# Patient Record
Sex: FEMALE | Race: WHITE | NOT HISPANIC OR LATINO | Employment: OTHER | URBAN - METROPOLITAN AREA
[De-identification: names, ages, dates, MRNs, and addresses within clinical notes are randomized per-mention and may not be internally consistent; named-entity substitution may affect disease eponyms.]

---

## 2017-01-31 ENCOUNTER — TELEPHONE (OUTPATIENT)
Dept: INTERNAL MEDICINE | Facility: CLINIC | Age: 37
End: 2017-01-31

## 2017-01-31 NOTE — TELEPHONE ENCOUNTER
Pt states she passed out for an hour this morning-due to muscle spasm. She does report feeling baseline dizziness and dyspnea on exertion.  No clear orthopnea or PND.  No chest pain, abdominal complaints or urinary complaints.  No prior history of DVT.  Vertigo today.  Refusing ER -appt with Dr Ashford 02/02 9:20AM.  Olive Arthur RN 4:59 PM on 1/31/2017.

## 2017-02-17 DIAGNOSIS — Z87.2: ICD-10-CM

## 2017-02-20 NOTE — TELEPHONE ENCOUNTER
minocycline 50 MG      Last Written Prescription Date:  7/7/16  Last Fill Quantity: 60,   # refills: 5  Last Office Visit : 8/25/16  Future Office visit:  none  Routing refill request to provider for review/approval because:   Refer to last progress notes for plan; give one refill to allow for patient to schedule    No Progress Note/Plan

## 2017-02-21 RX ORDER — MINOCYCLINE HYDROCHLORIDE 50 MG/1
50 CAPSULE ORAL 2 TIMES DAILY
Qty: 60 CAPSULE | Refills: 0 | Status: SHIPPED | OUTPATIENT
Start: 2017-02-21 | End: 2018-01-19

## 2017-02-27 DIAGNOSIS — Z87.2: ICD-10-CM

## 2017-02-28 RX ORDER — MINOCYCLINE HYDROCHLORIDE 50 MG/1
CAPSULE ORAL
Qty: 60 CAPSULE | Refills: 0 | OUTPATIENT
Start: 2017-02-28

## 2017-03-15 DIAGNOSIS — T78.40XA ALLERGIC STATE: Primary | ICD-10-CM

## 2017-03-15 RX ORDER — PSEUDOEPHEDRINE HCL 120 MG/1
120 TABLET, FILM COATED, EXTENDED RELEASE ORAL EVERY 12 HOURS PRN
Qty: 60 TABLET | Refills: 3 | Status: SHIPPED | OUTPATIENT
Start: 2017-03-15 | End: 2017-08-01

## 2017-03-15 NOTE — TELEPHONE ENCOUNTER
sudafed  Last Written Prescription Date:  6/24/15  Last Fill Quantity: 60,   # refills: 5  Last Office Visit : 8/25/16  Future Office visit:  no

## 2017-03-24 DIAGNOSIS — G47.00 INSOMNIA: ICD-10-CM

## 2017-03-27 RX ORDER — NORTRIPTYLINE HYDROCHLORIDE 50 MG/1
CAPSULE ORAL
Qty: 90 CAPSULE | Refills: 0 | OUTPATIENT
Start: 2017-03-27

## 2017-04-19 ENCOUNTER — HOSPITAL ENCOUNTER (EMERGENCY)
Facility: CLINIC | Age: 37
Discharge: HOME | End: 2017-04-19
Attending: EMERGENCY MEDICINE | Admitting: EMERGENCY MEDICINE
Payer: MEDICARE

## 2017-04-19 VITALS
SYSTOLIC BLOOD PRESSURE: 128 MMHG | DIASTOLIC BLOOD PRESSURE: 72 MMHG | HEART RATE: 82 BPM | TEMPERATURE: 97.5 F | OXYGEN SATURATION: 98 % | RESPIRATION RATE: 20 BRPM

## 2017-04-19 DIAGNOSIS — T14.8XXA SKIN EXCORIATION: ICD-10-CM

## 2017-04-19 DIAGNOSIS — T14.8XXA SCRATCH MARK: ICD-10-CM

## 2017-04-19 PROCEDURE — 99282 EMERGENCY DEPT VISIT SF MDM: CPT

## 2017-04-19 RX ORDER — MUPIROCIN 20 MG/G
OINTMENT TOPICAL 3 TIMES DAILY
Qty: 22 G | Refills: 1 | Status: SHIPPED | OUTPATIENT
Start: 2017-04-19 | End: 2017-04-24

## 2017-04-19 RX ORDER — CEPHALEXIN 500 MG/1
500 CAPSULE ORAL 4 TIMES DAILY
Qty: 28 CAPSULE | Refills: 0 | Status: SHIPPED | OUTPATIENT
Start: 2017-04-19 | End: 2017-04-26

## 2017-04-19 NOTE — ED AVS SNAPSHOT
[facility] Emergency Department    [address]    [facility] [address]    Phone:  [phone]    Fax:  [phone]                                       Giovana Joaquin   MRN: [id number]    Department:  [facility] Emergency Department   Date of Visit:  4/19/2017           After Visit Summary Signature Page     I have received my discharge instructions, and my questions have been answered. I have discussed any challenges I see with this plan with the nurse or doctor.    ..........................................................................................................................................  Patient/Patient Representative Signature      ..........................................................................................................................................  Patient Representative Print Name and Relationship to Patient    ..................................................               ................................................  Date                                            Time    ..........................................................................................................................................  Reviewed by Signature/Title    ...................................................              ..............................................  Date                                                            Time

## 2017-04-19 NOTE — ED NOTES
"In to discharge pt now; pt began yelling at nurse about \"awful care\" she received; pt told nurse that MD told patient that facial rash is not shingles and that scratches were caused by her or another person; also states MD turned on light even though pt had migraine for his visit and left it on for his exam; states MD did not treat her migraine; MD consulted; confirmed what he had told patient; patient demands to speak to \"the person in charge\"; charge nurse PJ RN asked to see pt.  "

## 2017-04-19 NOTE — ED AVS SNAPSHOT
[facility] Emergency Department    [address] [place] [address]    Phone:  [phone]    Fax:  [phone]                                       Giovana Joaquin   MRN: [id number]    Department:  [facility] Emergency Department   Date of Visit:  4/19/2017           Patient Information     Date Of Birth          1980        Your diagnoses for this visit were:     Skin excoriation chin    Scratch nino Right shoulder       You were seen by Jorge Wheeler MD.      Follow-up Information     Follow up with Gloria Love MD.    Specialty:  Internal Medicine    Why:  As needed    Contact information:    [facility]  [address]  [facility] [address] [phone]        Discharge References/Attachments     CELLULITIS, FACIAL (ENGLISH)      Future Appointments        Provider Department Dept Phone Center    4/19/2017 10:30 AM Hammad Lowe MD [facility] Ear Nose and Throat [phone] [facility]    5/17/2017 10:30 AM Ray Mcfarland [facility] Audiology [phone] [facility]    5/17/2017 11:45 AM Tobin Carrillo MD [facility] Ear Nose and Throat [phone] [facility]    8/16/2017 10:30 AM Hammad Lowe MD [facility] Ear Nose and Throat [phone] [facility]      24 Hour Appointment Hotline       To make an appointment at any [facility], call [phone]). If you don't have a family doctor or clinic, we will help you find one. Central clinics are conveniently located to serve the needs of you and your family.             Review of your medicines      START taking        Dose / Directions Last dose taken    cephALEXin 500 MG capsule   Commonly known as:  KEFLEX   Dose:  500 mg   Quantity:  28 capsule        Take 1 capsule (500 mg) by mouth 4 times daily for 7 days   Refills:  0        mupirocin 2 % ointment   Commonly known as:  BACTROBAN   Quantity:  22 g        Apply topically 3 times daily for 5 days    Refills:  1          Our records show that you are taking the medicines listed below. If these are incorrect, please call your family doctor or clinic.        Dose / Directions Last dose taken    atenolol 25 MG tablet   Commonly known as:  TENORMIN   Dose:  25 mg   Quantity:  90 tablet        Take 1 tablet (25 mg) by mouth daily   Refills:  3        BACLOFEN PO   Dose:  10 mg        Take 10 mg by mouth 3 times daily   Refills:  0        cetirizine 10 MG tablet   Commonly known as:  ZYRTEC ALLERGY   Dose:  10 mg   Quantity:  90 tablet        Take 1 tablet (10 mg) by mouth daily   Refills:  2        cevimeline 30 MG capsule   Commonly known as:  EVOXAC   Dose:  30 mg   Quantity:  270 capsule        Take 1 capsule (30 mg) by mouth 3 times daily   Refills:  3        esomeprazole 40 MG CR capsule   Commonly known as:  nexIUM   Dose:  40 mg   Quantity:  90 capsule        Take 1 capsule (40 mg) by mouth every morning (before breakfast) One hour before meals   Refills:  3        fentaNYL 12 mcg/hr 72 hr patch   Commonly known as:  DURAGESIC   Dose:  1 patch        Place 1 patch onto the skin every 72 hours   Refills:  0        ferrous sulfate 325 (65 FE) MG tablet   Commonly known as:  IRON   Dose:  325 mg   Quantity:  100 tablet        Take 1 tablet (325 mg) by mouth 2 times daily   Refills:  3        FETZIMA 80 MG 24 hr capsule   Dose:  80 mg   Generic drug:  levomilnacipran        Take 80 mg by mouth daily   Refills:  0        gabapentin 300 MG capsule   Commonly known as:  NEURONTIN        Takes 600 mg qid   Refills:  0        MAGNESIUM OXIDE PO   Dose:  500 mg        Take 500 mg by mouth daily   Refills:  0        meclizine 12.5 MG tablet   Commonly known as:  ANTIVERT   Dose:  12.5-25 mg   Quantity:  90 tablet        Take 1-2 tablets (12.5-25 mg) by mouth 3 times daily as needed for dizziness (may cause drowsiness)   Refills:  1        minocycline 50 MG capsule   Commonly known as:  MINOCIN/DYNACIN   Dose:  50 mg    Quantity:  60 capsule        Take 1 capsule (50 mg) by mouth 2 times daily Refills from originating provider or needs appt to discuss   Refills:  0        MULTIVITAL PO        Take by mouth daily   Refills:  0        nabumetone 500 MG tablet   Commonly known as:  RELAFEN   Dose:  500 mg   Quantity:  180 tablet        Take 1 tablet (500 mg) by mouth 2 times daily   Refills:  3        nortriptyline 50 MG capsule   Commonly known as:  PAMELOR   Dose:  50 mg   Quantity:  90 capsule        Take 1 capsule (50 mg) by mouth At Bedtime   Refills:  1        ondansetron 4 MG ODT tab   Commonly known as:  ZOFRAN-ODT   Dose:  4 mg   Quantity:  20 tablet        Take 1 tablet (4 mg) by mouth every 8 hours as needed for nausea   Refills:  1        oxyCODONE-acetaminophen 5-325 MG per tablet   Commonly known as:  PERCOCET   Dose:  0.05 tablet        Take 0.05 tablets by mouth 2 times daily   Refills:  0        pseudoePHEDrine 120 MG 12 hr tablet   Commonly known as:  SUDAFED   Dose:  120 mg   Quantity:  60 tablet        Take 1 tablet (120 mg) by mouth every 12 hours as needed for congestion   Refills:  3        STRATTERA 60 MG capsule   Dose:  80 mg   Generic drug:  atomoxetine        Take 80 mg by mouth daily   Refills:  0        * SUMAtriptan 6 MG/0.5ML injection   Commonly known as:  IMITREX   Dose:  6 mg   Quantity:  6 mL        Inject 0.5 mLs (6 mg) Subcutaneous once as needed May repeat once after one hour, max 2 doses in 24 hours   Refills:  1        * SUMAtriptan 100 MG tablet   Commonly known as:  IMITREX   Dose:  100 mg   Quantity:  27 tablet        Take 1 tablet (100 mg) by mouth at onset of headache May repeat after one hour, max 200 mg in 24 hours   Refills:  2        * SUMAtriptan 50 MG tablet   Commonly known as:  IMITREX   Dose:   mg   Quantity:  27 tablet        Take 1-2 tablets ( mg) by mouth at onset of headache for migraine (max 100 mg in 24 hours)   Refills:  2        vitamin D 2000 UNITS Caps         Refills:  0        * Notice:  This list has 3 medication(s) that are the same as other medications prescribed for you. Read the directions carefully, and ask your doctor or other care provider to review them with you.            Prescriptions were sent or printed at these locations (2 Prescriptions)                   Other Prescriptions                Printed at Department/Unit printer (2 of 2)         cephALEXin (KEFLEX) 500 MG capsule               mupirocin (BACTROBAN) 2 % ointment                Orders Needing Specimen Collection     None      Pending Results     No orders found from 4/17/2017 to 4/20/2017.            Pending Culture Results     No orders found from 4/17/2017 to 4/20/2017.            Test Results From Your Hospital Stay               Clinical Quality Measure: Blood Pressure Screening     Your blood pressure was checked while you were in the emergency department today. The last reading we obtained was  BP: 135/83 . Please read the guidelines below about what these numbers mean and what you should do about them.  If your systolic blood pressure (the top number) is less than 120 and your diastolic blood pressure (the bottom number) is less than 80, then your blood pressure is normal. There is nothing more that you need to do about it.  If your systolic blood pressure (the top number) is 120-139 or your diastolic blood pressure (the bottom number) is 80-89, your blood pressure may be higher than it should be. You should have your blood pressure rechecked within a year by a primary care provider.  If your systolic blood pressure (the top number) is 140 or greater or your diastolic blood pressure (the bottom number) is 90 or greater, you may have high blood pressure. High blood pressure is treatable, but if left untreated over time it can put you at risk for heart attack, stroke, or kidney failure. You should have your blood pressure rechecked by a primary care provider within the next 4 weeks.  If  your provider in the emergency department today gave you specific instructions to follow-up with your doctor or provider even sooner than that, you should follow that instruction and not wait for up to 4 weeks for your follow-up visit.        Thank you for choosing [facility]       Thank you for choosing [facility] for your care. Our goal is always to provide you with excellent care. Hearing back from our patients is one way we can continue to improve our services. Please take a few minutes to complete the written survey that you may receive in the mail after you visit with us. Thank you!        This Week [facility] Information     [facility] gives you secure access to your electronic health record. If you see a primary care provider, you can also send messages to your care team and make appointments. If you have questions, please call your primary care clinic.  If you do not have a primary care provider, please call [phone] and they will assist you.        Care EveryWhere ID     This is your Care EveryWhere ID. This could be used by other organizations to access your [facility] medical records  [id number]        After Visit Summary       This is your record. Keep this with you and show to your community pharmacist(s) and doctor(s) at your next visit.

## 2017-04-19 NOTE — ED NOTES
Pt to ER with c/o rash to face,  And right upper arm pt concerned for shingles has had them in the past

## 2017-04-19 NOTE — ED PROVIDER NOTES
History     Chief Complaint:  Rash      HPI   Giovana Joaquin is a 37 year old female who presents with a rash. The patient states that for the last month she developed a sores in her chin area, which seemed to get more crusty yesterday evening/this morning. After noticing this, she cleaned and then applied hydrogen peroxide to the area. This was immediately painful, and she has had pain at the site since. She also comments on a rash on her right shoulder as well.        Allergies:  Ambien   Levaquinn  Morphine      Medications:    Pseudoephedrine (sudafed) 120 mg 12 hr tablet  Minocycline (minocin/dynacin) 50 mg capsule  Nortriptyline (pamelor) 50 mg capsule  Ondansetron (zofran-odt) 4 mg odt tab  Meclizine (antivert) 12.5 mg tablet  Cetirizine (zyrtec allergy) 10 mg tablet  Sumatriptan (imitrex) 100 mg tablet  Sumatriptan (imitrex) 50 mg tablet  Atenolol (tenormin) 25 mg tablet  Nabumetone (relafen) 500 mg tablet  Cevimeline (evoxac) 30 mg capsule  Sumatriptan (imitrex) 6 mg/0.5ml vial  Magnesium oxide po  Levomilnacipran (fetzima) 80 mg 24 hr capsule  Esomeprazole (nexium) 40 mg capsule  Oxycodone-acetaminophen (percocet) 5-325 mg per tablet  Fentanyl (duragesic) 12 mcg/hr patch 72 hr  Atomoxetine (strattera) 60 mg capsule  Multiple vitamins-minerals (multivital po)  Cholecalciferol (vitamin d) 2000 units caps  Baclofen po  Gabapentin 300 mg or caps  Ferrous sulfate (iron) 325 (65 fe) mg tablet    Past Medical History:    ADD (attention deficit disorder)   Anxiety   Blood transfusion   Brain tumor (H)   Chronic pain   Cryoglobulinemia (H)   Difficult intubation   EDS (Castro-Danlos syndrome)   Gastro-oesophageal reflux disease   Hypertension   IBS (irritable bowel syndrome)   MDD (major depressive disorder)   Migraine   Obesity (BMI 30-39.9) Velopharyngeal insufficiency, acquired   Oligomenorrhea  Dysfunction of eustachian tube  Velopharyngeal insufficiency, acquired  Chordoma (H)    Past Surgical History:     Biopsy  Brain tumor removal  DAVINCI ASSISTED UVULOPALATOPHARYNGOPLASTY  Neck fusion to C3  Proton particle radiation  Soft palette removed, throat x4    Family History:    Arthritis - Father     Social History:  Marital Status: Single  Presents to the ED alone  Tobacco Use: Former Smoker, 0.2 ppd for 10 years, quit 2013   Alcohol Use: Yes  PCP: Gloria Love        Review of Systems   Skin: Positive for rash.   All other systems reviewed and are negative.        Physical Exam   First Vitals:  BP: 135/83  Pulse: 94  Temp: 97.5  F (36.4  C)  Resp: 18  SpO2: 100 %      Physical Exam    Nursing note and vitals reviewed.  Constitutional: Cooperative.   HENT:   Mouth/Throat: Mucous membranes are normal.   Eyes: Pupils are equal, round, and reactive to light.   Cardiovascular: Normal rate, regular rhythm and normal heart sounds.  No murmur.  Pulmonary/Chest: Effort normal and breath sounds normal. No respiratory distress. Musculoskeletal: Normal range of motion of UE's.   Neurological: Alert.   Skin: Skin is warm and dry. Linear excoriations to the right shoulder consistent with scratch marks, no evidence of cellulitis Chin: multiple open excoriations consistent with picking. No vesicular lesions. No cellulitis   Psychiatric: Normal mood and affect.     Emergency Department Course     Emergency Department Course:  Nursing notes and vitals reviewed.  I performed an exam of the patient as documented above.   Findings and plan explained to the patient. Patient discharged home with instructions regarding supportive care, medications, and reasons to return. The importance of close follow-up was reviewed. The patient was prescribed Keflex and Bactroban.       Impression & Plan      Medical Decision Making:  Patient is a 37 year old female who presents with a rash on her chin and scratch marks on her shoulder. She states that she has not scratched, but it is very clear that these are linear excoriations to her right  shoulder consistent with where her left arm would be able to reach. There could be another cause beside self induced, though I'm suspicious. Regardless, there is no evidence of cellulitis or secondary infection here. With regards to the chin, there is multiple excoriations that are in various stages of healing. These are not vesicular. Perhaps this is impetigo, though she just cleaned with H2O2 so it doesn't have an crusting at this time. She was worried about shingles, but with being on bilateral sides of the face, this seems unlikely. Topical treatment with Bactroban as well as oral antibiotics would be reasonable at this time. No indication for imaging, lab work or further workup.         Diagnosis:    ICD-10-CM    1. Skin excoriation T14.8     chin   2. Scratch nino T14.8     Right shoulder       Disposition:  Discharged to home    Discharge Medications:  Discharge Medication List as of 4/19/2017  2:55 AM      START taking these medications    Details   cephALEXin (KEFLEX) 500 MG capsule Take 1 capsule (500 mg) by mouth 4 times daily for 7 days, Disp-28 capsule, R-0, Local Print      mupirocin (BACTROBAN) 2 % ointment Apply topically 3 times daily for 5 daysDisp-22 g, R-1Local Print             Hamilton RIVERA am serving as a scribe on 4/19/2017 at 2:25 AM to personally document services performed by Dr. Wheeler based on my observations and the provider's statements to me.   4/19/2017   [facility] EMERGENCY DEPARTMENT       Jorge Wheeler MD  04/19/17 [id number]

## 2017-05-09 DIAGNOSIS — H93.19 TINNITUS: Primary | ICD-10-CM

## 2017-05-16 DIAGNOSIS — Z87.2: ICD-10-CM

## 2017-05-20 RX ORDER — MINOCYCLINE HYDROCHLORIDE 50 MG/1
50 CAPSULE ORAL 2 TIMES DAILY
Qty: 60 CAPSULE | Refills: 0 | OUTPATIENT
Start: 2017-05-20

## 2017-05-22 ENCOUNTER — TELEPHONE (OUTPATIENT)
Dept: INTERNAL MEDICINE | Facility: CLINIC | Age: 37
End: 2017-05-22

## 2017-05-22 NOTE — TELEPHONE ENCOUNTER
----- Message from Abebe Shi sent at 5/19/2017 12:28 PM CDT -----  Regarding: Medication question   Contact: [phone]  Patient reports she was seen Tuesday morning in the ED for generalized body swelling. They did some test but could not figure out what was wrong with her. She states she was prescribed hydrochlorothiazide at the ED, however, does not like this medication and would like to speak with you about this.       Left message for pt to call back.  Olive Arthur RN 11:33 AM on 5/22/2017.

## 2017-05-22 NOTE — TELEPHONE ENCOUNTER
----- Message from Chelsea Reid sent at 5/22/2017  1:48 PM CDT -----  Regarding: Saul Farerll, Patient wants to be see sooner.   Tammy Gardner wants to have a follow-up to her recent ER visit. She has an Infection + blisters on the face. Patient is allergic to the antibiotic the ER DrPiyush gave her. He also gave her cream for her face, that says not to put onto open sores. Face is not healing completely. Patient is swollen everywhere. She would like to get in with Dr. Farrell if possible. She is not scheduled to see Dr. Regalado tomorrow, but if there's any chance to see Dr. Farrell that would be better. Please f/u.     Left message for pt to call back.  Olive Arthur RN 3:12 PM on 5/22/2017.

## 2017-06-08 ENCOUNTER — ONCOLOGY VISIT (OUTPATIENT)
Dept: ONCOLOGY | Facility: CLINIC | Age: 37
End: 2017-06-08
Attending: INTERNAL MEDICINE
Payer: COMMERCIAL

## 2017-06-08 ENCOUNTER — ONCOLOGY VISIT (OUTPATIENT)
Dept: ONCOLOGY | Facility: CLINIC | Age: 37
End: 2017-06-08
Attending: INTERNAL MEDICINE
Payer: MEDICARE

## 2017-06-08 ENCOUNTER — HOSPITAL ENCOUNTER (OUTPATIENT)
Facility: CLINIC | Age: 37
Setting detail: SPECIMEN
Discharge: HOME | End: 2017-06-08
Attending: INTERNAL MEDICINE | Admitting: INTERNAL MEDICINE
Payer: MEDICARE

## 2017-06-08 VITALS
BODY MASS INDEX: 43.99 KG/M2 | SYSTOLIC BLOOD PRESSURE: 109 MMHG | HEART RATE: 82 BPM | TEMPERATURE: 97.7 F | OXYGEN SATURATION: 97 % | RESPIRATION RATE: 18 BRPM | WEIGHT: 232.8 LBS | DIASTOLIC BLOOD PRESSURE: 73 MMHG

## 2017-06-08 DIAGNOSIS — D50.9 IRON DEFICIENCY ANEMIA, UNSPECIFIED IRON DEFICIENCY ANEMIA TYPE: ICD-10-CM

## 2017-06-08 LAB
BASOPHILS # BLD AUTO: 0 10E9/L (ref 0–0.2)
BASOPHILS NFR BLD AUTO: 0.2 %
DIFFERENTIAL METHOD BLD: ABNORMAL
EOSINOPHIL # BLD AUTO: 0.2 10E9/L (ref 0–0.7)
EOSINOPHIL NFR BLD AUTO: 2.4 %
ERYTHROCYTE [DISTWIDTH] IN BLOOD BY AUTOMATED COUNT: 14.5 % (ref 10–15)
FERRITIN SERPL-MCNC: 24 NG/ML (ref 12–150)
HCT VFR BLD AUTO: 35 % (ref 35–47)
HGB BLD-MCNC: 11 G/DL (ref 11.7–15.7)
IMM GRANULOCYTES # BLD: 0 10E9/L (ref 0–0.4)
IMM GRANULOCYTES NFR BLD: 0.4 %
IRON SATN MFR SERPL: 5 % (ref 15–46)
IRON SERPL-MCNC: 19 UG/DL (ref 35–180)
LYMPHOCYTES # BLD AUTO: 1.4 10E9/L (ref 0.8–5.3)
LYMPHOCYTES NFR BLD AUTO: 16.1 %
MCH RBC QN AUTO: 28.4 PG (ref 26.5–33)
MCHC RBC AUTO-ENTMCNC: 31.4 G/DL (ref 31.5–36.5)
MCV RBC AUTO: 90 FL (ref 78–100)
MONOCYTES # BLD AUTO: 0.8 10E9/L (ref 0–1.3)
MONOCYTES NFR BLD AUTO: 9.4 %
NEUTROPHILS # BLD AUTO: 6 10E9/L (ref 1.6–8.3)
NEUTROPHILS NFR BLD AUTO: 71.5 %
NRBC # BLD AUTO: 0 10*3/UL
NRBC BLD AUTO-RTO: 0 /100
PLATELET # BLD AUTO: 278 10E9/L (ref 150–450)
RBC # BLD AUTO: 3.88 10E12/L (ref 3.8–5.2)
RETICS # AUTO: 70.6 10E9/L (ref 25–95)
RETICS/RBC NFR AUTO: 1.8 % (ref 0.5–2)
TIBC SERPL-MCNC: 378 UG/DL (ref 240–430)
TRANSFERRIN SERPL-MCNC: 298 MG/DL (ref 210–360)
WBC # BLD AUTO: 8.4 10E9/L (ref 4–11)

## 2017-06-08 PROCEDURE — 85025 COMPLETE CBC W/AUTO DIFF WBC: CPT | Performed by: INTERNAL MEDICINE

## 2017-06-08 PROCEDURE — 99211 OFF/OP EST MAY X REQ PHY/QHP: CPT

## 2017-06-08 PROCEDURE — 36415 COLL VENOUS BLD VENIPUNCTURE: CPT

## 2017-06-08 PROCEDURE — 99214 OFFICE O/P EST MOD 30 MIN: CPT | Performed by: INTERNAL MEDICINE

## 2017-06-08 PROCEDURE — 82728 ASSAY OF FERRITIN: CPT | Performed by: INTERNAL MEDICINE

## 2017-06-08 PROCEDURE — 85045 AUTOMATED RETICULOCYTE COUNT: CPT | Performed by: INTERNAL MEDICINE

## 2017-06-08 PROCEDURE — 84466 ASSAY OF TRANSFERRIN: CPT | Performed by: INTERNAL MEDICINE

## 2017-06-08 PROCEDURE — 83540 ASSAY OF IRON: CPT | Performed by: INTERNAL MEDICINE

## 2017-06-08 PROCEDURE — 83550 IRON BINDING TEST: CPT | Performed by: INTERNAL MEDICINE

## 2017-06-08 RX ORDER — FERROUS SULFATE 325(65) MG
325 TABLET ORAL 2 TIMES DAILY
Qty: 100 TABLET | Refills: 3 | Status: SHIPPED | OUTPATIENT
Start: 2017-06-08 | End: 2018-06-13

## 2017-06-08 ASSESSMENT — PAIN SCALES - GENERAL: PAINLEVEL: NO PAIN (0)

## 2017-06-08 NOTE — PROGRESS NOTES
[facility] PHYSICIANS  HEMATOLOGY ONCOLOGY    HEMATOLOGY FOLLOWUP NOTE      DIAGNOSES:   1.  Iron deficiency anemia      TREATMENT:  Iron sulfate 325 mg daily.      SUBJECTIVE:  Giovana Joaquin comes in for followup today. She has been at her baseline except for some complain of hair loss.      REVIEW OF SYSTEMS:  A complete review of systems was performed and found to be negative other than pertinent positives mentioned in history of present illness.     Past medical, social histories reviewed.    Meds- Reviewed.     PHYSICAL EXAMINATION:   VITAL SIGNS:/73 (BP Location: Left arm, Patient Position: Chair, Cuff Size: Adult Large)  Pulse 82  Temp 97.7  F (36.5  C) (Oral)  Resp 18  Wt 105.6 kg (232 lb 12.8 oz)  SpO2 97%  BMI 43.99 kg/m2  GENERAL: Sitting comfortably.   HEENT: Pupils are equal. Oropharynx is clear.   NECK: No cervical or supraclavicular lymphadenopathy.   LUNGS: Clear bilaterally.   HEART: S1, S2, regular.   ABDOMEN: Soft, nontender, nondistended, no hepatosplenomegaly.   EXTREMITIES: Warm, well perfused.   NEUROLOGIC: Alert, awake.   SKIN: No rash.   LYMPHATICS: No edema.     DATA:  Recent Labs   Lab Test  08/25/16   1231  07/27/16   1055   07/20/15   1150   05/10/13   0624  05/08/13   0355   NA  141  138   < >  138   < >   --   143   POTASSIUM  4.4  3.7   < >  3.8   < >   --   3.7   CHLORIDE  105  102   < >  104   < >   --   110*   CO2  30  29   < >  28   < >   --   24   ANIONGAP  6  7   < >  6   < >   --   9   BUN  12  17   < >  11   < >   --   9   CR  0.73  0.75   < >  0.68   < >   --   0.58   GLC  86  103*   < >  81   < >   --   108*   MIROSLAVA  9.6  9.2   < >  9.2   < >   --   8.9   MAG   --    --    --   2.0   --   2.0  1.7   PHOS   --    --    --    --    --   5.3*  4.4    < > = values in this interval not displayed.     Recent Labs   Lab Test  09/26/16   1359  08/25/16   1231  07/27/16   1055   07/20/15   1150   WBC  9.7  9.9  8.7   < >  10.8   HGB  12.1  10.6*  10.1*   < >   11.5*   PLT  265  297  293   < >  327   MCV  88  88  84   < >  85   NEUTROPHIL  67.8   --   65.3   --   75.1    < > = values in this interval not displayed.     Recent Labs   Lab Test  08/25/16   1231  07/27/16   1055  07/14/16   1046   08/11/14   1533   BILITOTAL  0.4  0.4  0.5   < >  0.3   ALKPHOS  134  136  142   < >  140   ALT  20  17  23   < >  27   AST  16  14  18   < >  16   ALBUMIN  3.3*  3.4  3.7   < >  3.5*   LDH   --   189   --    --   240*    < > = values in this interval not displayed.         Results for orders placed or performed during the hospital encounter of 08/25/16   US Lower Extremity Venous Duplex Bilateral    Narrative    US LOWER EXTREMITY VENOUS DUPLEX BILATERAL  8/25/2016 2:54 PM     HISTORY: Other specified soft tissue disorders    COMPARISON: None.    FINDINGS: Examination of the deep veins with graded compression and  color flow Doppler with spectral wave form analysis shows no evidence  of thrombus.      Impression    IMPRESSION: No evidence of deep venous thrombosis.      KELVIN CENTENO MD         ECOG performance status of 0.      ASSESSMENT:    1- This is a 34-year-old lady who was initially seen for neutrophilia. 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  She had a 42 radiation treatments for that since 2006.  She had evidence of leukocytosis and neutrophilia and absolute monocytosis. Negative for KING-2 mutation and BCR-ABL. She also had a polyclonal IgM immunoglobulin detected, unclear etiology which resolved later. She had abnormal serum free light chain ratio. Bone scan 7/16/15- possible focal lucency in calvarium- not a clear cut lytic lesion, level of suspicion was not high given no other lab abnormality indicting myeloma. Resolved.  2-  Iron deficiency anemia- labs are in process today. We will call her with the results.     PLAN:   1- Continue iron sulfate 325 mg BID  2- RTC MD 6 months  3- Labs before next visit-  CBC diff retic iron studies    SUMIT RUBY MD    6/8/2017

## 2017-06-08 NOTE — PROGRESS NOTES
"Oncology Rooming Note    June 8, 2017 12:06 PM   Giovana Joaquin is a 37 year old female who presents for:    Chief Complaint   Patient presents with     Oncology Clinic Visit     Chordoma      Initial Vitals: /73 (BP Location: Left arm, Patient Position: Chair, Cuff Size: Adult Large)  Pulse 82  Temp 97.7  F (36.5  C) (Oral)  Resp 18  Wt 105.6 kg (232 lb 12.8 oz)  SpO2 97%  BMI 43.99 kg/m2 Estimated body mass index is 43.99 kg/(m^2) as calculated from the following:    Height as of 8/13/16: 1.549 m (5' 1\").    Weight as of this encounter: 105.6 kg (232 lb 12.8 oz). Body surface area is 2.13 meters squared.  No Pain (0) Comment: Data Unavailable   No LMP recorded. Patient is not currently having periods (Reason: Irregular Periods).  Allergies reviewed: Yes  Medications reviewed: Yes    Medications: Medication refills not needed today.  Pharmacy name entered into EPIC:    [facility] [place] [address] [facility] [address] [place] [address] [address] [address] AT [place] [address] [address] & [facility]  [facility] [address] [place] [address] [id number] [address] [address]    Clinical concerns: Follow-Up Dr. Lan was notified.    6 minutes for nursing intake (face to face time)     Dinora Higuera MA    DISCHARGE PLAN:  1.) Patient to scheduled for labs and follow up in 6 months with Dr. Eduardo.   2.) Patient to be called with lab results from today.  Next appointments: See patient instruction section  Departure Mode: Ambulatory  Accompanied by: self  6 minutes for nursing discharge (face to face time)   Tea Farnsworth RN            "

## 2017-06-08 NOTE — MR AVS SNAPSHOT
After Visit Summary   6/8/2017    Giovana Joaquin    MRN: [id number]           Patient Information     Date Of Birth          1980        Visit Information        Provider Department      6/8/2017 11:30 AM Natacha Lan MD [facility]        Today's Diagnoses     Iron deficiency anemia, unspecified iron deficiency anemia type          Care Instructions    1- Continue iron sulfate 325 mg BID  2- RTC MD 6 months  3- Labs before next visit- CBC diff retic iron studies          Follow-ups after your visit        Your next 10 appointments already scheduled     Aug 16, 2017 10:30 AM CDT   (Arrive by 10:15 AM)   Return Visit with Hammad Lowe MD   [facility] ([facility])    [address] [place] [address]   [phone]            Sep 20, 2017 10:30 AM CDT   (Arrive by 10:15 AM)   Return Visit with Hammad Lowe MD   [facility] ([facility])    [address] [place] [address]   [phone]            Dec 05, 2017  1:00 PM CST   Return Visit with  Oncology Nurse   [facility] ([facility])    [facility]  [address]  [facility] [address] [phone]            Dec 07, 2017 11:00 AM CST   Return Visit with Natacha Lan MD   [facility] ([facility])    [facility]  [address]  [facility] [address] [phone]              Who to contact     If you have questions or need follow up information about today's clinic visit or your schedule please contact [facility] directly at [phone].  Normal or non-critical lab and imaging results will be communicated to you by MyChart, letter or phone within 4 business days after the clinic has received the results. If you do not hear from us within 7 days,  please contact the clinic through [employer] or phone. If you have a critical or abnormal lab result, we will notify you by phone as soon as possible.  Submit refill requests through [employer] or call your pharmacy and they will forward the refill request to us. Please allow 3 business days for your refill to be completed.          Additional Information About Your Visit        [employer] Information     [employer] gives you secure access to your electronic health record. If you see a primary care provider, you can also send messages to your care team and make appointments. If you have questions, please call your primary care clinic.  If you do not have a primary care provider, please call [phone] and they will assist you.        Care EveryWhere ID     This is your Care EveryWhere ID. This could be used by other organizations to access your [place] medical records  [id number]        Your Vitals Were     Pulse Temperature Respirations Pulse Oximetry BMI (Body Mass Index)       82 97.7  F (36.5  C) (Oral) 18 97% 43.99 kg/m2        Blood Pressure from Last 3 Encounters:   06/08/17 109/73   04/19/17 128/72   09/28/16 118/66    Weight from Last 3 Encounters:   06/08/17 105.6 kg (232 lb 12.8 oz)   09/28/16 108 kg (238 lb 3.2 oz)   08/25/16 110.2 kg (243 lb)              Today, you had the following     No orders found for display         Where to get your medicines      These medications were sent to [facility] [address] [address] AT [facility] [address] & [address] [address]    Hours:  24-hours Phone:  [phone]     ferrous sulfate 325 (65 FE) MG tablet          Primary Care Provider Office Phone # Fax #    Gloria Love [phone]       [address]  [facility] [address]        Thank you!     Thank you for choosing [facility]  for your care. Our goal is always to provide you with excellent care.  Hearing back from our patients is one way we can continue to improve our services. Please take a few minutes to complete the written survey that you may receive in the mail after your visit with us. Thank you!             Your Updated Medication List - Protect others around you: Learn how to safely use, store and throw away your medicines at www.disposemymeds.org.          This list is accurate as of: 6/8/17 12:27 PM.  Always use your most recent med list.                   Brand Name Dispense Instructions for use    atenolol 25 MG tablet    TENORMIN    90 tablet    Take 1 tablet (25 mg) by mouth daily       BACLOFEN PO      Take 10 mg by mouth 3 times daily       cetirizine 10 MG tablet    ZYRTEC ALLERGY    90 tablet    Take 1 tablet (10 mg) by mouth daily       cevimeline 30 MG capsule    EVOXAC    270 capsule    Take 1 capsule (30 mg) by mouth 3 times daily       esomeprazole 40 MG CR capsule    nexIUM    90 capsule    Take 1 capsule (40 mg) by mouth every morning (before breakfast) One hour before meals       fentaNYL 12 mcg/hr 72 hr patch    DURAGESIC     Place 1 patch onto the skin every 72 hours       ferrous sulfate 325 (65 FE) MG tablet    IRON    100 tablet    Take 1 tablet (325 mg) by mouth 2 times daily       FETZIMA 80 MG 24 hr capsule   Generic drug:  levomilnacipran      Take 80 mg by mouth daily       gabapentin 300 MG capsule    NEURONTIN     Takes 600 mg qid       MAGNESIUM OXIDE PO      Take 500 mg by mouth daily       meclizine 12.5 MG tablet    ANTIVERT    90 tablet    Take 1-2 tablets (12.5-25 mg) by mouth 3 times daily as needed for dizziness (may cause drowsiness)       minocycline 50 MG capsule    MINOCIN/DYNACIN    60 capsule    Take 1 capsule (50 mg) by mouth 2 times daily Refills from originating provider or needs appt to discuss       MULTIVITAL PO      Take by mouth daily       nabumetone 500 MG tablet    RELAFEN    180 tablet    Take 1 tablet (500 mg) by mouth 2 times daily        nortriptyline 50 MG capsule    PAMELOR    90 capsule    Take 1 capsule (50 mg) by mouth At Bedtime       ondansetron 4 MG ODT tab    ZOFRAN-ODT    20 tablet    Take 1 tablet (4 mg) by mouth every 8 hours as needed for nausea       oxyCODONE-acetaminophen 5-325 MG per tablet    PERCOCET     Take 0.05 tablets by mouth 2 times daily       pseudoePHEDrine 120 MG 12 hr tablet    SUDAFED    60 tablet    Take 1 tablet (120 mg) by mouth every 12 hours as needed for congestion       REXULTI 0.5 MG tablet   Generic drug:  brexpiprazole      Take 1 mg by mouth daily       STRATTERA 60 MG capsule   Generic drug:  atomoxetine      Take 80 mg by mouth daily       * SUMAtriptan 6 MG/0.5ML injection    IMITREX    6 mL    Inject 0.5 mLs (6 mg) Subcutaneous once as needed May repeat once after one hour, max 2 doses in 24 hours       * SUMAtriptan 100 MG tablet    IMITREX    27 tablet    Take 1 tablet (100 mg) by mouth at onset of headache May repeat after one hour, max 200 mg in 24 hours       * SUMAtriptan 50 MG tablet    IMITREX    27 tablet    Take 1-2 tablets ( mg) by mouth at onset of headache for migraine (max 100 mg in 24 hours)       vitamin D 2000 UNITS Caps          * Notice:  This list has 3 medication(s) that are the same as other medications prescribed for you. Read the directions carefully, and ask your doctor or other care provider to review them with you.

## 2017-06-08 NOTE — PROGRESS NOTES
Medical Assistant Note:  Giovana Joaquin presents today for lab visit.    Patient seen by provider today: Yes: Dr. Lan.   present during visit today: Not Applicable.    Concerns: No Concerns.    Procedure:  Lab draw site: LAC, Needle type: BF, Gauge: 21 g gauze and coban applied.    Post Assessment:  Labs drawn without difficulty: Yes.    Discharge Plan:  Departure Mode: Ambulatory.    Face to Face Time: 5.    Rosalinda Macario MA

## 2017-06-08 NOTE — PATIENT INSTRUCTIONS
1- Continue iron sulfate 325 mg BID  2- RTC MD 6 months   *Scheduled/Tory  3- Labs before next visit- CBC diff retic iron studies *Scheduled/Janice    AVS printed & given to patient/Tory

## 2017-06-08 NOTE — MR AVS SNAPSHOT
After Visit Summary   6/8/2017    Giovana Joaquin    MRN: [id number]           Patient Information     Date Of Birth          1980        Visit Information        Provider Department      6/8/2017 10:45 AM Nurse,  Oncology [facility]        Today's Diagnoses     Iron deficiency anemia, unspecified iron deficiency anemia type           Follow-ups after your visit        Your next 10 appointments already scheduled     Jun 08, 2017 11:30 AM CDT   Return Visit with Natacha Lan MD   [facility])    [facility]  [address]  [facility] [address]   [phone]            Aug 16, 2017 10:30 AM CDT   (Arrive by 10:15 AM)   Return Visit with Hammad Lowe MD   [facility])    [address] [place] [id number] [phone]            Sep 20, 2017 10:30 AM CDT   (Arrive by 10:15 AM)   Return Visit with Hammad Lowe MD   [facility])    [address] [place] [id number] [phone]              Who to contact     If you have questions or need follow up information about today's clinic visit or your schedule please contact [facility] directly at [phone].  Normal or non-critical lab and imaging results will be communicated to you by MyChart, letter or phone within 4 business days after the clinic has received the results. If you do not hear from us within 7 days, please contact the clinic through MyChart or phone. If you have a critical or abnormal lab result, we will notify you by phone as soon as possible.  Submit refill requests through BRIKA or call your pharmacy and they will forward the refill request to us. Please allow 3 business days for your refill to be completed.          Additional Information About Your Visit         [employer]k Information     [employer] gives you secure access to your electronic health record. If you see a primary care provider, you can also send messages to your care team and make appointments. If you have questions, please call your primary care clinic.  If you do not have a primary care provider, please call [phone] and they will assist you.        Care EveryWhere ID     This is your Care EveryWhere ID. This could be used by other organizations to access your [place] medical records  [id number]         Blood Pressure from Last 3 Encounters:   04/19/17 128/72   09/28/16 118/66   08/25/16 121/72    Weight from Last 3 Encounters:   09/28/16 108 kg (238 lb 3.2 oz)   08/25/16 110.2 kg (243 lb)   08/13/16 111.1 kg (245 lb)              We Performed the Following     CBC with platelets differential     Ferritin     Iron and iron binding capacity     Reticulocyte count     Transferrin        Primary Care Provider Office Phone # Fax #    Gloria Love [phone]       [address]  [facility] [address]        Thank you!     Thank you for choosing [facility]  for your care. Our goal is always to provide you with excellent care. Hearing back from our patients is one way we can continue to improve our services. Please take a few minutes to complete the written survey that you may receive in the mail after your visit with us. Thank you!             Your Updated Medication List - Protect others around you: Learn how to safely use, store and throw away your medicines at www.disposemymeds.org.          This list is accurate as of: 6/8/17 11:28 AM.  Always use your most recent med list.                   Brand Name Dispense Instructions for use    atenolol 25 MG tablet    TENORMIN    90 tablet    Take 1 tablet (25 mg) by mouth daily       BACLOFEN PO      Take 10 mg by mouth 3 times daily       cetirizine 10 MG tablet    ZYRTEC ALLERGY    90 tablet    Take  1 tablet (10 mg) by mouth daily       cevimeline 30 MG capsule    EVOXAC    270 capsule    Take 1 capsule (30 mg) by mouth 3 times daily       esomeprazole 40 MG CR capsule    nexIUM    90 capsule    Take 1 capsule (40 mg) by mouth every morning (before breakfast) One hour before meals       fentaNYL 12 mcg/hr 72 hr patch    DURAGESIC     Place 1 patch onto the skin every 72 hours       ferrous sulfate 325 (65 FE) MG tablet    IRON    100 tablet    Take 1 tablet (325 mg) by mouth 2 times daily       FETZIMA 80 MG 24 hr capsule   Generic drug:  levomilnacipran      Take 80 mg by mouth daily       gabapentin 300 MG capsule    NEURONTIN     Takes 600 mg qid       MAGNESIUM OXIDE PO      Take 500 mg by mouth daily       meclizine 12.5 MG tablet    ANTIVERT    90 tablet    Take 1-2 tablets (12.5-25 mg) by mouth 3 times daily as needed for dizziness (may cause drowsiness)       minocycline 50 MG capsule    MINOCIN/DYNACIN    60 capsule    Take 1 capsule (50 mg) by mouth 2 times daily Refills from originating provider or needs appt to discuss       MULTIVITAL PO      Take by mouth daily       nabumetone 500 MG tablet    RELAFEN    180 tablet    Take 1 tablet (500 mg) by mouth 2 times daily       nortriptyline 50 MG capsule    PAMELOR    90 capsule    Take 1 capsule (50 mg) by mouth At Bedtime       ondansetron 4 MG ODT tab    ZOFRAN-ODT    20 tablet    Take 1 tablet (4 mg) by mouth every 8 hours as needed for nausea       oxyCODONE-acetaminophen 5-325 MG per tablet    PERCOCET     Take 0.05 tablets by mouth 2 times daily       pseudoePHEDrine 120 MG 12 hr tablet    SUDAFED    60 tablet    Take 1 tablet (120 mg) by mouth every 12 hours as needed for congestion       STRATTERA 60 MG capsule   Generic drug:  atomoxetine      Take 80 mg by mouth daily       * SUMAtriptan 6 MG/0.5ML injection    IMITREX    6 mL    Inject 0.5 mLs (6 mg) Subcutaneous once as needed May repeat once after one hour, max 2 doses in 24 hours       *  SUMAtriptan 100 MG tablet    IMITREX    27 tablet    Take 1 tablet (100 mg) by mouth at onset of headache May repeat after one hour, max 200 mg in 24 hours       * SUMAtriptan 50 MG tablet    IMITREX    27 tablet    Take 1-2 tablets ( mg) by mouth at onset of headache for migraine (max 100 mg in 24 hours)       vitamin D 2000 UNITS Caps          * Notice:  This list has 3 medication(s) that are the same as other medications prescribed for you. Read the directions carefully, and ask your doctor or other care provider to review them with you.

## 2017-06-16 DIAGNOSIS — G47.00 INSOMNIA: ICD-10-CM

## 2017-06-20 DIAGNOSIS — M19.90 ARTHRITIS: ICD-10-CM

## 2017-06-20 RX ORDER — NORTRIPTYLINE HYDROCHLORIDE 50 MG/1
50 CAPSULE ORAL AT BEDTIME
Qty: 30 CAPSULE | Refills: 0 | Status: SHIPPED | OUTPATIENT
Start: 2017-06-20 | End: 2017-07-21

## 2017-06-20 RX ORDER — NABUMETONE 500 MG/1
500 TABLET, FILM COATED ORAL 2 TIMES DAILY
Qty: 180 TABLET | Refills: 0 | Status: SHIPPED | OUTPATIENT
Start: 2017-06-20 | End: 2017-11-20

## 2017-06-20 NOTE — TELEPHONE ENCOUNTER
nortriptyline  Last Written Prescription Date:  12/20/16  Last Fill Quantity: 90,   # refills: 1  Last Office Visit : 8/25/16  Future Office visit:  NONE

## 2017-06-20 NOTE — TELEPHONE ENCOUNTER
nabumetone (RELAFEN) 500 MG tablet      Last Written Prescription Date:  8/19/2016  Last Fill Quantity: 180,   # refills: 3  Last Office Visit : 8/25/2016  Future Office visit:  0    BP Readings from Last 1 Encounters:   06/08/17 109/73     Creatinine   Date Value Ref Range Status   08/25/2016 0.73 0.52 - 1.04 mg/dL Final   ]      Appointment due in 2 months and no pending appointment at this time.  Will sent Rx for 90 days supply and no added refills with comments added to pharmacy.

## 2017-07-14 DIAGNOSIS — R42 VERTIGO: ICD-10-CM

## 2017-07-16 RX ORDER — MECLIZINE HCL 12.5 MG 12.5 MG/1
TABLET ORAL
Qty: 30 TABLET | Refills: 0 | Status: SHIPPED | OUTPATIENT
Start: 2017-07-16 | End: 2018-01-13

## 2017-07-17 NOTE — TELEPHONE ENCOUNTER
meclizine (ANTIVERT) 12.5 MG       Last Written Prescription Date:  12/14/16  Last Fill Quantity: 90,   # refills: 1  Last Office Visit : 8/25/16  Future Office visit:  NONE

## 2017-07-21 DIAGNOSIS — G47.00 INSOMNIA: ICD-10-CM

## 2017-07-23 RX ORDER — NORTRIPTYLINE HYDROCHLORIDE 50 MG/1
50 CAPSULE ORAL AT BEDTIME
Qty: 30 CAPSULE | Refills: 0 | Status: SHIPPED | OUTPATIENT
Start: 2017-07-23 | End: 2017-08-23

## 2017-07-23 NOTE — TELEPHONE ENCOUNTER
nortriptyline        Last Written Prescription Date:  6/20/17  Last Fill Quantity: 30,   # refills: 0  Last Office Visit : 8/25/16  Future Office visit:  NONE

## 2017-07-30 DIAGNOSIS — C41.2 CHORDOMA (H): ICD-10-CM

## 2017-07-31 RX ORDER — CETIRIZINE HYDROCHLORIDE 10 MG/1
10 TABLET ORAL DAILY
Qty: 30 TABLET | Refills: 0 | Status: SHIPPED | OUTPATIENT
Start: 2017-07-31 | End: 2017-09-05

## 2017-07-31 NOTE — TELEPHONE ENCOUNTER
cetirizine    Last Written Prescription Date:  11/22/16  Last Fill Quantity: 90,   # refills: 2  Last Office Visit : 8/25/16  Future Office visit:  NONE

## 2017-08-01 DIAGNOSIS — T78.40XA ALLERGIC STATE: ICD-10-CM

## 2017-08-01 RX ORDER — PSEUDOEPHEDRINE HCL 120 MG/1
120 TABLET, FILM COATED, EXTENDED RELEASE ORAL EVERY 12 HOURS PRN
Qty: 60 TABLET | Refills: 3 | Status: SHIPPED | OUTPATIENT
Start: 2017-08-01 | End: 2018-01-19

## 2017-08-08 DIAGNOSIS — G43.909 MIGRAINE: Primary | ICD-10-CM

## 2017-08-08 RX ORDER — ATENOLOL 25 MG/1
25 TABLET ORAL DAILY
Qty: 30 TABLET | Refills: 0 | Status: SHIPPED | OUTPATIENT
Start: 2017-08-08 | End: 2017-10-06

## 2017-08-08 NOTE — LETTER
/August 8, 2017      Giovana Joaquin  [address]  [place] [address]            Dear Giovana Joaquin:      This letter is a reminder that you are due/overdue to see your Primary Care Provider for an Annual Visit and needed labs. You must be seen by your Primary Care Provider on a yearly basis and have appropriate labs drawn for continued care and prescription refills. Please call [phone] to schedule an appointment for an Annual Visit with Dr Gloria Love MD.     You have been given a 30 day supply/refill of your Atenolol while you get your clinic visit/labs completed.      [facility],    Primary Care Center

## 2017-08-08 NOTE — TELEPHONE ENCOUNTER
Atenolol 25 mg tabs      Last Written Prescription Date: 8-9-16  Last Fill Quantity: 90, # refills: 3    Last Office Visit :  8-25-16   Next Office Visit: none    BP Readings from Last 3 Encounters:   06/08/17 109/73   04/19/17 128/72   09/28/16 118/66       Kathleen M Doege RN

## 2017-08-10 ENCOUNTER — TELEPHONE (OUTPATIENT)
Dept: INTERNAL MEDICINE | Facility: CLINIC | Age: 37
End: 2017-08-10

## 2017-08-10 DIAGNOSIS — G43.719 INTRACTABLE CHRONIC MIGRAINE WITHOUT AURA AND WITHOUT STATUS MIGRAINOSUS: Primary | ICD-10-CM

## 2017-08-10 RX ORDER — METOPROLOL SUCCINATE 25 MG/1
25 TABLET, EXTENDED RELEASE ORAL DAILY
Qty: 30 TABLET | Refills: 1 | Status: SHIPPED | OUTPATIENT
Start: 2017-08-10 | End: 2018-01-19

## 2017-08-10 NOTE — TELEPHONE ENCOUNTER
----- Message from Abebe Shi sent at 8/10/2017 12:37 PM CDT -----  Regarding: Rx   Contact: [phone]  [facility] called.    Re: atenolol    They are not able to get this for the patient at this time due to the nationwide shortage, requesting for a call back with an alternative.

## 2017-08-16 ENCOUNTER — OFFICE VISIT (OUTPATIENT)
Dept: OTOLARYNGOLOGY | Facility: CLINIC | Age: 37
End: 2017-08-16

## 2017-08-16 DIAGNOSIS — C41.2 CHORDOMA (H): Primary | ICD-10-CM

## 2017-08-16 DIAGNOSIS — R13.12 OROPHARYNGEAL DYSPHAGIA: ICD-10-CM

## 2017-08-16 ASSESSMENT — PAIN SCALES - GENERAL: PAINLEVEL: EXTREME PAIN (8)

## 2017-08-16 NOTE — LETTER
8/16/2017       RE: Giovana Joaquin  [address]  [place] [address]     Dear Colleague,    Thank you for referring your patient, Giovana Joaquin, to the [facility] at [facility]. Please see a copy of my visit note below.    HISTORY OF PRESENT ILLNESS:  Giovana Joaquin is a now 37-year-old female who comes in with multiple complaints.  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  She denies any specific nasal complaints.  She does have chronic rhinitis and uses Flonase.      Her main complaint today is mild dysphagia, but no odynophagia.  It is mainly to solids and some pill she takes.  She has not lost weight and, of course, she has reflux and is treated with Nexium for that.      REVIEW OF SYSTEMS:  Significant only for the above.      SOCIAL HISTORY:  She is a nonsmoker and does not drink alcohol.      PHYSICAL EXAMINATION:  The examination shows tympanic membranes intact and mobile.  Nares are congested with inferior turbinate hypertrophy bilaterally.  There is a whitish coating resultant of the Flonase use.      Oral cavity shows multiple decayed dentition.  She is in the midst of having that corrected at this time.  She does have some sensitive dentition to cold and hot.  There are no lesions or masses.      PROCEDURE:  Nasopharyngoscopy was undertaken with topical lidocaine spray which shows the single port the midline open.  There is scarring on both sides of it, but it is patent.  There is no obvious recurrent tumor seen.  Further, the hypopharynx is unremarkable with normal laryngeal exam.  There is no obstruction to the esophageal inlet.  Neck is supple.  There is no adenopathy or thyromegaly.      ASSESSMENT:   Dysphagia to solids and a  longstanding history of chordoma, status post resection.  She did have radiation as well.      PLAN:  I have recommended we repeat her MRI.  This may be important because she does have a history of cervical fusion and may have some stricture because of scarring there.  At the same time, we will obtain a swallow study to evaluate her current status.  We will call her with the results.      Again, thank you for allowing me to participate in the care of your patient.      Sincerely,    Hammad Lowe MD    Cc:   Saud De Luna MD    [facility] Neuro[facility]    [address] [place] [address]             DDH/ms

## 2017-08-16 NOTE — PROGRESS NOTES
HISTORY OF PRESENT ILLNESS:  Giovana Joaquin is a now 37-year-old female who comes in with multiple complaints.  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  She denies any specific nasal complaints.  She does have chronic rhinitis and uses Flonase.      Her main complaint today is mild dysphagia, but no odynophagia.  It is mainly to solids and some pill she takes.  She has not lost weight and, of course, she has reflux and is treated with Nexium for that.      REVIEW OF SYSTEMS:  Significant only for the above.      SOCIAL HISTORY:  She is a nonsmoker and does not drink alcohol.      PHYSICAL EXAMINATION:  The examination shows tympanic membranes intact and mobile.  Nares are congested with inferior turbinate hypertrophy bilaterally.  There is a whitish coating resultant of the Flonase use.      Oral cavity shows multiple decayed dentition.  She is in the midst of having that corrected at this time.  She does have some sensitive dentition to cold and hot.  There are no lesions or masses.      PROCEDURE:  Nasopharyngoscopy was undertaken with topical lidocaine spray which shows the single port the midline open.  There is scarring on both sides of it, but it is patent.  There is no obvious recurrent tumor seen.  Further, the hypopharynx is unremarkable with normal laryngeal exam.  There is no obstruction to the esophageal inlet.  Neck is supple.  There is no adenopathy or thyromegaly.      ASSESSMENT:   Dysphagia to solids and a longstanding history of chordoma, status post resection.  She did have radiation as well.      PLAN:  I have recommended we repeat her MRI.  This may be important because she does have a history of cervical fusion and may have some stricture because of scarring there.  At the same time, we will  obtain a swallow study to evaluate her current status.  We will call her with the results.      Cc:   Saud De Luna MD    [facility]    [address] [place] [address]             DDH/ms

## 2017-08-16 NOTE — PATIENT INSTRUCTIONS
Once the results are reviewed by Dr Lowe for the swallow study and the MRI we will notify you of the plan moving forward.   Nicholas Marin ,RN  [phone]

## 2017-08-16 NOTE — MR AVS SNAPSHOT
"              After Visit Summary   8/16/2017    Giovana Joaquin    MRN: [id number]           Patient Information     Date Of Birth          1980        Visit Information        Provider Department      8/16/2017 10:30 AM Hammad Lowe MD [facility] Ear Nose and Throat        Today's Diagnoses     Chordoma (H)    -  1    Oropharyngeal dysphagia          Care Instructions    Once the results are reviewed by Dr Lowe for the swallow study and the MRI we will notify you of the plan moving forward.   Nicholas MarinRN  [phone]              Follow-ups after your visit        Additional Services     SPEECH THERAPY REFERRAL       *This therapy referral will be filtered to a centralized scheduling office at [facility] and the patient will receive a call to schedule an appointment at a [facility] location most convenient for them. *     [facility] provides Speech Therapy evaluation and treatment and many specialty services across the [facility] system.  If requesting a specialty program, please choose from the list below.  If you have not heard from the scheduling office within 2 business days, please call [phone] for all locations, with the exception of [place], please call [phone].       Treatment: Evaluation & Treatment  Speech Treatment Diagnosis: Dysphagia  Special Instructions: quinn  Special Programs: Barium swallow study    Please be aware that coverage of these services is subject to the terms and limitations of your health insurance plan.  Call member services at your health plan with any benefit or coverage questions.      **Note to Provider:  If you are referring outside of [facility] for the therapy appointment, please list the name of the location in the \"special instructions\" above, print the referral and give to the patient to schedule the appointment.                  Follow-up notes from your care team     Return for call with MRI and " swallow study results.      Your next 10 appointments already scheduled     Sep 05, 2017  7:00 AM CDT   (Arrive by 6:45 AM)   MR CERVICAL SPINE W/O & W CONTRAST with [id number]   [facility] ([facility])    [address]  [address] [facility] [id number] [phone]           Take your medicines as usual, unless your doctor tells you not to. Bring a list of your current medicines to your exam (including vitamins, minerals and over-the-counter drugs).  You will be given intravenous contrast for this exam. To prepare:   The day before your exam, drink extra fluids at least six 8-ounce glasses (unless your doctor tells you to restrict your fluids).   Have a blood test (creatinine test) within 30 days of your exam. Go to your clinic or Diagnostic Imaging Department for this test.  The MRI machine uses a strong magnet. Please wear clothes without metal (snaps, zippers). A sweatsuit works well, or we may give you a hospital gown.  Please remove any body piercings and hair extensions before you arrive. You will also remove watches, jewelry, hairpins, wallets, dentures, partial dental plates and hearing aids. You may wear contact lenses, and you may be able to wear your rings. We have a safe place to keep your personal items, but it is safer to leave them at home.   **IMPORTANT** THE INSTRUCTIONS BELOW ARE ONLY FOR THOSE PATIENTS WHO HAVE BEEN TOLD THEY WILL RECEIVE SEDATION OR GENERAL ANESTHESIA DURING THEIR MRI PROCEDURE:  IF YOU WILL RECEIVE SEDATION (take medicine to help you relax during your exam):   You must get the medicine from your doctor before you arrive. Bring the medicine to the exam. Do not take it at home.   Arrive one hour early. Bring someone who can take you home after the test. Your medicine will make you sleepy. After the exam, you may not drive, take a bus or take a taxi by yourself.   No eating 8 hours before your exam. You may have clear liquids up  until 4 hours before your exam. (Clear liquids include water, clear tea, black coffee and fruit juice without pulp.)  IF YOU WILL RECEIVE ANESTHESIA (be asleep for your exam):   Arrive 1 1/2 hours early. Bring someone who can take you home after the test. You may not drive, take a bus or take a taxi by yourself.   No eating 8 hours before your exam. You may have clear liquids up until 4 hours before your exam. (Clear liquids include water, clear tea, black coffee and fruit juice without pulp.)  Please call the Imaging Department at your exam site with any questions.            Sep 05, 2017  1:00 PM CDT   XR VIDEO SPEECH EVALUATION WITH ESOPHAGRAM with UCXR2, UC GIGU RAD   [facility] ([facility])    [address] [place] [id number] [phone]           Please bring a list of your current medicines to your exam. (Include vitamins, minerals and over-the-counter medicines.) Leave your valuables at home.  Tell the doctor if there is a chance you could be pregnant.  Do not eat for 4 hours before the exam. Keep drinking clear liquids until 2 hours before the exam.  You may take pain medicine (with a sip of water) up to 4 hours before the exam.  Do not swallow any other medicines unless your doctor tells you to. Talk to your doctor to be sure it s safe to stop your medicines.  Please call the Imaging Department at your exam site with any questions.            Sep 05, 2017  1:00 PM CDT   Video Swallow with ALEXANDRU Webb   [facility] ([facility])    [address] [place] [id number] [phone]           Please check in for this visit in Imaging on the 1st floor.            Sep 20, 2017 10:30 AM CDT   (Arrive by 10:15 AM)   Return Visit with Hammad Lowe MD   [facility] Ear Nose and Throat ([facility])    [address] [place] [address]  [id number]   [phone]            Dec 05, 2017  1:00 PM CST   Return Visit with  Oncology Nurse   [facility] ([facility])    [facility] [place]  [address]  [place] [id number] [phone]            Dec 07, 2017 11:00 AM CST   Return Visit with Natacha Lan MD   [facility] ([facility])    [facility] [place] [address]  [place] [address]   [phone]              Future tests that were ordered for you today     Open Future Orders        Priority Expected Expires Ordered    MR Cervical Spine w/o & w Contrast Routine 8/16/2017 8/16/2018 8/16/2017    XR Video Swallow w/o Esophagram Routine 8/16/2017 8/16/2018 8/16/2017            Who to contact     Please call your clinic at [phone] to:    Ask questions about your health    Make or cancel appointments    Discuss your medicines    Learn about your test results    Speak to your doctor   If you have compliments or concerns about an experience at your clinic, or if you wish to file a complaint, please contact [facility] Patient Relations at [phone] or email us at [email or web address]         Additional Information About Your Visit        MyChart Information     [employer] gives you secure access to your electronic health record. If you see a primary care provider, you can also send messages to your care team and make appointments. If you have questions, please call your primary care clinic.  If you do not have a primary care provider, please call [phone] and they will assist you.      [employer] is an electronic gateway that provides easy, online access to your medical records. With [employer] you can request a clinic appointment, read your test results, renew a prescription or communicate with your care team.     To access your existing account, please contact your [facility]  Clinic or call [phone] for assistance.        Care EveryWhere ID     This is your Care EveryWhere ID. This could be used by other organizations to access your [place] medical records  [id number]         Blood Pressure from Last 3 Encounters:   06/08/17 109/73   04/19/17 128/72   09/28/16 118/66    Weight from Last 3 Encounters:   06/08/17 105.6 kg (232 lb 12.8 oz)   09/28/16 108 kg (238 lb 3.2 oz)   08/25/16 110.2 kg (243 lb)              We Performed the Following     LARYNGOSCOPY FLEX FIBEROPTIC, DIAGNOSTIC     SPEECH THERAPY REFERRAL        Primary Care Provider Office Phone # Fax #    Gloria Love [phone]       [address] [place] [address]        Equal Access to Services     MAKENZIE LEMUS : Hadii becky shrestha hadasho Soomaali, waaxda luqadaha, qaybta kaalmada adeegyada, ronnell alonso . So [facility] [phone].    ATENCIÓN: Si habla español, tiene a moreno disposición servicios gratuitos de asistencia lingüística. Sanya al [phone].    We comply with applicable federal civil rights laws and [place] laws. We do not discriminate on the basis of race, color, national origin, age, disability sex, sexual orientation or gender identity.            Thank you!     Thank you for choosing [facility] EAR NOSE [facility] TH[facility]AT  for your care. Our goal is always to provide you with excellent care. Hearing back from our patients is one way we can continue to improve our services. Please take a few minutes to complete the written survey that you may receive in the mail after your visit with us. Thank you!             Your Updated Medication List - Protect others around you: Learn how to safely use, store and throw away your medicines at www.disposemymeds.org.          This list is accurate as of: 8/16/17 11:39 AM.  Always use your most recent med list.                   Brand Name Dispense Instructions for use Diagnosis    atenolol 25 MG tablet    TENORMIN    30  tablet    Take 1 tablet (25 mg) by mouth daily    Migraine       BACLOFEN PO      Take 10 mg by mouth 3 times daily        cetirizine 10 MG tablet    zyrTEC    30 tablet    Take 1 tablet (10 mg) by mouth daily *PLEASE SCHEDULE ANNUAL MD PT.    Chordoma (H)       cevimeline 30 MG capsule    EVOXAC    270 capsule    Take 1 capsule (30 mg) by mouth 3 times daily    Dry mouth, Chordoma (H)       esomeprazole 40 MG CR capsule    nexIUM    90 capsule    Take 1 capsule (40 mg) by mouth every morning (before breakfast) One hour before meals    Gastroesophageal reflux disease without esophagitis       fentaNYL 12 mcg/hr 72 hr patch    DURAGESIC     Place 1 patch onto the skin every 72 hours        ferrous sulfate 325 (65 FE) MG tablet    IRON    100 tablet    Take 1 tablet (325 mg) by mouth 2 times daily    Iron deficiency anemia, unspecified iron deficiency anemia type       FETZIMA 80 MG 24 hr capsule   Generic drug:  levomilnacipran      Take 80 mg by mouth daily    Tension headache, EDS (Castro-Danlos syndrome), Bleeds easily (H), Excessive or frequent menstruation, Screening for hyperlipidemia       gabapentin 300 MG capsule    NEURONTIN     Takes 600 mg qid        MAGNESIUM OXIDE PO      Take 500 mg by mouth daily    Normocytic anemia       meclizine 12.5 MG tablet    ANTIVERT    30 tablet    TAKE 1 TO 2 TABLETS(12.5 TO 25 MG) BY MOUTH THREE TIMES DAILY AS NEEDED FOR DIZZINESS. MAY CAUSE DROWSINESS*ANNUAL MD APPT.DUE AUG. 2017    Vertigo       metoprolol 25 MG 24 hr tablet    TOPROL-XL    30 tablet    Take 1 tablet (25 mg) by mouth daily    Intractable chronic migraine without aura and without status migrainosus       minocycline 50 MG capsule    MINOCIN/DYNACIN    60 capsule    Take 1 capsule (50 mg) by mouth 2 times daily Refills from originating provider or needs appt to discuss    H/O pityriasis rosea       MULTIVITAL PO      Take by mouth daily        nabumetone 500 MG tablet    RELAFEN    180 tablet    Take 1  tablet (500 mg) by mouth 2 times daily    Arthritis       nortriptyline 50 MG capsule    PAMELOR    30 capsule    Take 1 capsule (50 mg) by mouth At Bedtime *SCHEDULE ANNUAL MD APPT*    Insomnia       ondansetron 4 MG ODT tab    ZOFRAN-ODT    20 tablet    Take 1 tablet (4 mg) by mouth every 8 hours as needed for nausea    Chordoma (H)       oxyCODONE-acetaminophen 5-325 MG per tablet    PERCOCET     Take 0.05 tablets by mouth 2 times daily        pseudoePHEDrine 120 MG 12 hr tablet    SUDAFED    60 tablet    Take 1 tablet (120 mg) by mouth every 12 hours as needed for congestion    Allergic state       REXULTI 0.5 MG tablet   Generic drug:  brexpiprazole      Take 1 mg by mouth daily        STRATTERA 60 MG capsule   Generic drug:  atomoxetine      Take 80 mg by mouth daily        * SUMAtriptan 6 MG/0.5ML injection    IMITREX    6 mL    Inject 0.5 mLs (6 mg) Subcutaneous once as needed May repeat once after one hour, max 2 doses in 24 hours    Chronic migraine without aura without status migrainosus, not intractable       * SUMAtriptan 100 MG tablet    IMITREX    27 tablet    Take 1 tablet (100 mg) by mouth at onset of headache May repeat after one hour, max 200 mg in 24 hours    Migraine without aura and without status migrainosus, not intractable       * SUMAtriptan 50 MG tablet    IMITREX    27 tablet    Take 1-2 tablets ( mg) by mouth at onset of headache for migraine (max 100 mg in 24 hours)    Migraine without aura and without status migrainosus, not intractable       vitamin D 2000 UNITS Caps           * Notice:  This list has 3 medication(s) that are the same as other medications prescribed for you. Read the directions carefully, and ask your doctor or other care provider to review them with you.

## 2017-08-23 DIAGNOSIS — G47.00 INSOMNIA: ICD-10-CM

## 2017-08-23 NOTE — TELEPHONE ENCOUNTER
nortriptyline   Last Written Prescription Date:  7/23/17  Last Fill Quantity: 30,   # refills: 0  Last Office Visit : 8/25/16  Future Office visit:  NONE    Routing refill request to provider for review/approval because:  OVER DUE MD APPT.   LETTERS SENT + 30 DAY RF  7/23/17

## 2017-08-25 RX ORDER — NORTRIPTYLINE HYDROCHLORIDE 50 MG/1
50 CAPSULE ORAL AT BEDTIME
Qty: 30 CAPSULE | Refills: 0 | Status: SHIPPED | OUTPATIENT
Start: 2017-08-25 | End: 2017-09-30

## 2017-08-29 DIAGNOSIS — R68.2 DRY MOUTH: ICD-10-CM

## 2017-08-29 DIAGNOSIS — C41.2 CHORDOMA (H): ICD-10-CM

## 2017-08-29 NOTE — TELEPHONE ENCOUNTER
cevimeline (EVOXAC) 30 MG capsule      Last Written Prescription Date:  8/10/2016  Last Fill Quantity: 270,   # refills: 3  Last Office Visit : 8/25/2016  Future Office visit:  Not scheduled    Routing refill request to provider for review/approval because:  Drug not on the [facility] refill protocol.  Appointment due-  Reminder letter was sent 8/8/2017.  Pending for 1 month supply and no refills.

## 2017-08-31 RX ORDER — CEVIMELINE HYDROCHLORIDE 30 MG/1
30 CAPSULE ORAL 3 TIMES DAILY
Qty: 90 CAPSULE | Refills: 0 | Status: SHIPPED | OUTPATIENT
Start: 2017-08-31 | End: 2017-10-19

## 2017-09-05 ENCOUNTER — THERAPY VISIT (OUTPATIENT)
Dept: SPEECH THERAPY | Facility: CLINIC | Age: 37
End: 2017-09-05

## 2017-09-05 DIAGNOSIS — C41.2 CHORDOMA (H): ICD-10-CM

## 2017-09-05 DIAGNOSIS — R13.12 OROPHARYNGEAL DYSPHAGIA: ICD-10-CM

## 2017-09-05 NOTE — MR AVS SNAPSHOT
After Visit Summary   9/5/2017    Giovana Joaquin    MRN: [id number]           Patient Information     Date Of Birth          1980        Visit Information        Provider Department      9/5/2017 1:00 PM Mary Anne Marin SLP [facility]        Today's Diagnoses     Oropharyngeal dysphagia           Follow-ups after your visit        Your next 10 appointments already scheduled     Dec 05, 2017  1:00 PM CST   Return Visit with  Oncology Nurse   [facility] ([facility])    [facility]  [address]  [facility] [address] [phone]            Dec 07, 2017 11:00 AM CST   Return Visit with Natacha Lan MD   [facility] ([facility])    [facility]  [address]  [facility] [id number] [phone]              Who to contact     Please call your clinic at [phone] to:    Ask questions about your health    Make or cancel appointments    Discuss your medicines    Learn about your test results    Speak to your doctor   If you have compliments or concerns about an experience at your clinic, or if you wish to file a complaint, please contact [facility] Patient Relations at [phone] or email us at [email or web address]         Additional Information About Your Visit        MyChart Information     [employer] gives you secure access to your electronic health record. If you see a primary care provider, you can also send messages to your care team and make appointments. If you have questions, please call your primary care clinic.  If you do not have a primary care provider, please call [phone] and they will assist you.      [employer] is an electronic gateway that provides easy, online access to your medical records. With [employer], you can request a clinic appointment, read your test results, renew a prescription or communicate with your care  team.     To access your existing account, please contact your [facility] or call [phone] for assistance.        Care EveryWhere ID     This is your Care EveryWhere ID. This could be used by other organizations to access your [place] medical records  [id number]         Blood Pressure from Last 3 Encounters:   06/08/17 109/73   04/19/17 128/72   09/28/16 118/66    Weight from Last 3 Encounters:   09/20/17 105.2 kg (232 lb)   06/08/17 105.6 kg (232 lb 12.8 oz)   09/28/16 108 kg (238 lb 3.2 oz)              Today, you had the following     No orders found for display         Today's Medication Changes          These changes are accurate as of: 9/5/17 11:59 PM.  If you have any questions, ask your nurse or doctor.               These medicines have changed or have updated prescriptions.        Dose/Directions    cetirizine 10 MG tablet   Commonly known as:  zyrTEC   This may have changed:  additional instructions   Used for:  Chordoma (H)   Changed by:  Gloria Love MD        Dose:  10 mg   Take 1 tablet (10 mg) by mouth daily   Quantity:  30 tablet   Refills:  0            Where to get your medicines      These medications were sent to [facility] [address] [place] [address] AT [place] [address] & [address] [address]    Hours:  24-hours Phone:  [phone]     cetirizine 10 MG tablet                Primary Care Provider Office Phone # Fax #    Gloria Love [phone]       [address] [place] [address] [address] [place] [address]        Equal Access to Services     MAKENZIE LEMUS AH: Hadii becky Caraballo, waaxda luqadaha, qaybta kaalronnell north. So [facility] [phone].    ATENCIÓN: Si habla español, tiene a moreno disposición servicios gratuitos de asistencia lingüística. Sanya al [phone].    We comply with applicable federal civil rights laws and  [place] laws. We do not discriminate on the basis of race, color, national origin, age, disability sex, sexual orientation or gender identity.            Thank you!     Thank you for choosing [facility]  for your care. Our goal is always to provide you with excellent care. Hearing back from our patients is one way we can continue to improve our services. Please take a few minutes to complete the written survey that you may receive in the mail after your visit with us. Thank you!             Your Updated Medication List - Protect others around you: Learn how to safely use, store and throw away your medicines at www.disposemymeds.org.          This list is accurate as of: 9/5/17 11:59 PM.  Always use your most recent med list.                   Brand Name Dispense Instructions for use Diagnosis    atenolol 25 MG tablet    TENORMIN    30 tablet    Take 1 tablet (25 mg) by mouth daily    Migraine       BACLOFEN PO      Take 10 mg by mouth 3 times daily        cetirizine 10 MG tablet    zyrTEC    30 tablet    Take 1 tablet (10 mg) by mouth daily    Chordoma (H)       cevimeline 30 MG capsule    EVOXAC    90 capsule    Take 1 capsule (30 mg) by mouth 3 times daily    Dry mouth, Chordoma (H)       esomeprazole 40 MG CR capsule    nexIUM    90 capsule    Take 1 capsule (40 mg) by mouth every morning (before breakfast) One hour before meals    Gastroesophageal reflux disease without esophagitis       fentaNYL 12 mcg/hr 72 hr patch    DURAGESIC     Place 1 patch onto the skin every 72 hours        ferrous sulfate 325 (65 FE) MG tablet    IRON    100 tablet    Take 1 tablet (325 mg) by mouth 2 times daily    Iron deficiency anemia, unspecified iron deficiency anemia type       FETZIMA 80 MG 24 hr capsule   Generic drug:  levomilnacipran      Take 80 mg by mouth daily    Tension headache, EDS (Castro-Danlos syndrome), Bleeds easily (H), Excessive or frequent menstruation, Screening for hyperlipidemia       gabapentin 300  MG capsule    NEURONTIN     Takes 600 mg qid        MAGNESIUM OXIDE PO      Take 500 mg by mouth daily    Normocytic anemia       meclizine 12.5 MG tablet    ANTIVERT    30 tablet    TAKE 1 TO 2 TABLETS(12.5 TO 25 MG) BY MOUTH THREE TIMES DAILY AS NEEDED FOR DIZZINESS. MAY CAUSE DROWSINESS*ANNUAL MD APPT.DUE AUG. 2017    Vertigo       metoprolol 25 MG 24 hr tablet    TOPROL-XL    30 tablet    Take 1 tablet (25 mg) by mouth daily    Intractable chronic migraine without aura and without status migrainosus       minocycline 50 MG capsule    MINOCIN/DYNACIN    60 capsule    Take 1 capsule (50 mg) by mouth 2 times daily Refills from originating provider or needs appt to discuss    H/O pityriasis rosea       MULTIVITAL PO      Take by mouth daily        nabumetone 500 MG tablet    RELAFEN    180 tablet    Take 1 tablet (500 mg) by mouth 2 times daily    Arthritis       nortriptyline 50 MG capsule    PAMELOR    30 capsule    Take 1 capsule (50 mg) by mouth At Bedtime Needs office visit for refills    Insomnia       ondansetron 4 MG ODT tab    ZOFRAN-ODT    20 tablet    Take 1 tablet (4 mg) by mouth every 8 hours as needed for nausea    Chordoma (H)       oxyCODONE-acetaminophen 5-325 MG per tablet    PERCOCET     Take 0.05 tablets by mouth 2 times daily        pseudoePHEDrine 120 MG 12 hr tablet    SUDAFED    60 tablet    Take 1 tablet (120 mg) by mouth every 12 hours as needed for congestion    Allergic state       REXULTI 0.5 MG tablet   Generic drug:  brexpiprazole      Take 1 mg by mouth daily        STRATTERA 60 MG capsule   Generic drug:  atomoxetine      Take 80 mg by mouth daily        * SUMAtriptan 6 MG/0.5ML injection    IMITREX    6 mL    Inject 0.5 mLs (6 mg) Subcutaneous once as needed May repeat once after one hour, max 2 doses in 24 hours    Chronic migraine without aura without status migrainosus, not intractable       * SUMAtriptan 100 MG tablet    IMITREX    27 tablet    Take 1 tablet (100 mg) by mouth at  onset of headache May repeat after one hour, max 200 mg in 24 hours    Migraine without aura and without status migrainosus, not intractable       * SUMAtriptan 50 MG tablet    IMITREX    27 tablet    Take 1-2 tablets ( mg) by mouth at onset of headache for migraine (max 100 mg in 24 hours)    Migraine without aura and without status migrainosus, not intractable       vitamin D 2000 UNITS Caps           * Notice:  This list has 3 medication(s) that are the same as other medications prescribed for you. Read the directions carefully, and ask your doctor or other care provider to review them with you.

## 2017-09-06 NOTE — TELEPHONE ENCOUNTER
cetirizine (ZYRTEC)    Last Written Prescription Date:  7/31/17  Last Fill Quantity: 30,   # refills: 0  Last Office Visit : 8/25/16  Future Office visit:  0  Routing refill request to provider for review/approval because:  OVER DUE MD APPT.  30DAY RF + LETTER  7/31/17

## 2017-09-16 RX ORDER — CETIRIZINE HYDROCHLORIDE 10 MG/1
10 TABLET ORAL DAILY
Qty: 30 TABLET | Refills: 0 | Status: SHIPPED | OUTPATIENT
Start: 2017-09-16 | End: 2017-10-09

## 2017-09-20 ENCOUNTER — OFFICE VISIT (OUTPATIENT)
Dept: OTOLARYNGOLOGY | Facility: CLINIC | Age: 37
End: 2017-09-20

## 2017-09-20 VITALS — HEIGHT: 60 IN | WEIGHT: 232 LBS | BODY MASS INDEX: 45.55 KG/M2

## 2017-09-20 DIAGNOSIS — K13.79 VELOPHARYNGEAL INSUFFICIENCY, ACQUIRED: Primary | ICD-10-CM

## 2017-09-20 ASSESSMENT — PAIN SCALES - GENERAL: PAINLEVEL: MILD PAIN (3)

## 2017-09-20 NOTE — PATIENT INSTRUCTIONS
Follow up with Dr Lowe will be as needed. For questions or concerns please call the RN care coordinator.   Nicholas Marin RN  [phone]

## 2017-09-20 NOTE — PROGRESS NOTES
History of Present Illness:  Giovana Joaquin returns to clinic to discuss her recent video swallow. The technologist told her that she had aspiration and she is here today because I want to discuss that with her. Her senses that some of her medications and fluids get stuck, but nothing is going down into her lungs.    My view of the video swallow shows no aspiration. She has a nice bolus that eventually does clear with what appears to be more normal esophageal phase.    Assessment: Stable swallow study.    Plan: I recommend modifying her diet and crushing pills before converting to liquid medication. Follow up with me as needed.      DDH/ms

## 2017-09-20 NOTE — LETTER
9/20/2017      RE: Giovana Joaquin  [address]  [place] [address]       History of Present Illness:  Giovana Joaquin returns to clinic to discuss her recent video swallow. The technologist told her that she had aspiration and she is here today because I want to discuss that with her. Her senses that some of her medications and fluids get stuck, but nothing is going down into her lungs.    My view of the video swallow shows no aspiration. She has a nice bolus that eventually does clear with what appears to be more normal esophageal phase.    Assessment: Stable swallow study.    Plan: I recommend modifying her diet and crushing pills before converting to liquid medication. Follow up with me as needed.      DDH/ms    Hammad Lowe MD

## 2017-09-20 NOTE — LETTER
9/20/2017       RE: Giovana Joaquin  [address]  [place] [address]     Dear Colleague,    Thank you for referring your patient, Giovana Joaquin, to the [facility] at [facility]. Please see a copy of my visit note below.    History of Present Illness:  Giovana Joaquin returns to clinic to discuss her recent video swallow. The technologist told her that she had aspiration and she is here today because I want to discuss that with her. Her senses that some of her medications and fluids get stuck, but nothing is going down into her lungs.    My view of the video swallow shows no aspiration. She has a nice bolus that eventually does clear with what appears to be more normal esophageal phase.    Assessment: Stable swallow study.    Plan: I recommend modifying her diet and crushing pills before converting to liquid medication. Follow up with me as needed.      DDH/ms    Again, thank you for allowing me to participate in the care of your patient.      Sincerely,    Hammad Lowe MD

## 2017-09-20 NOTE — MR AVS SNAPSHOT
After Visit Summary   9/20/2017    Giovana Joaquin    MRN: [id number]           Patient Information     Date Of Birth          1980        Visit Information        Provider Department      9/20/2017 10:30 AM Hammad Lowe MD [facility]        Today's Diagnoses     Velopharyngeal insufficiency, acquired    -  1      Care Instructions    Follow up with Dr Lowe will be as needed. For questions or concerns please call the RN care coordinator.   Nicholas Marin RN  [phone]              Follow-ups after your visit        Follow-up notes from your care team     Return if symptoms worsen or fail to improve.      Your next 10 appointments already scheduled     Dec 05, 2017  1:00 PM CST   Return Visit with  Oncology Nurse   [facility] ([facility])    [facility]  [address]  [facility] [address]   [phone]            Dec 07, 2017 11:00 AM CST   Return Visit with Natacha Lan MD   [facility] ([facility])    [facility] [place]  [address]  [facility] [address]   [phone]              Who to contact     Please call your clinic at [phone] to:    Ask questions about your health    Make or cancel appointments    Discuss your medicines    Learn about your test results    Speak to your doctor   If you have compliments or concerns about an experience at your clinic, or if you wish to file a complaint, please contact [facility] Patient Relations at [phone] or email us at [email or web address]         Additional Information About Your Visit        MyChart Information     [employer] gives you secure access to your electronic health record. If you see a primary care provider, you can also send messages to your care team and make appointments. If you have questions, please call your primary care clinic.  If you  do not have a primary care provider, please call [phone] and they will assist you.      [employer] is an electronic gateway that provides easy, online access to your medical records. With [employer], you can request a clinic appointment, read your test results, renew a prescription or communicate with your care team.     To access your existing account, please contact your [facility] or call [phone] for assistance.        Care EveryWhere ID     This is your Care EveryWhere ID. This could be used by other organizations to access your [place] medical records  [id number]        Your Vitals Were     Height BMI (Body Mass Index)                1.524 m (5') 45.31 kg/m2           Blood Pressure from Last 3 Encounters:   06/08/17 109/73   04/19/17 128/72   09/28/16 118/66    Weight from Last 3 Encounters:   09/20/17 105.2 kg (232 lb)   06/08/17 105.6 kg (232 lb 12.8 oz)   09/28/16 108 kg (238 lb 3.2 oz)              Today, you had the following     No orders found for display       Primary Care Provider Office Phone # Fax #    Gloria Love [phone]       [address] [place] [address] [address] [place] [address]        Equal Access to Services     KEELEY LEMUS : Hadii becky ku hadasho Soomaali, waaxda luqadaha, qaybta kaalmada adeegyada, ronnell jeffersonin haysamsonn katharine guajardo. So [facility] [phone].    ATENCIÓN: Si habla español, tiene a moreno disposición servicios gratuitos de asistencia lingüística. Llame al [phone].    We comply with applicable federal civil rights laws and [place] laws. We do not discriminate on the basis of race, color, national origin, age, disability sex, sexual orientation or gender identity.            Thank you!     Thank you for choosing [facility]  for your care. Our goal is always to provide you with excellent care. Hearing back from our patients is one way we can continue to improve our services. Please take a few  minutes to complete the written survey that you may receive in the mail after your visit with us. Thank you!             Your Updated Medication List - Protect others around you: Learn how to safely use, store and throw away your medicines at www.disposemymeds.org.          This list is accurate as of: 9/20/17 11:59 PM.  Always use your most recent med list.                   Brand Name Dispense Instructions for use Diagnosis    atenolol 25 MG tablet    TENORMIN    30 tablet    Take 1 tablet (25 mg) by mouth daily    Migraine       BACLOFEN PO      Take 10 mg by mouth 3 times daily        cetirizine 10 MG tablet    zyrTEC    30 tablet    Take 1 tablet (10 mg) by mouth daily    Chordoma (H)       cevimeline 30 MG capsule    EVOXAC    90 capsule    Take 1 capsule (30 mg) by mouth 3 times daily    Dry mouth, Chordoma (H)       esomeprazole 40 MG CR capsule    nexIUM    90 capsule    Take 1 capsule (40 mg) by mouth every morning (before breakfast) One hour before meals    Gastroesophageal reflux disease without esophagitis       fentaNYL 12 mcg/hr 72 hr patch    DURAGESIC     Place 1 patch onto the skin every 72 hours        ferrous sulfate 325 (65 FE) MG tablet    IRON    100 tablet    Take 1 tablet (325 mg) by mouth 2 times daily    Iron deficiency anemia, unspecified iron deficiency anemia type       FETZIMA 80 MG 24 hr capsule   Generic drug:  levomilnacipran      Take 80 mg by mouth daily    Tension headache, EDS (Castro-Danlos syndrome), Bleeds easily (H), Excessive or frequent menstruation, Screening for hyperlipidemia       gabapentin 300 MG capsule    NEURONTIN     Takes 600 mg qid        MAGNESIUM OXIDE PO      Take 500 mg by mouth daily    Normocytic anemia       meclizine 12.5 MG tablet    ANTIVERT    30 tablet    TAKE 1 TO 2 TABLETS(12.5 TO 25 MG) BY MOUTH THREE TIMES DAILY AS NEEDED FOR DIZZINESS. MAY CAUSE DROWSINESS*ANNUAL MD APPT.DUE AUG. 2017    Vertigo       metoprolol 25 MG 24 hr tablet    TOPROL-XL     30 tablet    Take 1 tablet (25 mg) by mouth daily    Intractable chronic migraine without aura and without status migrainosus       minocycline 50 MG capsule    MINOCIN/DYNACIN    60 capsule    Take 1 capsule (50 mg) by mouth 2 times daily Refills from originating provider or needs appt to discuss    H/O pityriasis rosea       MULTIVITAL PO      Take by mouth daily        nabumetone 500 MG tablet    RELAFEN    180 tablet    Take 1 tablet (500 mg) by mouth 2 times daily    Arthritis       nortriptyline 50 MG capsule    PAMELOR    30 capsule    Take 1 capsule (50 mg) by mouth At Bedtime Needs office visit for refills    Insomnia       ondansetron 4 MG ODT tab    ZOFRAN-ODT    20 tablet    Take 1 tablet (4 mg) by mouth every 8 hours as needed for nausea    Chordoma (H)       oxyCODONE-acetaminophen 5-325 MG per tablet    PERCOCET     Take 0.05 tablets by mouth 2 times daily        pseudoePHEDrine 120 MG 12 hr tablet    SUDAFED    60 tablet    Take 1 tablet (120 mg) by mouth every 12 hours as needed for congestion    Allergic state       REXULTI 0.5 MG tablet   Generic drug:  brexpiprazole      Take 1 mg by mouth daily        STRATTERA 60 MG capsule   Generic drug:  atomoxetine      Take 80 mg by mouth daily        * SUMAtriptan 6 MG/0.5ML injection    IMITREX    6 mL    Inject 0.5 mLs (6 mg) Subcutaneous once as needed May repeat once after one hour, max 2 doses in 24 hours    Chronic migraine without aura without status migrainosus, not intractable       * SUMAtriptan 100 MG tablet    IMITREX    27 tablet    Take 1 tablet (100 mg) by mouth at onset of headache May repeat after one hour, max 200 mg in 24 hours    Migraine without aura and without status migrainosus, not intractable       * SUMAtriptan 50 MG tablet    IMITREX    27 tablet    Take 1-2 tablets ( mg) by mouth at onset of headache for migraine (max 100 mg in 24 hours)    Migraine without aura and without status migrainosus, not intractable        vitamin D 2000 UNITS Caps           * Notice:  This list has 3 medication(s) that are the same as other medications prescribed for you. Read the directions carefully, and ask your doctor or other care provider to review them with you.

## 2017-09-20 NOTE — NURSING NOTE
Chief Complaint   Patient presents with     RECHECK     f/u after tests     Sue Wahl Medical Assistant

## 2017-09-25 NOTE — PROGRESS NOTES
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 She presents with increased difficulty swallowing.    Respiratory Status Room air   Prior Level Of Function Swallowing   Prior Level Of Function Comment Dysphagia diet level 3 with thin liquids.    FALL RISK SCREEN   Have you fallen 2 or more times in the last year? No   Have you fallen and had an injury in the past year? No   Is the patient a fall risk? No   Clinical Swallow Evaluation   Oral Musculature anomalies present   Dentition present and adequate   Mucosal Quality sticky   Mandibular Strength and Mobility intact   Oral Labial Strength and Mobility WFL   Lingual Strength and Mobility WFL   Velar Elevation impaired   Buccal Strength and Mobility intact   Laryngeal Function Cough;Swallow;Throat clear;Voicing initiated   VFSS Eval: Radiology   Radiologist Resident   Views Taken left lateral;A/P   Physical Location of Procedure [facility]   VFSS Eval: Thin Liquid Texture Trial   Mode of Presentation, Thin Liquid cup;self-fed   Order of Presentation 1,2,6,7   Preparatory Phase WFL   Oral Phase, Thin Liquid WFL   Pharyngeal Phase, Thin Liquid Delayed swallow reflex;Residue in valleculae;Residue in pyriform sinus  (Residual along BOT)   Rosenbek's Penetration Aspiration Scale: Thin Liquid Trial Results 5 - contrast contacts vocal cords, visible residue remains (penetration)    Diagnostic Statement Deep penetration to the vocal cords demonstrated which spontaneously cleared.    VFSS Eval: Nectar Thick Liquid Texture Trial   Mode of Presentation, Nectar cup;self-fed   Order of Presentation 3   Preparatory Phase WFL   Oral Phase, Nectar WFL   Pharyngeal Phase, Nectar Delayed swallow reflex;Residue in valleculae;Residue in pyriform sinus   Rosenbek's Penetration Aspiration Scale: Nectar-Thick Liquid Trial Results 5 - contrast contacts vocal cords, visible residue remains (penetration)   Diagnostic Statement Pt demonstrates delayed initiation of the swallow with deep penetration into the laryngeal vestibule. This penetration spontaneously clears. She demonstrates residual in the valleculae, BOT and minimally in the pyriform sinus after the completed swallow.    VFSS Eval: Puree Solid Texture Trial   Mode of Presentation, Puree spoon;straw   Order of Presentation 4,8   Preparatory Phase WFL   Oral Phase, Puree WFL   Pharyngeal Phase, Puree Delayed swallow reflex;Residue in valleculae;Residue in pyriform sinus   Rosenbek's Penetration Aspiration Scale: Puree Food Trial Results 2 - contrast enters airway, remains above the vocal cords, no residue remains (penetration)   Diagnostic Statement Delayed initiation again noted with penetration into her laryngeal vestibule. Pt does not demonstrate aspiration and all penetrated material resolves.    VFSS Eval: Solid Food Texture Trial   Mode of Presentation, Solid self-fed   Order of Presentation 5   Preparatory Phase WFL   Oral Phase, Solid WFL   Pharyngeal Phase, Solid Delayed swallow reflex;Residue in valleculae;Residue in pyriform sinus  (Residual along BOT)   Rosenbek's Penetration Aspiration Scale: Solid Food Trial Results 1 - no aspiration, contrast does not enter airway   Diagnostic Statement Residual noted after completed swallow in valleculae and along BOT. Pt able to clear with several swallows and sip of liquid.    Swallow Compensations    Swallow Compensations Alternate viscosity of consistencies;Effortful swallow;Reduce amounts;Multiple swallow   Educational Assessment   Barriers to Learning No barriers   Esophageal Phase of Swallow   Patient reports or presents with symptoms of esophageal dysphagia No   Esophageal sweep performed during today s vidofluoroscopic exam  No   Swallow Eval: Clinical Impressions   Skilled Criteria for Therapy Intervention Current level of function same as previous level of function   Dysphagia Outcome Severity Scale (GEE) Level 4 - GEE   Treatment Diagnosis Mild to moderate oropharyngeal dysphagia   Diet texture recommendations Dysphagia diet level 3;Thin liquids   Recommended Feeding/Eating Techniques alternate between small bites and sips of food/liquid;maintain upright posture during/after eating for 30 mins;hard swallow w/ each bite or sip;small sips/bites   Predicted Duration of Therapy Intervention (days/wks) Evaluation only   Anticipated Discharge Disposition home   Risks and Benefits of Treatment have been explained. Yes   Patient, family and/or staff in agreement with Plan of Care Yes   Clinical Impression Comments Ms Joaquin demonstrates mild to moderate oropharyngeal dysphagia as characterized by delayed initiation of the swallow on all consistencies presented, deep penetration into the laryngeal vestibule on all consistencies which is grossly cleared from larynx. She does have trace residue from thin liquid remain in the laryngeal vestibule but aspiration of this residual not noted. Pt demonstrates increasing stasis in the valleculae, BOT and pyriform sinus as viscosity increases. Pt able to reduce residual material by taking sip of liquid. Pt given barium tablet which is 15mm. Pill became stuck in several positions (i.e. valleculae, pyriform sinus, within UES). Pt eventually was able to clear pill with pudding consistency. Pt does not demonstrate nasal refulx during evaluation although she does have  significant velopharyngeal insufficiency and is unable to close her nasal passage during swallowing. Recomment pt continue with Dysphagia diet level 3 with thin liquids. Sit pt upright for po intake. Encouraged small bites/sips and slow rate. Encouraged pt to alternate bites/sips and attend to task during po intake. No further SLP services indicated at this time. Pt to follow up with provider. Thank you kindly for this referral.    Total Session Time   Total Session Time 45   Total Evaluation Time 45   SLP Medicare Only G-code   G-code Swallowing   Swallowing   Swallowing:  Current Status , Goal , Discharge -Pnev Only-Modifier the same for all G-codes CJ: 20-39% impairment   Swallowing: Current  & Discharge Modifier Rationale-Eval Only Modifier chosen based on the results of this evaluation and reference to LORENE guidelines.

## 2017-09-30 DIAGNOSIS — G47.00 INSOMNIA: ICD-10-CM

## 2017-09-30 NOTE — LETTER
Giovana Joaquin  [address]  [place] [address]      October 2, 2017      This letter is a reminder that you are overdue to see your Primary Care Provider for an Annual Visit and needed labs. You must be seen by your Primary Care Provider on a yearly basis and have appropriate labs drawn for continued care and prescription refills. Please call [phone] to schedule an appointment for an Annual Visit with Dr Kate MAN.       You have been given a 30 day supply/refill of your nortriptyline (PAMELOR) 50 MG capsule   while you get your clinic visit/labs completed.      [facility],    Primary Care [place]

## 2017-10-02 RX ORDER — NORTRIPTYLINE HYDROCHLORIDE 50 MG/1
50 CAPSULE ORAL AT BEDTIME
Qty: 30 CAPSULE | Refills: 0 | Status: SHIPPED | OUTPATIENT
Start: 2017-10-02 | End: 2017-12-21

## 2017-10-02 NOTE — TELEPHONE ENCOUNTER
nortriptyline (PAMELOR) 50 MG capsule  Last Written Prescription Date:  8/25/17  Last Fill Quantity: 30,   # refills: 0  Last Office Visit with [facility], [facility] or [facility] prescribing provider:    8/25/16  Future Office visit:  None  appt letter sent

## 2017-10-06 DIAGNOSIS — G43.909 MIGRAINE: ICD-10-CM

## 2017-10-06 RX ORDER — ATENOLOL 25 MG/1
25 TABLET ORAL DAILY
Qty: 30 TABLET | Refills: 0 | Status: SHIPPED | OUTPATIENT
Start: 2017-10-06 | End: 2017-11-20

## 2017-10-06 NOTE — TELEPHONE ENCOUNTER
atenolol (TENORMIN) 25 MG tablet  Last Written Prescription Date:  8/8/17  Last Fill Quantity: 30,   # refills: 0  Last Office Visit with [facility], [facility] or [facility] prescribing provider:   8/25/16  Future Office visit:   None    BP Readings from Last 3 Encounters:   06/08/17 109/73   04/19/17 128/72   09/28/16 118/66       appt past due.  Letter sent.

## 2017-10-06 NOTE — LETTER
Giovana Joaquin  [address]  [place] [address]        October 6, 2017                  This letter is a reminder that you are overdue to see your Primary Care Provider for an Annual Visit and needed labs. You must be seen by your Primary Care Provider on a yearly basis and have appropriate labs drawn for continued care and prescription refills. Please call [phone] to schedule an appointment for an Annual Visit with Dr Kate MAN.       You have been given a 30 day supply/refill of your atenolol (TENORMIN) 25 MG tablet    while you get your clinic visit/labs completed.      [facility],    Primary Care Center

## 2017-10-09 DIAGNOSIS — C41.2 CHORDOMA (H): ICD-10-CM

## 2017-10-09 NOTE — LETTER
October 12, 2017      TO: Giovana Joaquin  [address]  [place] [address]     Dear MsPiyush Joaquin,    This letter is a reminder that you are overdue to see your Primary Care Provider for an Annual Visit and needed labs. You must be seen by your Primary Care Provider on a yearly basis and have appropriate labs drawn for continued care and prescription refills. Please call [phone] to schedule an appointment for an Annual Visit with SULLY SRIVASTAVA MD.   LAST SEEN  8/25/16    You have been given a 30 day supply/refill of your  cetirizine (ZYRTEC) 10 MG while you get your clinic visit/labs completed.    Regards,  Primary [facility] Center

## 2017-10-12 RX ORDER — CETIRIZINE HYDROCHLORIDE 10 MG/1
10 TABLET ORAL DAILY
Qty: 30 TABLET | Refills: 0 | Status: SHIPPED | OUTPATIENT
Start: 2017-10-12 | End: 2017-11-10

## 2017-10-12 NOTE — TELEPHONE ENCOUNTER
cetirizine (ZYRTEC)      Last Written Prescription Date:  9/16/7  Last Fill Quantity: 30,   # refills: 0  Last Office Visit : 8/25/16  Future Office visit:  NONE  OVER DUE MD APPT   LETTER + 30 DAY RF

## 2017-10-19 DIAGNOSIS — R68.2 DRY MOUTH: ICD-10-CM

## 2017-10-19 DIAGNOSIS — C41.2 CHORDOMA (H): ICD-10-CM

## 2017-10-22 NOTE — TELEPHONE ENCOUNTER
cevimeline (EVOXAC) 30 MG       Last Written Prescription Date:  8/31/17  Last Fill Quantity: 90,   # refills: 0  Last Office Visit : 8/25/16  Future Office visit:  NONE  Routing refill request to provider for review/approval because:  OVER DUE RTC   LETTERS SENT  NO APPT. F/U

## 2017-10-23 RX ORDER — CEVIMELINE HYDROCHLORIDE 30 MG/1
30 CAPSULE ORAL 3 TIMES DAILY
Qty: 90 CAPSULE | Refills: 0 | Status: SHIPPED | OUTPATIENT
Start: 2017-10-23 | End: 2017-11-30

## 2017-10-24 ENCOUNTER — TELEPHONE (OUTPATIENT)
Dept: INTERNAL MEDICINE | Facility: CLINIC | Age: 37
End: 2017-10-24

## 2017-10-24 NOTE — TELEPHONE ENCOUNTER
Thank you for the update.  Does she want to speak with SW or Financial?  Gloria Love MD  Internal Medicine

## 2017-10-24 NOTE — TELEPHONE ENCOUNTER
Patient reports that her insurance through social security disability was to be reinstated in two weeks to a month and its been over a month.  Patient requesting assistance from  for options.  Darlene Ko LPN

## 2017-10-24 NOTE — TELEPHONE ENCOUNTER
----- Message from Arlet Gore sent at 10/24/2017 12:42 PM CDT -----  Regarding: Please call  Contact: [phone]  The reason she is not coming in for an appt is that she does not have any medical insurance right now.  Once it's reinstated she will come in for an appt

## 2017-11-06 DIAGNOSIS — G47.00 INSOMNIA: ICD-10-CM

## 2017-11-08 NOTE — TELEPHONE ENCOUNTER
pamelor    Last Written Prescription Date: 10/2/17  Last Fill Quantity: 30; # refills: 0  Last Office Visit :  8/25/16   Pending Visit: no future appt    Last PHQ-9 score on record= No flowsheet data found.    Lab Results   Component Value Date    AST 16 08/25/2016     Lab Results   Component Value Date    ALT 20 08/25/2016     Scheduling letter sent 10/2/17, kaden refill given 10/2/17  Routed to clinic nurse, pt has not scheduled clinic appt

## 2017-11-09 RX ORDER — NORTRIPTYLINE HYDROCHLORIDE 50 MG/1
CAPSULE ORAL
Qty: 30 CAPSULE | Refills: 0 | Status: SHIPPED | OUTPATIENT
Start: 2017-11-09 | End: 2017-12-18

## 2017-11-10 DIAGNOSIS — C41.2 CHORDOMA (H): ICD-10-CM

## 2017-11-13 NOTE — TELEPHONE ENCOUNTER
cetirizine (ZYRTEC) 10 MG tablet  Last Written Prescription Date: 10/12/17  Last Fill Quantity: 30,   # refills: 0  Last Office Visit : 8/25/16  Future Office visit:  none    Routing  Because:   cetirizine (ZYRTEC) 10 MG tablet.  Letter and rf notation last fill. rf?

## 2017-11-14 DIAGNOSIS — G47.00 INSOMNIA: ICD-10-CM

## 2017-11-14 RX ORDER — CETIRIZINE HYDROCHLORIDE 10 MG/1
10 TABLET ORAL DAILY
Qty: 30 TABLET | Refills: 0 | Status: SHIPPED | OUTPATIENT
Start: 2017-11-14 | End: 2020-06-26

## 2017-11-15 RX ORDER — NORTRIPTYLINE HYDROCHLORIDE 50 MG/1
CAPSULE ORAL
Qty: 30 CAPSULE | Refills: 0 | OUTPATIENT
Start: 2017-11-15

## 2017-11-20 DIAGNOSIS — G43.909 MIGRAINE: ICD-10-CM

## 2017-11-20 DIAGNOSIS — M19.90 ARTHRITIS: ICD-10-CM

## 2017-11-22 NOTE — TELEPHONE ENCOUNTER
relafen    Last Written Prescription Date:  6/20/17  Last Fill Quantity: 180,   # refills: 0  atenolol      Last Written Prescription Date:  8/10/17  Last Fill Quantity: 30,   # refills: 1  Last Office Visit: 8/25/16  Future Office visit:  No future appt    Hemoglobin   Date Value Ref Range Status   06/08/2017 11.0 (L) 11.7 - 15.7 g/dL Final   ]    Creatinine   Date Value Ref Range Status   08/25/2016 0.73 0.52 - 1.04 mg/dL Final   ]    Liver Function Studies -   Recent Labs   Lab Test  08/25/16   1231   PROTTOTAL  7.1   ALBUMIN  3.3*   BILITOTAL  0.4   ALKPHOS  134   AST  16   ALT  20       Routing refill request to clinic nurse for review/approval because:  Last appt > 1 yr ago, scheduling letter sent 10/12/17, no pending appt

## 2017-11-27 RX ORDER — ATENOLOL 25 MG/1
TABLET ORAL
Qty: 30 TABLET | Refills: 0 | Status: SHIPPED | OUTPATIENT
Start: 2017-11-27 | End: 2017-12-19

## 2017-11-27 RX ORDER — NABUMETONE 500 MG/1
TABLET, FILM COATED ORAL
Qty: 60 TABLET | Refills: 0 | Status: SHIPPED | OUTPATIENT
Start: 2017-11-27 | End: 2017-12-24

## 2017-11-30 DIAGNOSIS — C41.2 CHORDOMA (H): ICD-10-CM

## 2017-11-30 DIAGNOSIS — R68.2 DRY MOUTH: ICD-10-CM

## 2017-12-04 NOTE — TELEPHONE ENCOUNTER
Last Written Prescription Date:  10/23/17  Last Fill Quantity: 90,   # refills: 0  Last Office Visit : 8/25/16  Future Office visit:  1/19/18    Routing refill request to provider for review/approval because:  10/23/17 APPT. Letter(s) + 90 DAY SENT    PENDING 1/19/18 APPT.

## 2017-12-07 RX ORDER — CEVIMELINE HYDROCHLORIDE 30 MG/1
30 CAPSULE ORAL 3 TIMES DAILY
Qty: 105 CAPSULE | Refills: 0 | Status: SHIPPED | OUTPATIENT
Start: 2017-12-07 | End: 2018-01-11

## 2017-12-11 DIAGNOSIS — C41.2 CHORDOMA (H): ICD-10-CM

## 2017-12-13 RX ORDER — CETIRIZINE HYDROCHLORIDE 10 MG/1
TABLET ORAL
Qty: 30 TABLET | Refills: 0 | OUTPATIENT
Start: 2017-12-13

## 2017-12-13 NOTE — TELEPHONE ENCOUNTER
With last refill patient was to be seen in clinic for any further refills.    Last clinic visit 8-10-16  Cancelled x1  NOS x1  4 reminder letters sent this year.    Refill refused.    Kathleen M Doege RN

## 2017-12-18 DIAGNOSIS — G47.00 INSOMNIA: ICD-10-CM

## 2017-12-19 DIAGNOSIS — G43.909 MIGRAINE: ICD-10-CM

## 2017-12-21 RX ORDER — NORTRIPTYLINE HYDROCHLORIDE 50 MG/1
50 CAPSULE ORAL AT BEDTIME
Qty: 30 CAPSULE | Refills: 0 | Status: SHIPPED | OUTPATIENT
Start: 2017-12-21 | End: 2018-01-13

## 2017-12-21 RX ORDER — ATENOLOL 25 MG/1
25 TABLET ORAL DAILY
Qty: 30 TABLET | Refills: 0 | Status: SHIPPED | OUTPATIENT
Start: 2017-12-21 | End: 2018-01-13

## 2017-12-21 NOTE — TELEPHONE ENCOUNTER
nortriptyline (PAMELOR)   Last Written Prescription Date:  10/2/17  Last Fill Quantity: 30,   # refills: 0  Last Office Visit : 8/25/16  Future Office visit: 1/19/18

## 2017-12-24 DIAGNOSIS — M19.90 ARTHRITIS: ICD-10-CM

## 2017-12-27 RX ORDER — NABUMETONE 500 MG/1
500 TABLET, FILM COATED ORAL 2 TIMES DAILY
Qty: 60 TABLET | Refills: 0 | Status: SHIPPED | OUTPATIENT
Start: 2017-12-27 | End: 2018-01-21

## 2017-12-27 NOTE — TELEPHONE ENCOUNTER
nabumetone (RELAFEN  Last Written Prescription Date:  11/27/17  Last Fill Quantity: 60,   # refills: 0  Last Office Visit : 8/25/16  Future Office visit:  1/19/18

## 2018-01-11 DIAGNOSIS — R68.2 DRY MOUTH: ICD-10-CM

## 2018-01-11 DIAGNOSIS — C41.2 CHORDOMA (H): ICD-10-CM

## 2018-01-12 RX ORDER — CEVIMELINE HYDROCHLORIDE 30 MG/1
30 CAPSULE ORAL 3 TIMES DAILY
Qty: 90 CAPSULE | Refills: 0 | Status: SHIPPED | OUTPATIENT
Start: 2018-01-12 | End: 2018-02-16

## 2018-01-12 NOTE — TELEPHONE ENCOUNTER
cevimeline (EVOXAC) 30 MG  Last Written Prescription Date:  12/7/17  Last Fill Quantity: 105,   # refills: 0  Last Office Visit : 8/25/16    Future Office visit:  1/19/18   *REFILL UNTIL APPT.

## 2018-01-13 DIAGNOSIS — R42 VERTIGO: ICD-10-CM

## 2018-01-13 DIAGNOSIS — G43.909 MIGRAINE: ICD-10-CM

## 2018-01-13 DIAGNOSIS — G47.00 INSOMNIA: ICD-10-CM

## 2018-01-16 RX ORDER — NORTRIPTYLINE HYDROCHLORIDE 50 MG/1
50 CAPSULE ORAL AT BEDTIME
Qty: 30 CAPSULE | Refills: 0 | Status: SHIPPED
Start: 2018-01-16 | End: 2018-01-19

## 2018-01-16 RX ORDER — ATENOLOL 25 MG/1
25 TABLET ORAL DAILY
Qty: 30 TABLET | Refills: 0 | Status: SHIPPED
Start: 2018-01-16 | End: 2018-02-16

## 2018-01-16 NOTE — TELEPHONE ENCOUNTER
atenolol (TENORMIN) 25 MG tablet      Last Written Prescription Date: 12/21/17  Last Fill Quantity: 30,   # refills: 0  Last Office Visit : 8/25/16  Future Office visit:  1/19/18  Refused- qty sufficient thru appt in 3 days       nortriptyline (PAMELOR) 50 MG capsule      Last Written Prescription Date:  12/21/17  Last Fill Quantity: 30,   # refills: 0  Last Office Visit : 8/25/16  Future Office visit:  1/19/18  Refused- qty sufficient thru appt in 3 days    meclizine (ANTIVERT) 12.5 MG tablet      Last Written Prescription Date:  7/16/17  Last Fill Quantity: 30,   # refills: 0    Routing refill request to provider for review/approval because:  Appt overdue- last seen 8/25/16.  Pended Rx for qty 15 and no refills- Patient has appt in 3 days.     Prior refills for all 3 med's the pharmacy was notified that a patient appointment was due in clinic.

## 2018-01-18 RX ORDER — MECLIZINE HCL 12.5 MG 12.5 MG/1
TABLET ORAL
Qty: 15 TABLET | Refills: 0 | Status: SHIPPED | OUTPATIENT
Start: 2018-01-18 | End: 2018-03-03

## 2018-01-19 ENCOUNTER — OFFICE VISIT (OUTPATIENT)
Dept: INTERNAL MEDICINE | Facility: CLINIC | Age: 38
End: 2018-01-19
Payer: COMMERCIAL

## 2018-01-19 VITALS
DIASTOLIC BLOOD PRESSURE: 81 MMHG | OXYGEN SATURATION: 97 % | WEIGHT: 235.1 LBS | SYSTOLIC BLOOD PRESSURE: 114 MMHG | HEART RATE: 78 BPM | BODY MASS INDEX: 45.91 KG/M2

## 2018-01-19 DIAGNOSIS — N39.46 MIXED STRESS AND URGE URINARY INCONTINENCE: ICD-10-CM

## 2018-01-19 DIAGNOSIS — G47.00 INSOMNIA, UNSPECIFIED TYPE: ICD-10-CM

## 2018-01-19 DIAGNOSIS — R68.2 DRY MOUTH: ICD-10-CM

## 2018-01-19 DIAGNOSIS — Z12.31 ENCOUNTER FOR SCREENING MAMMOGRAM FOR BREAST CANCER: Primary | ICD-10-CM

## 2018-01-19 DIAGNOSIS — Z87.2: ICD-10-CM

## 2018-01-19 DIAGNOSIS — T78.40XS ALLERGIC STATE, SEQUELA: ICD-10-CM

## 2018-01-19 RX ORDER — MIRABEGRON 25 MG/1
25 TABLET, FILM COATED, EXTENDED RELEASE ORAL DAILY
Qty: 30 TABLET | Refills: 1 | Status: SHIPPED | OUTPATIENT
Start: 2018-01-19 | End: 2018-02-16

## 2018-01-19 RX ORDER — MINOCYCLINE HYDROCHLORIDE 50 MG/1
50 CAPSULE ORAL 2 TIMES DAILY
Qty: 60 CAPSULE | Refills: 3 | Status: SHIPPED | OUTPATIENT
Start: 2018-01-19 | End: 2018-06-13

## 2018-01-19 RX ORDER — NORTRIPTYLINE HCL 25 MG
25 CAPSULE ORAL AT BEDTIME
Qty: 90 CAPSULE | Refills: 1 | Status: SHIPPED | OUTPATIENT
Start: 2018-01-19 | End: 2018-07-09

## 2018-01-19 RX ORDER — PSEUDOEPHEDRINE HCL 120 MG/1
120 TABLET, FILM COATED, EXTENDED RELEASE ORAL EVERY 12 HOURS PRN
Qty: 60 TABLET | Refills: 3 | Status: SHIPPED | OUTPATIENT
Start: 2018-01-19 | End: 2018-05-15

## 2018-01-19 ASSESSMENT — PAIN SCALES - GENERAL: PAINLEVEL: SEVERE PAIN (7)

## 2018-01-19 NOTE — MR AVS SNAPSHOT
After Visit Summary   1/19/2018    Giovana Joaquin    MRN: [id number]           Patient Information     Date Of Birth          1980        Visit Information        Provider Department      1/19/2018 11:25 AM Gloria Love MD [facility]        Today's Diagnoses     Encounter for screening mammogram for breast cancer    -  1    Allergic state, sequela        H/O pityriasis rosea        Mixed stress and urge urinary incontinence        Dry mouth        Insomnia, unspecified type          Care Instructions    Primary Care Center: [phone]     Primary Care Center Medication Refill Request Information:  * Please contact your pharmacy regarding ANY request for medication refills.  ** [facility] Prescription Fax = [phone]  * Please allow 3 business days for routine medication refills.  * Please allow 5 business days for controlled substance medication refills.     Primary Care Center Test Result notification information:  *You will be notified with in 7-10 days of your appointment day regarding the results of your test.  If you are on MyChart you will be notified as soon as the provider has reviewed the results and signed off on them.      Add bladder medication.  We will reduce dose of nortriptyline to 25 mg.    Mammogram Screening Tool    Mammogram   Does patient have a history of breast cancer? no  Does patient have breast implants? no  Reason for mammogram? Routine            Follow-ups after your visit        Follow-up notes from your care team     Return in about 4 weeks (around 2/16/2018) for Routine Visit.      Your next 10 appointments already scheduled     Jan 23, 2018 12:15 PM CST   (Arrive by 12:00 PM)   MA SCREENING DIGITAL BILATERAL with [id number]   [facility] ([facility])    [address] [place] [id number] [phone]           Do not use any powder, lotion or deodorant under your arms or  "on your breast. If you do, we will ask you to remove it before your exam.  Wear comfortable, two-piece clothing.  If you have any allergies, tell your care team.  Bring any previous mammograms from other facilities or have them mailed to the breast center. Three-dimensional (3D) mammograms are available at [facility] locations in [facility], [place], [place], [facility], [place], [place], and [place]. [facility] locations include [place] and [facility] in [place]. Benefits of 3D mammograms include: - Improved rate of cancer detection - Decreases your chance of having to go back for more tests, which means fewer: - \"False-positive\" results (This means that there is an abnormal area but it isn't cancer.) - Invasive testing procedures, such as a biopsy or surgery - Can provide clearer images of the breast if you have dense breast tissue. 3D mammography is an optional exam that anyone can have with a 2D mammogram. It doesn't replace or take the place of a 2D mammogram. 2D mammograms remain an effective screening test for all women.  Not all insurance companies cover the cost of a 3D mammogram. Check with your insurance.            Feb 16, 2018 10:55 AM CST   (Arrive by 10:40 AM)   Return Visit with Gloria Love MD   [facility] ([facility])    [address] [place] [id number] [phone]              Future tests that were ordered for you today     Open Future Orders        Priority Expected Expires Ordered    Mammogram, routine screening Routine  1/19/2019 1/19/2018            Who to contact     Please call your clinic at [phone] to:    Ask questions about your health    Make or cancel appointments    Discuss your medicines    Learn about your test results    Speak to your doctor   If you have compliments or concerns about an experience at your clinic, or if you wish to file a complaint, please contact " [facility] Patient Relations at [phone] or email us at [email or web address]         Additional Information About Your Visit        [employer] Information     [employer] gives you secure access to your electronic health record. If you see a primary care provider, you can also send messages to your care team and make appointments. If you have questions, please call your primary care clinic.  If you do not have a primary care provider, please call [phone] and they will assist you.      [employer] is an electronic gateway that provides easy, online access to your medical records. With [employer], you can request a clinic appointment, read your test results, renew a prescription or communicate with your care team.     To access your existing account, please contact your [facility] or call [phone] for assistance.        Care EveryWhere ID     This is your Care EveryWhere ID. This could be used by other organizations to access your [facility] medical records  [id number]        Your Vitals Were     Pulse Pulse Oximetry Breastfeeding? BMI (Body Mass Index)          78 97% No 45.91 kg/m2         Blood Pressure from Last 3 Encounters:   01/19/18 114/81   06/08/17 109/73   04/19/17 128/72    Weight from Last 3 Encounters:   01/19/18 106.6 kg (235 lb 1.6 oz)   09/20/17 105.2 kg (232 lb)   06/08/17 105.6 kg (232 lb 12.8 oz)                 Today's Medication Changes          These changes are accurate as of: 1/19/18  4:49 PM.  If you have any questions, ask your nurse or doctor.               Start taking these medicines.        Dose/Directions    mirabegron 25 MG 24 hr tablet   Commonly known as:  MYRBETRIQ   Used for:  Mixed stress and urge urinary incontinence, Dry mouth   Started by:  Gloria Love MD        Dose:  25 mg   Take 1 tablet (25 mg) by mouth daily   Quantity:  30 tablet   Refills:  1         These medicines have changed or have updated  prescriptions.        Dose/Directions    nortriptyline 25 MG capsule   Commonly known as:  PAMELOR   This may have changed:    - medication strength  - how much to take  - additional instructions   Used for:  Insomnia, unspecified type   Changed by:  Gloria Love MD        Dose:  25 mg   Take 1 capsule (25 mg) by mouth At Bedtime Reduced dose   Quantity:  90 capsule   Refills:  1            Where to get your medicines      These medications were sent to [facility] [address] [address] AT [facility] [address] [address], [facility] [address]    Hours:  24-hours Phone:  [phone]     minocycline 50 MG capsule    mirabegron 25 MG 24 hr tablet    nortriptyline 25 MG capsule         Some of these will need a paper prescription and others can be bought over the counter.  Ask your nurse if you have questions.     Bring a paper prescription for each of these medications     pseudoePHEDrine 120 MG 12 hr tablet                Primary Care Provider Office Phone # Fax #    Gloria Love [phone]       [address] [place] [address] [address] [place] [phone]        Equal Access to Services     [facility] Hospital[facility] AH: Hadii becky ku hadasho Sosnehalali, waaxda luqadaha, qaybta kaalmada adevenitayada, ronnell alonso . So [facility] [phone].    ATENCIÓN: Si habla español, tiene a moreno disposición servicios gratAdvanced Care Hospital of Southern New Mexicoos de asistencia lingüística. [facility] [phone].    We comply with applicable federal civil rights laws and [place] laws. We do not discriminate on the basis of race, color, national origin, age, disability, sex, sexual orientation, or gender identity.            Thank you!     Thank you for choosing [facility]  for your care. Our goal is always to provide you with excellent care. Hearing back from our patients is one way we can continue to improve our services. Please take a few minutes to complete the written  survey that you may receive in the mail after your visit with us. Thank you!             Your Updated Medication List - Protect others around you: Learn how to safely use, store and throw away your medicines at www.disposemymeds.org.          This list is accurate as of: 1/19/18  4:49 PM.  Always use your most recent med list.                   Brand Name Dispense Instructions for use Diagnosis    atenolol 25 MG tablet    TENORMIN    30 tablet    Take 1 tablet (25 mg) by mouth daily    Migraine       BACLOFEN PO      Take 10 mg by mouth 3 times daily        cetirizine 10 MG tablet    zyrTEC    30 tablet    Take 1 tablet (10 mg) by mouth daily    Chordoma (H)       cevimeline 30 MG capsule    EVOXAC    90 capsule    Take 1 capsule (30 mg) by mouth 3 times daily *1/19/18 MD APPT*    Dry mouth, Chordoma (H)       esomeprazole 40 MG CR capsule    nexIUM    90 capsule    Take 1 capsule (40 mg) by mouth every morning (before breakfast) One hour before meals    Gastroesophageal reflux disease without esophagitis       fentaNYL 12 mcg/hr 72 hr patch    DURAGESIC     Place 1 patch onto the skin every 72 hours        ferrous sulfate 325 (65 FE) MG tablet    IRON    100 tablet    Take 1 tablet (325 mg) by mouth 2 times daily    Iron deficiency anemia, unspecified iron deficiency anemia type       FETZIMA 80 MG 24 hr capsule   Generic drug:  levomilnacipran      Take 80 mg by mouth daily    Tension headache, EDS (Castro-Danlos syndrome), Bleeds easily (H), Excessive or frequent menstruation, Screening for hyperlipidemia       gabapentin 300 MG capsule    NEURONTIN     Takes 600 mg qid        MAGNESIUM OXIDE PO      Take 500 mg by mouth daily    Normocytic anemia       meclizine 12.5 MG tablet    ANTIVERT    15 tablet    TAKE 1 TO 2 TABLETS THREE TIMES DAILY AS NEEDED FOR DIZZINESS *MAY CAUSE DROWSINESS    Vertigo       minocycline 50 MG capsule    MINOCIN/DYNACIN    60 capsule    Take 1 capsule (50 mg) by mouth 2 times daily  Refills from originating provider or needs appt to discuss    H/O pityriasis rosea       mirabegron 25 MG 24 hr tablet    MYRBETRIQ    30 tablet    Take 1 tablet (25 mg) by mouth daily    Mixed stress and urge urinary incontinence, Dry mouth       MULTIVITAL PO      Take by mouth daily        nabumetone 500 MG tablet    RELAFEN    60 tablet    Take 1 tablet (500 mg) by mouth 2 times daily    Arthritis       nortriptyline 25 MG capsule    PAMELOR    90 capsule    Take 1 capsule (25 mg) by mouth At Bedtime Reduced dose    Insomnia, unspecified type       ondansetron 4 MG ODT tab    ZOFRAN-ODT    20 tablet    Take 1 tablet (4 mg) by mouth every 8 hours as needed for nausea    Chordoma (H)       oxyCODONE-acetaminophen 5-325 MG per tablet    PERCOCET     Take 0.05 tablets by mouth 2 times daily        pseudoePHEDrine 120 MG 12 hr tablet    SUDAFED    60 tablet    Take 1 tablet (120 mg) by mouth every 12 hours as needed for congestion    Allergic state, sequela       REXULTI 0.5 MG tablet   Generic drug:  brexpiprazole      Take 1 mg by mouth daily        STRATTERA 60 MG capsule   Generic drug:  atomoxetine      Take 80 mg by mouth daily        * SUMAtriptan 6 MG/0.5ML injection    IMITREX    6 mL    Inject 0.5 mLs (6 mg) Subcutaneous once as needed May repeat once after one hour, max 2 doses in 24 hours    Chronic migraine without aura without status migrainosus, not intractable       * SUMAtriptan 100 MG tablet    IMITREX    27 tablet    Take 1 tablet (100 mg) by mouth at onset of headache May repeat after one hour, max 200 mg in 24 hours    Migraine without aura and without status migrainosus, not intractable       * SUMAtriptan 50 MG tablet    IMITREX    27 tablet    Take 1-2 tablets ( mg) by mouth at onset of headache for migraine (max 100 mg in 24 hours)    Migraine without aura and without status migrainosus, not intractable       vitamin D 2000 UNITS Caps           * Notice:  This list has 3 medication(s)  that are the same as other medications prescribed for you. Read the directions carefully, and ask your doctor or other care provider to review them with you.

## 2018-01-19 NOTE — NURSING NOTE
Chief Complaint   Patient presents with     Medication Follow-up     Patient is here to follow up on medications.      Merly Schmidt LPN at 11:25 AM on 1/19/2018.

## 2018-01-19 NOTE — PROGRESS NOTES
Ms. Joaquin is a 38 year old female here for follow up.    History of Present Illness:  She has a complicated PMH including clival chordoma with soft palate resection in 2001, neck fusion, pharyngoplasty, now with velopharyngeal insufficiency, chronic neck/hip pain, depression.    Giovana has not been seen in a while b/c Medicare was cancelled and she was out of insurance for a while.  Back on Medicare and disability income now.  Thinks she had flu over New Years with febrile illness but now improved.  Needs a few refills of medications today.  Needs minocycline for her facial rosacea and dermatitis.  Was taking chronically.  She also needs pseudophed.    Generally feeling well but does note bladder issues, especially overnight, enuresis incontinence, some urgency during daytime, mild stress incontinence as well.  Tries to do kegels. Unfortunately has chronic dry mouth and does drink frequently.    From Previous:  Complex history with multiple outside caregivers.  Follows with ENT at [facility].  Has outside psychiatrist (Dr. Nielsen), pain doctor (Ariel Trujillo at [facility]) and neurologist (Dr Hess at [facility]) Neurosurgeon (Dr. Saud De Luna at ).  Pt has complicated PMH including clival chordoma with soft palate resection in 2001, pharyngoplasty,PP radiation, now with velopharyngeal insufficiency, radiation induced narcolepsy, chronic neck/hip pain, depression.    Follows with Pain Clinic and on pain meds for several years for chronic pain in neck and hip.  Had cervical fusion and bone graft from hip at time of brain surgery.  Also c/o all over body pains, doing okay on current regimen of fentanyl, gabapentin, nortriptyline, relafen, baclofen.  Dr. Hess at [facility] previously treated her for narcolepsy with adderral.  Takes strattera for depression/ADHD type symptoms.    Met with Genetics 2014 after she found out her sister has POTS and she was evaluated for EDS/hypermobility syndrome.  It was thought she may  have joint hypermobility syndrome, not necessarily EDS III/II.  No genetic testing was recommended.  However, given hx of hypermobility, dislocations, skin laxity, bruising, echo and other conservative measures were recommended.    A full 10-pt Review of Systems was performed and is negative except as indicated in the HPI.      Past Medical History:  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     Gastro-oesophageal reflux disease      Hypertension      IBS (irritable bowel syndrome)      MDD (major depressive disorder)      Migraine      Obesity (BMI 30-39.9)      Velopharyngeal insufficiency, acquired        Past Surgical History:  Past Surgical History:   Procedure Laterality Date     BIOPSY       brain tumor removal       DAVINCI ASSISTED UVULOPALATOPHARYNGOPLASTY  5/7/2013    Procedure: DAVINCI ASSISTED UVULOPALATOPHARYNGOPLASTY;  Davinci Pharyngoplasty ;  Surgeon: Hammad Lowe MD;  Location: [facility] OR     ESOPHAGOSCOPY, GASTROSCOPY, DUODENOSCOPY (EGD), COMBINED  12/5/2011    Procedure:COMBINED ESOPHAGOSCOPY, GASTROSCOPY, DUODENOSCOPY (EGD); Surgeon:CEDRICK PABLO; Location:[facility]     neck fusion to c3      to C4      proton particle radiation       soft palette removed, throat x4         Active Meds:  Current Outpatient Prescriptions   Medication     meclizine (ANTIVERT) 12.5 MG tablet     atenolol (TENORMIN) 25 MG tablet     nortriptyline (PAMELOR) 50 MG capsule     cevimeline (EVOXAC) 30 MG capsule     nabumetone (RELAFEN) 500 MG tablet     cetirizine (ZYRTEC) 10 MG tablet     metoprolol (TOPROL-XL) 25 MG 24 hr tablet     pseudoePHEDrine (SUDAFED) 120 MG 12 hr tablet     brexpiprazole (REXULTI) 0.5 MG  tablet     ferrous sulfate (IRON) 325 (65 FE) MG tablet     minocycline (MINOCIN/DYNACIN) 50 MG capsule     ondansetron (ZOFRAN-ODT) 4 MG ODT tab     SUMAtriptan (IMITREX) 100 MG tablet     SUMAtriptan (IMITREX) 50 MG tablet     SUMAtriptan (IMITREX) 6 MG/0.5ML vial     MAGNESIUM OXIDE PO     levomilnacipran (FETZIMA) 80 MG 24 HR capsule     esomeprazole (NEXIUM) 40 MG capsule     oxyCODONE-acetaminophen (PERCOCET) 5-325 MG per tablet     fentaNYL (DURAGESIC) 12 mcg/hr patch 72 hr     atomoxetine (STRATTERA) 60 MG capsule     Multiple Vitamins-Minerals (MULTIVITAL PO)     Cholecalciferol (VITAMIN D) 2000 UNITS CAPS     BACLOFEN PO     GABAPENTIN 300 MG OR CAPS     No current facility-administered medications for this visit.         Allergies:  Ambien [zolpidem tartrate]; Cephalexin; Levaquin [levofloxacin]; and Morphine    Family History:  family history includes Arthritis in her father and paternal grandmother; Breast Cancer in her maternal grandmother; Musculoskeletal Disorder in her maternal grandmother.    Social History:  Social History   Substance Use Topics     Smoking status: Former Smoker     Packs/day: 0.20     Years: 10.00     Types: Cigarettes     Quit date: 1/17/2013     Smokeless tobacco: Never Used      Comment: e-cig     Alcohol use Yes      Comment: socially       Physical Exam:  Vitals: /81  Pulse 78  Wt 106.6 kg (235 lb 1.6 oz)  SpO2 97%  Breastfeeding? No  BMI 45.91 kg/m2  Constitutional: Alert, oriented, pleasant, obese, no acute distress, fatigued appearing  Head: Normocephalic, atraumatic  ENT: Posterior oropharynx clear erythematous with yellow discharge, large defect in upper soft palate, some gray-yellow drainage, moist mucus membranes  Neck: supple, no LAD  CVS: RRR, no murmurs  Resp: Clear without rales/wheezing/rhonchi  Ext: No edema, normal gait  Neuro: nonfocal          Assessment and Plan:  Giovaan was seen today for medication follow-up.    Diagnoses and all orders for  this visit:    Encounter for screening mammogram for breast cancer  -     Mammogram, routine screening; Future    Allergic state, sequela  -     pseudoePHEDrine (SUDAFED) 120 MG 12 hr tablet; Take 1 tablet (120 mg) by mouth every 12 hours as needed for congestion    H/O pityriasis rosea  -     minocycline (MINOCIN/DYNACIN) 50 MG capsule; Take 1 capsule (50 mg) by mouth 2 times daily Refills from originating provider or needs appt to discuss    Mixed stress and urge urinary incontinence: Discussed potential risks including urinary retention, HTN and also QT prolongation.  Given she is also on pamelor, will reduce the dose of this to minimize risks.  I advised her to RTC for EKG monitoring.  -     mirabegron (MYRBETRIQ) 25 MG 24 hr tablet; Take 1 tablet (25 mg) by mouth daily  -     If not helpful, consider Urology or Urogyn referral    Dry mouth  -     mirabegron (MYRBETRIQ) 25 MG 24 hr tablet; Take 1 tablet (25 mg) by mouth daily    Insomnia, unspecified type  -     nortriptyline (PAMELOR) 25 MG capsule; Take 1 capsule (25 mg) by mouth At Bedtime Reduced dose    Neutrophilia, Cryoglobulinemia: Has seen Hematology.  The lab work is positive for small monoclonal IgM immunoglobulin of kappa light chain type with the positivity of cryoglobulin IgM. She also has a polyclonal IgM immunoglobulin detected, unclear etiology. Her CBC has normalized without any evidence of neutrophilia. Also chronic anemia.  -           Continued observation, Iron      #Routine Health Maintenence:  Immunizations (zoster, pneumovax, flu, Tdap, Hep A/B):   Most Recent Immunizations   Administered Date(s) Administered     Flu > 3 Years 11/20/2017     Influenza (H1N1) 12/29/2009     Influenza (IIV3) PF 10/02/2013     MMR 03/18/1993     TD (ADULT, 7+) 04/19/2006     Tdap (Adacel,Boostrix) 07/02/2013     Lipids:  Feb 2012 183/115/43/120  Recent Labs   Lab Test  07/14/16   1046   CHOL  202*   HDL  57   LDL  116*   TRIG  148     Lung Ca Screening (>30  pk age 55-79 or >20 py age 50-79 + RF): n/a  Colonoscopy (50-75 yrs): n/a  Dexa (>65W or 70M yrs): n/a  Mammogram (40-75 yrs): ordered  Pap (21-65 yrs): PAP      NIL   8/10/2016, HPV neg      Gloria Love MD  Internal Medicine      25 min spent face to face, of which >50% time spent on counseling/coordinating care exclusive of any procedure time

## 2018-01-19 NOTE — PATIENT INSTRUCTIONS
[facility]: [phone]     [facility] Medication Refill Request Information:  * Please contact your pharmacy regarding ANY request for medication refills.  ** [facility] Prescription Fax = [phone]  * Please allow 3 business days for routine medication refills.  * Please allow 5 business days for controlled substance medication refills.     [facility] Test Result notification information:  *You will be notified with in 7-10 days of your appointment day regarding the results of your test.  If you are on MyChart you will be notified as soon as the provider has reviewed the results and signed off on them.      Add bladder medication.  We will reduce dose of nortriptyline to 25 mg.    Mammogram Screening Tool    Mammogram   Does patient have a history of breast cancer? no  Does patient have breast implants? no  Reason for mammogram? Routine

## 2018-01-19 NOTE — PROGRESS NOTES
Rooming Note  Health Maintenance   Health Maintenance Due   Topic Date Due     URINE DRUG SCREEN Q1 YR  01/16/1993     SCOT QUESTIONNAIRE 1 YEAR  01/16/1996     PHQ-9 Q1YR  01/16/1996    Health maintenance items discussed and ordered/forwarded to PCP.    Merly Schmidt LPN at 11:28 AM on 1/19/2018.

## 2018-01-19 NOTE — PROGRESS NOTES
[facility]  Primary Care Center   Gloria Love MD  1/19/2018     Chief Complaint:   Medication Follow-up (Patient is here to follow up on medications. )       History of Present Illness:   Giovana Joaquin is a 38 year old female with a history of *** who presents for evaluation of ***.    Refills on atenolol, nortriptyline, meclizine    Other concerns discussed:  ***     Review of Systems:   A full 10-pt Review of Systems was performed, verified and is negative except as documented in the HPI.  All health questionnaires were reviewed, verified and relevant information documented above.    Active Medications:     Current Outpatient Prescriptions:      meclizine (ANTIVERT) 12.5 MG tablet, TAKE 1 TO 2 TABLETS THREE TIMES DAILY AS NEEDED FOR DIZZINESS *MAY CAUSE DROWSINESS, Disp: 15 tablet, Rfl: 0     atenolol (TENORMIN) 25 MG tablet, Take 1 tablet (25 mg) by mouth daily, Disp: 30 tablet, Rfl: 0     nortriptyline (PAMELOR) 50 MG capsule, Take 1 capsule (50 mg) by mouth At Bedtime, Disp: 30 capsule, Rfl: 0     cevimeline (EVOXAC) 30 MG capsule, Take 1 capsule (30 mg) by mouth 3 times daily *1/19/18 MD APPT*, Disp: 90 capsule, Rfl: 0     nabumetone (RELAFEN) 500 MG tablet, Take 1 tablet (500 mg) by mouth 2 times daily, Disp: 60 tablet, Rfl: 0     cetirizine (ZYRTEC) 10 MG tablet, Take 1 tablet (10 mg) by mouth daily, Disp: 30 tablet, Rfl: 0     metoprolol (TOPROL-XL) 25 MG 24 hr tablet, Take 1 tablet (25 mg) by mouth daily, Disp: 30 tablet, Rfl: 1     pseudoePHEDrine (SUDAFED) 120 MG 12 hr tablet, Take 1 tablet (120 mg) by mouth every 12 hours as needed for congestion, Disp: 60 tablet, Rfl: 3     brexpiprazole (REXULTI) 0.5 MG tablet, Take 1 mg by mouth daily, Disp: , Rfl:      ferrous sulfate (IRON) 325 (65 FE) MG tablet, Take 1 tablet (325 mg) by mouth 2 times daily, Disp: 100 tablet, Rfl: 3     minocycline (MINOCIN/DYNACIN) 50 MG capsule, Take 1 capsule (50 mg) by mouth 2 times daily Refills from originating  provider or needs appt to discuss, Disp: 60 capsule, Rfl: 0     ondansetron (ZOFRAN-ODT) 4 MG ODT tab, Take 1 tablet (4 mg) by mouth every 8 hours as needed for nausea, Disp: 20 tablet, Rfl: 1     SUMAtriptan (IMITREX) 100 MG tablet, Take 1 tablet (100 mg) by mouth at onset of headache May repeat after one hour, max 200 mg in 24 hours, Disp: 27 tablet, Rfl: 2     SUMAtriptan (IMITREX) 50 MG tablet, Take 1-2 tablets ( mg) by mouth at onset of headache for migraine (max 100 mg in 24 hours), Disp: 27 tablet, Rfl: 2     SUMAtriptan (IMITREX) 6 MG/0.5ML vial, Inject 0.5 mLs (6 mg) Subcutaneous once as needed May repeat once after one hour, max 2 doses in 24 hours, Disp: 6 mL, Rfl: 1     MAGNESIUM OXIDE PO, Take 500 mg by mouth daily, Disp: , Rfl:      levomilnacipran (FETZIMA) 80 MG 24 HR capsule, Take 80 mg by mouth daily, Disp: , Rfl:      esomeprazole (NEXIUM) 40 MG capsule, Take 1 capsule (40 mg) by mouth every morning (before breakfast) One hour before meals, Disp: 90 capsule, Rfl: 3     oxyCODONE-acetaminophen (PERCOCET) 5-325 MG per tablet, Take 0.05 tablets by mouth 2 times daily , Disp: , Rfl:      fentaNYL (DURAGESIC) 12 mcg/hr patch 72 hr, Place 1 patch onto the skin every 72 hours, Disp: , Rfl:      atomoxetine (STRATTERA) 60 MG capsule, Take 80 mg by mouth daily , Disp: , Rfl:      Multiple Vitamins-Minerals (MULTIVITAL PO), Take by mouth daily, Disp: , Rfl:      Cholecalciferol (VITAMIN D) 2000 UNITS CAPS, , Disp: , Rfl:      BACLOFEN PO, Take 10 mg by mouth 3 times daily , Disp: , Rfl:      GABAPENTIN 300 MG OR CAPS, Takes 600 mg qid, Disp: , Rfl:       Allergies:   Ambien [zolpidem tartrate]; Cephalexin; Levaquin [levofloxacin]; and Morphine      Past Medical History:  Attention deficit disorder  Anxiety  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  Chronic neck and hip pain  History of cryoglobulinemia  Castro-Danlos syndrome, 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  Gastro-esophageal reflux  disease  Hypertension  Irritable bowel syndrome  Major depressive disorder  Migraines  Velopharyngeal insufficiency, acquired  Oligomenorrhea  Syncope  Dysfunction of eustachian tube (bilateral)  Chronic rhinitis     Past Surgical History:  Biopsy  Brain tumor removal  Uvulopalatopharyngoplasty  Neck fusion c3 to c4  Proton particle radiation  Soft palette removed, throat, multiple    Family History:   Father - rheumatoid arthritis  Maternal grandmother - breast cancer, MS  Paternal grandmother - arthritis    Social History:   Former smoker (quit date: 1/17/2013)  Patient consumes alcohol.    Physical Exam:   /81  Pulse 78  Wt 106.6 kg (235 lb 1.6 oz)  SpO2 97%  Breastfeeding? No  BMI 45.91 kg/m2   ***   Constitutional: Alert, oriented, pleasant, no acute distress  Head: Normocephalic, atraumatic  Eyes: Extra-ocular movements intact, pupils equally round and reactive bilaterally, no scleral icterus  ENT: Oropharynx clear, moist mucus membranes, good dentition  Neck: Supple, no lymphadenopathy, no thyromegaly   Cardiovascular: Regular rate and rhythm, no murmurs, rubs or gallops, peripheral pulses full/symmetric  Respiratory: Good air movement bilaterally, lungs clear, no wheezes/rales/rhonchi  GI: Abdomen soft, bowel sounds present, nondistended, nontender, no organomegaly or masses, no rebound/guarding  Musculoskeletal: No edema, normal muscle tone, normal gait  Neurologic: Alert and oriented, cranial nerves 2-12 intact.  Skin: No rashes/lesions  Psychiatric: normal mentation, affect and mood     Assessment and Plan:  There are no diagnoses linked to this encounter.        Follow-up: Data Unavailable         Scribe Disclosure:   Yecenia RIVERA, am serving as a scribe to document services personally performed by Gloria Love MD at this visit, based upon the provider's statements to me. All documentation has been reviewed by the aforementioned provider prior to being entered into the official  medical record.     Portions of this medical record were completed by a scribe. UPON MY REVIEW AND AUTHENTICATION BY ELECTRONIC SIGNATURE, this confirms (a) I performed the applicable clinical services, and (b) the record is accurate.

## 2018-01-21 DIAGNOSIS — M19.90 ARTHRITIS: ICD-10-CM

## 2018-01-22 NOTE — TELEPHONE ENCOUNTER
nabumetone (RELAFEN) 500 MG    Last Written Prescription Date:  12/27/17  Last Fill Quantity: 60,   # refills: 0  Last Office Visit : 1/19/18  Future Office visit:  2/16/18    Routing refill request to provider for review/approval because: LABS

## 2018-01-23 RX ORDER — NABUMETONE 500 MG/1
500 TABLET, FILM COATED ORAL 2 TIMES DAILY
Qty: 60 TABLET | Refills: 0 | Status: SHIPPED | OUTPATIENT
Start: 2018-01-23 | End: 2018-02-23

## 2018-02-16 ENCOUNTER — OFFICE VISIT (OUTPATIENT)
Dept: INTERNAL MEDICINE | Facility: CLINIC | Age: 38
End: 2018-02-16
Payer: COMMERCIAL

## 2018-02-16 VITALS
SYSTOLIC BLOOD PRESSURE: 108 MMHG | WEIGHT: 232.4 LBS | BODY MASS INDEX: 45.39 KG/M2 | DIASTOLIC BLOOD PRESSURE: 73 MMHG | HEART RATE: 88 BPM

## 2018-02-16 DIAGNOSIS — R68.2 DRY MOUTH: ICD-10-CM

## 2018-02-16 DIAGNOSIS — C41.2 CHORDOMA (H): ICD-10-CM

## 2018-02-16 DIAGNOSIS — N39.46 MIXED STRESS AND URGE URINARY INCONTINENCE: ICD-10-CM

## 2018-02-16 DIAGNOSIS — G43.909 MIGRAINE: ICD-10-CM

## 2018-02-16 DIAGNOSIS — E66.9 OBESITY, UNSPECIFIED CLASSIFICATION, UNSPECIFIED OBESITY TYPE, UNSPECIFIED WHETHER SERIOUS COMORBIDITY PRESENT: Primary | ICD-10-CM

## 2018-02-16 DIAGNOSIS — Z51.81 ENCOUNTER FOR THERAPEUTIC DRUG MONITORING: ICD-10-CM

## 2018-02-16 LAB — INTERPRETATION ECG - MUSE: NORMAL

## 2018-02-16 RX ORDER — MIRABEGRON 50 MG/1
50 TABLET, EXTENDED RELEASE ORAL DAILY
Qty: 30 TABLET | Refills: 2 | Status: SHIPPED | OUTPATIENT
Start: 2018-02-16 | End: 2018-05-14

## 2018-02-16 RX ORDER — ATENOLOL 25 MG/1
25 TABLET ORAL DAILY
Qty: 90 TABLET | Refills: 3 | Status: SHIPPED | OUTPATIENT
Start: 2018-02-16 | End: 2019-01-31

## 2018-02-16 ASSESSMENT — PAIN SCALES - GENERAL: PAINLEVEL: EXTREME PAIN (8)

## 2018-02-16 NOTE — MR AVS SNAPSHOT
After Visit Summary   2/16/2018    Giovana Joaquin    MRN: [id number]           Patient Information     Date Of Birth          1980        Visit Information        Provider Department      2/16/2018 10:55 AM Gloria Love MD [facility]        Today's Diagnoses     Obesity, unspecified classification, unspecified obesity type, unspecified whether serious comorbidity present    -  1    Encounter for therapeutic drug monitoring        Mixed stress and urge urinary incontinence        Dry mouth          Care Instructions    [facility]: [phone]     Primary Care Center Medication Refill Request Information:  * Please contact your pharmacy regarding ANY request for medication refills.  ** [facility] Prescription Fax = [phone]  * Please allow 3 business days for routine medication refills.  * Please allow 5 business days for controlled substance medication refills.     Primary Care Center Test Result notification information:  *You will be notified with in 7-10 days of your appointment day regarding the results of your test.  If you are on MyChart you will be notified as soon as the provider has reviewed the results and signed off on them.        Okay to increase myrbetriq to 50 mg daily. Let us know if you experience side effects.      Preparing for IUD Insertion       Prior to the Appointment::    Check with your insurance to ensure you have coverage for the IUD    If you are NOT on birth control, abstain from unprotected intercourse (sex without condoms) for 7 days prior to the appointment.     Failure to do so will result in IUD not being placed the day of the appointment.     If you are on birth control, continue your current form of birth control until 7 days after the IUD is inserted.     You need to be taking this correctly.     If you are missing pills, you also need to abstain from unprotected intercourse for 7 days prior to the appointment.     Failure to  do so will result in IUD not being placed the day of the appointment.       Day of the Appointment:     Please arrive to lab 30 minutes prior to the scheduled appointment for a urine pregnancy test.     You may take 600 mg of ibuprofen 60 minutes prior to the procedure to help reduce cramping.     If you require medication to relax for the appointment, please let us know when you schedule the appointment. You will need a  for after the procedure.       After the Appointment:     You will not be able to place anything in the vagina for seven days    No intercourse (sexual activity)    No tampons    No douches              Follow-ups after your visit        Additional Services     BARIATRIC ADULT REFERRAL       Your provider has referred you to: [facility] [phone]. [email or web address]    Please be aware that coverage of these services is subject to the terms and limitations of your health insurance plan.  Call member services at your health plan with any benefit or coverage questions.      Please bring the following with you to your appointment:      (1) List of current medications   (2) This referral request   (3) Any documents/labs given to you for this referral                  Your next 10 appointments already scheduled     Mar 06, 2018 12:15 PM CST   (Arrive by 12:00 PM)   MA SCREENING DIGITAL BILATERAL with [id number]   [facility] ([facility])    [address] [place] [id number] [phone]           Do not use any powder, lotion or deodorant under your arms or on your breast. If you do, we will ask you to remove it before your exam.  Wear comfortable, two-piece clothing.  If you have any allergies, tell your care team.  Bring any previous mammograms from other facilities or have them mailed to the breast center.  "Three-dimensional (3D) mammograms are available at [facility] locations in [place], [place], [place], [place], [facility], [place], [place], and [place]. [facility] locations include [facility] in [place]. Benefits of 3D mammograms include: - Improved rate of cancer detection - Decreases your chance of having to go back for more tests, which means fewer: - \"False-positive\" results (This means that there is an abnormal area but it isn't cancer.) - Invasive testing procedures, such as a biopsy or surgery - Can provide clearer images of the breast if you have dense breast tissue. 3D mammography is an optional exam that anyone can have with a 2D mammogram. It doesn't replace or take the place of a 2D mammogram. 2D mammograms remain an effective screening test for all women.  Not all insurance companies cover the cost of a 3D mammogram. Check with your insurance.            Mar 07, 2018  6:30 PM CST   (Arrive by 6:15 PM)   Return Visit with Andrea Michele MD   [facility] ([facility] [place])    [address] [place] [id number] [phone]              Who to contact     Please call your clinic at [phone] to:    Ask questions about your health    Make or cancel appointments    Discuss your medicines    Learn about your test results    Speak to your doctor            Additional Information About Your Visit        [email or web address] Information     [employer] gives you secure access to your electronic health record. If you see a primary care provider, you can also send messages to your care team and make appointments. If you have questions, please call your primary care clinic.  If you do not have a primary care provider, please call [phone] and they will assist you.      [employer] is an electronic gateway that provides easy, online access to your medical records. With [employer], you can request a clinic appointment, read your " test results, renew a prescription or communicate with your care team.     To access your existing account, please contact your [facility] or call [phone] for assistance.        Care EveryWhere ID     This is your Care EveryWhere ID. This could be used by other organizations to access your [place] medical records  [id number]        Your Vitals Were     Pulse Breastfeeding? BMI (Body Mass Index)             88 No 45.39 kg/m2          Blood Pressure from Last 3 Encounters:   02/16/18 108/73   01/19/18 114/81   06/08/17 109/73    Weight from Last 3 Encounters:   02/16/18 105.4 kg (232 lb 6.4 oz)   01/19/18 106.6 kg (235 lb 1.6 oz)   09/20/17 105.2 kg (232 lb)              We Performed the Following     BARIATRIC ADULT REFERRAL     EKG Performed in Clinic w/ Provider Reading Fee          Today's Medication Changes          These changes are accurate as of 2/16/18 12:07 PM.  If you have any questions, ask your nurse or doctor.               These medicines have changed or have updated prescriptions.        Dose/Directions    mirabegron 50 MG 24 hr tablet   Commonly known as:  MYRBETRIQ   This may have changed:    - medication strength  - how much to take   Used for:  Mixed stress and urge urinary incontinence, Dry mouth   Changed by:  Gloria Love MD        Dose:  50 mg   Take 1 tablet (50 mg) by mouth daily   Quantity:  30 tablet   Refills:  2            Where to get your medicines      [facility] [id number] [place] [address] AT [facility] [address] [address]    Hours:  24-hours Phone:  [phone]     mirabegron 50 MG 24 hr tablet                Primary Care Provider Office Phone # Fax #    Gloria Love [phone]       [address] [address]        Equal Access to Services     KEELEY LEMUS AH: Teo Caraballo,  waashleyda nicoosiris, qaybta karegan land, ronnell cifuentesaanadeem ah. So [facility] [phone].    ATENCIÓN: Si beba rabago, tiene a moreno disposición servicios gratuitos de asistencia lingüística. Sanya al [phone].    We comply with applicable federal civil rights laws and [place] laws. We do not discriminate on the basis of race, color, national origin, age, disability, sex, sexual orientation, or gender identity.            Thank you!     Thank you for choosing [facility]  for your care. Our goal is always to provide you with excellent care. Hearing back from our patients is one way we can continue to improve our services. Please take a few minutes to complete the written survey that you may receive in the mail after your visit with us. Thank you!             Your Updated Medication List - Protect others around you: Learn how to safely use, store and throw away your medicines at www.disposemymeds.org.          This list is accurate as of 2/16/18 12:07 PM.  Always use your most recent med list.                   Brand Name Dispense Instructions for use Diagnosis    atenolol 25 MG tablet    TENORMIN    30 tablet    Take 1 tablet (25 mg) by mouth daily    Migraine       BACLOFEN PO      Take 10 mg by mouth 3 times daily        cetirizine 10 MG tablet    zyrTEC    30 tablet    Take 1 tablet (10 mg) by mouth daily    Chordoma (H)       cevimeline 30 MG capsule    EVOXAC    90 capsule    Take 1 capsule (30 mg) by mouth 3 times daily *1/19/18 MD APPT*    Dry mouth, Chordoma (H)       esomeprazole 40 MG CR capsule    nexIUM    90 capsule    Take 1 capsule (40 mg) by mouth every morning (before breakfast) One hour before meals    Gastroesophageal reflux disease without esophagitis       fentaNYL 12 mcg/hr 72 hr patch    DURAGESIC     Place 1 patch onto the skin every 72 hours        ferrous sulfate 325 (65 FE) MG tablet    IRON    100 tablet    Take 1 tablet (325 mg) by mouth 2 times  daily    Iron deficiency anemia, unspecified iron deficiency anemia type       FETZIMA 80 MG 24 hr capsule   Generic drug:  levomilnacipran      Take 80 mg by mouth daily    Tension headache, EDS (Castro-Danlos syndrome), Bleeds easily (H), Excessive or frequent menstruation, Screening for hyperlipidemia       gabapentin 300 MG capsule    NEURONTIN     Takes 600 mg qid        MAGNESIUM OXIDE PO      Take 500 mg by mouth daily    Normocytic anemia       meclizine 12.5 MG tablet    ANTIVERT    15 tablet    TAKE 1 TO 2 TABLETS THREE TIMES DAILY AS NEEDED FOR DIZZINESS *MAY CAUSE DROWSINESS    Vertigo       minocycline 50 MG capsule    MINOCIN/DYNACIN    60 capsule    Take 1 capsule (50 mg) by mouth 2 times daily Refills from originating provider or needs appt to discuss    H/O pityriasis rosea       mirabegron 50 MG 24 hr tablet    MYRBETRIQ    30 tablet    Take 1 tablet (50 mg) by mouth daily    Mixed stress and urge urinary incontinence, Dry mouth       MULTIVITAL PO      Take by mouth daily        nabumetone 500 MG tablet    RELAFEN    60 tablet    Take 1 tablet (500 mg) by mouth 2 times daily    Arthritis       nortriptyline 25 MG capsule    PAMELOR    90 capsule    Take 1 capsule (25 mg) by mouth At Bedtime Reduced dose    Insomnia, unspecified type       ondansetron 4 MG ODT tab    ZOFRAN-ODT    20 tablet    Take 1 tablet (4 mg) by mouth every 8 hours as needed for nausea    Chordoma (H)       oxyCODONE-acetaminophen 5-325 MG per tablet    PERCOCET     Take 0.05 tablets by mouth 2 times daily        pseudoePHEDrine 120 MG 12 hr tablet    SUDAFED    60 tablet    Take 1 tablet (120 mg) by mouth every 12 hours as needed for congestion    Allergic state, sequela       REXULTI 0.5 MG tablet   Generic drug:  brexpiprazole      Take 1 mg by mouth daily        STRATTERA 60 MG capsule   Generic drug:  atomoxetine      Take 80 mg by mouth daily        * SUMAtriptan 6 MG/0.5ML injection    IMITREX    6 mL    Inject 0.5 mLs  (6 mg) Subcutaneous once as needed May repeat once after one hour, max 2 doses in 24 hours    Chronic migraine without aura without status migrainosus, not intractable       * SUMAtriptan 100 MG tablet    IMITREX    27 tablet    Take 1 tablet (100 mg) by mouth at onset of headache May repeat after one hour, max 200 mg in 24 hours    Migraine without aura and without status migrainosus, not intractable       * SUMAtriptan 50 MG tablet    IMITREX    27 tablet    Take 1-2 tablets ( mg) by mouth at onset of headache for migraine (max 100 mg in 24 hours)    Migraine without aura and without status migrainosus, not intractable       vitamin D 2000 UNITS Caps           * Notice:  This list has 3 medication(s) that are the same as other medications prescribed for you. Read the directions carefully, and ask your doctor or other care provider to review them with you.

## 2018-02-16 NOTE — PROGRESS NOTES
[facility]  Primary Care Center   Gloria Love MD  02/16/2018      Chief Complaint:   Recheck Medication       History of Present Illness:   Giovana Joaquin is a 38 year old female with a complicated past medical history of clival chordoma with soft palate resection in 2001, neck fusion, pharyngoplasty, now with velopharyngeal insufficiency, chronic neck/hip pain, and depression. She presents to clinic for a follow up. She was last seen in clinic on 1/19/2018.    At her last visit, she was concerned about urinary incontinence as well as dry mouth. She has been taking 25mg myrbetriq and a reduced dose of nortriptyline. She states that both medication changes have been helpful with no major side effects. She has half the amount of strong urges, and leakage has decreased from a couple times a week to only every once in a while. She has not had any new migraines.    Weight loss: She quit drinking soda a few months ago, and is concerned that she has not lost as much weight as she expected. She has also been monitoring her diet by cutting back on sugar. She mentions that exercising is difficult for her, and water exercises make her nervous ever since her soft palate was removed. She is looking for advice on how to lose more weight.     Ears: She mentions having lumps on her ear lobe that produce pus and blood. She states that they are painful and she does not wear earrings in the hole where they can be found.     Gynecology: She mentions that she has been experiencing menstrual periods that are heavier than normal. She states that she bleeds for 6-7 days at least once per month. No intermenstrual bleeding or pain. When she was younger, she had similar amounts of blood with her periods as well has painful cramps. She used to be on birth control, which improved symptoms. She is not currently on oral contracepives. She states that as a teenager she had ovarian cysts.        Review of Systems:   A full 10-pt Review of  Systems was performed, verified and is negative except as documented in the HPI.  All health questionnaires were reviewed, verified and relevant information documented above.      Active Medications:      nabumetone (RELAFEN) 500 MG tablet, Take 1 tablet (500 mg) by mouth 2 times daily, Disp: 60 tablet, Rfl: 0     pseudoePHEDrine (SUDAFED) 120 MG 12 hr tablet, Take 1 tablet (120 mg) by mouth every 12 hours as needed for congestion, Disp: 60 tablet, Rfl: 3     minocycline (MINOCIN/DYNACIN) 50 MG capsule, Take 1 capsule (50 mg) by mouth 2 times daily Refills from originating provider or needs appt to discuss, Disp: 60 capsule, Rfl: 3     mirabegron (MYRBETRIQ) 25 MG 24 hr tablet, Take 1 tablet (25 mg) by mouth daily, Disp: 30 tablet, Rfl: 1     nortriptyline (PAMELOR) 25 MG capsule, Take 1 capsule (25 mg) by mouth At Bedtime Reduced dose, Disp: 90 capsule, Rfl: 1     meclizine (ANTIVERT) 12.5 MG tablet, TAKE 1 TO 2 TABLETS THREE TIMES DAILY AS NEEDED FOR DIZZINESS *MAY CAUSE DROWSINESS, Disp: 15 tablet, Rfl: 0     atenolol (TENORMIN) 25 MG tablet, Take 1 tablet (25 mg) by mouth daily, Disp: 30 tablet, Rfl: 0     cevimeline (EVOXAC) 30 MG capsule, Take 1 capsule (30 mg) by mouth 3 times daily *1/19/18 MD APPT*, Disp: 90 capsule, Rfl: 0     cetirizine (ZYRTEC) 10 MG tablet, Take 1 tablet (10 mg) by mouth daily, Disp: 30 tablet, Rfl: 0     brexpiprazole (REXULTI) 0.5 MG tablet, Take 1 mg by mouth daily, Disp: , Rfl:      ferrous sulfate (IRON) 325 (65 FE) MG tablet, Take 1 tablet (325 mg) by mouth 2 times daily, Disp: 100 tablet, Rfl: 3     ondansetron (ZOFRAN-ODT) 4 MG ODT tab, Take 1 tablet (4 mg) by mouth every 8 hours as needed for nausea, Disp: 20 tablet, Rfl: 1     SUMAtriptan (IMITREX) 100 MG tablet, Take 1 tablet (100 mg) by mouth at onset of headache May repeat after one hour, max 200 mg in 24 hours, Disp: 27 tablet, Rfl: 2     SUMAtriptan (IMITREX) 50 MG tablet, Take 1-2 tablets ( mg) by mouth at onset of  headache for migraine (max 100 mg in 24 hours), Disp: 27 tablet, Rfl: 2     SUMAtriptan (IMITREX) 6 MG/0.5ML vial, Inject 0.5 mLs (6 mg) Subcutaneous once as needed May repeat once after one hour, max 2 doses in 24 hours, Disp: 6 mL, Rfl: 1     MAGNESIUM OXIDE PO, Take 500 mg by mouth daily, Disp: , Rfl:      levomilnacipran (FETZIMA) 80 MG 24 HR capsule, Take 80 mg by mouth daily, Disp: , Rfl:      esomeprazole (NEXIUM) 40 MG capsule, Take 1 capsule (40 mg) by mouth every morning (before breakfast) One hour before meals, Disp: 90 capsule, Rfl: 3     oxyCODONE-acetaminophen (PERCOCET) 5-325 MG per tablet, Take 0.05 tablets by mouth 2 times daily , Disp: , Rfl:      fentaNYL (DURAGESIC) 12 mcg/hr patch 72 hr, Place 1 patch onto the skin every 72 hours, Disp: , Rfl:      atomoxetine (STRATTERA) 60 MG capsule, Take 80 mg by mouth daily , Disp: , Rfl:      Multiple Vitamins-Minerals (MULTIVITAL PO), Take by mouth daily, Disp: , Rfl:      Cholecalciferol (VITAMIN D) 2000 UNITS CAPS, , Disp: , Rfl:      BACLOFEN PO, Take 10 mg by mouth 3 times daily , Disp: , Rfl:      GABAPENTIN 300 MG OR CAPS, Takes 600 mg qid, Disp: , Rfl:       Allergies:   Ambien [zolpidem tartrate];   Cephalexin;   Levaquin [levofloxacin];  Morphine      Past Medical History:  Attention deficit disorder  Anxiety  Brain tumor (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)  Chronic pain, neck and hip  Cryoglobulinemia  Difficult intubation  Castro-Danlos syndrome  Gastroesophageal reflux disease  Hypertension  Irritable bowel syndrome  Major depressive disorder  Migraine  Obesity  Velopharyngeal insufficiency, acquired  Oligomenorrhea  Syncope  Dysfunction of eustachian tube, bilateral     Past Surgical History:  Brain tumor removal  Neck fusion C3 to C4  Proton particle radiation  Soft palette removed, throat x4    Family History:   Mother - uterine cancer  Father - rheumatoid arthritis  Maternal grandmother - breast cancer, MS  Paternal  grandmother - arthritis      Social History:   Patient is a former smoker (quit date: 1/17/2013) who consumes alcohol socially.     Physical Exam:   /73  Pulse 88  Wt 105.4 kg (232 lb 6.4 oz)  Breastfeeding? No  BMI 45.39 kg/m2      Constitutional: Alert, oriented, pleasant, no acute distress  Head: Normocephalic, atraumatic  ENT: bilateral ear lobes appeared normal with no erythema or fluctuance or discharge.   Cardiovascular: Regular rate and rhythm, no murmurs, rubs or gallops, peripheral pulses full/symmetric  Respiratory: Good air movement bilaterally, lungs clear, no wheezes/rales/rhonchi  Musculoskeletal: No edema, normal muscle tone, normal gait  Neurologic: Alert and oriented, cranial nerves 2-12 intact.  Psychiatric: normal mentation, affect and mood     Assessment and Plan:  1. Obesity, unspecified classification, unspecified obesity type, unspecified whether serious comorbidity present  Referral provided to further discuss nutritional and medicinal options for weight loss. In the meantime we discussed trying low impact exercise such as walking on a treadmill or pool jogging.   - BARIATRIC ADULT REFERRAL    2. Encounter for therapeutic drug monitoring  EKG to be done today to ensure no prolonged QT interval. If everything appears normal we can increase her myrbetriq to 50mg daily.  - EKG Performed in Clinic w/ Provider Reading Fee     3. Menorrhagia   IUD might be an effective way to manage her cycles especially in light of her anemia.    4. External ear concerns  Nothing abnormal found upon examination. Suggested washing ears with hydrogen peroxide.        Follow-up: Data Unavailable         Scribe Disclosure:   Yecenia RIVERA, am serving as a scribe to document services personally performed by Gloria Love MD at this visit, based upon the provider's statements to me. All documentation has been reviewed by the aforementioned provider prior to being entered into the official medical  record.     Portions of this medical record were completed by a scribe. UPON MY REVIEW AND AUTHENTICATION BY ELECTRONIC SIGNATURE, this confirms (a) I performed the applicable clinical services, and (b) the record is accurate.  Gloria Love MD  Internal Medicine    25 min spent face to face, of which >50% time spent on counseling/coordinating care exclusive of any procedure time

## 2018-02-16 NOTE — PATIENT INSTRUCTIONS
[facility] [place]: [phone]     [facility] Medication Refill Request Information:  * Please contact your pharmacy regarding ANY request for medication refills.  ** [facility] Prescription Fax = [phone]  * Please allow 3 business days for routine medication refills.  * Please allow 5 business days for controlled substance medication refills.     [facility] Test Result notification information:  *You will be notified with in 7-10 days of your appointment day regarding the results of your test.  If you are on MyChart you will be notified as soon as the provider has reviewed the results and signed off on them.        Okay to increase myrbetriq to 50 mg daily. Let us know if you experience side effects.      Preparing for IUD Insertion       Prior to the Appointment::    Check with your insurance to ensure you have coverage for the IUD    If you are NOT on birth control, abstain from unprotected intercourse (sex without condoms) for 7 days prior to the appointment.     Failure to do so will result in IUD not being placed the day of the appointment.     If you are on birth control, continue your current form of birth control until 7 days after the IUD is inserted.     You need to be taking this correctly.     If you are missing pills, you also need to abstain from unprotected intercourse for 7 days prior to the appointment.     Failure to do so will result in IUD not being placed the day of the appointment.       Day of the Appointment:     Please arrive to lab 30 minutes prior to the scheduled appointment for a urine pregnancy test.     You may take 600 mg of ibuprofen 60 minutes prior to the procedure to help reduce cramping.     If you require medication to relax for the appointment, please let us know when you schedule the appointment. You will need a  for after the procedure.       After the Appointment:     You will not be able to place anything in the vagina for seven days    No  intercourse (sexual activity)    No tampons    No douches

## 2018-02-16 NOTE — NURSING NOTE
Chief Complaint   Patient presents with     Recheck Medication     Patient here for medication recheck.       Preeti Archer CMA at 11:06 AM on 2/16/2018.

## 2018-02-19 RX ORDER — CEVIMELINE HYDROCHLORIDE 30 MG/1
30 CAPSULE ORAL 3 TIMES DAILY
Qty: 270 CAPSULE | Refills: 3 | Status: SHIPPED | OUTPATIENT
Start: 2018-02-19 | End: 2019-02-27

## 2018-02-21 ENCOUNTER — TELEPHONE (OUTPATIENT)
Dept: FAMILY MEDICINE | Facility: CLINIC | Age: 38
End: 2018-02-21

## 2018-02-21 DIAGNOSIS — Z30.430 ENCOUNTER FOR INSERTION OF MIRENA IUD: Primary | ICD-10-CM

## 2018-02-22 NOTE — TELEPHONE ENCOUNTER
----- Message from Travon Najera sent at 2/21/2018  7:54 PM CST -----  Regarding: IUD   Hi Dr. Michele,      Patient Giovana Joaquin has an IUD insertion appt with you on 03/07/18 at 6:30PM.  Can you place an IUD and urine pregnancy order if patient is good to go for the procedure.       Thank you,    Travon Najera  Primary Care

## 2018-02-23 DIAGNOSIS — M19.90 ARTHRITIS: ICD-10-CM

## 2018-02-23 NOTE — TELEPHONE ENCOUNTER
nabumetone (RELAFEN) 500 MG tablet      Last Written Prescription Date:  1-23-18  Last Fill Quantity: 60,   # refills: 0  Last Office Visit : 2-16-18  Future Office visit:  none    Routing refill request to provider for review/approval because:  Protocol Labs overdue - over a year since last done.    Kathleen M Doege RN

## 2018-02-27 RX ORDER — NABUMETONE 500 MG/1
500 TABLET, FILM COATED ORAL 2 TIMES DAILY
Qty: 60 TABLET | Refills: 1 | Status: SHIPPED | OUTPATIENT
Start: 2018-02-27 | End: 2018-06-15

## 2018-02-27 NOTE — TELEPHONE ENCOUNTER
Patient overdue for labs.  I have ordered CMP and pregnancy test which she needs for IUD next week.  Please call her and let her know to do labs prior to appt 3/7.  Thanks,  Gloria Love MD  Internal Medicine

## 2018-03-02 ENCOUNTER — MYC MEDICAL ADVICE (OUTPATIENT)
Dept: OTHER | Age: 38
End: 2018-03-02

## 2018-03-03 DIAGNOSIS — R42 VERTIGO: ICD-10-CM

## 2018-03-05 RX ORDER — MECLIZINE HCL 12.5 MG 12.5 MG/1
TABLET ORAL
Qty: 15 TABLET | Refills: 0 | Status: SHIPPED | OUTPATIENT
Start: 2018-03-05 | End: 2018-05-07

## 2018-03-08 ENCOUNTER — THERAPY VISIT (OUTPATIENT)
Dept: PHYSICAL THERAPY | Facility: CLINIC | Age: 38
End: 2018-03-08
Payer: COMMERCIAL

## 2018-03-08 DIAGNOSIS — M25.511 CHRONIC RIGHT SHOULDER PAIN: Primary | ICD-10-CM

## 2018-03-08 DIAGNOSIS — G89.29 CHRONIC RIGHT SHOULDER PAIN: Primary | ICD-10-CM

## 2018-03-08 PROCEDURE — 97110 THERAPEUTIC EXERCISES: CPT | Mod: GP | Performed by: PHYSICAL THERAPIST

## 2018-03-08 PROCEDURE — 97161 PT EVAL LOW COMPLEX 20 MIN: CPT | Mod: GP | Performed by: PHYSICAL THERAPIST

## 2018-03-08 NOTE — PROGRESS NOTES
[facility] Initial Evaluation  Subjective:  Patient is a 38 year old female presenting with rehab right shoulder hpi. The history is provided by the patient. No  was used.   Giovana Joaquin is a 38 year old female with a right shoulder condition.  Condition occurred with:  Other (R shoulder pain. with R arm pain for 4 years.  Pt with Hx of cervical DJD.  Pt Had R shoulder injection last week. Pt did fall last night onto hip on icy driveway feeling little tense in shoulders/neck).  Condition occurred: for unknown reasons.  This is a chronic condition  Feb 2018 MD order  .    Patient reports pain:  Anterior.    Pain is described as aching and is intermittent and reported as 9/10.  Associated symptoms:  Loss of motion/stiffness and loss of strength. Pain is worse during the night and worse during the day.  Symptoms are exacerbated by using arm overhead, using arm behind back, lifting, carrying and certain positions and relieved by rest.  Since onset symptoms are unchanged.  Special tests:  X-ray and MRI.      General health as reported by patient is fair.  Pertinent medical history includes:  Depression, overweight and sleep disorder/apnea (R shoulder dislocation x 2, EDS (hypermobility)).    Other surgeries include:  Orthopedic surgery and cancer surgery (cervical fusion 2002 (from brainstem tumor) , radiation).  Current medications:  Anti-inflammatory, anti-depressants and pain medication.  Current occupation is Disabled   .        Barriers include:  None as reported by the patient.    Red flags:  Significant weakness.                        Objective:  Standing Alignment:      Shoulder/UE:  Rounded shoulders                                       Shoulder Evaluation:  ROM:  AROM:    Flexion:  Left:  160    Right:  145    Abduction:  Left: 160   Right:  160      External Rotation:  Left:  60    Right:  60            Extension/Internal Rotation:  Left:  T5    Right:  T10     PROM:  normal                                Strength:    Flexion: Left:5/5   Pain:    Right: 4/5      Pain:  +    Abduction:  Left: 5/5  Pain:    Right: 4/5      Pain:++    Internal Rotation:  Left:5/5     Pain:    Right: 4+/5     Pain:  External Rotation:   Left:5/5     Pain:   Right:4/5      Pain:+            Stability Testing:      Left shoulder stability negative testing:  Sulcus sign; Load and shift anterior and Load and shift posterior    Right shoulder stability negative testing:  Sulcus sign; Load and shift anterior and Load and shift posterior  Special Tests:      Right shoulder positive for the following special tests:Impingement  Right shoulder negative for the following special tests:Rotator cuff tear                                       General     ROS    Assessment/Plan:    Patient is a 38 year old female with right side shoulder complaints.    Patient has the following significant findings with corresponding treatment plan.                Diagnosis 1:  R shoulder pain, Impingement (Hx of hypermobility)  Pain -  hot/cold therapy, self management, education and home program  Decreased ROM/flexibility - manual therapy and therapeutic exercise  Decreased strength - therapeutic exercise and therapeutic activities  Decreased function - therapeutic activities  Impaired posture - neuro re-education    Therapy Evaluation Codes:   1) History comprised of:   Personal factors that impact the plan of care:      None.    Comorbidity factors that impact the plan of care are:      Overweight, Weakness and Elohs.     Medications impacting care: None.  2) Examination of Body Systems comprised of:   Body structures and functions that impact the plan of care:      Shoulder.   Activity limitations that impact the plan of care are:      Bathing, Dressing, Lifting, Sleeping and Laying down.  3) Clinical presentation characteristics are:   Stable/Uncomplicated.  4) Decision-Making    Low complexity using standardized  patient assessment instrument and/or measureable assessment of functional outcome.  Cumulative Therapy Evaluation is: Low complexity.    Previous and current functional limitations:  (See Goal Flow Sheet for this information)    Short term and Long term goals: (See Goal Flow Sheet for this information)     Communication ability:  Patient appears to be able to clearly communicate and understand verbal and written communication and follow directions correctly.  Treatment Explanation - The following has been discussed with the patient:   RX ordered/plan of care  Anticipated outcomes  Possible risks and side effects  This patient would benefit from PT intervention to resume normal activities.   Rehab potential is good.    Frequency:  1 X week, once daily  Duration:  for 6 weeks  Discharge Plan:  Achieve all LTG.  Independent in home treatment program.  Reach maximal therapeutic benefit.    Please refer to the daily flowsheet for treatment today, total treatment time and time spent performing 1:1 timed codes.

## 2018-03-08 NOTE — MR AVS SNAPSHOT
"              After Visit Summary   3/8/2018    Giovana Joaquin    MRN: [id number]           Patient Information     Date Of Birth          1980        Visit Information        Provider Department      3/8/2018 3:00 PM Vikas De Jesus PT [facility]        Today's Diagnoses     Chronic right shoulder pain    -  1       Follow-ups after your visit        Your next 10 appointments already scheduled     Mar 15, 2018  3:00 PM CDT   FIDELIA Extremity with Vikas De Jesus PT   [facility] ([facility])    [address] [place] [address]   [phone]            Apr 11, 2018 12:00 PM CDT   (Arrive by 11:45 AM)   MA SCREENING DIGITAL BILATERAL with [id number]   [facility] ([facility] [place])    [address] [place] [address] [phone]           Do not use any powder, lotion or deodorant under your arms or on your breast. If you do, we will ask you to remove it before your exam.  Wear comfortable, two-piece clothing.  If you have any allergies, tell your care team.  Bring any previous mammograms from other facilities or have them mailed to the breast center. Three-dimensional (3D) mammograms are available at [facility] locations in [facility], [facility], [facility], and [place]. [facility] locations include [place] and [facility] in [place]. Benefits of 3D mammograms include: - Improved rate of cancer detection - Decreases your chance of having to go back for more tests, which means fewer: - \"False-positive\" results (This means that there is an abnormal area but it isn't cancer.) - Invasive testing procedures, such as a biopsy or surgery - Can provide clearer images of the breast if you have dense breast tissue. 3D mammography is an optional exam that anyone can have with a 2D mammogram. It doesn't replace or take the place of a 2D " mammogram. 2D mammograms remain an effective screening test for all women.  Not all insurance companies cover the cost of a 3D mammogram. Check with your insurance.              Who to contact     If you have questions or need follow up information about today's clinic visit or your schedule please contact [facility] directly at [phone].  Normal or non-critical lab and imaging results will be communicated to you by MyChart, letter or phone within 4 business days after the clinic has received the results. If you do not hear from us within 7 days, please contact the clinic through [employer] or phone. If you have a critical or abnormal lab result, we will notify you by phone as soon as possible.  Submit refill requests through [employer] or call your pharmacy and they will forward the refill request to us. Please allow 3 business days for your refill to be completed.          Additional Information About Your Visit        [employer] Information     [employer] gives you secure access to your electronic health record. If you see a primary care provider, you can also send messages to your care team and make appointments. If you have questions, please call your primary care clinic.  If you do not have a primary care provider, please call [phone] and they will assist you.        Care EveryWhere ID     This is your Care EveryWhere ID. This could be used by other organizations to access your [facility] medical records  [id number]         Blood Pressure from Last 3 Encounters:   02/16/18 108/73   01/19/18 114/81   06/08/17 109/73    Weight from Last 3 Encounters:   02/16/18 105.4 kg (232 lb 6.4 oz)   01/19/18 106.6 kg (235 lb 1.6 oz)   09/20/17 105.2 kg (232 lb)              We Performed the Following     HC PT EVAL, LOW COMPLEXITY     FIDELIA INITIAL EVAL REPORT     THERAPEUTIC EXERCISES        Primary Care Provider Office Phone # Fax #    Gloria Love [phone] [address]        Equal Access to Services     KEELEY LEMUS : Hadii aad ku hadfarnazhilary Caraballo, waashleyda eriberto, qabaymehul mccoymalelo land, ronnell romeojavierrafia guajardo. So [facility] [phone].    ATENCIÓN: Si habla español, tiene a moreno disposición servicios gratuitos de asistencia lingüística. Llame al [phone].    We comply with applicable federal civil rights laws and [place] laws. We do not discriminate on the basis of race, color, national origin, age, disability, sex, sexual orientation, or gender identity.            Thank you!     Thank you for choosing [facility]  for your [facility]. Our goal is always to provide you with excellent care. Hearing back from our patients is one way we can continue to improve our services. Please take a few minutes to complete the written survey that you may receive in the mail after your visit with us. Thank you!             Your Updated Medication List - Protect others around you: Learn how to safely use, store and throw away your medicines at www.disposemymeds.org.          This list is accurate as of 3/8/18  4:17 PM.  Always use your most recent med list.                   Brand Name Dispense Instructions for use Diagnosis    atenolol 25 MG tablet    TENORMIN    90 tablet    Take 1 tablet (25 mg) by mouth daily    Migraine       BACLOFEN PO      Take 10 mg by mouth 3 times daily        cetirizine 10 MG tablet    zyrTEC    30 tablet    Take 1 tablet (10 mg) by mouth daily    Chordoma (H)       cevimeline 30 MG capsule    EVOXAC    270 capsule    Take 1 capsule (30 mg) by mouth 3 times daily    Dry mouth, Chordoma (H)       esomeprazole 40 MG CR capsule    nexIUM    90 capsule    Take 1 capsule (40 mg) by mouth every morning (before breakfast) One hour before meals    Gastroesophageal reflux disease without esophagitis       fentaNYL 12 mcg/hr 72 hr patch    DURAGESIC     Place 1 patch onto the skin every 72  hours        ferrous sulfate 325 (65 FE) MG tablet    IRON    100 tablet    Take 1 tablet (325 mg) by mouth 2 times daily    Iron deficiency anemia, unspecified iron deficiency anemia type       FETZIMA 80 MG 24 hr capsule   Generic drug:  levomilnacipran      Take 80 mg by mouth daily    Tension headache, EDS (Castro-Danlos syndrome), Bleeds easily (H), Excessive or frequent menstruation, Screening for hyperlipidemia       gabapentin 300 MG capsule    NEURONTIN     Takes 600 mg qid        levonorgestrel 20 MCG/24HR IUD    MIRENA    1 each    1 each (20 mcg) by Intrauterine route once for 1 dose    Encounter for insertion of mirena IUD       MAGNESIUM OXIDE PO      Take 500 mg by mouth daily    Normocytic anemia       meclizine 12.5 MG tablet    ANTIVERT    15 tablet    TAKE 1 TO 2 TABLETS BY MOUTH THREE TIMES DAILY AS NEEDED FOR DIZZINESS    Vertigo       minocycline 50 MG capsule    MINOCIN/DYNACIN    60 capsule    Take 1 capsule (50 mg) by mouth 2 times daily Refills from originating provider or needs appt to discuss    H/O pityriasis rosea       mirabegron 50 MG 24 hr tablet    MYRBETRIQ    30 tablet    Take 1 tablet (50 mg) by mouth daily    Mixed stress and urge urinary incontinence, Dry mouth       MULTIVITAL PO      Take by mouth daily        nabumetone 500 MG tablet    RELAFEN    60 tablet    Take 1 tablet (500 mg) by mouth 2 times daily Labs due    Arthritis       nortriptyline 25 MG capsule    PAMELOR    90 capsule    Take 1 capsule (25 mg) by mouth At Bedtime Reduced dose    Insomnia, unspecified type       ondansetron 4 MG ODT tab    ZOFRAN-ODT    20 tablet    Take 1 tablet (4 mg) by mouth every 8 hours as needed for nausea    Chordoma (H)       oxyCODONE-acetaminophen 5-325 MG per tablet    PERCOCET     Take 0.05 tablets by mouth 2 times daily        pseudoePHEDrine 120 MG 12 hr tablet    SUDAFED    60 tablet    Take 1 tablet (120 mg) by mouth every 12 hours as needed for congestion    Allergic state,  sequela       REXULTI 0.5 MG tablet   Generic drug:  brexpiprazole      Take 1 mg by mouth daily        STRATTERA 60 MG capsule   Generic drug:  atomoxetine      Take 80 mg by mouth daily        * SUMAtriptan 6 MG/0.5ML injection    IMITREX    6 mL    Inject 0.5 mLs (6 mg) Subcutaneous once as needed May repeat once after one hour, max 2 doses in 24 hours    Chronic migraine without aura without status migrainosus, not intractable       * SUMAtriptan 100 MG tablet    IMITREX    27 tablet    Take 1 tablet (100 mg) by mouth at onset of headache May repeat after one hour, max 200 mg in 24 hours    Migraine without aura and without status migrainosus, not intractable       * SUMAtriptan 50 MG tablet    IMITREX    27 tablet    Take 1-2 tablets ( mg) by mouth at onset of headache for migraine (max 100 mg in 24 hours)    Migraine without aura and without status migrainosus, not intractable       vitamin D 2000 UNITS Caps           * Notice:  This list has 3 medication(s) that are the same as other medications prescribed for you. Read the directions carefully, and ask your doctor or other care provider to review them with you.

## 2018-03-08 NOTE — LETTER
[facility]  [address]  [facility] [address]  [phone]    2018    Re: Giovana Joaquin   :   1980  MRN:  [id number]   REFERRING PHYSICIAN:   Barby Bajwa  [facility]  Date of Initial Evaluation:  3/8/2018  Visits:  Rxs Used: 1  Reason for Referral:  Chronic right shoulder pain    EVALUATION SUMMARY    [facility] Initial Evaluation  Subjective:  Patient is a 38 year old female presenting with rehab right shoulder hpi. The history is provided by the patient. No  was used.   Giovana Joaquin is a 38 year old female with a right shoulder condition.  Condition occurred with:  Other (R shoulder pain. with R arm pain for 4 years.  Pt with Hx of cervical DJD.  Pt Had R shoulder injection last week. Pt did fall last night onto hip on icy driveway feeling little tense in shoulders/neck).  Condition occurred: for unknown reasons.  This is a chronic condition  2018 MD order  .    Patient reports pain:  Anterior.    Pain is described as aching and is intermittent and reported as 9/10.  Associated symptoms:  Loss of motion/stiffness and loss of strength. Pain is worse during the night and worse during the day.  Symptoms are exacerbated by using arm overhead, using arm behind back, lifting, carrying and certain positions and relieved by rest.  Since onset symptoms are unchanged.  Special tests:  X-ray and MRI.      General health as reported by patient is fair.  Pertinent medical history includes:  Depression, overweight and sleep disorder/apnea (R shoulder dislocation x 2, EDS (hypermobility)).    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  Current medications:  Anti-inflammatory, anti-depressants and pain medication.  Current occupation is Disabled   Barriers include:  None as reported by the patient.  Red flags:   Significant weakness.    Objective:  Standing Alignment:    Shoulder/UE:  Rounded shoulders  Shoulder Evaluation:  ROM:  AROM:    Flexion:  Left:  160    Right:  145  Abduction:  Left: 160   Right:  160  External Rotation:  Left:  60    Right:  60  Extension/Internal Rotation:  Left:  T5    Right:  T10    PROM:  normal    Re: Giovana Joaquin :   1980    Strength:    Flexion: Left:5/5   Pain:    Right: 4/5      Pain:  +  Abduction:  Left: 5/5  Pain:    Right: 4/5      Pain:++  Internal Rotation:  Left:5/5     Pain:    Right: 4+/5     Pain:  External Rotation:   Left:5/5     Pain:   Right:4/5      Pain:+    Stability Testing:    Left shoulder stability negative testing:  Sulcus sign; Load and shift anterior and Load and shift posterior  Right shoulder stability negative testing:  Sulcus sign; Load and shift anterior and Load and shift posterior  Special Tests:    Right shoulder positive for the following special tests:Impingement  Right shoulder negative for the following special tests:Rotator cuff tear    Assessment/Plan:    Patient is a 38 year old female with right side shoulder complaints.    Patient has the following significant findings with corresponding treatment plan.                Diagnosis 1:  R shoulder pain, Impingement (Hx of hypermobility)  Pain -  hot/cold therapy, self management, education and home program  Decreased ROM/flexibility - manual therapy and therapeutic exercise  Decreased strength - therapeutic exercise and therapeutic activities  Decreased function - therapeutic activities  Impaired posture - neuro re-education  Therapy Evaluation Codes:   1) History comprised of:   Personal factors that impact the plan of care:      None.    Comorbidity factors that impact the plan of care are:      Overweight, Weakness and Elohs.     Medications impacting care: None.  2) Examination of Body Systems comprised of:   Body structures and functions that impact the plan of care:       Shoulder.   Activity limitations that impact the plan of care are:      Bathing, Dressing, Lifting, Sleeping and Laying down.  3) Clinical presentation characteristics are:   Stable/Uncomplicated.  4) Decision-Making    Low complexity using standardized patient assessment instrument and/or measureable assessment of functional outcome.  Cumulative Therapy Evaluation is: Low complexity.  Previous and current functional limitations:  (See Goal Flow Sheet for this information)    Short term and Long term goals: (See Goal Flow Sheet for this information)   Communication ability:  Patient appears to be able to clearly communicate and understand verbal and written communication and follow directions correctly.  Treatment Explanation - The following has been discussed with the patient:   RX ordered/plan of care  Anticipated outcomes  Possible risks and side effects  This patient would benefit from PT intervention to resume normal activities.   Rehab potential is good.  Re: Giovana Joaquin   :   1980    Frequency:  1 X week, once daily  Duration:  for 6 weeks  Discharge Plan:  Achieve all LTG.  Independent in home treatment program.  Reach maximal therapeutic benefit.      Thank you for your referral.    INQUIRIES  Therapist: Osmany De Jesus, PT  [facility]  [address]  [place] [address]  Phone: [phone]  Fax: [phone]

## 2018-03-15 ENCOUNTER — THERAPY VISIT (OUTPATIENT)
Dept: PHYSICAL THERAPY | Facility: CLINIC | Age: 38
End: 2018-03-15
Payer: COMMERCIAL

## 2018-03-15 DIAGNOSIS — M25.511 CHRONIC RIGHT SHOULDER PAIN: ICD-10-CM

## 2018-03-15 DIAGNOSIS — G89.29 CHRONIC RIGHT SHOULDER PAIN: ICD-10-CM

## 2018-03-15 PROCEDURE — 97110 THERAPEUTIC EXERCISES: CPT | Mod: GP | Performed by: PHYSICAL THERAPIST

## 2018-03-21 ENCOUNTER — TRANSFERRED RECORDS (OUTPATIENT)
Dept: PHYSICAL THERAPY | Facility: CLINIC | Age: 38
End: 2018-03-21

## 2018-04-03 NOTE — TELEPHONE ENCOUNTER
Patient called back today regarding this message. Advised of need to have CMP done. She had to cancel her appointment for IUD placement on 3/7/18, but is wanting to reschedule it. She is aware to do lab prior. Call transferred to scheduling.

## 2018-05-07 DIAGNOSIS — R42 VERTIGO: ICD-10-CM

## 2018-05-09 RX ORDER — MECLIZINE HCL 12.5 MG 12.5 MG/1
TABLET ORAL
Qty: 15 TABLET | Refills: 0 | Status: SHIPPED | OUTPATIENT
Start: 2018-05-09 | End: 2018-09-25

## 2018-05-14 DIAGNOSIS — N39.46 MIXED STRESS AND URGE URINARY INCONTINENCE: ICD-10-CM

## 2018-05-14 DIAGNOSIS — R68.2 DRY MOUTH: ICD-10-CM

## 2018-05-15 DIAGNOSIS — T78.40XS ALLERGIC STATE, SEQUELA: ICD-10-CM

## 2018-05-16 NOTE — TELEPHONE ENCOUNTER
MYRBETRIQ 50MG TABLETS 30'S  Last Written Prescription Date:  2/16/2018  Last Fill Quantity: 30,   # refills: 2  Last Office Visit : 2/16/2018  Future Office visit:  None    Routing refill request to provider for review/approval because:  Not on refill protocol

## 2018-05-16 NOTE — TELEPHONE ENCOUNTER
pseudoePHEDrine (SUDAFED) 120 MG 12 hr    Last Written Prescription Date:  1/19/18  Last Fill Quantity: 60,   # refills: 3  Last Office Visit : 2/16/18  Future Office visit:  NONE    Routing refill request to provider for review/approval because:Drug not on the  refill protocol or controlled substance.   AMT / USE ?

## 2018-05-17 RX ORDER — MIRABEGRON 50 MG/1
50 TABLET, EXTENDED RELEASE ORAL DAILY
Qty: 30 TABLET | Refills: 2 | Status: SHIPPED | OUTPATIENT
Start: 2018-05-17 | End: 2018-08-13

## 2018-05-22 RX ORDER — PSEUDOEPHEDRINE HCL 120 MG/1
120 TABLET, FILM COATED, EXTENDED RELEASE ORAL EVERY 12 HOURS PRN
Qty: 60 TABLET | Refills: 5 | Status: SHIPPED | OUTPATIENT
Start: 2018-05-22 | End: 2018-11-24

## 2018-05-23 NOTE — TELEPHONE ENCOUNTER
Pseudoephedrine faxed to     [id number] - [place], [place] - [address] [facility] [address]  Darlene Ko LANA

## 2018-06-12 DIAGNOSIS — M19.90 ARTHRITIS: ICD-10-CM

## 2018-06-13 ENCOUNTER — APPOINTMENT (OUTPATIENT)
Dept: GENERAL RADIOLOGY | Facility: CLINIC | Age: 38
End: 2018-06-13
Attending: NURSE PRACTITIONER
Payer: COMMERCIAL

## 2018-06-13 ENCOUNTER — HOSPITAL ENCOUNTER (EMERGENCY)
Facility: CLINIC | Age: 38
Discharge: HOME | End: 2018-06-13
Attending: NURSE PRACTITIONER | Admitting: NURSE PRACTITIONER
Payer: COMMERCIAL

## 2018-06-13 ENCOUNTER — APPOINTMENT (OUTPATIENT)
Dept: CT IMAGING | Facility: CLINIC | Age: 38
End: 2018-06-13
Attending: NURSE PRACTITIONER
Payer: COMMERCIAL

## 2018-06-13 VITALS
OXYGEN SATURATION: 98 % | TEMPERATURE: 98.5 F | DIASTOLIC BLOOD PRESSURE: 81 MMHG | SYSTOLIC BLOOD PRESSURE: 123 MMHG | RESPIRATION RATE: 12 BRPM

## 2018-06-13 DIAGNOSIS — M25.512 ACUTE PAIN OF LEFT SHOULDER: ICD-10-CM

## 2018-06-13 DIAGNOSIS — T14.8XXA HEMATOMA OF SKIN: ICD-10-CM
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
## 2018-06-13 DIAGNOSIS — T14.8XXA ABRASION: ICD-10-CM

## 2018-06-13 DIAGNOSIS — S20.212A CONTUSION OF LEFT CHEST WALL, INITIAL ENCOUNTER: ICD-10-CM

## 2018-06-13 LAB — B-HCG FREE SERPL-ACNC: <5 IU/L

## 2018-06-13 PROCEDURE — 70450 CT HEAD/BRAIN W/O DYE: CPT

## 2018-06-13 PROCEDURE — 71046 X-RAY EXAM CHEST 2 VIEWS: CPT

## 2018-06-13 PROCEDURE — 72125 CT NECK SPINE W/O DYE: CPT

## 2018-06-13 PROCEDURE — 73000 X-RAY EXAM OF COLLAR BONE: CPT | Mod: LT

## 2018-06-13 PROCEDURE — 84702 CHORIONIC GONADOTROPIN TEST: CPT

## 2018-06-13 PROCEDURE — 25000128 H RX IP 250 OP 636: Performed by: NURSE PRACTITIONER

## 2018-06-13 PROCEDURE — 99285 EMERGENCY DEPT VISIT HI MDM: CPT | Mod: 25

## 2018-06-13 PROCEDURE — 96372 THER/PROPH/DIAG INJ SC/IM: CPT

## 2018-06-13 RX ORDER — KETOROLAC TROMETHAMINE 30 MG/ML
30 INJECTION, SOLUTION INTRAMUSCULAR; INTRAVENOUS ONCE
Status: COMPLETED | OUTPATIENT
Start: 2018-06-13 | End: 2018-06-13

## 2018-06-13 RX ADMIN — KETOROLAC TROMETHAMINE 30 MG: 30 INJECTION, SOLUTION INTRAMUSCULAR at 22:05

## 2018-06-13 ASSESSMENT — ENCOUNTER SYMPTOMS: NECK PAIN: 1
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
## 2018-06-13 NOTE — ED AVS SNAPSHOT
[facility]    [address]    [facility] [address]    Phone:  [phone]    Fax:  [phone]                                       Giovana Joaquin   MRN: [id number]    Department:  [facility]   Date of Visit:  6/13/2018           After Visit Summary Signature Page     I have received my discharge instructions, and my questions have been answered. I have discussed any challenges I see with this plan with the nurse or doctor.    ..........................................................................................................................................  Patient/Patient Representative Signature      ..........................................................................................................................................  Patient Representative Print Name and Relationship to Patient    ..................................................               ................................................  Date                                            Time    ..........................................................................................................................................  Reviewed by Signature/Title    ...................................................              ..............................................  Date                                                            Time

## 2018-06-14 ASSESSMENT — ENCOUNTER SYMPTOMS
NAUSEA: 0
APPETITE CHANGE: 0
FLANK PAIN: 0
HEADACHES: 0
WEAKNESS: 0
SHORTNESS OF BREATH: 0
VOMITING: 0
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
## 2018-06-14 NOTE — ED NOTES
Bed: ED28  Expected date: 6/13/18  Expected time: 7:49 PM  Means of arrival: Ambulance  Comments:  BSV 1

## 2018-06-14 NOTE — DISCHARGE INSTRUCTIONS
Ibuprofen or Naproxen and/or Tylenol scheduled for the next 3-5 days. 600 mg (three tabs) ibuprofen, 440 mg (two tabs) Naproxen, 650-1000 mg of Tylenol. Follow directions. Use OTC lidocaine patches as directed.   Ice or heat to area.  I recommend ice in the first 24-48 hours.        Bruises (Contusions)    A contusion is a bruise. A bruise happens when a blow to your body doesn't break the skin but does break blood vessels beneath the skin. Blood leaking from the broken vessels causes redness and swelling. As it heals, your bruise is likely to turn colors like purple, green, and yellow. This is normal. The bruise should fade in 2 or 3 weeks.  Factors that make you more likely to bruise  Almost everyone bruises now and then. Certain people do bruise more easily than others. You're more prone to bruising as you get older. That's because blood vessels become more fragile with age. You're also more likely to bruise if you have a clotting disorder such as hemophilia or take medicines that reduce clotting, including aspirin and coumadin.  When to go to the emergency room (ER)  Bruises almost always heal on their own without special treatment. But for some people, a bad bruise can be serious. Seek medical care if you:    Have a clotting disorder such as hemophilia    Have cirrhosis or other serious liver disease    Take blood-thinning medicines such as warfarin  What to expect in the ER  A doctor will examine your bruise and ask about any health conditions you have. In some cases, you may have a test to check how well your blood clots. Other treatment will depend on your needs.  Follow-up care  Sometimes a bruise gets worse instead of better. It may become larger and more swollen. This can occur when your body walls off a small pool of blood under the skin (hematoma). In very rare cases, your doctor may need to drain extra blood from the area.  Tip:  Apply an ice pack or bag of frozen peas to a bruise. Keep a thin cloth  between the ice or frozen peas and your skin. The cold can help reduce redness and swelling.   Date Last Reviewed: 12/1/2016 2000-2017 [employer]. [address] [facility], [place] [address]. All rights reserved. This information is not intended as a substitute for professional medical care. Always follow your healthcare professional's instructions.          Air Bag Injury  In order to protect you during a crash, air bags must inflate very rapidly. They stop you from hitting the steering wheel or windshield during a front-end crash. They are very good at saving lives. But they blast out of the steering wheel hub or instrument panel at more than 100 mph. Because of this great force, the air bag may injure you when it strikes your body. Such injuries are usually minor scrapes (abrasions) and chemical burns to the face, hands, or arms.  Although rare, a more serious or even fatal injury can happen when someone is very close to the air bag module when it opens. To help prevent this:    Wear your seat belt at all times whether you are a  or a passenger. This will keep you at a safe distance from the air bag when it opens.    When driving, sit with your breastbone at least 10 inches away from the steering wheel.    Children younger than 13 are safest in the rear seat.    Never put a rear-facing infant restraint in the front seat. This puts the baby s head too close to the air bag. Severe head injury or death could occur if the air bag opens with the child in this position.  Home care  Follow these tips for home care if you have a scrape or chemical burn:    If you were given a bandage, change it once a day. If your bandage sticks to the wound, soak it in warm water until it loosens.    Wash the area with soap and water to remove all the cream or ointment. You may do this in a sink, under a tub faucet, or in the shower. Rinse off the soap and pat dry with a clean towel.    Reapply cream or ointment  according to your healthcare provider s instructions. This will prevent infection and help keep the bandage from sticking.    Cover the wound with a fresh nonstick bandage. If the wound is on your face, you can just use the cream or ointment without the bandage, if you prefer.    Repeat this procedure once a day until the scrape becomes dry.    If the bandage gets wet or dirty, change it as soon as you can.  You can take acetaminophen or ibuprofen to control pain, unless another pain medicine was prescribed. If you have chronic liver or kidney disease, talk with your healthcare provider before using these medicines. Also talk with your provider if you ever had a stomach ulcer or GI bleeding.  Follow-up care  Follow up with your healthcare provider, or as advised. Most skin wounds heal within 10 days. But you make get an infection even with proper treatment. Watch for early signs of infection listed below.  When to seek medical advice  Call your healthcare provider right away if any of these occur:    Pain in the wound that gets worse    Redness or swelling that gets worse    Pus coming from the wound    Fever of 100.4 F (38 C) or higher, or as directed by your healthcare provider    Headache or vision problems, or headache or vision problems that get worse    Neck, back, abdomen, arm, or leg pain, or pain in these areas that gets worse    Shortness of breath or chest pain that gets worse    Repeated vomiting, dizziness, or fainting    Excessive drowsiness or unable to wake up as usual    Confusion or change in behavior or speech, memory loss, or blurred vision  Date Last Reviewed: 9/1/2016 2000-2017 [employer]. [address] [place], [place] [address]. All rights reserved. This information is not intended as a substitute for professional medical care. Always follow your healthcare professional's instructions.          Motor Vehicle Accident (MVA): Contusion from a Seat Belt   Seat belts can help  save lives in a car accident. But if your body was thrown forward against the seat belt, you may have a bruise (contusion) or scrape (abrasion) on your neck, chest, back, or belly (abdomen).  A bruise may cause changes in skin color (for instance, the skin may turn blue or black). Swelling and pain may also occur. A scrape may cause pain, redness, swelling, and bleeding.   Most bruises and scrapes are not serious. They generally take a few days or longer to heal.  Home care    Being in a car accident can be emotionally upsetting. Take time to rest and adjust to what has happened. Talking with others about your feelings can help you feel less anxious and afraid.    It s normal for your muscles to feel sore and tight the day after the accident. But tell your healthcare provider about any pain that is severe.    You may use acetaminophen to control pain, unless another pain medicine was prescribed. Don t take aspirin or NSAIDs (nonsteroidal anti-inflammatory drugs) without talking to your provider first. These medicines increase the risk of bleeding.    To help reduce swelling and pain, apply a cold source to the injured area for up to 20 minutes at a time as often as directed. Use a cold pack or bag of ice wrapped in a thin towel. Never put a cold source directly on your skin.    If you have any cuts or scrapes caused by the accident, be sure to care for them as directed.  Note about concussion  The strong forces from a car accident can sometimes cause a concussion (mild brain injury). You don t have symptoms of a concussion at this time. But these can show up later. For this reason, you may be told to watch for symptoms of concussion once you re home. Seek emergency medical care if you develop any of the symptoms below over the next hours to days:    Headache    Nausea or vomiting    Dizziness    Sensitivity to light or noise    Unusual sleepiness or grogginess    Trouble falling asleep    Personality  changes    Vision changes    Memory loss    Confusion or disorientation    Trouble walking or clumsiness    Loss of consciousness (even for a short time)    Inability to be awakened  During the time period that you re watching for concussion symptoms:    Don t drink alcohol or use sedatives or other medicines that make you sleepy.    Don t drive or operate machinery.    Don t do anything strenuous, such as heavy lifting or straining.    Limit tasks that require concentration. This includes reading, watching TV, using a smartphone or computer, and playing video games.    Don t return to sports, exercise, or other activity that could result in another injury.  Ask your healthcare provider when you can safely resume these activities.   Follow-up care  Follow up with your healthcare provider or as advised. If you had imaging tests done, they will be reviewed by a doctor. You will be told the results and any new findings that may affect your care.  When to seek medical advice  Call your healthcare provider right away if any of these occur:    Bruising spreads or worsens    Pain or swelling worsens    Fever of 100.4 F (38 C) or higher, or as directed by your provider    Increased warmth, redness, swelling, bleeding, or drainage around any cuts or scrapes  Call 911  Call 911 if any of these occur:    Blood in your vomit, stool (red or black color), or urine (pink or red color)    Trouble breathing or shortness of breath    Seizure  Date Last Reviewed: 5/1/2017 2000-2017 [employer]. [address] [place], [place] [id number]. All rights reserved. This information is not intended as a substitute for professional medical care. Always follow your healthcare professional's instructions.          RICE     Rest an injury, elevate it, and use ice and compression as directed.   RICE stands for rest, ice, compression, and elevation. These can limit pain and swelling after an injury. RICE may be recommended to help  treat fractures, sprains, strains, and bruises or bumps.   Home care  The following explain the details of RICE:    Rest. Limit the use of the injured body part. This helps prevent further damage to the body part and gives it time to heal. In some cases, you may need a sling, brace, splint, or cast to help keep the body part still until it has healed.    Ice. Applying ice right after an injury helps relieve pain and swelling. Wrap a bag of ice in a thin towel. Then, place it over the injured area. Do this for 10 to 15 minutes every 3 to 4 hours. Continue for the next 1 to 3 days or until your symptoms improve. Never put ice directly on your skin or ice an area longer than 15 minutes at a time.    Compression. Putting pressure on an injury helps reduce swelling and provides support. Wrap the injured area firmly with an elastic bandage/wrap. Make sure not to wrap the bandage too tightly or you will cut off blood flow to the injured area. If your bandage loosens, rewrap it.    Elevation. Keeping an injury raised above the level of your heart reduces swelling, pain, and throbbing. For instance, if you have a broken leg, it may help to rest your leg on several pillows when sitting or lying down. Try to keep the injured area elevated for at least 2 to 3 hours per day.  Follow-up care  Follow up with your healthcare provider, or as advised.  When to seek medical advice  Call your healthcare provider right away if any of these occur:    Fever of 100.4 F (38 C) or higher, or as directed by your healthcare provider    Increased pain or swelling in the injured body part    Injured body part becomes cold, blue, numb, or tingly    Signs of infection. These include warmth in the skin, redness, drainage, or bad smell coming from the injured body part.  Date Last Reviewed: 1/18/2016 2000-2017 [employer]. [address] [place] [address], [place] [address]. All rights reserved. This information is not intended as a  substitute for professional medical care. Always follow your healthcare professional's instructions.

## 2018-06-15 DIAGNOSIS — M19.90 ARTHRITIS: ICD-10-CM

## 2018-06-15 DIAGNOSIS — Z51.81 ENCOUNTER FOR THERAPEUTIC DRUG MONITORING: Primary | ICD-10-CM

## 2018-06-15 RX ORDER — NABUMETONE 500 MG/1
500 TABLET, FILM COATED ORAL 2 TIMES DAILY
Qty: 60 TABLET | Refills: 0 | OUTPATIENT
Start: 2018-06-15

## 2018-06-15 RX ORDER — NABUMETONE 500 MG/1
500 TABLET, FILM COATED ORAL 2 TIMES DAILY
Qty: 30 TABLET | Refills: 0 | Status: SHIPPED | OUTPATIENT
Start: 2018-06-15 | End: 2020-06-26

## 2018-06-15 NOTE — TELEPHONE ENCOUNTER
She needs labs done.  We have granted an exception previously. Not safe to prescribe without labs.  I will give short term supply but I also ordered labs for her to do at her earliest convenience.   Please let her know.  Thanks,  ML
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
## 2018-07-09 DIAGNOSIS — G47.00 INSOMNIA, UNSPECIFIED TYPE: ICD-10-CM

## 2018-07-12 RX ORDER — NORTRIPTYLINE HCL 25 MG
25 CAPSULE ORAL AT BEDTIME
Qty: 90 CAPSULE | Refills: 1 | Status: SHIPPED | OUTPATIENT
Start: 2018-07-12 | End: 2019-01-16

## 2018-08-13 DIAGNOSIS — R68.2 DRY MOUTH: ICD-10-CM

## 2018-08-13 DIAGNOSIS — N39.46 MIXED STRESS AND URGE URINARY INCONTINENCE: ICD-10-CM

## 2018-08-14 NOTE — TELEPHONE ENCOUNTER
mirabegron (MYRBETRIQ) 50 MG  Last Written Prescription Date:  5/17/18  Last Fill Quantity: 30,   # refills: 2  Last Office Visit : 2/16/18  Future Office visit:  NONE

## 2018-08-16 RX ORDER — MIRABEGRON 50 MG/1
50 TABLET, EXTENDED RELEASE ORAL DAILY
Qty: 90 TABLET | Refills: 0 | Status: SHIPPED | OUTPATIENT
Start: 2018-08-16 | End: 2018-11-15

## 2018-09-25 DIAGNOSIS — R42 VERTIGO: ICD-10-CM

## 2018-09-27 RX ORDER — MECLIZINE HCL 12.5 MG 12.5 MG/1
TABLET ORAL
Qty: 15 TABLET | Refills: 0 | Status: SHIPPED | OUTPATIENT
Start: 2018-09-27 | End: 2018-11-15

## 2018-11-15 DIAGNOSIS — R42 VERTIGO: ICD-10-CM

## 2018-11-15 DIAGNOSIS — R68.2 DRY MOUTH: ICD-10-CM

## 2018-11-15 DIAGNOSIS — N39.46 MIXED STRESS AND URGE URINARY INCONTINENCE: ICD-10-CM

## 2018-11-16 RX ORDER — MECLIZINE HCL 12.5 MG 12.5 MG/1
TABLET ORAL
Qty: 15 TABLET | Refills: 1 | Status: SHIPPED | OUTPATIENT
Start: 2018-11-16 | End: 2019-04-23

## 2018-11-16 RX ORDER — MIRABEGRON 50 MG/1
50 TABLET, EXTENDED RELEASE ORAL DAILY
Qty: 90 TABLET | Refills: 0 | Status: SHIPPED | OUTPATIENT
Start: 2018-11-16 | End: 2019-02-16

## 2018-11-16 NOTE — TELEPHONE ENCOUNTER
MYRBETRIQ 50 MG 24 hr tablet      Last Written Prescription Date:  8-16-18  Last Fill Quantity: 90,   # refills: 0  Last Office Visit : 2-16-18  Future Office visit:  none  2. Encounter for therapeutic drug monitoring  EKG to be done today to ensure no prolonged QT interval. If everything appears normal we can increase her myrbetriq to 50mg daily.  - EKG Performed in Clinic w/ Provider Reading Fee  Routing refill request to provider for review/approval because:  Drug failed the [facility], P or [facility] refill protocol. Last clinic note change in dosage- continue ?. No rtc f/u  FYI to clinic RN overdue eGFR

## 2018-11-24 DIAGNOSIS — T78.40XS ALLERGIC STATE, SEQUELA: ICD-10-CM

## 2018-11-27 NOTE — TELEPHONE ENCOUNTER
Pseudoephedrine (SUDOGEST) 12 HOUR 120 MG 12 hr tablet      Last Written Prescription Date:  5-22-18  Last Fill Quantity: 60,   # refills: 5  Last Office Visit : 2-6-18  Future Office visit:  none    Routing refill request to provider for review/approval because:  Drug not on the [facility], P or [facility] refill protocol or controlled substance.

## 2018-11-28 RX ORDER — PSEUDOEPHEDRINE HCL 120 MG/1
120 TABLET, FILM COATED, EXTENDED RELEASE ORAL EVERY 12 HOURS PRN
Qty: 60 TABLET | Refills: 3 | Status: SHIPPED | OUTPATIENT
Start: 2018-11-28 | End: 2019-03-21

## 2018-11-30 DIAGNOSIS — T78.40XS ALLERGIC STATE, SEQUELA: ICD-10-CM

## 2018-12-03 RX ORDER — PSEUDOEPHEDRINE HCL 120 MG/1
TABLET, FILM COATED, EXTENDED RELEASE ORAL
Qty: 60 TABLET | Refills: 0 | OUTPATIENT
Start: 2018-12-03

## 2019-01-16 DIAGNOSIS — G47.00 INSOMNIA, UNSPECIFIED TYPE: ICD-10-CM

## 2019-01-18 RX ORDER — NORTRIPTYLINE HCL 25 MG
25 CAPSULE ORAL AT BEDTIME
Qty: 90 CAPSULE | Refills: 0 | Status: SHIPPED | OUTPATIENT
Start: 2019-01-18 | End: 2019-04-23

## 2019-01-18 NOTE — TELEPHONE ENCOUNTER
Called patient, no answered. Left message with clinic number to call back to schedule annual physical, due in February, in the [facility].

## 2019-01-18 NOTE — TELEPHONE ENCOUNTER
NORTRIPTYLINE 25MG CAPSULES  Take 1 capsule (25 mg) by mouth At Bedtime - Oral    Last Written Prescription Date:  7/12/2018  Last Fill Quantity: 90,   # refills: 1  Last Office Visit : 2/16/2018  Future Office visit:  None  Scheduling has been notified to contact the pt for appointment.      Routing refill request to provider for review/approval because:   high interaction alert Mirabegron and Nortryptyline.

## 2019-01-31 DIAGNOSIS — N39.46 MIXED STRESS AND URGE URINARY INCONTINENCE: ICD-10-CM

## 2019-01-31 DIAGNOSIS — G43.909 MIGRAINE: ICD-10-CM

## 2019-01-31 DIAGNOSIS — G47.00 INSOMNIA, UNSPECIFIED TYPE: ICD-10-CM

## 2019-01-31 DIAGNOSIS — R68.2 DRY MOUTH: ICD-10-CM

## 2019-02-02 RX ORDER — MIRABEGRON 25 MG/1
TABLET, FILM COATED, EXTENDED RELEASE ORAL
Qty: 30 TABLET | Refills: 0 | OUTPATIENT
Start: 2019-02-02

## 2019-02-02 RX ORDER — ATENOLOL 25 MG/1
25 TABLET ORAL DAILY
Qty: 90 TABLET | Refills: 0 | Status: SHIPPED | OUTPATIENT
Start: 2019-02-02 | End: 2019-05-17

## 2019-02-02 RX ORDER — NORTRIPTYLINE HCL 25 MG
CAPSULE ORAL
Qty: 90 CAPSULE | Refills: 0 | OUTPATIENT
Start: 2019-02-02

## 2019-02-02 NOTE — TELEPHONE ENCOUNTER
atenolol (TENORMIN) 25 MG   90 DAY RF   OVER DUE LEX  Scheduling has been notified to contact the pt for appointment.

## 2019-02-04 ENCOUNTER — TELEPHONE (OUTPATIENT)
Dept: INTERNAL MEDICINE | Facility: CLINIC | Age: 39
End: 2019-02-04

## 2019-02-04 NOTE — TELEPHONE ENCOUNTER
----- Message from Sindy Chavarria RN sent at 2/2/2019 12:46 PM CST -----  Regarding: [facility]  APPT  Please call to schedule WITH LOGEAIS, SULLY DIETRICH MD  Patient is overdue for annual clinic visit - unless otherwise indicated patient needs to be scheduled with 1st available.  Patient may need labs and or imaging - please check with RN care coordinator.    I do not need any follow up on the scheduling of this appointment unless the patient will no longer be receiving care in our clinic.  THANKS  MRT

## 2019-02-04 NOTE — TELEPHONE ENCOUNTER
Called patient to schedule annual with PCP. Per patient, will call back to schedule as needed her calendar. Patient have clinic number.

## 2019-02-07 ENCOUNTER — TRANSFERRED RECORDS (OUTPATIENT)
Dept: HEALTH INFORMATION MANAGEMENT | Facility: CLINIC | Age: 39
End: 2019-02-07

## 2019-02-11 ENCOUNTER — HOSPITAL ENCOUNTER (EMERGENCY)
Facility: CLINIC | Age: 39
Discharge: HOME | End: 2019-02-11
Attending: INTERNAL MEDICINE | Admitting: INTERNAL MEDICINE
Payer: MEDICARE

## 2019-02-11 ENCOUNTER — APPOINTMENT (OUTPATIENT)
Dept: CT IMAGING | Facility: CLINIC | Age: 39
End: 2019-02-11
Attending: INTERNAL MEDICINE
Payer: MEDICARE

## 2019-02-11 VITALS
TEMPERATURE: 96.3 F | BODY MASS INDEX: 37.11 KG/M2 | SYSTOLIC BLOOD PRESSURE: 133 MMHG | WEIGHT: 190 LBS | RESPIRATION RATE: 14 BRPM | HEART RATE: 78 BPM | OXYGEN SATURATION: 97 % | DIASTOLIC BLOOD PRESSURE: 67 MMHG

## 2019-02-11 DIAGNOSIS — R22.0 FACIAL SWELLING: ICD-10-CM

## 2019-02-11 DIAGNOSIS — K04.7 PERIAPICAL ABSCESS: ICD-10-CM

## 2019-02-11 LAB
ALBUMIN SERPL-MCNC: 3.2 G/DL (ref 3.4–5)
ALP SERPL-CCNC: 118 U/L (ref 40–150)
ALT SERPL W P-5'-P-CCNC: 18 U/L (ref 0–50)
ANION GAP SERPL CALCULATED.3IONS-SCNC: 4 MMOL/L (ref 3–14)
AST SERPL W P-5'-P-CCNC: 13 U/L (ref 0–45)
BASOPHILS # BLD AUTO: 0.1 10E9/L (ref 0–0.2)
BASOPHILS NFR BLD AUTO: 0.5 %
BILIRUB SERPL-MCNC: 0.3 MG/DL (ref 0.2–1.3)
BUN SERPL-MCNC: 12 MG/DL (ref 7–30)
CALCIUM SERPL-MCNC: 9.7 MG/DL (ref 8.5–10.1)
CHLORIDE SERPL-SCNC: 102 MMOL/L (ref 94–109)
CO2 SERPL-SCNC: 33 MMOL/L (ref 20–32)
CREAT SERPL-MCNC: 0.76 MG/DL (ref 0.52–1.04)
CRP SERPL-MCNC: 29 MG/L (ref 0–8)
DIFFERENTIAL METHOD BLD: ABNORMAL
EOSINOPHIL # BLD AUTO: 0.2 10E9/L (ref 0–0.7)
EOSINOPHIL NFR BLD AUTO: 1.5 %
ERYTHROCYTE [DISTWIDTH] IN BLOOD BY AUTOMATED COUNT: 14.5 % (ref 10–15)
ERYTHROCYTE [SEDIMENTATION RATE] IN BLOOD BY WESTERGREN METHOD: 55 MM/H (ref 0–20)
GFR SERPL CREATININE-BSD FRML MDRD: >90 ML/MIN/{1.73_M2}
GLUCOSE SERPL-MCNC: 84 MG/DL (ref 70–99)
HCG UR QL: NEGATIVE
HCT VFR BLD AUTO: 38.1 % (ref 35–47)
HGB BLD-MCNC: 11.4 G/DL (ref 11.7–15.7)
IMM GRANULOCYTES # BLD: 0 10E9/L (ref 0–0.4)
IMM GRANULOCYTES NFR BLD: 0.3 %
INR PPP: 0.99 (ref 0.86–1.14)
INTERNAL QC OK POCT: YES
LYMPHOCYTES # BLD AUTO: 1.8 10E9/L (ref 0.8–5.3)
LYMPHOCYTES NFR BLD AUTO: 15.5 %
MCH RBC QN AUTO: 27.8 PG (ref 26.5–33)
MCHC RBC AUTO-ENTMCNC: 29.9 G/DL (ref 31.5–36.5)
MCV RBC AUTO: 93 FL (ref 78–100)
MONOCYTES # BLD AUTO: 0.8 10E9/L (ref 0–1.3)
MONOCYTES NFR BLD AUTO: 6.5 %
NEUTROPHILS # BLD AUTO: 8.9 10E9/L (ref 1.6–8.3)
NEUTROPHILS NFR BLD AUTO: 75.7 %
NRBC # BLD AUTO: 0 10*3/UL
NRBC BLD AUTO-RTO: 0 /100
PLATELET # BLD AUTO: 357 10E9/L (ref 150–450)
POTASSIUM SERPL-SCNC: 3.7 MMOL/L (ref 3.4–5.3)
PROT SERPL-MCNC: 8.1 G/DL (ref 6.8–8.8)
RBC # BLD AUTO: 4.1 10E12/L (ref 3.8–5.2)
SODIUM SERPL-SCNC: 140 MMOL/L (ref 133–144)
WBC # BLD AUTO: 11.8 10E9/L (ref 4–11)

## 2019-02-11 PROCEDURE — 85610 PROTHROMBIN TIME: CPT | Performed by: INTERNAL MEDICINE

## 2019-02-11 PROCEDURE — 80053 COMPREHEN METABOLIC PANEL: CPT | Performed by: INTERNAL MEDICINE

## 2019-02-11 PROCEDURE — 25000128 H RX IP 250 OP 636: Performed by: RADIOLOGY

## 2019-02-11 PROCEDURE — 85652 RBC SED RATE AUTOMATED: CPT | Performed by: INTERNAL MEDICINE

## 2019-02-11 PROCEDURE — 96375 TX/PRO/DX INJ NEW DRUG ADDON: CPT | Performed by: INTERNAL MEDICINE

## 2019-02-11 PROCEDURE — 99285 EMERGENCY DEPT VISIT HI MDM: CPT | Mod: 25 | Performed by: INTERNAL MEDICINE

## 2019-02-11 PROCEDURE — 64400 NJX AA&/STRD TRIGEMINAL NRV: CPT | Performed by: INTERNAL MEDICINE

## 2019-02-11 PROCEDURE — 64400 NJX AA&/STRD TRIGEMINAL NRV: CPT | Mod: Z6 | Performed by: INTERNAL MEDICINE

## 2019-02-11 PROCEDURE — 85025 COMPLETE CBC W/AUTO DIFF WBC: CPT | Performed by: INTERNAL MEDICINE

## 2019-02-11 PROCEDURE — 86140 C-REACTIVE PROTEIN: CPT | Performed by: INTERNAL MEDICINE

## 2019-02-11 PROCEDURE — 25000125 ZZHC RX 250: Performed by: RADIOLOGY

## 2019-02-11 PROCEDURE — 70491 CT SOFT TISSUE NECK W/DYE: CPT

## 2019-02-11 PROCEDURE — 25000128 H RX IP 250 OP 636: Performed by: INTERNAL MEDICINE

## 2019-02-11 PROCEDURE — 81025 URINE PREGNANCY TEST: CPT | Performed by: INTERNAL MEDICINE

## 2019-02-11 PROCEDURE — 96374 THER/PROPH/DIAG INJ IV PUSH: CPT | Mod: 59 | Performed by: INTERNAL MEDICINE

## 2019-02-11 PROCEDURE — 96361 HYDRATE IV INFUSION ADD-ON: CPT | Mod: 59 | Performed by: INTERNAL MEDICINE

## 2019-02-11 RX ORDER — AMPICILLIN AND SULBACTAM 2; 1 G/1; G/1
3 INJECTION, POWDER, FOR SOLUTION INTRAMUSCULAR; INTRAVENOUS ONCE
Status: COMPLETED | OUTPATIENT
Start: 2019-02-11 | End: 2019-02-11

## 2019-02-11 RX ORDER — IOPAMIDOL 755 MG/ML
100 INJECTION, SOLUTION INTRAVASCULAR ONCE
Status: COMPLETED | OUTPATIENT
Start: 2019-02-11 | End: 2019-02-11

## 2019-02-11 RX ORDER — BUPIVACAINE HYDROCHLORIDE AND EPINEPHRINE 5; 5 MG/ML; UG/ML
10 INJECTION, SOLUTION EPIDURAL; INTRACAUDAL; PERINEURAL ONCE
Status: DISCONTINUED | OUTPATIENT
Start: 2019-02-11 | End: 2019-02-11 | Stop reason: HOSPADM

## 2019-02-11 RX ORDER — HYDROMORPHONE HYDROCHLORIDE 1 MG/ML
0.5 INJECTION, SOLUTION INTRAMUSCULAR; INTRAVENOUS; SUBCUTANEOUS ONCE
Status: COMPLETED | OUTPATIENT
Start: 2019-02-11 | End: 2019-02-11

## 2019-02-11 RX ADMIN — SODIUM CHLORIDE, PRESERVATIVE FREE 60 ML: 5 INJECTION INTRAVENOUS at 08:38

## 2019-02-11 RX ADMIN — IOPAMIDOL 100 ML: 755 INJECTION, SOLUTION INTRAVENOUS at 08:38

## 2019-02-11 RX ADMIN — SODIUM CHLORIDE 1000 ML: 9 INJECTION, SOLUTION INTRAVENOUS at 07:51

## 2019-02-11 RX ADMIN — AMPICILLIN SODIUM AND SULBACTAM SODIUM 3 G: 2; 1 INJECTION, POWDER, FOR SOLUTION INTRAMUSCULAR; INTRAVENOUS at 08:45

## 2019-02-11 RX ADMIN — Medication 0.5 MG: at 07:51

## 2019-02-11 ASSESSMENT — ENCOUNTER SYMPTOMS
FACIAL SWELLING: 1
VOICE CHANGE: 1
SHORTNESS OF BREATH: 0
TROUBLE SWALLOWING: 0
FEVER: 0

## 2019-02-11 NOTE — ED PROVIDER NOTES
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 Now comes here with left lower jaw swelling and pain. This started last Thursday and she went to [facility] [facility] on Friday where she was given oral dose of clindamycin to be discharged with. Unfortunately, the area got much worse and communicated to ENT Dr. Lowe, who recommended her to come to the Emergency Department here. She denies any fever or any trouble breathing or swallowing. She does have some speech difficulty since this surgical resection but that hasn't changed.    This part of the medical record was transcribed by Eric Mary, Medical Scribe, from a dictation done by Archie Reis MD.       I have reviewed the Medications, Allergies, Past Medical and Surgical History, and Social History in the [employer]ae system.    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     Gastro-oesophageal reflux disease      Hypertension      IBS (irritable bowel syndrome)      MDD (major depressive disorder)      Migraine      Obesity (BMI 30-39.9)      Velopharyngeal insufficiency, acquired        Past Surgical History:   Procedure Laterality Date     BIOPSY       brain tumor removal       DAVINCI ASSISTED UVULOPALATOPHARYNGOPLASTY  5/7/2013    Procedure: DAVINCI ASSISTED UVULOPALATOPHARYNGOPLASTY;  Davinci Pharyngoplasty ;  Surgeon: Hammad Lowe MD;  Location: [facility] OR     ESOPHAGOSCOPY, GASTROSCOPY, DUODENOSCOPY (EGD), COMBINED   2011    Procedure:COMBINED ESOPHAGOSCOPY, GASTROSCOPY, DUODENOSCOPY (EGD); Surgeon:CEDRICK PABLO; Location:[facility]     neck fusion to c3      to C4      proton particle radiation       soft palette removed, throat x4         Family History   Problem Relation Age of Onset     Uterine Cancer Mother      Arthritis Father         RA     Breast Cancer Maternal Grandmother      Musculoskeletal Disorder Maternal Grandmother         MS     Arthritis Paternal Grandmother        Social History     Tobacco Use     Smoking status: Current Every Day Smoker     Packs/day: 0.20     Years: 10.00     Pack years: 2.00     Types: Cigarettes     Last attempt to quit: 2013     Years since quittin.0     Smokeless tobacco: Never Used     Tobacco comment: e-cig   Substance Use Topics     Alcohol use: Yes     Comment: socially       No current facility-administered medications for this encounter.      Current Outpatient Medications   Medication     atenolol (TENORMIN) 25 MG tablet     atomoxetine (STRATTERA) 60 MG capsule     BACLOFEN PO     brexpiprazole (REXULTI) 0.5 MG tablet     cetirizine (ZYRTEC) 10 MG tablet     cevimeline (EVOXAC) 30 MG capsule     Cholecalciferol (VITAMIN D) 2000 UNITS CAPS     esomeprazole (NEXIUM) 40 MG capsule     fentaNYL (DURAGESIC) 12 mcg/hr patch 72 hr     GABAPENTIN 300 MG OR CAPS     levomilnacipran (FETZIMA) 80 MG 24 HR capsule     levonorgestrel (MIRENA) 20 MCG/24HR IUD     MAGNESIUM OXIDE PO     meclizine (ANTIVERT) 12.5 MG tablet     mirabegron (MYRBETRIQ) 50 MG 24 hr tablet     Multiple Vitamins-Minerals (MULTIVITAL PO)     nabumetone (RELAFEN) 500 MG tablet     nortriptyline (PAMELOR) 25 MG capsule     oxyCODONE-acetaminophen (PERCOCET) 5-325 MG per tablet     pseudoePHEDrine (SUDOGEST 12 HOUR) 120 MG 12 hr tablet     SUMAtriptan (IMITREX) 100 MG tablet     SUMAtriptan (IMITREX) 50 MG tablet     SUMAtriptan (IMITREX) 6 MG/0.5ML vial        Allergies   Allergen Reactions     Ambien  [Zolpidem Tartrate] Other (See Comments)     Sleep walking     Cephalexin Other (See Comments)     Vertigo      Levaquin [Levofloxacin]      Has vertigo     Morphine Itching         Review of Systems   Constitutional: Negative for fever.   HENT: Positive for facial swelling and voice change (chronic; unchanged). Negative for trouble swallowing.    Respiratory: Negative for shortness of breath.    All other systems reviewed and are negative.      Physical Exam   BP: 134/87  Pulse: 80  Temp: 96.3  F (35.7  C)  Resp: 14  Weight: 86.2 kg (190 lb)  SpO2: 100 %      Physical Exam   Constitutional: No distress.   HENT:   Head: Atraumatic.       Mouth/Throat: Oropharynx is clear and moist. No trismus in the jaw. Dental abscesses and dental caries present. No uvula swelling. No oropharyngeal exudate.       Eyes: Pupils are equal, round, and reactive to light. No scleral icterus.   Cardiovascular: Normal heart sounds and intact distal pulses.   Pulmonary/Chest: Breath sounds normal. No respiratory distress.   Abdominal: Soft. Bowel sounds are normal. There is no tenderness.   Musculoskeletal: She exhibits no edema or tenderness.   Skin: Skin is warm. No rash noted. She is not diaphoretic.       ED Course        Incision + drainage  Date/Time: 2/11/2019 3:49 PM  Performed by: Archie Reis MD  Authorized by: Archie Reis MD     Consent:     Consent obtained:  Verbal    Consent given by:  Patient    Risks discussed:  Bleeding and incomplete drainage    Alternatives discussed:  No treatment  Location:     Type:  Abscess    Location:  Mouth    Mouth location:  Alveolar process  Pre-procedure details:     Skin preparation:  Techni-Care  Anesthesia (see MAR for exact dosages):     Anesthesia method:  None  Procedure type:     Complexity:  Simple  Procedure details:     Incision types:  Single straight    Incision depth:  Submucosal    Scalpel blade:  11    Drainage:  Purulent    Drainage amount:  Moderate    Wound treatment:   Wound left open    Packing materials:  None  Post-procedure details:     Patient tolerance of procedure:  Tolerated well, no immediate complications            Results for orders placed or performed during the hospital encounter of 02/11/19 (from the past 24 hour(s))   CBC with platelets differential     Status: Abnormal    Collection Time: 02/11/19  7:54 AM   Result Value Ref Range    WBC 11.8 (H) 4.0 - 11.0 10e9/L    RBC Count 4.10 3.8 - 5.2 10e12/L    Hemoglobin 11.4 (L) 11.7 - 15.7 g/dL    Hematocrit 38.1 35.0 - 47.0 %    MCV 93 78 - 100 fl    MCH 27.8 26.5 - 33.0 pg    MCHC 29.9 (L) 31.5 - 36.5 g/dL    RDW 14.5 10.0 - 15.0 %    Platelet Count 357 150 - 450 10e9/L    Diff Method Automated Method     % Neutrophils 75.7 %    % Lymphocytes 15.5 %    % Monocytes 6.5 %    % Eosinophils 1.5 %    % Basophils 0.5 %    % Immature Granulocytes 0.3 %    Nucleated RBCs 0 0 /100    Absolute Neutrophil 8.9 (H) 1.6 - 8.3 10e9/L    Absolute Lymphocytes 1.8 0.8 - 5.3 10e9/L    Absolute Monocytes 0.8 0.0 - 1.3 10e9/L    Absolute Eosinophils 0.2 0.0 - 0.7 10e9/L    Absolute Basophils 0.1 0.0 - 0.2 10e9/L    Abs Immature Granulocytes 0.0 0 - 0.4 10e9/L    Absolute Nucleated RBC 0.0    INR     Status: None    Collection Time: 02/11/19  7:54 AM   Result Value Ref Range    INR 0.99 0.86 - 1.14   Comprehensive metabolic panel     Status: Abnormal    Collection Time: 02/11/19  7:54 AM   Result Value Ref Range    Sodium 140 133 - 144 mmol/L    Potassium 3.7 3.4 - 5.3 mmol/L    Chloride 102 94 - 109 mmol/L    Carbon Dioxide 33 (H) 20 - 32 mmol/L    Anion Gap 4 3 - 14 mmol/L    Glucose 84 70 - 99 mg/dL    Urea Nitrogen 12 7 - 30 mg/dL    Creatinine 0.76 0.52 - 1.04 mg/dL    GFR Estimate >90 >60 mL/min/[1.73_m2]    GFR Estimate If Black >90 >60 mL/min/[1.73_m2]    Calcium 9.7 8.5 - 10.1 mg/dL    Bilirubin Total 0.3 0.2 - 1.3 mg/dL    Albumin 3.2 (L) 3.4 - 5.0 g/dL    Protein Total 8.1 6.8 - 8.8 g/dL    Alkaline Phosphatase 118 40 - 150 U/L     ALT 18 0 - 50 U/L    AST 13 0 - 45 U/L   CRP inflammation     Status: Abnormal    Collection Time: 02/11/19  7:54 AM   Result Value Ref Range    CRP Inflammation 29.0 (H) 0.0 - 8.0 mg/L   Erythrocyte sedimentation rate auto     Status: Abnormal    Collection Time: 02/11/19  7:54 AM   Result Value Ref Range    Sed Rate 55 (H) 0 - 20 mm/h   hCG qual urine POCT     Status: Normal    Collection Time: 02/11/19  8:07 AM   Result Value Ref Range    HCG Qual Urine Negative neg    Internal QC OK Yes    Soft tissue neck CT w contrast     Status: None    Collection Time: 02/11/19  9:02 AM    Narrative    CT SOFT TISSUE NECK W CONTRAST 2/11/2019 9:02 AM    Provided History:  Abscess of pharynx; left jaw swelling r/o abscess    Patient information obtained from EMR: Patient has had left lower jaw  swelling and pain started last Thursday and was given antibiotics. She  again presents to ED for worsening of symptoms.      Comparison: CT 6/13/2018 MRI 2/17/2015     Technique: Following intravenous administration of nonionic iodinated  contrast medium, thin section helical CT images were obtained from the  skull base down to the level of the aortic arch.  Axial, coronal and  sagittal reformations were performed with 2-3 mm slice thickness  reconstruction. Images were reviewed in soft tissue, lung and bone  windows.    Contrast: 100 cc of isovue 370    Findings:    Surgical plates and screws over the maxillary sinus anterior walls.  Thin mucosal thickening of the left maxillary sinus is noted. The  remainder of the paranasal sinuses are clear. Mastoid air cells are  clear.     Postoperative changes of chordoma resection and cervical occipital  instrumented spinal fusion that appears solid from the occiput through  C4 anteriorly and posteriorly. Metallic bar is seen through the  anterior aspect of the mandible.    Periapical lucency surrounding the left mandibular first bicuspid  (tooth #12) with overlying soft tissue inflammatory  changes and focal  1.2 x 1.0 cm hypoattenuation concerning for a phlegmon/abscess. The  periapical lucency can be seen on series 2, image 41 and series 3,  image 33. Adjacent to this is a prominent lymph node measuring 13 x 10  mm (series 2, image 44)    Evaluation of the mucosal space demonstrates no evident abnormality in  the nasopharynx, oropharynx, hypopharynx or the glottis. The tongue  base appears normal. The major salivary glands appear unremarkable.  The thyroid gland appears normal.    Nonenlarged cervical lymph nodes. The fascial spaces in the neck are  intact bilaterally. Small amount of atherosclerotic calcifications  within the proximal internal carotid arteries bilaterally.    Evaluation of the osseous structures demonstrate no worrisome lytic or  sclerotic lesion. Moderate degenerative disc disease at C4-C7.  Moderate neural foraminal stenosis on the right at C4-5 and on the  left at C5-6. Bony demineralization of the clivus, C1, and C2  consistent with history of post radiation changes. The visualized  paranasal sinuses are clear. The mastoid air cells are clear.     The visualized lung apices are clear. The left vertebral artery arises  from the aortic arch.      Impression    Impression:  1. Left mandibular first bicuspid dental periapical abscess with  overlying soft tissue inflammatory changes and 1.2 x 1.0 cm associated  abscess/phlegmon.  2. Stable postoperative and posttreatment changes in the upper  cervical spine.  3. Multilevel cervical spondylosis of the lower cervical spine.    I have personally reviewed the examination and initial interpretation  and I agree with the findings.    ANGELA DORANTES MD           Labs Ordered and Resulted from Time of ED Arrival Up to the Time of Departure from the ED   CBC WITH PLATELETS DIFFERENTIAL - Abnormal; Notable for the following components:       Result Value    WBC 11.8 (*)     Hemoglobin 11.4 (*)     MCHC 29.9 (*)     Absolute Neutrophil 8.9 (*)      All other components within normal limits   COMPREHENSIVE METABOLIC PANEL - Abnormal; Notable for the following components:    Carbon Dioxide 33 (*)     Albumin 3.2 (*)     All other components within normal limits   CRP INFLAMMATION - Abnormal; Notable for the following components:    CRP Inflammation 29.0 (*)     All other components within normal limits   ERYTHROCYTE SEDIMENTATION RATE AUTO - Abnormal; Notable for the following components:    Sed Rate 55 (*)     All other components within normal limits   HCG QUAL URINE POCT - Normal   INR       Consults  Dental: Responded (02/11/19 5771)  ENT: Responded (02/11/19 7812)    Assessments & Plan (with Medical Decision Making)  Left facial swelling from periapical abscess s/p I and D in the pt with H and N Ca s/p resection and RT, CT clear periapical abscess odontogenic, D/W ENT-asked use to consult Dental-asked us to I and D and follow up with them this pm. After the dental block I and D done withmoderate pus, will discharge and follow up with Dental clinic this pm.       I have reviewed the nursing notes.    I have reviewed the findings, diagnosis, plan and need for follow up with the patient.       Medication List      There are no discharge medications for this visit.         Final diagnoses:   Facial swelling   Periapical abscess       2/11/2019   [facility], [place], EMERGENCY DEPARTMENT     Archie Reis MD  02/11/19 5090

## 2019-02-11 NOTE — DISCHARGE INSTRUCTIONS
Please make an appointment to follow up with [facility] [phone] as soon as possible even if entirely better, they are expecting you for this afternoon.

## 2019-02-11 NOTE — ED AVS SNAPSHOT
[facility], [place], Emergency Department  [address] [place] [id number]  Phone:  [phone]                                    Giovana Joaquin   MRN: [id number]    Department:  [facility], Emergency Department   Date of Visit:  2/11/2019           After Visit Summary Signature Page    I have received my discharge instructions, and my questions have been answered. I have discussed any challenges I see with this plan with the nurse or doctor.    ..........................................................................................................................................  Patient/Patient Representative Signature      ..........................................................................................................................................  Patient Representative Print Name and Relationship to Patient    ..................................................               ................................................  Date                                   Time    ..........................................................................................................................................  Reviewed by Signature/Title    ...................................................              ..............................................  Date                                               Time          22EPIC Rev 08/18

## 2019-02-11 NOTE — ED TRIAGE NOTES
Was at [facility] in [place] last week for swelling, they gave her antibiotics and sent her home. The swelling has gotten worse and now  Moved into dental implant screws.

## 2019-02-11 NOTE — ED TRIAGE NOTES
Pt c/o swelling all over.  Pt in NAD.  Pt wants more pain meds.  Pt wants V antibiotic discontinue.  Pt has no rash.  Pt crying and breath holding.  sats decrease to 89 and then breathes and sats in 98%.  MD notified.  No new orders.  Will continue to monitor

## 2019-02-16 DIAGNOSIS — R68.2 DRY MOUTH: ICD-10-CM

## 2019-02-16 DIAGNOSIS — N39.46 MIXED STRESS AND URGE URINARY INCONTINENCE: ICD-10-CM

## 2019-02-18 RX ORDER — MIRABEGRON 50 MG/1
50 TABLET, EXTENDED RELEASE ORAL DAILY
Qty: 30 TABLET | Refills: 0 | Status: SHIPPED | OUTPATIENT
Start: 2019-02-18

## 2019-02-18 NOTE — TELEPHONE ENCOUNTER
mirabegron (MYRBETRIQ) 50 MG 24 hr tablet   Last Written Prescription Date:  11/16/18   Last Fill Quantity: 90,   # refills: 0  Last Office Visit : 2/16/18  Future Office visit:  None scheduled    Routing refill request to RN for review/approval because:  Med failed protocol, abnormal labs  Also overdue for appointment. Note on Rx to pharmacy and clinic coordinators alerted.

## 2019-02-20 ENCOUNTER — OFFICE VISIT (OUTPATIENT)
Dept: OTOLARYNGOLOGY | Facility: CLINIC | Age: 39
End: 2019-02-20
Payer: COMMERCIAL

## 2019-02-20 VITALS
SYSTOLIC BLOOD PRESSURE: 97 MMHG | HEART RATE: 74 BPM | HEIGHT: 60 IN | BODY MASS INDEX: 41.03 KG/M2 | WEIGHT: 209 LBS | DIASTOLIC BLOOD PRESSURE: 48 MMHG

## 2019-02-20 DIAGNOSIS — K04.7 DENTAL ABSCESS: Primary | ICD-10-CM

## 2019-02-20 DIAGNOSIS — K13.79 VELOPHARYNGEAL INSUFFICIENCY, ACQUIRED: ICD-10-CM

## 2019-02-20 DIAGNOSIS — C41.2 CHORDOMA (H): ICD-10-CM

## 2019-02-20 ASSESSMENT — PAIN SCALES - GENERAL: PAINLEVEL: SEVERE PAIN (7)

## 2019-02-20 ASSESSMENT — MIFFLIN-ST. JEOR: SCORE: 1532.03

## 2019-02-20 NOTE — LETTER
2/20/2019       RE: Giovana Joaquin  [address]  [place] [address]     Dear Colleague,    Thank you for referring your patient, Giovana Joaquin, to the [facility] EAR [facility]AT at [facility]. Please see a copy of my visit note below.    HISTORY OF PRESENT ILLNESS:  Giovana Joaquin is a longtime patient of mine, now 39 years old and a history of chordoma, status post excision from the skull base.  As a result of that as well as radiation therapy, she now has velopharyngeal insufficiency, which has caused some degree of this frustration, but she has managed overall fairly well.      Over the past several weeks, she had an infection of a tooth in the lower jaw, and she is primarily here today for my evaluation.      She notes that she had pain along the anterior left mandible for some time, had some swelling, and then an exudate erupted, which was treated with antibiotics and now resolving.      Her past medical history is reviewed as is medication status.      ALLERGIES:  Reviewed.      REVIEW OF SYSTEMS:  Significant for the above with continued mild velopharyngeal insufficiency.  She no longer has pain in the anterior mandible.      PHYSICAL EXAMINATION:  Examination shows tympanic membranes are intact with indwelling PE tubes.  The oropharynx has a small deficiency in the posterior pharynx, which contributes to the velopharyngeal insufficiency.  Orally itself, there is no obvious abscess in the site.  The gingiva is mildly erythemic.  There is no drainage.  The dentition is intact.      IMAGING:  The images I am provided show an abscess at the site prior to the drainage of the abscess.      ASSESSMENT:  Resolving periodontal abscess associated with tooth #22.      PLAN:  Continue to monitor and follow up with her dentist.  Follow up with me as needed.         Again, thank you for allowing me to participate in the care of your patient.       Sincerely,    Hammad Lowe MD

## 2019-02-20 NOTE — NURSING NOTE
"Chief Complaint   Patient presents with     RECHECK     facial pain and swelling      Blood pressure 97/48, pulse 74, height 1.52 m (4' 11.84\"), weight 94.8 kg (209 lb), last menstrual period 02/10/2019, not currently breastfeeding.    Fuentes Krishnan LPN    "

## 2019-02-20 NOTE — PATIENT INSTRUCTIONS
Plan is to follow up as needed with Dr. Lowe     Please call our clinic for any questions,concerns,or worsening symptoms.      Clinic #[phone]       Option 1 for scheduling.    Marizol LEWIS RNCC

## 2019-02-21 DIAGNOSIS — C41.2 CHORDOMA (H): ICD-10-CM

## 2019-02-21 DIAGNOSIS — R68.2 DRY MOUTH: ICD-10-CM

## 2019-02-22 NOTE — TELEPHONE ENCOUNTER
cevimeline (EVOXAC) 30 MG capsule      Last Written Prescription Date:  2-19-18  Last Fill Quantity: 270,   # refills: 3  Last Office Visit : 2-16-18  Future Office visit:  none    Routing refill request to provider for review/approval because:  Not on protocol    Scheduling has been notified to contact the pt for appointment.

## 2019-02-25 NOTE — TELEPHONE ENCOUNTER
Spoke to patient and patient states she will call back to schedule annual with Dr. Love. Patient aware that appointment is needed.

## 2019-02-27 RX ORDER — CEVIMELINE HYDROCHLORIDE 30 MG/1
30 CAPSULE ORAL 3 TIMES DAILY
Qty: 90 CAPSULE | Refills: 0 | Status: SHIPPED | OUTPATIENT
Start: 2019-02-27 | End: 2019-05-06

## 2019-03-03 NOTE — PROGRESS NOTES
HISTORY OF PRESENT ILLNESS:  Giovana Joaquin is a longtime patient of mine, now 39 years old and a history of chordoma, status post excision from the skull base.  As a result of that as well as radiation therapy, she now has velopharyngeal insufficiency, which has caused some degree of this frustration, but she has managed overall fairly well.      Over the past several weeks, she had an infection of a tooth in the lower jaw, and she is primarily here today for my evaluation.      She notes that she had pain along the anterior left mandible for some time, had some swelling, and then an exudate erupted, which was treated with antibiotics and now resolving.      Her past medical history is reviewed as is medication status.      ALLERGIES:  Reviewed.      REVIEW OF SYSTEMS:  Significant for the above with continued mild velopharyngeal insufficiency.  She no longer has pain in the anterior mandible.      PHYSICAL EXAMINATION:  Examination shows tympanic membranes are intact with indwelling PE tubes.  The oropharynx has a small deficiency in the posterior pharynx, which contributes to the velopharyngeal insufficiency.  Orally itself, there is no obvious abscess in the site.  The gingiva is mildly erythemic.  There is no drainage.  The dentition is intact.      IMAGING:  The images I am provided show an abscess at the site prior to the drainage of the abscess.      ASSESSMENT:  Resolving periodontal abscess associated with tooth #22.      PLAN:  Continue to monitor and follow up with her dentist.  Follow up with me as needed.

## 2019-03-21 DIAGNOSIS — T78.40XS ALLERGIC STATE, SEQUELA: ICD-10-CM

## 2019-03-25 NOTE — TELEPHONE ENCOUNTER
sudogest    Last Written Prescription Date:  11/28/18  Last Fill Quantity: 60,   # refills: 3  Last Office Visit : 2/16/18  Future Office visit:  No future appt    Routing refill request to nurse for review/approval because:    Drug not on the [facility], [facility] or  Health refill protocol or controlled substance  Scheduling has been notified to contact the pt for appointment.

## 2019-03-26 RX ORDER — PSEUDOEPHEDRINE HCL 120 MG/1
TABLET, FILM COATED, EXTENDED RELEASE ORAL
Qty: 60 TABLET | Refills: 1 | Status: SHIPPED | OUTPATIENT
Start: 2019-03-26 | End: 2019-05-17

## 2019-04-04 ENCOUNTER — TELEPHONE (OUTPATIENT)
Dept: ONCOLOGY | Facility: CLINIC | Age: 39
End: 2019-04-04

## 2019-04-04 NOTE — TELEPHONE ENCOUNTER
Pt states she quit smoking more than 5 years ago.    Tobacco Treatment Team at the [facility] attempted to reach Ms. Joaquin on 4/4/2019 regarding the tobacco cessation program to help Ms. Joaquin to quit smoking.

## 2019-04-05 DIAGNOSIS — N39.46 MIXED STRESS AND URGE URINARY INCONTINENCE: ICD-10-CM

## 2019-04-05 DIAGNOSIS — R68.2 DRY MOUTH: ICD-10-CM

## 2019-04-09 NOTE — TELEPHONE ENCOUNTER
mirabegron (MYRBETRIQ) 50 MG 24 hr tablet      Last Written Prescription Date:  2/18/19  Last Fill Quantity: 30,   # refills: 0  Last Office Visit :2/16/18  Future Office visit:  None    Scheduling has been notified to contact the pt for appointment.    Routing  Because:  Labs, 3rd [facility]

## 2019-04-10 NOTE — TELEPHONE ENCOUNTER
Called patient and left message with clinic phone number to call back and schedule annual with PCP

## 2019-04-16 RX ORDER — MIRABEGRON 50 MG/1
50 TABLET, EXTENDED RELEASE ORAL DAILY
Qty: 30 TABLET | Refills: 0 | Status: SHIPPED | OUTPATIENT
Start: 2019-04-16 | End: 2019-05-16

## 2019-04-19 RX ORDER — NORTRIPTYLINE HYDROCHLORIDE 50 MG/1
CAPSULE ORAL
Qty: 30 CAPSULE | Refills: 0 | OUTPATIENT
Start: 2019-04-19

## 2019-04-22 ENCOUNTER — TELEPHONE (OUTPATIENT)
Dept: INTERNAL MEDICINE | Facility: CLINIC | Age: 39
End: 2019-04-22

## 2019-04-22 NOTE — TELEPHONE ENCOUNTER
Left a message for her to call us back. Last appointment with Dr Love- 2-16-18.   On 1-16-18 dose of 50 mg ordered for nortriptyline. Discontinued on 1-19-18 and changed to 25 mg.

## 2019-04-22 NOTE — TELEPHONE ENCOUNTER
Health Call Center    Phone Message    May a detailed message be left on voicemail: yes    Reason for Call: Medication Refill Request    Has the patient contacted the pharmacy for the refill? Yes   Name of medication being requested: nortriptyline (PAMELOR) 25 MG capsule but pt says should be 50 mgs not 25 mg.   Provider who prescribed the medication: Piper  [facility]  Date medication is needed: today pt is all of RX    Pt says she' supposed to have 50 mgs.  Please change.       Action Taken: Message routed to:  [facility] ([facility] elinor

## 2019-04-23 DIAGNOSIS — R42 VERTIGO: ICD-10-CM

## 2019-04-23 RX ORDER — MECLIZINE HCL 12.5 MG 12.5 MG/1
12.5 TABLET ORAL 3 TIMES DAILY PRN
Qty: 15 TABLET | Refills: 0 | Status: SHIPPED | OUTPATIENT
Start: 2019-04-23 | End: 2019-05-03

## 2019-04-23 NOTE — TELEPHONE ENCOUNTER
meclizine (ANTIVERT) 12.5 MG tablet  Last Written Prescription Date:  11/16/18  Last Fill Quantity: 15,   # refills: 1  Last Office Visit : 2/16/18  Future Office visit:  6/13/19    Refill to pharmacy.

## 2019-05-03 DIAGNOSIS — R42 VERTIGO: ICD-10-CM

## 2019-05-06 DIAGNOSIS — C41.2 CHORDOMA (H): ICD-10-CM

## 2019-05-06 DIAGNOSIS — R68.2 DRY MOUTH: ICD-10-CM

## 2019-05-06 RX ORDER — MECLIZINE HCL 12.5 MG 12.5 MG/1
TABLET ORAL
Qty: 15 TABLET | Refills: 0 | Status: SHIPPED | OUTPATIENT
Start: 2019-05-06

## 2019-05-06 NOTE — TELEPHONE ENCOUNTER
meclizine (ANTIVERT) 12.5 MG tablet     Last Written Prescription Date:  4/23/19  Last Fill Quantity: 15,   # refills: 0  Last Office Visit : 2/16/18  Future Office visit:  6/13/19    Med to pharmacy. Instructed to keep scheduled f/u appt on 6/13/19.

## 2019-05-06 NOTE — TELEPHONE ENCOUNTER
[facility] Call Center    Phone Message    May a detailed message be left on voicemail: yes    Reason for Call:   Other: Pt called stating that she has been waiting for this to be filled; Pt is all out.  Please call this Pt back once its been sent to her pharmacy. Thank you.  Action Taken: Message routed to:  [facility] GEOFF

## 2019-05-07 RX ORDER — CEVIMELINE HYDROCHLORIDE 30 MG/1
CAPSULE ORAL
Qty: 90 CAPSULE | Refills: 0 | Status: SHIPPED | OUTPATIENT
Start: 2019-05-07 | End: 2019-07-23

## 2019-05-12 ENCOUNTER — TRANSFERRED RECORDS (OUTPATIENT)
Dept: HEALTH INFORMATION MANAGEMENT | Facility: CLINIC | Age: 39
End: 2019-05-12

## 2019-05-16 DIAGNOSIS — N39.46 MIXED STRESS AND URGE URINARY INCONTINENCE: ICD-10-CM

## 2019-05-16 DIAGNOSIS — R68.2 DRY MOUTH: ICD-10-CM

## 2019-05-17 DIAGNOSIS — T78.40XS ALLERGIC STATE, SEQUELA: ICD-10-CM

## 2019-05-17 DIAGNOSIS — G43.909 MIGRAINE: ICD-10-CM

## 2019-05-17 RX ORDER — MIRABEGRON 50 MG/1
TABLET, FILM COATED, EXTENDED RELEASE ORAL
Qty: 30 TABLET | Refills: 0 | Status: SHIPPED | OUTPATIENT
Start: 2019-05-17 | End: 2019-06-17

## 2019-05-17 RX ORDER — ATENOLOL 25 MG/1
TABLET ORAL
Qty: 30 TABLET | Refills: 0 | Status: SHIPPED | OUTPATIENT
Start: 2019-05-17 | End: 2019-06-17

## 2019-05-17 NOTE — TELEPHONE ENCOUNTER
mirabegron (MYRBETRIQ) 50 MG 24 hr tablet      Last Written Prescription Date:  4/16/19  Last Fill Quantity: 30,   # refills: 0  Last Office Visit : 2/16/18  Future Office visit:  6/13/19    Pended 30 day.   Routing refill request to provider for review/approval because:  High alert taken with nortriptyline.

## 2019-05-17 NOTE — TELEPHONE ENCOUNTER
atenolol (TENORMIN) 25 MG tablet      Last Written Prescription Date:  2/2/19  Last Fill Quantity: 90,   # refills: 0  Last Office Visit : 2/16/18  Future Office visit:  6/13/19  30 to pharmacy.    SUDOGEST 12 HOUR 120 MG 12 hr tablet  Last Written Prescription Date:  3/26/19  Last Fill Quantity: 60,   # refills: 1    Routing refill request to provider for review/approval because:  Drug not on the [facility], [facility] or [facility] refill protocol

## 2019-05-21 RX ORDER — PSEUDOEPHEDRINE HCL 120 MG/1
TABLET, FILM COATED, EXTENDED RELEASE ORAL
Qty: 60 TABLET | Refills: 0 | Status: SHIPPED | OUTPATIENT
Start: 2019-05-21 | End: 2020-06-10

## 2019-06-17 DIAGNOSIS — R68.2 DRY MOUTH: ICD-10-CM

## 2019-06-17 DIAGNOSIS — G43.909 MIGRAINE: ICD-10-CM

## 2019-06-17 DIAGNOSIS — N39.46 MIXED STRESS AND URGE URINARY INCONTINENCE: ICD-10-CM

## 2019-06-17 NOTE — TELEPHONE ENCOUNTER
MYRBETRIQ 50 MG 24 hr tablet.      Last Written Prescription Date:  5/17/19  Last Fill Quantity: 30,   # refills: 0  Last Office Visit : 2/19/18  Future Office visit:  8/5/19  Pended.  Routing refill request to provider for review/approval because:  Failed protocol. Appointment overdue. Future appointment on 8/5/19. Previous kaden for 90/30 day given.     atenolol (TENORMIN) 25 MG tablet      Last Written Prescription Date:  5/17/19  Last Fill Quantity: 30,   # refills: 0  Pended.  Routing refill request to provider for review/approval because:  Failed protocol. Appointment overdue. Future appointment on 8/5/19.  Previous kaden for 90/30 day given.

## 2019-06-18 RX ORDER — ATENOLOL 25 MG/1
25 TABLET ORAL DAILY
Qty: 30 TABLET | Refills: 0 | Status: SHIPPED | OUTPATIENT
Start: 2019-06-18 | End: 2019-07-23

## 2019-06-18 RX ORDER — MIRABEGRON 50 MG/1
50 TABLET, EXTENDED RELEASE ORAL DAILY
Qty: 30 TABLET | Refills: 0 | Status: SHIPPED | OUTPATIENT
Start: 2019-06-18 | End: 2019-07-23

## 2019-06-22 ENCOUNTER — TRANSFERRED RECORDS (OUTPATIENT)
Dept: HEALTH INFORMATION MANAGEMENT | Facility: CLINIC | Age: 39
End: 2019-06-22

## 2019-07-22 DIAGNOSIS — R68.2 DRY MOUTH: ICD-10-CM

## 2019-07-22 DIAGNOSIS — C41.2 CHORDOMA (H): ICD-10-CM

## 2019-07-22 DIAGNOSIS — G43.909 MIGRAINE: ICD-10-CM

## 2019-07-22 DIAGNOSIS — N39.46 MIXED STRESS AND URGE URINARY INCONTINENCE: ICD-10-CM

## 2019-07-23 ENCOUNTER — TELEPHONE (OUTPATIENT)
Dept: INTERNAL MEDICINE | Facility: CLINIC | Age: 39
End: 2019-07-23

## 2019-07-23 DIAGNOSIS — R68.2 DRY MOUTH: ICD-10-CM

## 2019-07-23 DIAGNOSIS — N39.46 MIXED STRESS AND URGE URINARY INCONTINENCE: ICD-10-CM

## 2019-07-23 DIAGNOSIS — G47.00 INSOMNIA, UNSPECIFIED TYPE: ICD-10-CM

## 2019-07-23 DIAGNOSIS — G43.909 MIGRAINE: ICD-10-CM

## 2019-07-23 DIAGNOSIS — C41.2 CHORDOMA (H): ICD-10-CM

## 2019-07-23 RX ORDER — MIRABEGRON 50 MG/1
50 TABLET, EXTENDED RELEASE ORAL DAILY
Qty: 14 TABLET | Refills: 0 | Status: SHIPPED | OUTPATIENT
Start: 2019-07-23 | End: 2020-07-06

## 2019-07-23 RX ORDER — MIRABEGRON 50 MG/1
TABLET, FILM COATED, EXTENDED RELEASE ORAL
Qty: 30 TABLET | Refills: 0 | OUTPATIENT
Start: 2019-07-23

## 2019-07-23 RX ORDER — ATENOLOL 25 MG/1
TABLET ORAL
Qty: 30 TABLET | Refills: 0 | OUTPATIENT
Start: 2019-07-23

## 2019-07-23 RX ORDER — ATENOLOL 25 MG/1
25 TABLET ORAL DAILY
Qty: 14 TABLET | Refills: 0 | Status: SHIPPED | OUTPATIENT
Start: 2019-07-23

## 2019-07-23 RX ORDER — CEVIMELINE HYDROCHLORIDE 30 MG/1
30 CAPSULE ORAL 3 TIMES DAILY
Qty: 42 CAPSULE | Refills: 0 | Status: SHIPPED | OUTPATIENT
Start: 2019-07-23 | End: 2020-09-24

## 2019-07-23 RX ORDER — CEVIMELINE HYDROCHLORIDE 30 MG/1
CAPSULE ORAL
Qty: 90 CAPSULE | Refills: 0 | OUTPATIENT
Start: 2019-07-23

## 2019-07-23 RX ORDER — NORTRIPTYLINE HCL 25 MG
25 CAPSULE ORAL AT BEDTIME
Qty: 14 CAPSULE | Refills: 0 | Status: SHIPPED | OUTPATIENT
Start: 2019-07-23 | End: 2020-09-14

## 2019-07-23 NOTE — TELEPHONE ENCOUNTER
[facility] Call [facility]    Phone Message    May a detailed message be left on voicemail: yes    Reason for Call: Medication Refill Request    Has the patient contacted the pharmacy for the refill? Yes   Name of medication being requested: nortriptyline (PAMELOR) 25 MG capsule, cevimeline (EVOXAC) 30 MG capsule, atenolol (TENORMIN) 25 MG tablet, mirabegron (MYRBETRIQ) 50 MG 24 hr tablet  Provider who prescribed the medication: Dr. Love  Pharmacy:    Address: [address] [place], [place] [address], [place] [address]  Departments: [facility]   [facility]     Phone: [phone]    Date medication is needed: Tonight if possible. Pt is leaving out of town tomorrow. Please call pt to let her know if this will be possible.          Action Taken: Message routed to:  [facility] GEOFF

## 2019-07-23 NOTE — TELEPHONE ENCOUNTER
Patient was to be seen on 6-13-19 for further refills - pt cancelled appointment and is rescheduled for 8-5-19.    Pt leaving town - refilled qty to get to next appointment.

## 2019-07-24 RX ORDER — NORTRIPTYLINE HCL 25 MG
CAPSULE ORAL
Qty: 90 CAPSULE | Refills: 0 | OUTPATIENT
Start: 2019-07-24

## 2019-10-01 ENCOUNTER — HEALTH MAINTENANCE LETTER (OUTPATIENT)
Age: 39
End: 2019-10-01

## 2019-12-15 ENCOUNTER — HEALTH MAINTENANCE LETTER (OUTPATIENT)
Age: 39
End: 2019-12-15

## 2020-06-06 ENCOUNTER — TELEPHONE (OUTPATIENT)
Dept: INTERNAL MEDICINE | Facility: CLINIC | Age: 40
End: 2020-06-06

## 2020-06-06 DIAGNOSIS — T78.40XS ALLERGIC STATE, SEQUELA: ICD-10-CM

## 2020-06-10 RX ORDER — PSEUDOEPHEDRINE HCL 120 MG/1
TABLET, FILM COATED, EXTENDED RELEASE ORAL
Qty: 60 TABLET | Refills: 0 | Status: SHIPPED | OUTPATIENT
Start: 2020-06-10 | End: 2020-08-20

## 2020-06-10 RX ORDER — PSEUDOEPHEDRINE HCL 120 MG/1
TABLET, FILM COATED, EXTENDED RELEASE ORAL
Qty: 60 TABLET | OUTPATIENT
Start: 2020-06-10

## 2020-06-10 NOTE — TELEPHONE ENCOUNTER
SUDOGEST 12 HOUR TABLETS   Last Written Prescription Date:  5/21/2019  Last Fill Quantity: 60,   # refills: 0  Last Office Visit : 2/16/2018  Future Office visit:  None    Routing refill request to provider for review/approval because:  Drug not on the [facility], [facility] or [facility] refill protocol or controlled substance    Patty Calixto RN  Central Triage Red Flags/Med Refills

## 2020-06-10 NOTE — TELEPHONE ENCOUNTER
Patient called to schedule an appointment with Dr. Love on 6/26 at 11:00 AM via virtual. Patient decline coming to the clinic due to COVID situation. Routing to RN about meds prior to appointment.

## 2020-06-26 ENCOUNTER — VIRTUAL VISIT (OUTPATIENT)
Dept: INTERNAL MEDICINE | Facility: CLINIC | Age: 40
End: 2020-06-26
Payer: MEDICARE

## 2020-06-26 DIAGNOSIS — N39.46 MIXED INCONTINENCE URGE AND STRESS (MALE)(FEMALE): Primary | ICD-10-CM

## 2020-06-26 DIAGNOSIS — L70.9 ACNE, UNSPECIFIED ACNE TYPE: ICD-10-CM

## 2020-06-26 RX ORDER — HYDROXYZINE HYDROCHLORIDE 25 MG/1
TABLET, FILM COATED ORAL
COMMUNITY
Start: 2020-04-16

## 2020-06-26 RX ORDER — BUPRENORPHINE 10 UG/H
1 PATCH TRANSDERMAL
Status: ON HOLD | COMMUNITY
End: 2022-01-01

## 2020-06-26 RX ORDER — INFLUENZA A VIRUS A/VICTORIA/2454/2019 IVR-207 (H1N1) ANTIGEN (PROPIOLACTONE INACTIVATED), INFLUENZA A VIRUS A/HONG KONG/2671/2019 IVR-208 (H3N2) ANTIGEN (PROPIOLACTONE INACTIVATED), INFLUENZA B VIRUS B/VICTORIA/705/2018 BVR-11 ANTIGEN (PROPIOLACTONE INACTIVATED), INFLUENZA B VIRUS B/PHUKET/3073/2013 BVR-1B ANTIGEN (PROPIOLACTONE INACTIVATED) 15; 15; 15; 15 UG/.5ML; UG/.5ML; UG/.5ML; UG/.5ML
INJECTION, SUSPENSION INTRAMUSCULAR
Status: ON HOLD | COMMUNITY
Start: 2019-11-07 | End: 2022-01-01

## 2020-06-26 RX ORDER — TRETINOIN 0.25 MG/G
CREAM TOPICAL
Qty: 30 G | Refills: 1 | Status: SHIPPED | OUTPATIENT
Start: 2020-06-26

## 2020-06-26 NOTE — PROGRESS NOTES
Giovana Joaquin is a 40 year old female who is being evaluated via a billable video visit.      The patient has consented to a video visit and informed that video and telephone visits are being performed during the COVID-19 pandemic in order to mitigate the risk of an in office visit for appropriate candidates/issues. If during the course of the call the physician/provider feels a video visit is not appropriate, you will not be charged for this service. If the provider feels that they are unable to assess your concerns without an in person visit, you will be advised of this limitation and depending on the nature of the concern, advised to seek in person care if your provider feels you need urgent evaluation.      Subjective     Giovana Joaquin is a 40 year old female who presents to clinic today for the following health issues:    Chief Complaint   Patient presents with     [facility] Care     Pt would like to etablish care. 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  Has been seen at  recently. Last visit here 1/18. She was without insurance for a period and reports today that she has not had major changes in her health.    Has dental infection, currently taking amoxicillin. States had pneumonia x 2 last year and had sepsis. PCP retired.  Ariel Trujillo is Pain Doctor through [place] [facility].  Has psychiatrist Morales.  She is now on buprenorphine and percocet, off fentanyl.  Taking fetzima and rixulti and strattera.  On depo shot which controls bleeding. She is not sexually active with men so not concerned about contraception.  Complains of stress incontinence. Already on myrbetriq which helps UUI.  Acne on face. Worse for last year.  Was previously on minocycline.    Living with mom currently. Trying to get partner from [place] up to [place].        From  Previous:  Complex history with multiple outside caregivers.  Follows with ENT at [facility].  Has outside psychiatrist (Dr. Nielsen), pain doctor (Ariel Trujillo at [facility]) and neurologist (Dr Hess at [facility]) Neurosurgeon (Dr. Saud De Luna at ).  Pt has complicated PMH including clival chordoma with soft palate resection in 2001, pharyngoplasty,PP radiation, now with velopharyngeal insufficiency, radiation induced narcolepsy, chronic neck/hip pain, depression.     Follows with Pain Clinic and on pain meds for several years for chronic pain in neck and hip.  Had cervical fusion and bone graft from hip at time of brain surgery.  Also c/o all over body pains, doing okay on current regimen of fentanyl, gabapentin, nortriptyline, relafen, baclofen.  Dr. Hess at [facility] previously treated her for narcolepsy with adderral.  Takes strattera for depression/ADHD type symptoms.    Met with Genetics 2014 after she found out her sister has POTS and she was evaluated for EDS/hypermobility syndrome.  It was thought she may have joint hypermobility syndrome, not necessarily EDS III/II.  No genetic testing was recommended.  However, given hx of hypermobility, dislocations, skin laxity, bruising, echo and other conservative measures were recommended.    Patient Active Problem List   Diagnosis     Headache     Chronic pain syndrome     Chordoma (H)     Velopharyngeal insufficiency, acquired     Fatigue     Oligomenorrhea     Irregular menses     Other specified diffuse disease of connective tissue     Syncope     Dysfunction of eustachian tube     GERD (gastroesophageal reflux disease)     Chronic rhinitis     Dysfunction of Eustachian tube, bilateral     Chronic right shoulder pain     Past Surgical History:   Procedure Laterality Date     BIOPSY       brain tumor removal       DAVINCI ASSISTED UVULOPALATOPHARYNGOPLASTY  5/7/2013    Procedure: DAVINCI ASSISTED UVULOPALATOPHARYNGOPLASTY;  Davinci Pharyngoplasty ;  Surgeon:  Hammad Lowe MD;  Location: [facility] OR     ESOPHAGOSCOPY, GASTROSCOPY, DUODENOSCOPY (EGD), COMBINED  2011    Procedure:COMBINED ESOPHAGOSCOPY, GASTROSCOPY, DUODENOSCOPY (EGD); Surgeon:CEDRICK PABLO; Location:[facility]     neck fusion to c3      to C4      proton particle radiation       soft palette removed, throat x4         Social History     Tobacco Use     Smoking status: Former Smoker     Packs/day: 0.20     Years: 10.00     Pack years: 2.00     Types: Cigarettes     Last attempt to quit: 2013     Years since quittin.4     Smokeless tobacco: Never Used     Tobacco comment: e-cig   Substance Use Topics     Alcohol use: Yes     Comment: socially     Family History   Problem Relation Age of Onset     Uterine Cancer Mother      Arthritis Father         RA     Breast Cancer Maternal Grandmother      Musculoskeletal Disorder Maternal Grandmother         MS     Arthritis Paternal Grandmother          Current Outpatient Medications   Medication Sig Dispense Refill     AFLURIA QUADRIVALENT 0.5 ML injection ADM 0.5ML IM UTD       atenolol (TENORMIN) 25 MG tablet Take 1 tablet (25 mg) by mouth daily . Patient needs to see primary provider for further refills. 14 tablet 0     atomoxetine (STRATTERA) 60 MG capsule Take 80 mg by mouth daily        BACLOFEN PO Take 10 mg by mouth 3 times daily        brexpiprazole (REXULTI) 0.5 MG tablet Take 1 mg by mouth daily       buprenorphine (BUTRANS) 10 MCG/HR WK patch Place 1 patch onto the skin every 7 days       cevimeline (EVOXAC) 30 MG capsule Take 1 capsule (30 mg) by mouth 3 times daily 42 capsule 0     Cholecalciferol (VITAMIN D) 2000 UNITS CAPS        esomeprazole (NEXIUM) 40 MG capsule Take 1 capsule (40 mg) by mouth every morning (before breakfast) One hour before meals 90 capsule 3     GABAPENTIN 300 MG OR CAPS Takes 600 mg qid       hydrOXYzine (ATARAX) 25 MG tablet        levomilnacipran (FETZIMA) 80 MG 24 HR capsule Take 80 mg by mouth daily        MAGNESIUM OXIDE PO Take 500 mg by mouth daily       meclizine (ANTIVERT) 12.5 MG tablet TAKE 1 TABLET(12.5 MG) BY MOUTH THREE TIMES DAILY AS NEEDED FOR DIZZINESS 15 tablet 0     mirabegron (MYRBETRIQ) 50 MG 24 hr tablet Take 1 tablet (50 mg) by mouth daily . Patient needs to see primary provider for further refills. 30 tablet 0     Multiple Vitamins-Minerals (MULTIVITAL PO) Take by mouth daily       nortriptyline (PAMELOR) 25 MG capsule Take 1 capsule (25 mg) by mouth At Bedtime Please call for annual appointment, further refills. Thanks [phone]. 14 capsule 0     oxyCODONE-acetaminophen (PERCOCET) 5-325 MG per tablet Take 0.05 tablets by mouth 2 times daily        pseudoePHEDrine (SUDOGEST 12 HOUR) 120 MG 12 hr tablet TAKE 1 TABLET BY MOUTH EVERY 12 HOURS AS NEEDED FOR CONGESTION 60 tablet 0     SUMAtriptan (IMITREX) 100 MG tablet Take 1 tablet (100 mg) by mouth at onset of headache for migraine May repeat after one hour, max 200 mg in 24 hours 12 tablet 0     tretinoin (RETIN-A) 0.025 % external cream Apply small pea-sized amount to affected skin at bedtime, use sunscreen during day 30 g 1     cetirizine (ZYRTEC) 10 MG tablet Take 1 tablet (10 mg) by mouth daily (Patient not taking: Reported on 6/26/2020) 30 tablet 0     fentaNYL (DURAGESIC) 12 mcg/hr patch 72 hr Place 1 patch onto the skin every 72 hours       mirabegron (MYRBETRIQ) 50 MG 24 hr tablet Take 1 tablet (50 mg) by mouth daily . Patient needs to see primary provider for further refills. (Patient not taking: Reported on 6/26/2020) 14 tablet 0     nabumetone (RELAFEN) 500 MG tablet Take 1 tablet (500 mg) by mouth 2 times daily Labs due (Patient not taking: Reported on 6/26/2020) 30 tablet 0     Allergies   Allergen Reactions     Ambien [Zolpidem Tartrate] Other (See Comments)     Sleep walking     Amoxicillin Nausea     Stomach pain     Cephalexin Other (See Comments)     Vertigo      Cephalosporins      Levaquin [Levofloxacin]      Has vertigo      "Morphine Itching       Reviewed and updated as needed this visit by Provider    Review of Systems   A comprehensive ROS was performed and was negative unless indicated in the HPI above.        Physical Exam   There were no vitals taken for this visit.  Estimated body mass index is 22.24 kg/m  as calculated from the following:    Height as of 3/10/20: 1.702 m (5' 7\").    Weight as of 4/22/20: 64.4 kg (142 lb).  GENERAL: healthy, alert and no distress  HEAD: Normocephalic, atraumatic  EYES: Eyes grossly normal to inspection, EOMI and conjunctivae and sclerae normal  RESP: Speaking in full sentences, unlabored, no audible wheezes or cough  SKIN: no suspicious lesions or rashes, no jaundice  NEURO: oriented, and speech normal  PSYCH: mentation appears normal, affect normal/bright          Diagnostic Test Results:  Labs reviewed in Epic        Assessment and Plan:  Giovana was seen today for establish care.  Reestablishing care today. Giovana has complex PMH, we reviewed an updated today. No other acute concerns today but I will like to see her in clinic in 2-3 months.    Diagnoses and all orders for this visit:    Mixed incontinence urge and stress (male)(female)  -     UROLOGY ADULT REFERRAL    Acne, unspecified acne type  -     tretinoin (RETIN-A) 0.025 % external cream; Apply small pea-sized amount to affected skin at bedtime, use sunscreen during day          Video-Visit Details    Type of service:  Video Visit    Video Start/End Time: 11:29 AM 11:49 AM    Originating Location (pt. Location): Home    Distant Location (provider location):  [facility]     Mode of Communication:  Video Conference via Philly Love MD  Internal Medicine      "

## 2020-06-26 NOTE — NURSING NOTE
Chief Complaint   Patient presents with     Establish Care     Pt would like to etablish care. Other PCC retired     Kimberly Nissen, EMT at 10:29 AM on 6/26/2020

## 2020-06-29 NOTE — PATIENT INSTRUCTIONS
[facility] Medication Refill Request Information:  * Please contact your pharmacy regarding ANY request for medication refills.  ** [facility] Prescription Fax = [phone]  * Please allow 3 business days for routine medication refills.  * Please allow 5 business days for controlled substance medication refills.     [facility] Test Result notification information:  *You will be notified with in 7-10 days of your appointment day regarding the results of your test.  If you are on MyChart you will be notified as soon as the provider has reviewed the results and signed off on them.    [facility]): [phone]   Please call and schedule a 3 month in clinic follow up appointment with Dr Love.

## 2020-07-06 DIAGNOSIS — R68.2 DRY MOUTH: ICD-10-CM

## 2020-07-06 DIAGNOSIS — N39.46 MIXED STRESS AND URGE URINARY INCONTINENCE: ICD-10-CM

## 2020-07-06 RX ORDER — MIRABEGRON 50 MG/1
50 TABLET, EXTENDED RELEASE ORAL DAILY
Qty: 90 TABLET | Refills: 3 | Status: ON HOLD | OUTPATIENT
Start: 2020-07-06 | End: 2022-01-01

## 2020-07-06 NOTE — TELEPHONE ENCOUNTER
mirabegron (MYRBETRIQ) 50 MG 24 hr tablet      Last Written Prescription Date:  7/23/2019  Last Fill Quantity: 14,   # refills: 0  Last Office Visit : 6/26/2020  Future Office visit:  None    Routing refill request to provider for review/approval because:  Only 14 Tablets sent to pharm last order?  Ok to refill 90 day supply with refills for Pt care?     Refer to clinic for review    Patty Calixto RN  Central Triage Red Flags/Med Refills

## 2020-07-07 ENCOUNTER — PRE VISIT (OUTPATIENT)
Dept: UROLOGY | Facility: CLINIC | Age: 40
End: 2020-07-07

## 2020-07-07 NOTE — TELEPHONE ENCOUNTER
Reason for visit: mixed incontinence consult     Relevant information: history of cervical fusion    Records/imaging/labs/orders: referred by Dr. Love    Pt called: no need for a call

## 2020-07-07 NOTE — TELEPHONE ENCOUNTER
MEDICAL RECORDS REQUEST   [facility]  Urology Clinic  [address] [place] [address]  PHONE: [phone]  Fax: [phone]        FUTURE VISIT INFORMATION                                                   Giovana Joaquin, : 1980 scheduled for future visit at [facility]    APPOINTMENT INFORMATION:    Date: 20     Provider:  Annika Shaffer MD    Reason for Visit/Diagnosis: Mixed incontinence     REFERRAL INFORMATION:    Referring provider:  SULLY SRIVASTAVA    Specialty: MD    Referring providers clinic:  [facility]    Clinic contact number:  [phone]    RECORDS REQUESTED FOR VISIT                                                     NOTES  STATUS/DETAILS   OFFICE NOTE from referring provider  yes   OFFICE NOTE from other specialist  no   DISCHARGE SUMMARY from hospital  no   DISCHARGE REPORT from the ER  no   OPERATIVE REPORT  no   MEDICATION LIST  yes   LABS     URINALYSIS (UA)  no     PRE-VISIT CHECKLIST      Record collection complete Yes- Internal recs in epic    Appointment appropriately scheduled           (right time/right provider) Yes   MyChart activation Yes   Questionnaire complete If no, please explain: In process      Completed by: Jasmin Najera

## 2020-07-09 ENCOUNTER — VIRTUAL VISIT (OUTPATIENT)
Dept: UROLOGY | Facility: CLINIC | Age: 40
End: 2020-07-09
Attending: INTERNAL MEDICINE
Payer: MEDICARE

## 2020-07-09 ENCOUNTER — PRE VISIT (OUTPATIENT)
Dept: UROLOGY | Facility: CLINIC | Age: 40
End: 2020-07-09

## 2020-07-09 DIAGNOSIS — K59.00 CONSTIPATION, UNSPECIFIED CONSTIPATION TYPE: ICD-10-CM

## 2020-07-09 DIAGNOSIS — C41.2 CHORDOMA (H): ICD-10-CM

## 2020-07-09 DIAGNOSIS — M25.552 HIP PAIN, CHRONIC, LEFT: ICD-10-CM

## 2020-07-09 DIAGNOSIS — G89.29 HIP PAIN, CHRONIC, LEFT: ICD-10-CM

## 2020-07-09 DIAGNOSIS — G89.4 CHRONIC PAIN SYNDROME: ICD-10-CM

## 2020-07-09 DIAGNOSIS — F32.A DEPRESSION, UNSPECIFIED DEPRESSION TYPE: ICD-10-CM

## 2020-07-09 DIAGNOSIS — N39.46 MIXED INCONTINENCE: Primary | ICD-10-CM

## 2020-07-09 NOTE — LETTER
"7/9/2020       RE: Giovana Joaquin  [address]  [facility] [address]     Dear Colleague,    Thank you for referring your patient, Giovana Joaquin, to the [facility] UROLOGY AND INST FOR PROSTATE AND UROLOGIC CANCERS at [facility]. Please see a copy of my visit note below.    July 9, 2020    Giovana Joauqin is a 40 year old female who is being evaluated via a billable video visit.      THE VIDEO WILL BE CONDUCTED OVER: [employer] via Text: [phone]    The patient has been notified of following:     \"This video visit will be conducted via a call between you and your physician/provider. We have found that certain health care needs can be provided without the need for an in-person physical exam.  This service lets us provide the care you need with a video conversation.  If a prescription is necessary we can send it directly to your pharmacy.  If lab work is needed we can place an order for that and you can then stop by our lab to have the test done at a later time.    Video visits are billed at different rates depending on your insurance coverage.  Please reach out to your insurance provider with any questions.    If during the course of the call the physician/provider feels a video visit is not appropriate, you will not be charged for this service.\"    Patient has given verbal consent for Video visit? Yes    How would you like to obtain your AVS? Mail a copy    Patient would like the video invitation sent by: Text to cell phone: Mobile: [phone]      Video Start Time: 10:42 AM-waited for 5 minutes and then I disconnected, able to connect with patient at 11:14    Video-Visit Details    Type of service:  Video Visit    Video End Time (time video stopped): 11:30    Originating Location (pt. Location): Home    Distant Location (provider location):  [facility] UROLOGY AND INST FOR PROSTATE AND UROLOGIC CANCERS     Mode of Communication:  Video Conference via " [employer]    Referring Provider: Gloria Love MD  [address] [address]    Primary Care Provider: Gloria Love    CC: Urinary incontinence    HPI:  Giovana Joaquin is a 40 year old female who presents for evaluation of her pelvic floor symptoms.  She has a long history of urinary incontinence, recently switched her care to [facility] had been at [facility] prior.  She has a medical history significant for ENT surgery ([facility]), radiation induced narcolepsy, chronic neck/hip pain, depression and works with a pain doctor and a psychiatrist.  She also has been evaluated for a hypermobility syndrome, her sister has POTS.    For her urinary incontinence she has been on mirabegron for the UUI, but still has bothersome stress incontinence    She broke up with her girlfriend in February and noted that the leakage started getting worse about a couple months after.  She notes that they used some bone from her hip for her neck surgery    When she was younger she reports that she has a history of UTI.  She has been on cranberry pills to help prevent infection.  Maybe a history of pyelonephritis remotely.  Does also report history of constipation.    Denies gross hematuria, vaginal bleeding.  Denies history of  disease or malignancy.  Mother does have a history of prolapse repair.    Denies history of abuse.  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     Gastro-oesophageal reflux disease      Hypertension      IBS (irritable bowel syndrome)      MDD (major depressive disorder)      Migraine      Obesity (BMI 30-39.9)      Velopharyngeal insufficiency,  acquired      Past Surgical History:   Procedure Laterality Date     BIOPSY       brain tumor removal       DAVINCI ASSISTED UVULOPALATOPHARYNGOPLASTY  2013    Procedure: DAVINCI ASSISTED UVULOPALATOPHARYNGOPLASTY;  Davinci Pharyngoplasty ;  Surgeon: Hammad Lowe MD;  Location:  OR     ESOPHAGOSCOPY, GASTROSCOPY, DUODENOSCOPY (EGD), COMBINED  2011    Procedure:COMBINED ESOPHAGOSCOPY, GASTROSCOPY, DUODENOSCOPY (EGD); Surgeon:CEDRICK PABLO; Location:[facility]     neck fusion to c3      to C4      proton particle radiation       soft palette removed, throat x4       Social History     Socioeconomic History     Marital status: Single     Spouse name: Not on file     Number of children: Not on file     Years of education: Not on file     Highest education level: Not on file   Occupational History     Not on file   Social Needs     Financial resource strain: Not on file     Food insecurity     Worry: Not on file     Inability: Not on file     Transportation needs     Medical: Not on file     Non-medical: Not on file   Tobacco Use     Smoking status: Former Smoker     Packs/day: 0.20     Years: 10.00     Pack years: 2.00     Types: Cigarettes     Last attempt to quit: 2013     Years since quittin.4     Smokeless tobacco: Never Used     Tobacco comment: e-cig   Substance and Sexual Activity     Alcohol use: Yes     Comment: socially     Drug use: No     Sexual activity: Never   Lifestyle     Physical activity     Days per week: Not on file     Minutes per session: Not on file     Stress: Not on file   Relationships     Social connections     Talks on phone: Not on file     Gets together: Not on file     Attends Confucianism service: Not on file     Active member of club or organization: Not on file     Attends meetings of clubs or organizations: Not on file     Relationship status: Not on file     Intimate partner violence     Fear of current or ex partner: Not on file     Emotionally abused: Not on  file     Physically abused: Not on file     Forced sexual activity: Not on file   Other Topics Concern     Parent/sibling w/ CABG, MI or angioplasty before 65F 55M? Not Asked   Social History Narrative     Not on file       Family History   Problem Relation Age of Onset     Uterine Cancer Mother      Arthritis Father         RA     Breast Cancer Maternal Grandmother      Musculoskeletal Disorder Maternal Grandmother         MS     Arthritis Paternal Grandmother      ROS    Allergies   Allergen Reactions     Ambien [Zolpidem Tartrate] Other (See Comments)     Sleep walking     Amoxicillin Nausea     Stomach pain     Cephalexin Other (See Comments)     Vertigo      Cephalosporins      Levaquin [Levofloxacin]      Has vertigo     Morphine Itching       Current Outpatient Medications   Medication     AFLURIA QUADRIVALENT 0.5 ML injection     atenolol (TENORMIN) 25 MG tablet     atomoxetine (STRATTERA) 60 MG capsule     BACLOFEN PO     brexpiprazole (REXULTI) 0.5 MG tablet     buprenorphine (BUTRANS) 10 MCG/HR WK patch     cevimeline (EVOXAC) 30 MG capsule     Cholecalciferol (VITAMIN D) 2000 UNITS CAPS     esomeprazole (NEXIUM) 40 MG capsule     GABAPENTIN 300 MG OR CAPS     hydrOXYzine (ATARAX) 25 MG tablet     levomilnacipran (FETZIMA) 80 MG 24 HR capsule     MAGNESIUM OXIDE PO     meclizine (ANTIVERT) 12.5 MG tablet     mirabegron (MYRBETRIQ) 50 MG 24 hr tablet     mirabegron (MYRBETRIQ) 50 MG 24 hr tablet     Multiple Vitamins-Minerals (MULTIVITAL PO)     nortriptyline (PAMELOR) 25 MG capsule     oxyCODONE-acetaminophen (PERCOCET) 5-325 MG per tablet     pseudoePHEDrine (SUDOGEST 12 HOUR) 120 MG 12 hr tablet     SUMAtriptan (IMITREX) 100 MG tablet     tretinoin (RETIN-A) 0.025 % external cream     No current facility-administered medications for this visit.      There were no vitals taken for this visit. No LMP recorded. (Menstrual status: Irregular Periods). There is no height or weight on file to calculate  BMI.  GENERAL: healthy, alert and no distress  EYES: Eyes grossly normal to inspection, conjunctivae and sclerae normal  HENT: normal cephalic/atraumatic.  External ears, nose and mouth without ulcers or lesions.  RESP: no audible wheeze, cough, or visible cyanosis.  No visible retractions or increased work of breathing.  Able to speak fully in complete sentences.  NEURO: Cranial nerves grossly intact, mentation intact and speech normal  PSYCH: mentation appears normal, affect normal/bright, judgement and insight intact, normal speech and appearance well-groomed    A/P: Giovana Joaquin is a 40 year old F with complex medical history to include history of chondroma s/p surgery and radiation, radiation induced narcolepsy, chronic pain working with pain clinic, constipation, depression, chronic left hip pain, mixed urinary incontinence on mirabegron    We discussed the etiologies of stress and urge incontinence and how they differ. We discussed that the options of treating stress incontinence to include observation, weight loss, pelvic floor physical therapy, incontinence pessary, urethral bulking agents, and surgical correction most commonly with midurethral sling or autologous rectus fascial sling. We then discussed that urge incontinence treatments include observation, weight loss, medications most commonly anticholinergics, physical therapy, biofeedback, intravesical botulinum toxin, percutaneous tibial nerve stimulation and sacral neuromodulation.     We discussed how her pelvic floor symptoms may be related to her chronic pain and hip issues.  We discussed how the recommended treatment is dedicated pelvic floor therapy.  We discussed how the pelvic floor physical therapy works and patient is agreeable.  Referral was placed.      Follow up in 4 months.  Will set up for in person but if a lot better after the PT can switch to another video    Annika Shaffer MD MPH    Urology    CC  Patient Care  Team:  Sully Srivastava MD as PCP - General (Internal Medicine)  Kinjal Lopez MD as MD (Pediatrics)  Alka Macdonald PsyD (Psychology)  Sully Srivastava MD as Assigned PCP  Annika Shaffer MD as MD (Urology)  Daisy Chavez, RN as Specialty Care Coordinator (Urology)  SULLY SRIVASTAVA

## 2020-07-09 NOTE — NURSING NOTE
Giovana CHRISTY Jemal has given verbal permission for a video visit 07/09/20 9:36 AM and would like to be reached at # [phone] over [employer].    Called the pt 07/09/20, and 9:36 AM and reviewed their medications, history, and allergies. I then asked that they be in a quiet location with minimal distractions so they can hear the provider well.    Chief Complaint   Patient presents with     Video Visit     mixed incontinence consult        not currently breastfeeding. There is no height or weight on file to calculate BMI.    Patient Active Problem List   Diagnosis     Headache     Chronic pain syndrome     Chordoma (H)     Velopharyngeal insufficiency, acquired     Fatigue     Oligomenorrhea     Irregular menses     Other specified diffuse disease of connective tissue     Syncope     Dysfunction of eustachian tube     GERD (gastroesophageal reflux disease)     Chronic rhinitis     Dysfunction of Eustachian tube, bilateral     Chronic right shoulder pain       Allergies   Allergen Reactions     Ambien [Zolpidem Tartrate] Other (See Comments)     Sleep walking     Amoxicillin Nausea     Stomach pain     Cephalexin Other (See Comments)     Vertigo      Cephalosporins      Levaquin [Levofloxacin]      Has vertigo     Morphine Itching       Current Outpatient Medications   Medication Sig Dispense Refill     AFLURIA QUADRIVALENT 0.5 ML injection ADM 0.5ML IM UTD       atenolol (TENORMIN) 25 MG tablet Take 1 tablet (25 mg) by mouth daily . Patient needs to see primary provider for further refills. 14 tablet 0     atomoxetine (STRATTERA) 60 MG capsule Take 80 mg by mouth daily        BACLOFEN PO Take 10 mg by mouth 3 times daily        brexpiprazole (REXULTI) 0.5 MG tablet Take 1 mg by mouth daily       buprenorphine (BUTRANS) 10 MCG/HR WK patch Place 1 patch onto the skin every 7 days       cevimeline (EVOXAC) 30 MG capsule Take 1 capsule (30 mg) by mouth 3 times daily 42 capsule 0     Cholecalciferol (VITAMIN D) 2000 UNITS  CAPS        esomeprazole (NEXIUM) 40 MG capsule Take 1 capsule (40 mg) by mouth every morning (before breakfast) One hour before meals 90 capsule 3     GABAPENTIN 300 MG OR CAPS Takes 600 mg qid       hydrOXYzine (ATARAX) 25 MG tablet        levomilnacipran (FETZIMA) 80 MG 24 HR capsule Take 80 mg by mouth daily       MAGNESIUM OXIDE PO Take 500 mg by mouth daily       meclizine (ANTIVERT) 12.5 MG tablet TAKE 1 TABLET(12.5 MG) BY MOUTH THREE TIMES DAILY AS NEEDED FOR DIZZINESS 15 tablet 0     mirabegron (MYRBETRIQ) 50 MG 24 hr tablet Take 1 tablet (50 mg) by mouth daily 90 tablet 3     mirabegron (MYRBETRIQ) 50 MG 24 hr tablet Take 1 tablet (50 mg) by mouth daily . Patient needs to see primary provider for further refills. 30 tablet 0     Multiple Vitamins-Minerals (MULTIVITAL PO) Take by mouth daily       nortriptyline (PAMELOR) 25 MG capsule Take 1 capsule (25 mg) by mouth At Bedtime Please call for annual appointment, further refills. Thanks [phone]. 14 capsule 0     oxyCODONE-acetaminophen (PERCOCET) 5-325 MG per tablet Take 0.05 tablets by mouth 2 times daily        pseudoePHEDrine (SUDOGEST 12 HOUR) 120 MG 12 hr tablet TAKE 1 TABLET BY MOUTH EVERY 12 HOURS AS NEEDED FOR CONGESTION 60 tablet 0     SUMAtriptan (IMITREX) 100 MG tablet Take 1 tablet (100 mg) by mouth at onset of headache for migraine May repeat after one hour, max 200 mg in 24 hours 12 tablet 0     tretinoin (RETIN-A) 0.025 % external cream Apply small pea-sized amount to affected skin at bedtime, use sunscreen during day 30 g 1       Social History     Tobacco Use     Smoking status: Former Smoker     Packs/day: 0.20     Years: 10.00     Pack years: 2.00     Types: Cigarettes     Last attempt to quit: 2013     Years since quittin.4     Smokeless tobacco: Never Used     Tobacco comment: e-cig   Substance Use Topics     Alcohol use: Yes     Comment: socially     Drug use: No       ESTHELA Merino,  2020  9:36 AM

## 2020-07-09 NOTE — PROGRESS NOTES
"July 9, 2020    Giovana Joaquin is a 40 year old female who is being evaluated via a billable video visit.      THE VIDEO WILL BE CONDUCTED OVER: [employer] via Text: [phone]    The patient has been notified of following:     \"This video visit will be conducted via a call between you and your physician/provider. We have found that certain health care needs can be provided without the need for an in-person physical exam.  This service lets us provide the care you need with a video conversation.  If a prescription is necessary we can send it directly to your pharmacy.  If lab work is needed we can place an order for that and you can then stop by our lab to have the test done at a later time.    Video visits are billed at different rates depending on your insurance coverage.  Please reach out to your insurance provider with any questions.    If during the course of the call the physician/provider feels a video visit is not appropriate, you will not be charged for this service.\"    Patient has given verbal consent for Video visit? Yes    How would you like to obtain your AVS? Mail a copy    Patient would like the video invitation sent by: Text to cell phone: Mobile: [phone]      Video Start Time: 10:42 AM-waited for 5 minutes and then I disconnected, able to connect with patient at 11:14    Video-Visit Details    Type of service:  Video Visit    Video End Time (time video stopped): 11:30    Originating Location (pt. Location): Home    Distant Location (provider location):  [facility] UROLOGY AND [facility] FOR PROSTATE AND UROLOGIC CANCERS     Mode of Communication:  Video Conference via [employer]    Referring Provider: Gloria Love MD  [address] [place] [id number]    Primary Care Provider: Gloria Love    CC: Urinary incontinence    HPI:  Giovana Joaquin is a 40 year old female who presents for evaluation of her pelvic floor symptoms.  She has a long history of urinary incontinence, " recently switched her care to [place] had been at [facility] prior.  She has a medical history significant for ENT surgery ([facility]), radiation induced narcolepsy, chronic neck/hip pain, depression and works with a pain doctor and a psychiatrist.  She also has been evaluated for a hypermobility syndrome, her sister has POTS.    For her urinary incontinence she has been on mirabegron for the UUI, but still has bothersome stress incontinence    She broke up with her girlfriend in February and noted that the leakage started getting worse about a couple months after.  She notes that they used some bone from her hip for her neck surgery    When she was younger she reports that she has a history of UTI.  She has been on cranberry pills to help prevent infection.  Maybe a history of pyelonephritis remotely.  Does also report history of constipation.    Denies gross hematuria, vaginal bleeding.  Denies history of  disease or malignancy.  Mother does have a history of prolapse repair.    Denies history of abuse.  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     Gastro-oesophageal reflux disease      Hypertension      IBS (irritable bowel syndrome)      MDD (major depressive disorder)      Migraine      Obesity (BMI 30-39.9)      Velopharyngeal insufficiency, acquired      Past Surgical History:   Procedure Laterality Date     BIOPSY       brain tumor removal       DAVINCI ASSISTED UVULOPALATOPHARYNGOPLASTY  5/7/2013    Procedure: DAVINCI ASSISTED UVULOPALATOPHARYNGOPLASTY;  Davinci Pharyngoplasty ;  Surgeon: Hammad Lowe MD;  [facility]     ESOPHAGOSCOPY, GASTROSCOPY,  DUODENOSCOPY (EGD), COMBINED  2011    Procedure:COMBINED ESOPHAGOSCOPY, GASTROSCOPY, DUODENOSCOPY (EGD); Surgeon:CEDRICK PABLO Location:[facility]     neck fusion to c3      to C4      proton particle radiation       soft palette removed, throat x4       Social History     Socioeconomic History     Marital status: Single     Spouse name: Not on file     Number of children: Not on file     Years of education: Not on file     Highest education level: Not on file   Occupational History     Not on file   Social Needs     Financial resource strain: Not on file     Food insecurity     Worry: Not on file     Inability: Not on file     Transportation needs     Medical: Not on file     Non-medical: Not on file   Tobacco Use     Smoking status: Former Smoker     Packs/day: 0.20     Years: 10.00     Pack years: 2.00     Types: Cigarettes     Last attempt to quit: 2013     Years since quittin.4     Smokeless tobacco: Never Used     Tobacco comment: e-cig   Substance and Sexual Activity     Alcohol use: Yes     Comment: socially     Drug use: No     Sexual activity: Never   Lifestyle     Physical activity     Days per week: Not on file     Minutes per session: Not on file     Stress: Not on file   Relationships     Social connections     Talks on phone: Not on file     Gets together: Not on file     Attends Anabaptism service: Not on file     Active member of club or organization: Not on file     Attends meetings of clubs or organizations: Not on file     Relationship status: Not on file     Intimate partner violence     Fear of current or ex partner: Not on file     Emotionally abused: Not on file     Physically abused: Not on file     Forced sexual activity: Not on file   Other Topics Concern     Parent/sibling w/ CABG, MI or angioplasty before 65F 55M? Not Asked   Social History Narrative     Not on file       Family History   Problem Relation Age of Onset     Uterine Cancer Mother      Arthritis Father         RA      Breast Cancer Maternal Grandmother      Musculoskeletal Disorder Maternal Grandmother         MS     Arthritis Paternal Grandmother      ROS    Allergies   Allergen Reactions     Ambien [Zolpidem Tartrate] Other (See Comments)     Sleep walking     Amoxicillin Nausea     Stomach pain     Cephalexin Other (See Comments)     Vertigo      Cephalosporins      Levaquin [Levofloxacin]      Has vertigo     Morphine Itching       Current Outpatient Medications   Medication     AFLURIA QUADRIVALENT 0.5 ML injection     atenolol (TENORMIN) 25 MG tablet     atomoxetine (STRATTERA) 60 MG capsule     BACLOFEN PO     brexpiprazole (REXULTI) 0.5 MG tablet     buprenorphine (BUTRANS) 10 MCG/HR WK patch     cevimeline (EVOXAC) 30 MG capsule     Cholecalciferol (VITAMIN D) 2000 UNITS CAPS     esomeprazole (NEXIUM) 40 MG capsule     GABAPENTIN 300 MG OR CAPS     hydrOXYzine (ATARAX) 25 MG tablet     levomilnacipran (FETZIMA) 80 MG 24 HR capsule     MAGNESIUM OXIDE PO     meclizine (ANTIVERT) 12.5 MG tablet     mirabegron (MYRBETRIQ) 50 MG 24 hr tablet     mirabegron (MYRBETRIQ) 50 MG 24 hr tablet     Multiple Vitamins-Minerals (MULTIVITAL PO)     nortriptyline (PAMELOR) 25 MG capsule     oxyCODONE-acetaminophen (PERCOCET) 5-325 MG per tablet     pseudoePHEDrine (SUDOGEST 12 HOUR) 120 MG 12 hr tablet     SUMAtriptan (IMITREX) 100 MG tablet     tretinoin (RETIN-A) 0.025 % external cream     No current facility-administered medications for this visit.      There were no vitals taken for this visit. No LMP recorded. (Menstrual status: Irregular Periods). There is no height or weight on file to calculate BMI.  GENERAL: healthy, alert and no distress  EYES: Eyes grossly normal to inspection, conjunctivae and sclerae normal  HENT: normal cephalic/atraumatic.  External ears, nose and mouth without ulcers or lesions.  RESP: no audible wheeze, cough, or visible cyanosis.  No visible retractions or increased work of breathing.  Able to speak  fully in complete sentences.  NEURO: Cranial nerves grossly intact, mentation intact and speech normal  PSYCH: mentation appears normal, affect normal/bright, judgement and insight intact, normal speech and appearance well-groomed    A/P: Giovana Joaquin is a 40 year old F with complex medical history to include history of chondroma s/p surgery and radiation, radiation induced narcolepsy, chronic pain working with pain clinic, constipation, depression, chronic left hip pain, mixed urinary incontinence on mirabegron    We discussed the etiologies of stress and urge incontinence and how they differ. We discussed that the options of treating stress incontinence to include observation, weight loss, pelvic floor physical therapy, incontinence pessary, urethral bulking agents, and surgical correction most commonly with midurethral sling or autologous rectus fascial sling. We then discussed that urge incontinence treatments include observation, weight loss, medications most commonly anticholinergics, physical therapy, biofeedback, intravesical botulinum toxin, percutaneous tibial nerve stimulation and sacral neuromodulation.     We discussed how her pelvic floor symptoms may be related to her chronic pain and hip issues.  We discussed how the recommended treatment is dedicated pelvic floor therapy.  We discussed how the pelvic floor physical therapy works and patient is agreeable.  Referral was placed.      Follow up in 4 months.  Will set up for in person but if a lot better after the PT can switch to another video    Annika Shaffer MD MPH    Urology    CC  Patient Care Team:  Sully Srivastava MD as PCP - General (Internal Medicine)  Kinjal Lopez MD as MD (Pediatrics)  Alka Macdonald PsyD (Psychology)  Sully Srivastava MD as Assigned PCP  Annika Shaffer MD as MD (Urology)  Daisy Chavez, RN as Specialty Care Coordinator (Urology)  SULLY SRIVASTAVA

## 2020-07-09 NOTE — PATIENT INSTRUCTIONS
Websites with free information:    [employer] patient website: www.[email or web address]    Total Control Program: www.totalcontrolprogram.com    Please consider fiber supplement that you mix in the water    Please see one of the dedicated pelvic floor physical therapist ([facility] [phone])    Please return to see me in 4 months, sooner if needed    It was a pleasure meeting with you today.  Thank you for allowing me and my team the privilege of caring for you today.  YOU are the reason we are here, and I truly hope we provided you with the excellent service you deserve.  Please let us know if there is anything else we can do for you so that we can be sure you are leaving completely satisfied with your care experience.

## 2020-07-13 ENCOUNTER — TELEPHONE (OUTPATIENT)
Dept: UROLOGY | Facility: CLINIC | Age: 40
End: 2020-07-13

## 2020-08-12 ENCOUNTER — THERAPY VISIT (OUTPATIENT)
Dept: PHYSICAL THERAPY | Facility: CLINIC | Age: 40
End: 2020-08-12
Attending: UROLOGY
Payer: MEDICARE

## 2020-08-12 DIAGNOSIS — M25.552 HIP PAIN, CHRONIC, LEFT: ICD-10-CM

## 2020-08-12 DIAGNOSIS — G89.29 HIP PAIN, CHRONIC, LEFT: ICD-10-CM

## 2020-08-12 DIAGNOSIS — M62.81 MUSCLE WEAKNESS (GENERALIZED): ICD-10-CM

## 2020-08-12 DIAGNOSIS — G89.4 CHRONIC PAIN SYNDROME: ICD-10-CM

## 2020-08-12 DIAGNOSIS — N39.46 MIXED INCONTINENCE: ICD-10-CM

## 2020-08-12 PROCEDURE — 97161 PT EVAL LOW COMPLEX 20 MIN: CPT | Mod: GP | Performed by: PHYSICAL THERAPIST

## 2020-08-12 PROCEDURE — 97110 THERAPEUTIC EXERCISES: CPT | Mod: GP | Performed by: PHYSICAL THERAPIST

## 2020-08-12 PROCEDURE — 97112 NEUROMUSCULAR REEDUCATION: CPT | Mod: GP | Performed by: PHYSICAL THERAPIST

## 2020-08-12 PROCEDURE — 97535 SELF CARE MNGMENT TRAINING: CPT | Mod: GP | Performed by: PHYSICAL THERAPIST

## 2020-08-12 NOTE — PROGRESS NOTES
[facility] Initial Evaluation  Subjective:    Patient Health History             Pertinent medical history includes: anemia, cancer, concussions/dizziness, depression, migraines/headaches, numbness/tingling, overweight, sleep disorder/apnea and smoking.   Red flags:  Changes in bowel and bladder habits, severe dizziness, significant weakness, pain at rest/night and severe headaches.     Surgeries include:  Orthopedic surgery and other. Other surgery history details: Brain tumor.        Current occupation is disabled.                                       Objective:  System    Physical Exam    General     ROS    Assessment/Plan:

## 2020-08-12 NOTE — LETTER
DEPARTMENT OF HEALTH AND HUMAN SERVICES  CENTERS FOR MEDICARE & MEDICAID SERVICES    PLAN/UPDATED PLAN OF PROGRESS FOR OUTPATIENT REHABILITATION@       PATIENTS NAME:  Giovana Joaquin   : 1980    PROVIDER NUMBER:    [id number]    [id number]     PROVIDER NAME: [facility] [place]    MEDICAL RECORD NUMBER: [id number]     START OF CARE DATE:      TYPE:  PT    PRIMARY/TREATMENT DIAGNOSIS: (Pertinent Medical Diagnosis)     Mixed incontinence  Chronic pain syndrome  Hip pain, chronic, left  Muscle weakness (generalized)    VISITS FROM START OF CARE:  Rxs Used: 1     [facility] Initial Evaluation  Subjective:  Therapist Generated HPI Evaluation  Problem details: Patient has chief complaint of urinary stress incontinence which started in 2020 of insidious onset. She states that it progressively worsened and has had to wear 4 pads/day since 2020. She also has mild urinary urge incontinence and frequency. She voids every 2-4 hours and drinks plenty of fluids. She gets up 1-2x/night to void. History of UTIs as a child and young adult, but not recently. Medical history of chordoma brain tumor and neck fusion in 2002, with left hip pain after removing bone for neck fusion. Hip pain is 7/10 PL.         Pertinent medical history includes: anemia, cancer, concussions/dizziness, depression, migraines/headaches, numbness/tingling, overweight, sleep disorder/apnea and smoking.   Red flags:  Changes in bowel and bladder habits, severe dizziness, significant weakness, pain at rest/night and severe headaches.  Surgeries include:  Orthopedic surgery and other. Other surgery history details: Brain tumor.    Current occupation is disabled.     Objective:  Pelvic Dysfunction Evaluation:    Bladder/Pelvic Problems:    Storage Problem:  Stress incontinence, urge incontinence and frequency    Flexibility:    Tightness present at:Piriformis    PATIENTS NAME:  Jemal  Giovana   : 1980    Pelvic Clock Exam:    Ischiocavernosis pain:  -  Bulbocavernosis pain:  -  Transverse Perineal:  -  Levator ANI:  +/-  Perineal Body:  -    Reflex Testing:  normal  External Assessment:    Skin Condition:  Normal  Bearing Down/Coughing:  Normal  Tissue Symmetry:  Normal  Introitus:  Normal  Muscle Contraction/Perineal Mobility:  Slight lift, no urogential triangle descent  Internal Assessment:    Sensory Exam:  Normal  Contraction/Grade:  Fair squeeze, definite lift (3)    SEMG Biofeedback:    Surface electrode placement--Abdominals: yes  Suraface electrode placement--Perianal:  Yes  Baseline EMG PM:  2.3 mv  Baseline EMG Abdominals:  1.0 mv  Peak pelvic muscle contraction:  12 mv  EMG interpretation to fatigue:  5-8 seconds  Position:  Supine and sittingAdditional History:  Number of Pregnancies: 0    Assessment/Plan:    Patient is a 40 year old female with pelvic and left side hip complaints.    Patient has the following significant findings with corresponding treatment plan.                Diagnosis 1:  Mixed incontinence & hip pain  Pain -  self management and education  Decreased ROM/flexibility - manual therapy, therapeutic exercise and home program  Decreased strength - therapeutic exercise, therapeutic activities and home program  Decreased coordination - neuromuscular reeducation and biofeedback  Therapy Evaluation Codes:   1) History comprised of:   Personal factors that impact the plan of care:      None.    Comorbidity factors that impact the plan of care are:      None.     Medications impacting care: None.  2) Examination of Body Systems comprised of:   Body structures and functions that impact the plan of care:      Hip and Pelvis.   Activity limitations that impact the plan of care are:      Walking, Stress incontinence and Urge incontinence.  3) Clinical presentation characteristics are:   Stable/Uncomplicated.  4) Decision-Making    PATIENTS NAME:  Giovana Joaquin  "  : 1980          Low complexity using standardized patient assessment instrument and/or measureable assessment of functional outcome.  Cumulative Therapy Evaluation is: Low complexity.    Previous and current functional limitations:  (See Goal Flow Sheet for this information)    Short term and Long term goals: (See Goal Flow Sheet for this information)   Communication ability:  Patient appears to be able to clearly communicate and understand verbal and written communication and follow directions correctly.  Treatment Explanation - The following has been discussed with the patient:   RX ordered/plan of care  Anticipated outcomes  Possible risks and side effects  This patient would benefit from PT intervention to resume normal activities.   Rehab potential is good.  Frequency:  1 X week, once daily  Duration:  for 12 weeks  Discharge Plan:  Achieve all LTG.  Independent in home treatment program.  Reach maximal therapeutic benefit.      Caregiver Signature/Credentials _____________________________ Date ________       Treating Provider: Paula Baptiste,PT   I have reviewed and certified the need for these services and plan of treatment while under my care.        PHYSICIAN'S SIGNATURE:   _________________________________________  Date___________   Annika See José Miguel Shaffer MD    Certification period:  Beginning of Cert date period 2020  To End of Cert period date:   2020     Functional Level Progress Report: Please see attached \"Goal Flow sheet for Functional level.\"    ____X____ Continue Services or       ________ DC Services                Service dates: From  SOC Date 20  date to present                         "

## 2020-08-12 NOTE — PROGRESS NOTES
[facility] Initial Evaluation  Subjective:    Therapist Generated HPI Evaluation  Problem details: Patient has chief complaint of urinary stress incontinence which started in March 2020 of insidious onset. She states that it progressively worsened and has had to wear 4 pads/day since June 2020. She also has mild urinary urge incontinence and frequency. She voids every 2-4 hours and drinks plenty of fluids. She gets up 1-2x/night to void. History of UTIs as a child and young adult, but not recently. Medical history of chordoma brain tumor and neck fusion in Jan 2002, with left hip pain after removing bone for neck fusion. Hip pain is 7/10 PL.                                                        Objective:  System                                 Pelvic Dysfunction Evaluation:    Bladder/Pelvic Problems:    Storage Problem:  Stress incontinence, urge incontinence and frequency          Flexibility:    Tightness present at:Piriformis      Pelvic Clock Exam:    Ischiocavernosis pain:  -  Bulbocavernosis pain:  -  Transverse Perineal:  -  Levator ANI:  +/-  Perineal Body:  -    Reflex Testing:  normal    External Assessment:    Skin Condition:  Normal    Bearing Down/Coughing:  Normal  Tissue Symmetry:  Normal  Introitus:  Normal  Muscle Contraction/Perineal Mobility:  Slight lift, no urogential triangle descent  Internal Assessment:    Sensory Exam:  Normal  Contraction/Grade:  Fair squeeze, definite lift (3)          SEMG Biofeedback:      Surface electrode placement--Abdominals: yes  Suraface electrode placement--Perianal:  Yes  Baseline EMG PM:  2.3 mv  Baseline EMG Abdominals:  1.0 mv  Peak pelvic muscle contraction:  12 mv    EMG interpretation to fatigue:  5-8 seconds  Position:  Supine and sittingAdditional History:    Number of Pregnancies: 0                         General     ROS    Assessment/Plan:    Patient is a 40 year old female with pelvic and left side hip complaints.    Patient  has the following significant findings with corresponding treatment plan.                Diagnosis 1:  Mixed incontinence & hip pain  Pain -  self management and education  Decreased ROM/flexibility - manual therapy, therapeutic exercise and home program  Decreased strength - therapeutic exercise, therapeutic activities and home program  Decreased coordination - neuromuscular reeducation and biofeedback    Therapy Evaluation Codes:   1) History comprised of:   Personal factors that impact the plan of care:      None.    Comorbidity factors that impact the plan of care are:      None.     Medications impacting care: None.  2) Examination of Body Systems comprised of:   Body structures and functions that impact the plan of care:      Hip and Pelvis.   Activity limitations that impact the plan of care are:      Walking, Stress incontinence and Urge incontinence.  3) Clinical presentation characteristics are:   Stable/Uncomplicated.  4) Decision-Making    Low complexity using standardized patient assessment instrument and/or measureable assessment of functional outcome.  Cumulative Therapy Evaluation is: Low complexity.    Previous and current functional limitations:  (See Goal Flow Sheet for this information)    Short term and Long term goals: (See Goal Flow Sheet for this information)     Communication ability:  Patient appears to be able to clearly communicate and understand verbal and written communication and follow directions correctly.  Treatment Explanation - The following has been discussed with the patient:   RX ordered/plan of care  Anticipated outcomes  Possible risks and side effects  This patient would benefit from PT intervention to resume normal activities.   Rehab potential is good.    Frequency:  1 X week, once daily  Duration:  for 12 weeks  Discharge Plan:  Achieve all LTG.  Independent in home treatment program.  Reach maximal therapeutic benefit.    Please refer to the daily flowsheet for treatment  today, total treatment time and time spent performing 1:1 timed codes.

## 2020-08-19 ENCOUNTER — THERAPY VISIT (OUTPATIENT)
Dept: PHYSICAL THERAPY | Facility: CLINIC | Age: 40
End: 2020-08-19
Payer: MEDICARE

## 2020-08-19 DIAGNOSIS — N39.46 MIXED INCONTINENCE: Primary | ICD-10-CM

## 2020-08-19 DIAGNOSIS — M62.81 MUSCLE WEAKNESS (GENERALIZED): ICD-10-CM

## 2020-08-19 PROCEDURE — 97112 NEUROMUSCULAR REEDUCATION: CPT | Mod: GP | Performed by: PHYSICAL THERAPIST

## 2020-08-19 PROCEDURE — 97110 THERAPEUTIC EXERCISES: CPT | Mod: GP | Performed by: PHYSICAL THERAPIST

## 2020-08-19 PROCEDURE — 97140 MANUAL THERAPY 1/> REGIONS: CPT | Mod: GP | Performed by: PHYSICAL THERAPIST

## 2020-08-20 DIAGNOSIS — T78.40XS ALLERGIC STATE, SEQUELA: ICD-10-CM

## 2020-08-20 RX ORDER — PSEUDOEPHEDRINE HCL 120 MG/1
TABLET, FILM COATED, EXTENDED RELEASE ORAL
Qty: 60 TABLET | Refills: 2 | Status: SHIPPED | OUTPATIENT
Start: 2020-08-20

## 2020-08-20 NOTE — TELEPHONE ENCOUNTER
[facility] Call Center    Phone Message    May a detailed message be left on voicemail: yes     Reason for Call: Medication Refill Request    Has the patient contacted the pharmacy for the refill? Yes   Name of medication being requested: pseudoePHEDrine (SUDOGEST 12 HOUR) 120 MG 12 hr tablet  Provider who prescribed the medication: Gloria Love  Pharmacy: [id number] - [place], [place] - [address]   Date medication is needed: ASAP, Patient states the pharmacy called her and told her she cannot get a refill on this medication. Patient would like a call back from Dr. Love' Nurse to discuss this.        Action Taken: Message routed to:  [facility] PCC    Travel Screening: Not Applicable

## 2020-08-26 ENCOUNTER — THERAPY VISIT (OUTPATIENT)
Dept: PHYSICAL THERAPY | Facility: CLINIC | Age: 40
End: 2020-08-26
Payer: MEDICARE

## 2020-08-26 DIAGNOSIS — G89.29 HIP PAIN, CHRONIC, LEFT: Primary | ICD-10-CM

## 2020-08-26 DIAGNOSIS — N39.46 MIXED INCONTINENCE: ICD-10-CM

## 2020-08-26 DIAGNOSIS — M62.81 MUSCLE WEAKNESS (GENERALIZED): ICD-10-CM

## 2020-08-26 DIAGNOSIS — M25.552 HIP PAIN, CHRONIC, LEFT: Primary | ICD-10-CM

## 2020-08-26 PROCEDURE — 97110 THERAPEUTIC EXERCISES: CPT | Mod: GP | Performed by: PHYSICAL THERAPIST

## 2020-08-26 PROCEDURE — 97140 MANUAL THERAPY 1/> REGIONS: CPT | Mod: GP | Performed by: PHYSICAL THERAPIST

## 2020-09-09 ENCOUNTER — THERAPY VISIT (OUTPATIENT)
Dept: PHYSICAL THERAPY | Facility: CLINIC | Age: 40
End: 2020-09-09
Payer: MEDICARE

## 2020-09-09 DIAGNOSIS — M25.552 HIP PAIN, CHRONIC, LEFT: Primary | ICD-10-CM

## 2020-09-09 DIAGNOSIS — G89.29 HIP PAIN, CHRONIC, LEFT: Primary | ICD-10-CM

## 2020-09-09 DIAGNOSIS — M62.81 MUSCLE WEAKNESS (GENERALIZED): ICD-10-CM

## 2020-09-09 DIAGNOSIS — N39.46 MIXED INCONTINENCE: ICD-10-CM

## 2020-09-09 PROCEDURE — 97140 MANUAL THERAPY 1/> REGIONS: CPT | Mod: GP | Performed by: PHYSICAL THERAPIST

## 2020-09-09 PROCEDURE — 97110 THERAPEUTIC EXERCISES: CPT | Mod: GP | Performed by: PHYSICAL THERAPIST

## 2020-09-14 ENCOUNTER — TELEPHONE (OUTPATIENT)
Dept: INTERNAL MEDICINE | Facility: CLINIC | Age: 40
End: 2020-09-14

## 2020-09-14 DIAGNOSIS — G47.00 INSOMNIA, UNSPECIFIED TYPE: ICD-10-CM

## 2020-09-14 NOTE — TELEPHONE ENCOUNTER
nortriptyline (PAMELOR) 25 MG capsule      Last Written Prescription Date:  7-23-19  Last Fill Quantity: 14,   # refills: 0  Last Office Visit : 6-  Future Office visit:  none    Routing refill request to provider for review/approval because:  Gap in treatment?  Due for 2-3 month f/u appointment.  Scheduling has been notified to contact the pt for appointment.        Kathleen M Doege RN

## 2020-09-14 NOTE — TELEPHONE ENCOUNTER
Left message for patient to call to schedule virtual follow up with PCP. Due for 2-3 month f/u appointment from last visit 06- with [facility].

## 2020-09-14 NOTE — TELEPHONE ENCOUNTER
[facility] Call Center    Phone Message    May a detailed message be left on voicemail: yes     Reason for Call: Medication Refill Request    Has the patient contacted the pharmacy for the refill? Yes   Name of medication being requested: nortriptyline (PAMELOR) 25 MG capsule  Provider who prescribed the medication: Gloria Love  Pharmacy:[id number] - [place], [place] - [address]   Date medication is needed: 09/14/2020    Action Taken: Message routed to:  [facility] MED REFILL    Travel Screening: Not Applicable

## 2020-09-15 RX ORDER — NORTRIPTYLINE HCL 25 MG
25 CAPSULE ORAL AT BEDTIME
Qty: 90 CAPSULE | Refills: 2 | Status: SHIPPED | OUTPATIENT
Start: 2020-09-15 | End: 2021-05-28

## 2020-09-17 RX ORDER — NORTRIPTYLINE HCL 25 MG
CAPSULE ORAL
Qty: 90 CAPSULE | OUTPATIENT
Start: 2020-09-17

## 2020-09-24 ENCOUNTER — TELEPHONE (OUTPATIENT)
Dept: INTERNAL MEDICINE | Facility: CLINIC | Age: 40
End: 2020-09-24

## 2020-09-24 ENCOUNTER — THERAPY VISIT (OUTPATIENT)
Dept: PHYSICAL THERAPY | Facility: CLINIC | Age: 40
End: 2020-09-24
Payer: MEDICARE

## 2020-09-24 DIAGNOSIS — G89.29 HIP PAIN, CHRONIC, LEFT: Primary | ICD-10-CM

## 2020-09-24 DIAGNOSIS — M25.552 HIP PAIN, CHRONIC, LEFT: Primary | ICD-10-CM

## 2020-09-24 DIAGNOSIS — R68.2 DRY MOUTH: ICD-10-CM

## 2020-09-24 DIAGNOSIS — M62.81 MUSCLE WEAKNESS (GENERALIZED): ICD-10-CM

## 2020-09-24 DIAGNOSIS — C41.2 CHORDOMA (H): ICD-10-CM

## 2020-09-24 DIAGNOSIS — N39.46 MIXED INCONTINENCE: ICD-10-CM

## 2020-09-24 PROCEDURE — 97140 MANUAL THERAPY 1/> REGIONS: CPT | Mod: GP | Performed by: PHYSICAL THERAPIST

## 2020-09-24 PROCEDURE — 97110 THERAPEUTIC EXERCISES: CPT | Mod: GP | Performed by: PHYSICAL THERAPIST

## 2020-09-24 RX ORDER — CEVIMELINE HYDROCHLORIDE 30 MG/1
30 CAPSULE ORAL 3 TIMES DAILY
Qty: 90 CAPSULE | Refills: 1 | Status: SHIPPED | OUTPATIENT
Start: 2020-09-24 | End: 2020-10-21

## 2020-09-24 NOTE — TELEPHONE ENCOUNTER
[facility] Call Center    Phone Message    May a detailed message be left on voicemail: yes     Reason for Call: Medication Refill Request    Has the patient contacted the pharmacy for the refill? Yes   Name of medication being requested: cevimeline (EVOXAC) 30 MG capsule   Provider who prescribed the medication: Kate  Pharmacy: [facility] #[id number] - [place], [place] - [address]   Date medication is needed: 9/24/2020         Action Taken: Message routed to:  [facility] pcc    Travel Screening: Not Applicable

## 2020-09-30 ENCOUNTER — THERAPY VISIT (OUTPATIENT)
Dept: PHYSICAL THERAPY | Facility: CLINIC | Age: 40
End: 2020-09-30
Payer: MEDICARE

## 2020-09-30 DIAGNOSIS — M62.81 MUSCLE WEAKNESS (GENERALIZED): ICD-10-CM

## 2020-09-30 DIAGNOSIS — G89.29 HIP PAIN, CHRONIC, LEFT: ICD-10-CM

## 2020-09-30 DIAGNOSIS — N39.46 MIXED INCONTINENCE: Primary | ICD-10-CM

## 2020-09-30 DIAGNOSIS — M25.552 HIP PAIN, CHRONIC, LEFT: ICD-10-CM

## 2020-09-30 PROCEDURE — 97110 THERAPEUTIC EXERCISES: CPT | Mod: GP | Performed by: PHYSICAL THERAPIST

## 2020-09-30 PROCEDURE — 97140 MANUAL THERAPY 1/> REGIONS: CPT | Mod: GP | Performed by: PHYSICAL THERAPIST

## 2020-10-15 ENCOUNTER — THERAPY VISIT (OUTPATIENT)
Dept: PHYSICAL THERAPY | Facility: CLINIC | Age: 40
End: 2020-10-15
Payer: MEDICARE

## 2020-10-15 DIAGNOSIS — N39.46 MIXED INCONTINENCE: Primary | ICD-10-CM

## 2020-10-15 DIAGNOSIS — M62.81 MUSCLE WEAKNESS (GENERALIZED): ICD-10-CM

## 2020-10-15 DIAGNOSIS — M25.552 HIP PAIN, CHRONIC, LEFT: ICD-10-CM

## 2020-10-15 DIAGNOSIS — G89.29 HIP PAIN, CHRONIC, LEFT: ICD-10-CM

## 2020-10-15 PROCEDURE — 97140 MANUAL THERAPY 1/> REGIONS: CPT | Mod: GP | Performed by: PHYSICAL THERAPIST

## 2020-10-15 PROCEDURE — 97110 THERAPEUTIC EXERCISES: CPT | Mod: GP | Performed by: PHYSICAL THERAPIST

## 2020-10-18 DIAGNOSIS — C41.2 CHORDOMA (H): ICD-10-CM

## 2020-10-18 DIAGNOSIS — R68.2 DRY MOUTH: ICD-10-CM

## 2020-10-21 RX ORDER — CEVIMELINE HYDROCHLORIDE 30 MG/1
30 CAPSULE ORAL 3 TIMES DAILY
Qty: 90 CAPSULE | Refills: 1 | Status: SHIPPED | OUTPATIENT
Start: 2020-10-21 | End: 2021-01-20

## 2020-10-21 NOTE — TELEPHONE ENCOUNTER
CEVIMELINE 30MG CAPSULES  Last Written Prescription Date:  9/24/2020  Last Fill Quantity: 90,   # refills: 1  Last Office Visit : 6/26/2020  Future Office visit:  None    Routing refill request to provider for review/approval because:  Drug not on the [facility], [facility] or [facility] refill protocol or controlled substance      Patty Calixto RN  Central Triage Red Flags/Med Refills

## 2021-01-01 ENCOUNTER — ANCILLARY PROCEDURE (OUTPATIENT)
Dept: CT IMAGING | Facility: CLINIC | Age: 41
End: 2021-01-01
Attending: OTOLARYNGOLOGY
Payer: MEDICARE

## 2021-01-01 ENCOUNTER — OFFICE VISIT (OUTPATIENT)
Dept: OTOLARYNGOLOGY | Facility: CLINIC | Age: 41
End: 2021-01-01
Payer: MEDICARE

## 2021-01-01 ENCOUNTER — VIRTUAL VISIT (OUTPATIENT)
Dept: OTOLARYNGOLOGY | Facility: CLINIC | Age: 41
End: 2021-01-01
Payer: MEDICARE

## 2021-01-01 ENCOUNTER — ANCILLARY PROCEDURE (OUTPATIENT)
Dept: GENERAL RADIOLOGY | Facility: CLINIC | Age: 41
End: 2021-01-01
Attending: OTOLARYNGOLOGY
Payer: MEDICARE

## 2021-01-01 ENCOUNTER — TELEPHONE (OUTPATIENT)
Dept: OTOLARYNGOLOGY | Facility: CLINIC | Age: 41
End: 2021-01-01

## 2021-01-01 ENCOUNTER — TELEPHONE (OUTPATIENT)
Dept: OTOLARYNGOLOGY | Facility: CLINIC | Age: 41
End: 2021-01-01
Payer: COMMERCIAL

## 2021-01-01 ENCOUNTER — HOSPITAL ENCOUNTER (OUTPATIENT)
Facility: CLINIC | Age: 41
End: 2021-01-01
Attending: OTOLARYNGOLOGY | Admitting: OTOLARYNGOLOGY
Payer: MEDICARE

## 2021-01-01 ENCOUNTER — DOCUMENTATION ONLY (OUTPATIENT)
Dept: OTOLARYNGOLOGY | Facility: CLINIC | Age: 41
End: 2021-01-01

## 2021-01-01 ENCOUNTER — OFFICE VISIT (OUTPATIENT)
Dept: AUDIOLOGY | Facility: CLINIC | Age: 41
End: 2021-01-01
Payer: MEDICARE

## 2021-01-01 ENCOUNTER — THERAPY VISIT (OUTPATIENT)
Dept: SPEECH THERAPY | Facility: CLINIC | Age: 41
End: 2021-01-01
Payer: MEDICARE

## 2021-01-01 ENCOUNTER — PATIENT OUTREACH (OUTPATIENT)
Dept: OTOLARYNGOLOGY | Facility: CLINIC | Age: 41
End: 2021-01-01
Payer: COMMERCIAL

## 2021-01-01 ENCOUNTER — LAB (OUTPATIENT)
Dept: LAB | Facility: CLINIC | Age: 41
End: 2021-01-01
Attending: OTOLARYNGOLOGY
Payer: MEDICARE

## 2021-01-01 ENCOUNTER — TELEPHONE (OUTPATIENT)
Dept: AUDIOLOGY | Facility: CLINIC | Age: 41
End: 2021-01-01

## 2021-01-01 ENCOUNTER — TELEPHONE (OUTPATIENT)
Dept: INTERNAL MEDICINE | Facility: CLINIC | Age: 41
End: 2021-01-01
Payer: COMMERCIAL

## 2021-01-01 ENCOUNTER — HEALTH MAINTENANCE LETTER (OUTPATIENT)
Age: 41
End: 2021-01-01

## 2021-01-01 ENCOUNTER — PRE VISIT (OUTPATIENT)
Dept: OTOLARYNGOLOGY | Facility: CLINIC | Age: 41
End: 2021-01-01

## 2021-01-01 ENCOUNTER — TELEPHONE (OUTPATIENT)
Dept: SPEECH THERAPY | Facility: CLINIC | Age: 41
End: 2021-01-01

## 2021-01-01 ENCOUNTER — HOSPITAL ENCOUNTER (EMERGENCY)
Facility: CLINIC | Age: 41
Discharge: HOME | End: 2021-09-02
Attending: EMERGENCY MEDICINE | Admitting: EMERGENCY MEDICINE
Payer: MEDICARE

## 2021-01-01 VITALS
BODY MASS INDEX: 46.13 KG/M2 | WEIGHT: 235 LBS | HEART RATE: 85 BPM | HEIGHT: 60 IN | OXYGEN SATURATION: 97 % | TEMPERATURE: 96 F

## 2021-01-01 VITALS — HEART RATE: 87 BPM | BODY MASS INDEX: 48.1 KG/M2 | OXYGEN SATURATION: 100 % | WEIGHT: 245 LBS | HEIGHT: 60 IN

## 2021-01-01 VITALS
HEIGHT: 60 IN | OXYGEN SATURATION: 99 % | HEART RATE: 91 BPM | BODY MASS INDEX: 46.33 KG/M2 | TEMPERATURE: 97.3 F | WEIGHT: 236 LBS

## 2021-01-01 VITALS
RESPIRATION RATE: 18 BRPM | OXYGEN SATURATION: 100 % | BODY MASS INDEX: 45.9 KG/M2 | DIASTOLIC BLOOD PRESSURE: 82 MMHG | TEMPERATURE: 97 F | WEIGHT: 235.01 LBS | HEART RATE: 99 BPM | SYSTOLIC BLOOD PRESSURE: 150 MMHG

## 2021-01-01 VITALS — HEIGHT: 60 IN | HEART RATE: 89 BPM | BODY MASS INDEX: 48.48 KG/M2 | TEMPERATURE: 97.5 F | WEIGHT: 246.91 LBS

## 2021-01-01 VITALS
TEMPERATURE: 96.8 F | HEART RATE: 94 BPM | OXYGEN SATURATION: 98 % | WEIGHT: 242.29 LBS | BODY MASS INDEX: 47.57 KG/M2 | HEIGHT: 60 IN

## 2021-01-01 DIAGNOSIS — K22.0 CRICOPHARYNGEAL ACHALASIA: ICD-10-CM

## 2021-01-01 DIAGNOSIS — R13.12 OROPHARYNGEAL DYSPHAGIA: Primary | ICD-10-CM

## 2021-01-01 DIAGNOSIS — K21.9 GASTROESOPHAGEAL REFLUX DISEASE, UNSPECIFIED WHETHER ESOPHAGITIS PRESENT: ICD-10-CM

## 2021-01-01 DIAGNOSIS — H90.6 MIXED HEARING LOSS, BILATERAL: Primary | ICD-10-CM

## 2021-01-01 DIAGNOSIS — M54.2 CHRONIC NECK PAIN: ICD-10-CM

## 2021-01-01 DIAGNOSIS — H92.13 OTORRHEA, BILATERAL: Primary | ICD-10-CM

## 2021-01-01 DIAGNOSIS — Z11.59 ENCOUNTER FOR SCREENING FOR OTHER VIRAL DISEASES: ICD-10-CM

## 2021-01-01 DIAGNOSIS — R13.12 OROPHARYNGEAL DYSPHAGIA: ICD-10-CM

## 2021-01-01 DIAGNOSIS — J32.9 SINUS INFECTION: ICD-10-CM

## 2021-01-01 DIAGNOSIS — J32.9 SINUS INFECTION: Primary | ICD-10-CM

## 2021-01-01 DIAGNOSIS — B37.0 THRUSH: ICD-10-CM

## 2021-01-01 DIAGNOSIS — H69.90 DYSFUNCTION OF EUSTACHIAN TUBE, UNSPECIFIED LATERALITY: Primary | ICD-10-CM

## 2021-01-01 DIAGNOSIS — R49.0 DYSPHONIA: Primary | ICD-10-CM

## 2021-01-01 DIAGNOSIS — R49.0 DYSPHONIA: ICD-10-CM

## 2021-01-01 DIAGNOSIS — H69.93 DYSFUNCTION OF BOTH EUSTACHIAN TUBES: Primary | ICD-10-CM

## 2021-01-01 DIAGNOSIS — G89.29 CHRONIC NECK PAIN: ICD-10-CM

## 2021-01-01 DIAGNOSIS — R20.2 PARESTHESIAS: ICD-10-CM

## 2021-01-01 DIAGNOSIS — E66.01 MORBID OBESITY (H): ICD-10-CM

## 2021-01-01 LAB
ALBUMIN UR-MCNC: NEGATIVE MG/DL
ANION GAP SERPL CALCULATED.3IONS-SCNC: 2 MMOL/L (ref 3–14)
APPEARANCE UR: CLEAR
ATRIAL RATE - MUSE: 85 BPM
BASOPHILS # BLD AUTO: 0.1 10E3/UL (ref 0–0.2)
BASOPHILS NFR BLD AUTO: 1 %
BILIRUB UR QL STRIP: NEGATIVE
BUN SERPL-MCNC: 13 MG/DL (ref 7–30)
CALCIUM SERPL-MCNC: 9.5 MG/DL (ref 8.5–10.1)
CHLORIDE BLD-SCNC: 103 MMOL/L (ref 94–109)
CO2 SERPL-SCNC: 31 MMOL/L (ref 20–32)
COLOR UR AUTO: NORMAL
CREAT SERPL-MCNC: 0.7 MG/DL (ref 0.52–1.04)
DIASTOLIC BLOOD PRESSURE - MUSE: NORMAL MMHG
EOSINOPHIL # BLD AUTO: 0.1 10E3/UL (ref 0–0.7)
EOSINOPHIL NFR BLD AUTO: 1 %
ERYTHROCYTE [DISTWIDTH] IN BLOOD BY AUTOMATED COUNT: 14.5 % (ref 10–15)
GFR SERPL CREATININE-BSD FRML MDRD: >90 ML/MIN/1.73M2
GLUCOSE BLD-MCNC: 113 MG/DL (ref 70–99)
GLUCOSE UR STRIP-MCNC: NEGATIVE MG/DL
HCT VFR BLD AUTO: 34.3 % (ref 35–47)
HGB BLD-MCNC: 10 G/DL (ref 11.7–15.7)
HGB UR QL STRIP: NEGATIVE
HOLD SPECIMEN: NORMAL
HOLD SPECIMEN: NORMAL
IMM GRANULOCYTES # BLD: 0 10E3/UL
IMM GRANULOCYTES NFR BLD: 0 %
INTERPRETATION ECG - MUSE: NORMAL
KETONES UR STRIP-MCNC: NEGATIVE MG/DL
LEUKOCYTE ESTERASE UR QL STRIP: NEGATIVE
LYMPHOCYTES # BLD AUTO: 1.5 10E3/UL (ref 0.8–5.3)
LYMPHOCYTES NFR BLD AUTO: 21 %
MCH RBC QN AUTO: 26.7 PG (ref 26.5–33)
MCHC RBC AUTO-ENTMCNC: 29.2 G/DL (ref 31.5–36.5)
MCV RBC AUTO: 92 FL (ref 78–100)
MONOCYTES # BLD AUTO: 0.6 10E3/UL (ref 0–1.3)
MONOCYTES NFR BLD AUTO: 8 %
NEUTROPHILS # BLD AUTO: 5.1 10E3/UL (ref 1.6–8.3)
NEUTROPHILS NFR BLD AUTO: 69 %
NITRATE UR QL: NEGATIVE
NRBC # BLD AUTO: 0 10E3/UL
NRBC BLD AUTO-RTO: 0 /100
P AXIS - MUSE: 38 DEGREES
PH UR STRIP: 6.5 [PH] (ref 5–7)
PLATELET # BLD AUTO: 299 10E3/UL (ref 150–450)
POTASSIUM BLD-SCNC: 3.8 MMOL/L (ref 3.4–5.3)
PR INTERVAL - MUSE: 136 MS
QRS DURATION - MUSE: 92 MS
QT - MUSE: 372 MS
QTC - MUSE: 442 MS
R AXIS - MUSE: 19 DEGREES
RBC # BLD AUTO: 3.74 10E6/UL (ref 3.8–5.2)
RBC URINE: 0 /HPF
SARS-COV-2 RNA RESP QL NAA+PROBE: NEGATIVE
SODIUM SERPL-SCNC: 136 MMOL/L (ref 133–144)
SP GR UR STRIP: 1.01 (ref 1–1.03)
SYSTOLIC BLOOD PRESSURE - MUSE: NORMAL MMHG
T AXIS - MUSE: 10 DEGREES
TROPONIN I SERPL-MCNC: <0.015 UG/L (ref 0–0.04)
UROBILINOGEN UR STRIP-MCNC: NORMAL MG/DL
VENTRICULAR RATE- MUSE: 85 BPM
WBC # BLD AUTO: 7.3 10E3/UL (ref 4–11)
WBC URINE: <1 /HPF

## 2021-01-01 PROCEDURE — 99213 OFFICE O/P EST LOW 20 MIN: CPT | Mod: 25 | Performed by: OTOLARYNGOLOGY

## 2021-01-01 PROCEDURE — 99207 PR NO CHARGE LOS: CPT | Performed by: SPEECH-LANGUAGE PATHOLOGIST

## 2021-01-01 PROCEDURE — 92611 MOTION FLUOROSCOPY/SWALLOW: CPT | Mod: GN | Performed by: SPEECH-LANGUAGE PATHOLOGIST

## 2021-01-01 PROCEDURE — 96374 THER/PROPH/DIAG INJ IV PUSH: CPT

## 2021-01-01 PROCEDURE — U0005 INFEC AGEN DETEC AMPLI PROBE: HCPCS

## 2021-01-01 PROCEDURE — 81003 URINALYSIS AUTO W/O SCOPE: CPT | Performed by: EMERGENCY MEDICINE

## 2021-01-01 PROCEDURE — 99212 OFFICE O/P EST SF 10 MIN: CPT | Mod: 25 | Performed by: OTOLARYNGOLOGY

## 2021-01-01 PROCEDURE — 99213 OFFICE O/P EST LOW 20 MIN: CPT | Mod: 25 | Performed by: REGISTERED NURSE

## 2021-01-01 PROCEDURE — 92526 ORAL FUNCTION THERAPY: CPT | Mod: GN | Performed by: SPEECH-LANGUAGE PATHOLOGIST

## 2021-01-01 PROCEDURE — 250N000011 HC RX IP 250 OP 636: Performed by: EMERGENCY MEDICINE

## 2021-01-01 PROCEDURE — 92504 EAR MICROSCOPY EXAMINATION: CPT | Performed by: OTOLARYNGOLOGY

## 2021-01-01 PROCEDURE — 92567 TYMPANOMETRY: CPT | Performed by: AUDIOLOGIST

## 2021-01-01 PROCEDURE — 93005 ELECTROCARDIOGRAM TRACING: CPT

## 2021-01-01 PROCEDURE — 92504 EAR MICROSCOPY EXAMINATION: CPT | Performed by: REGISTERED NURSE

## 2021-01-01 PROCEDURE — 92612 ENDOSCOPY SWALLOW (FEES) VID: CPT | Mod: GN | Performed by: OTOLARYNGOLOGY

## 2021-01-01 PROCEDURE — 96361 HYDRATE IV INFUSION ADD-ON: CPT

## 2021-01-01 PROCEDURE — 92565 STENGER TEST PURE TONE: CPT | Performed by: AUDIOLOGIST

## 2021-01-01 PROCEDURE — 70486 CT MAXILLOFACIAL W/O DYE: CPT | Mod: ME | Performed by: RADIOLOGY

## 2021-01-01 PROCEDURE — 99214 OFFICE O/P EST MOD 30 MIN: CPT | Mod: 25 | Performed by: OTOLARYNGOLOGY

## 2021-01-01 PROCEDURE — 92613 ENDOSCOPY SWALLOW (FEES) I&R: CPT | Performed by: OTOLARYNGOLOGY

## 2021-01-01 PROCEDURE — G1004 CDSM NDSC: HCPCS | Performed by: RADIOLOGY

## 2021-01-01 PROCEDURE — 36415 COLL VENOUS BLD VENIPUNCTURE: CPT | Performed by: EMERGENCY MEDICINE

## 2021-01-01 PROCEDURE — 258N000003 HC RX IP 258 OP 636: Performed by: EMERGENCY MEDICINE

## 2021-01-01 PROCEDURE — 92610 EVALUATE SWALLOWING FUNCTION: CPT | Mod: GN | Performed by: SPEECH-LANGUAGE PATHOLOGIST

## 2021-01-01 PROCEDURE — 84484 ASSAY OF TROPONIN QUANT: CPT | Performed by: EMERGENCY MEDICINE

## 2021-01-01 PROCEDURE — 69433 CREATE EARDRUM OPENING: CPT | Mod: 50 | Performed by: OTOLARYNGOLOGY

## 2021-01-01 PROCEDURE — 74230 X-RAY XM SWLNG FUNCJ C+: CPT | Mod: GC | Performed by: RADIOLOGY

## 2021-01-01 PROCEDURE — 43197 ESOPHAGOSCOPY FLEX DX BRUSH: CPT | Mod: 52 | Performed by: OTOLARYNGOLOGY

## 2021-01-01 PROCEDURE — 85025 COMPLETE CBC W/AUTO DIFF WBC: CPT | Performed by: EMERGENCY MEDICINE

## 2021-01-01 PROCEDURE — 99284 EMERGENCY DEPT VISIT MOD MDM: CPT | Mod: 25

## 2021-01-01 PROCEDURE — 80048 BASIC METABOLIC PNL TOTAL CA: CPT | Performed by: EMERGENCY MEDICINE

## 2021-01-01 PROCEDURE — 92557 COMPREHENSIVE HEARING TEST: CPT | Performed by: AUDIOLOGIST

## 2021-01-01 RX ORDER — CYCLOBENZAPRINE HCL 10 MG
10 TABLET ORAL 3 TIMES DAILY PRN
Qty: 20 TABLET | Refills: 0 | Status: SHIPPED | OUTPATIENT
Start: 2021-01-01

## 2021-01-01 RX ORDER — CIPROFLOXACIN AND DEXAMETHASONE 3; 1 MG/ML; MG/ML
SUSPENSION/ DROPS AURICULAR (OTIC)
Qty: 7.5 ML | Refills: 1 | Status: SHIPPED | OUTPATIENT
Start: 2021-01-01 | End: 2021-01-01

## 2021-01-01 RX ORDER — SULFAMETHOXAZOLE/TRIMETHOPRIM 800-160 MG
1 TABLET ORAL 2 TIMES DAILY
Qty: 20 TABLET | Refills: 2 | Status: SHIPPED | OUTPATIENT
Start: 2021-01-01 | End: 2021-01-01

## 2021-01-01 RX ORDER — LIDOCAINE 50 MG/G
1 PATCH TOPICAL EVERY 24 HOURS
Qty: 10 PATCH | Refills: 0 | Status: SHIPPED | OUTPATIENT
Start: 2021-01-01 | End: 2021-01-01

## 2021-01-01 RX ORDER — FLUCONAZOLE 100 MG/1
TABLET ORAL
Qty: 15 TABLET | Refills: 0 | Status: SHIPPED | OUTPATIENT
Start: 2021-01-01 | End: 2021-01-01

## 2021-01-01 RX ORDER — BARIUM SULFATE 400 MG/ML
30 SUSPENSION ORAL ONCE
Status: COMPLETED | OUTPATIENT
Start: 2021-01-01 | End: 2021-01-01

## 2021-01-01 RX ORDER — METHYLPREDNISOLONE 4 MG/1
4 TABLET ORAL DAILY
Qty: 21 TABLET | Refills: 0 | Status: ON HOLD | OUTPATIENT
Start: 2021-01-01 | End: 2022-01-01

## 2021-01-01 RX ORDER — KETOROLAC TROMETHAMINE 15 MG/ML
10 INJECTION, SOLUTION INTRAMUSCULAR; INTRAVENOUS ONCE
Status: COMPLETED | OUTPATIENT
Start: 2021-01-01 | End: 2021-01-01

## 2021-01-01 RX ORDER — NYSTATIN 100000/ML
500000 SUSPENSION, ORAL (FINAL DOSE FORM) ORAL 4 TIMES DAILY
Qty: 280 ML | Refills: 0 | Status: SHIPPED | OUTPATIENT
Start: 2021-01-01 | End: 2021-01-01

## 2021-01-01 RX ORDER — FLUCONAZOLE 100 MG/1
TABLET ORAL
Qty: 15 TABLET | Refills: 0 | Status: ON HOLD | OUTPATIENT
Start: 2021-01-01 | End: 2022-01-01

## 2021-01-01 RX ADMIN — BARIUM SULFATE 20 ML: 400 SUSPENSION ORAL at 14:24

## 2021-01-01 RX ADMIN — SODIUM CHLORIDE 500 ML: 9 INJECTION, SOLUTION INTRAVENOUS at 02:49

## 2021-01-01 RX ADMIN — KETOROLAC TROMETHAMINE 10 MG: 15 INJECTION, SOLUTION INTRAMUSCULAR; INTRAVENOUS at 02:53

## 2021-01-01 ASSESSMENT — ENCOUNTER SYMPTOMS
NUMBNESS: 1
SHORTNESS OF BREATH: 0
NECK PAIN: 1
WEAKNESS: 0
MYALGIAS: 1

## 2021-01-01 ASSESSMENT — PAIN SCALES - GENERAL
PAINLEVEL: NO PAIN (0)
PAINLEVEL: NO PAIN (0)
PAINLEVEL: EXTREME PAIN (8)
PAINLEVEL: SEVERE PAIN (7)
PAINLEVEL: EXTREME PAIN (8)

## 2021-01-01 ASSESSMENT — MIFFLIN-ST. JEOR
SCORE: 1696.5
SCORE: 1642.45
SCORE: 1687.81
SCORE: 1675.5
SCORE: 1646.99

## 2021-01-07 PROBLEM — M62.81 MUSCLE WEAKNESS (GENERALIZED): Status: RESOLVED | Noted: 2020-08-12 | Resolved: 2021-01-07

## 2021-01-07 NOTE — PROGRESS NOTES
Subjective:  HPI  Physical Exam                    Objective:  System    Physical Exam    General     ROS    Assessment/Plan:    DISCHARGE REPORT    Progress reporting period is from 9/9/2020 to 10/15/2020.     SUBJECTIVE    Subjective: Urge incontinence still better, but not changed since last session. Bilateral hip pain worse this week due to yard work.      Initial Pain level: 7/10   Changes in function: No changes noted in function since last SOAP note.    OBJECTIVE  Objective: Moderate tightness and tenderness in bilateral piriformismm      ASSESSMENT/PLAN    STG/LTGs have been met or progress has been made towards goals:  Yes (See Goal flow sheet completed today.)  Assessment of Progress: The patient's condition is improving.  The patient's condition has potential to improve.  Self Management Plans:  Patient is independent in a home treatment program.  Patient is independent in self management of symptoms.    Giovana continues to require the following intervention to meet STG and LTG's: PT intervention is no longer required to meet STG/LTG.    Recommendations:    This patient is ready to be discharged from therapy and continue their home treatment program.    Please refer to the daily flowsheet for treatment today, total treatment time and time spent performing 1:1 timed codes.

## 2021-01-15 ENCOUNTER — HEALTH MAINTENANCE LETTER (OUTPATIENT)
Age: 41
End: 2021-01-15

## 2021-01-18 DIAGNOSIS — C41.2 CHORDOMA (H): ICD-10-CM

## 2021-01-18 DIAGNOSIS — R68.2 DRY MOUTH: ICD-10-CM

## 2021-01-21 RX ORDER — CEVIMELINE HYDROCHLORIDE 30 MG/1
30 CAPSULE ORAL 3 TIMES DAILY
Qty: 90 CAPSULE | Refills: 3 | Status: SHIPPED | OUTPATIENT
Start: 2021-01-21

## 2021-01-21 NOTE — TELEPHONE ENCOUNTER
cevimeline (EVOXAC) 30 MG capsule      Last Written Prescription Date:  10/21/20  Last Fill Quantity: 90,   # refills: 1  Last Office Visit : 6/26/20  Future Office visit:  None scheduled    Routing refill request to provider for review/approval because:  Drug not on the [facility], [facility] or [facility] refill protocol  Who is prescribing for her, Dr Love or Dr Rin Sánchez with [facility]?

## 2021-03-19 ENCOUNTER — MYC MEDICAL ADVICE (OUTPATIENT)
Dept: INTERNAL MEDICINE | Facility: CLINIC | Age: 41
End: 2021-03-19

## 2021-03-30 ENCOUNTER — IMMUNIZATION (OUTPATIENT)
Dept: NURSING | Facility: CLINIC | Age: 41
End: 2021-03-30
Payer: MEDICARE

## 2021-03-30 PROCEDURE — 91300 PR COVID VAC PFIZER DIL RECON 30 MCG/0.3 ML IM: CPT

## 2021-03-30 PROCEDURE — 0001A PR COVID VAC PFIZER DIL RECON 30 MCG/0.3 ML IM: CPT

## 2021-04-20 ENCOUNTER — IMMUNIZATION (OUTPATIENT)
Dept: NURSING | Facility: CLINIC | Age: 41
End: 2021-04-20
Attending: INTERNAL MEDICINE
Payer: MEDICARE

## 2021-04-20 PROCEDURE — 91300 PR COVID VAC PFIZER DIL RECON 30 MCG/0.3 ML IM: CPT

## 2021-04-20 PROCEDURE — 0002A PR COVID VAC PFIZER DIL RECON 30 MCG/0.3 ML IM: CPT

## 2021-05-17 ENCOUNTER — TELEPHONE (OUTPATIENT)
Dept: INTERNAL MEDICINE | Facility: CLINIC | Age: 41
End: 2021-05-17

## 2021-05-17 NOTE — TELEPHONE ENCOUNTER
[facility] Call Center    Phone Message    May a detailed message be left on voicemail: yes     Reason for Call: Symptoms or Concerns     If patient has red-flag symptoms, warm transfer to triage line    Current symptom or concern: Pt calling in stating she thinks something is wrong with her kidneys, swelling under eyes, hands and her legs  Pt states this has been going off and on for a month but the swelling in for the last 4 days and had diarrhea for the last 6 months.    Should pt go to Urgent Care or ER?    Symptoms have been present for:  4 day(s)    Has patient previously been seen for this? No    By Dr. Love    Date:     Are there any new or worsening symptoms? Yes:       Action Taken: Message routed to:  [facility] PCC    Travel Screening: Not Applicable

## 2021-05-18 NOTE — TELEPHONE ENCOUNTER
[facility] Call Center    Phone Message    May a detailed message be left on voicemail: yes     Reason for Call: Other: .  Patient states she is wanting to add to the previous message, Patient states she is having trouble sleeping at night and going on for a month. Patient states she had 4 or 5 tooth infections in the last few months. Patient states has been going on and off antibiotics a lot. Patient states she has been having a cough and has gotten really bad. Patient states has been going on for 4 days and has gotten really bad. Please advise.     Action Taken: Message routed to:  [facility] P[facility]    Travel Screening: Not Applicable

## 2021-05-19 NOTE — TELEPHONE ENCOUNTER
Patient was reached by phone, and RN reviewed her symptoms.  Patient has been able to eat and drink, denies dizziness, but other symptoms such as insomnia, diarrhea, and swelling have not improved.  Patient was offered an in person appt with an NP for Friday 5/21 and patient was agreeable, appt was made.    Inés Malagon RN on 5/19/2021 at 9:20 AM

## 2021-05-27 DIAGNOSIS — G47.00 INSOMNIA, UNSPECIFIED TYPE: ICD-10-CM

## 2021-05-28 ENCOUNTER — TELEPHONE (OUTPATIENT)
Dept: INTERNAL MEDICINE | Facility: CLINIC | Age: 41
End: 2021-05-28

## 2021-05-28 RX ORDER — NORTRIPTYLINE HCL 25 MG
25 CAPSULE ORAL AT BEDTIME
Qty: 90 CAPSULE | Refills: 0 | Status: SHIPPED | OUTPATIENT
Start: 2021-05-28

## 2021-05-28 NOTE — CONFIDENTIAL NOTE
"  nortriptyline (PAMELOR) 25 MG capsule  Last Written Prescription Date:  9/15/20  Last Fill Quantity: 90,   # refills: 2  Last Office Visit :6/26/20  Future Office visit:  None    \"No other acute concerns today but I will like to see her in clinic in 2-3 months\"    Scheduling has been notified to contact the pt for appointment.    2 high med alerts    Warnings Override History for nortriptyline (PAMELOR) 25 MG capsule [[id number]]    Overridden by Gloria Love MD on Sep 15, 2020 12:42 PM   Drug-Drug   1. TRICYCLIC ANTIDEPRESSANTS / SEROTONIN/NOREPINEPHRINE REUPTAKE INHIBITORS [Level: Major]   Other Orders: levomilnacipran (FETZIMA) 80 MG 24 HR capsule      2. MIRABEGRON / QT-PROLONGING CYP2D6 SUBSTRATES [Level: Major]   Other Orders: mirabegron (MYRBETRIQ) 50 MG 24 hr tablet      3. MIRABEGRON / QT-PROLONGING CYP2D6 SUBSTRATES [Level: Major]   Other Orders: mirabegron (MYRBETRIQ) 50 MG 24 hr tablet                "

## 2021-06-08 DIAGNOSIS — R68.2 DRY MOUTH: ICD-10-CM

## 2021-06-08 DIAGNOSIS — C41.2 CHORDOMA (H): ICD-10-CM

## 2021-06-11 RX ORDER — CEVIMELINE HYDROCHLORIDE 30 MG/1
30 CAPSULE ORAL 3 TIMES DAILY
Qty: 90 CAPSULE | OUTPATIENT
Start: 2021-06-11

## 2021-06-11 NOTE — TELEPHONE ENCOUNTER
cevimeline (EVOXAC) 30 MG capsule      Last Written Prescription Date:  1-21-21  Last Fill Quantity: 90,   # refills: 3  Last Office Visit : 6-26-20  Future Office visit:  none    Routing refill request to provider for review/approval because:  Med not on protocol  ?if pt established care elsewhere, [facility] notes                       in care everywhere.

## 2021-07-27 NOTE — TELEPHONE ENCOUNTER
Pt thought the provider had retired and so she didn't reach out to him sooner for a follow up. She is have some sinus issues and has seen her PCP and they told her to followup in ENT. I said I would discuss this with him and find a time for the pt to come in or if she needs to follow up with another provider.     Mayi Olivares LPN

## 2021-07-28 NOTE — TELEPHONE ENCOUNTER
Spoke to Dr. Lowe. Pt can get a CT sinuses and then he will see her at his next open appt. Order placed and pt can get scheduled for appts.     Mayi Olivares LPN

## 2021-08-11 PROBLEM — E66.01 MORBID OBESITY (H): Status: ACTIVE | Noted: 2021-01-01

## 2021-08-11 NOTE — PROGRESS NOTES
HISTORY OF PRESENT ILLNESS: Giovana is a now 41-year-old female who comes in with her mother, status post resection of a clival chordoma years ago.  Unfortunately, she developed velopharyngeal incompetency now has on presentation postnasal drip, known reflux with possible aspiration.  She has not been on antibiotics.  She has not suffered greatly.  She does have mild dysphagia.  She does have reflux as noted.  She is taking Nexium for this, but it does not seem to be helping.  She has hoarseness associated.    At the same time, she is having scheduled full dental extractions to help her with overall healing and she will need tympanostomy tubes for hyperbaric oxygen.    Finally, because of her hoarseness, there was a concern for infection and that something may be on the vocal cords as there is mild dysphagia associated.  For that, she is asking questions of what to do.    PAST MEDICAL HISTORY:  Reviewed.    MEDICATIONS.  Reviewed.      ALLERGIES:  Reviewed.    REVIEW OF SYSTEMS:  Significant only for the above.  She has multiple issues, which have been reviewed as well.    SOCIAL HISTORY:  She is a nonsmoker, and does not drink alcohol.    PHYSICAL EXAMINATION: Examination shows tympanic membranes are both intact and mobile.  There was no air fluid levels.  Nares are patent.  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  There is some thickening to the tissue but no obvious infection or cellulitis.  Neck is supple.  There is no adenopathy.  Indirect exam of the nasal and hypopharynx is unremarkable, but there is fullness to the true cords bilaterally.  There are no lesions or masses noted.    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  Does not need addressing.  She does have some weakness in her voice for which we will obtain a swallow study because of the dysphagia as well.  Because of her upcoming dental  surgery, we placed bilateral tympanostomy tubes with the consent form.  The left tube, there is some difficulty.  There is a wider than needed opening and thus no tube was placed.  She will follow up with me as needed.

## 2021-08-11 NOTE — LETTER
8/11/2021       RE: Giovana Joaquin  [address]  [place] [address]     Dear Colleague,    Thank you for referring your patient, Giovana Joaquin, to the [facility] EAR NOSE AND THROAT CLINIC [place] at [facility]. Please see a copy of my visit note below.    HISTORY OF PRESENT ILLNESS: Giovana is a now 41-year-old female who comes in with her mother, status post resection of a clival chordoma years ago.  Unfortunately, she developed velopharyngeal incompetency now has on presentation postnasal drip, known reflux with possible aspiration.  She has not been on antibiotics.  She has not suffered greatly.  She does have mild dysphagia.  She does have reflux as noted.  She is taking Nexium for this, but it does not seem to be helping.  She has hoarseness associated.    At the same time, she is having scheduled full dental extractions to help her with overall healing and she will need tympanostomy tubes for hyperbaric oxygen.    Finally, because of her hoarseness, there was a concern for infection and that something may be on the vocal cords as there is mild dysphagia associated.  For that, she is asking questions of what to do.    PAST MEDICAL HISTORY:  Reviewed.    MEDICATIONS.  Reviewed.      ALLERGIES:  Reviewed.    REVIEW OF SYSTEMS:  Significant only for the above.  She has multiple issues, which have been reviewed as well.    SOCIAL HISTORY:  She is a nonsmoker, and does not drink alcohol.    PHYSICAL EXAMINATION: Examination shows tympanic membranes are both intact and mobile.  There was no air fluid levels.  Nares are patent.  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  There is some thickening to the tissue but no obvious infection or cellulitis.  Neck is supple.  There is no adenopathy.  Indirect exam of the nasal and hypopharynx is unremarkable, but there is  fullness to the true cords bilaterally.  There are no lesions or masses noted.    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  Does not need addressing.  She does have some weakness in her voice for which we will obtain a swallow study because of the dysphagia as well.  Because of her upcoming dental surgery, we placed bilateral tympanostomy tubes with the consent form.  The left tube, there is some difficulty.  There is a wider than needed opening and thus no tube was placed.  She will follow up with me as needed.          Again, thank you for allowing me to participate in the care of your patient.      Sincerely,    Hammad Lowe MD

## 2021-08-11 NOTE — NURSING NOTE
Chief Complaint   Patient presents with     RECHECK     follow up      Pulse 89, temperature 97.5  F (36.4  C), height 1.524 m (5'), weight 112 kg (246 lb 14.6 oz), not currently breastfeeding.    Fuentes Krishnan LPN

## 2021-08-11 NOTE — PATIENT INSTRUCTIONS
1. You were seen in the ENT Clinic today by Dr. Lowe. If you have any questions or concerns after your appointment, please call   - Option 1: ENT Clinic: [phone]  - Option 2: Yecenia (Dr. Lowe's Nurse): [phone]         Mayi (Dr. Lowe's Nurse): [phone]     2.   Plan to return to clinic with a phone visit after your swallow study and audiogram    3. Tubes placed in clinic today    Yecenia, RN, BSN, PHN  RN Care Coordinator  [facility] Otolaryngology  [phone]

## 2021-08-20 NOTE — TELEPHONE ENCOUNTER
[facility] Call Center    Phone Message    May a detailed message be left on voicemail: yes     Reason for Call: Symptoms or Concerns     If patient has red-flag symptoms, warm transfer to triage line    Current symptom or concern: Drainage, sinus pain, hearing loss due to ear stuffiness    Symptoms have been present for:  3 day(s)    Has patient previously been seen for this? No    By : N/A    Date: N/A    Are there any new or worsening symptoms? Yes: Pt's mother Evangelina calling to report Pt's symptoms. She is wondering if Dr. Lowe would have any recommendations on how to treat it. Please call mother Evangelina back to discuss.       Action Taken: Message routed to:  [facility] ENT    Travel Screening: Not Applicable

## 2021-09-01 NOTE — PROGRESS NOTES
AUDIOLOGY REPORT    SUMMARY: Audiology visit completed. See audiogram for results.      RECOMMENDATIONS: Follow-up with ENT.    Claudia Hammond. CCC-A  Licensed Audiologist   [place] #[id number]

## 2021-09-01 NOTE — PROGRESS NOTES
Otolaryngology Clinic  September 1, 2021    HPI:  Giovana Joaquin is here for an ear check as requested by the audiology team after they observed patient's left PE tube sitting in ear canal during otoscope exam prior to audiogram. Patient had PE tubes placed by Dr. Lowe on 8/11/21 in anticipation of hyperbaric oxygen treatment after dental extractions.     Patient reports that right ear has been feeling plugged since PE tube placement. She currently on a course of Augmentin for bilateral ear drainage. Denies significant ear pain or dizziness.    Otologic microscope exam:    Patient's ear pathology required use of the binocular microscope for the purpose of cleaning and improving visualization in order to assure a more accurate diagnostic evaluation.    Right ear was examined under the microscope.  Significant thin, mucous drainage in ear that was cleaned with suction to reveal PE tube sitting in middle ear. Attempted to remove gently with suction. This was unsuccessful.    Left ear was also examined under the microscope.  Thin, mucous drainage also noted in left canal, suctioned to reveal PE tube sitting in inferior canal. This was removed with alligator forceps. Ear further cleaned with suction. Patent perforation in posterior TM.     The patient noted improvement of right ear symptoms with suction.    Assessment and Plan:  Patient presents today with bilateral otorrhea and extruded left PE tube. Tube was removed in clinic. Will have patient start ciprodex drops to bilateral ears and follow up with Dr. Lowe next week for removal of right PE tube from middle ear.     Patient will follow up in person with Dr. Lowe on 9/8/21    Robina Lott DNP, APRN, CNP  Otolaryngology  Head & Neck Surgery  [phone]    30 minutes spent on the date of the encounter doing chart review, history and exam, documentation and further activities per the note

## 2021-09-01 NOTE — NURSING NOTE
Chief Complaint   Patient presents with     Consult       Pulse 94, temperature 96.8  F (36  C), temperature source Temporal, height 1.524 m (5'), weight 109.9 kg (242 lb 4.6 oz), SpO2 98 %, not currently breastfeeding.    Dolores Rodriguez, EMT

## 2021-09-01 NOTE — LETTER
9/1/2021       RE: Giovana Joaquin  [address]  [place] [address]     Dear Colleague,    Thank you for referring your patient, Giovana Joaquin, to the [facility] EAR NOSE AND THROAT CLINIC [place] at [facility]. Please see a copy of my visit note below.      Otolaryngology Clinic  September 1, 2021    HPI:  Giovana Joaquin is here for an ear check as requested by the audiology team after they observed patient's left PE tube sitting in ear canal during otoscope exam prior to audiogram. Patient had PE tubes placed by Dr. Lowe on 8/11/21 in anticipation of hyperbaric oxygen treatment after dental extractions.     Patient reports that right ear has been feeling plugged since PE tube placement. She currently on a course of Augmentin for bilateral ear drainage. Denies significant ear pain or dizziness.    Otologic microscope exam:    Patient's ear pathology required use of the binocular microscope for the purpose of cleaning and improving visualization in order to assure a more accurate diagnostic evaluation.    Right ear was examined under the microscope.  Significant thin, mucous drainage in ear that was cleaned with suction to reveal PE tube sitting in middle ear. Attempted to remove gently with suction. This was unsuccessful.    Left ear was also examined under the microscope.  Thin, mucous drainage also noted in left canal, suctioned to reveal PE tube sitting in inferior canal. This was removed with alligator forceps. Ear further cleaned with suction. Patent perforation in posterior TM.     The patient noted improvement of right ear symptoms with suction.    Assessment and Plan:  Patient presents today with bilateral otorrhea and extruded left PE tube. Tube was removed in clinic. Will have patient start ciprodex drops to bilateral ears and follow up with Dr. Lowe next week for removal of right PE tube from middle ear.     Patient will  follow up in person with Dr. Lowe on 9/8/21    Robina Lott DNP, APRN, CNP  Otolaryngology  Head & Neck Surgery  [phone]    30 minutes spent on the date of the encounter doing chart review, history and exam, documentation and further activities per the note          Again, thank you for allowing me to participate in the care of your patient.      Sincerely,    Julieta Lott, NP

## 2021-09-02 NOTE — TELEPHONE ENCOUNTER
I tried calling this patient to schedule 8 weekly Swallow Treatments with Giovana Rascon starting the week of 9/13.    Appt Note: 8 weekly Swallow tx

## 2021-09-02 NOTE — PROGRESS NOTES
Speech-Language Pathology Department   EVALUATION  [facility]  VIDEO SWALLOW STUDY EVALUATION      09/01/21 1000       Present No   General Information   Type Of Visit Initial   Start Of Care Date 09/01/21   Referring Physician Hammad Lowe MD    Orders Evaluate And Treat   Orders Comment Video swallow study    Medical Diagnosis VPI    Onset Of Illness/injury Or Date Of Surgery 08/11/21   Precautions/limitations No Known Precautions/limitations   Hearing WFL for conversational speech production    Pertinent History of Current Problem/OT: Additional Occupational Profile Info Giovana is a 41-year-old female with a complicated PMH including clival chordoma with soft palate resection in 2001, pharyngoplasty, PP radiation, velopharyngeal insufficiency, radiation induced narcolepsy, chronic neck/hip pain, depression.  Per most recent ENT note, pt also has mild dysphagia, reflux, and hoarse voice.  Chart review reveals a history of a video swallow study in 2017; at that time, pt was diagnosed with mild to moderate oropharyngeal dysphagia and a DDL3 and thin liquids were recommended.  Pt was referred by ENT due to her dysphagia and she describes her dysphagia as longstanding but worsening over the past year.  Pt reports a sensation of globus across textures as well as an inability to initiate a swallow response.  Pt reports difficulty with pills and a sense that they get stuck on a 'ledge.'  Pt had PNA with sepsis in 2018; no PNA's since that time.      Respiratory Status Room air   Prior Level Of Function Swallowing   Prior Level Of Function Comment Regular textures and thin liquids    Living Environment House[facility]  (with mother )   General Observations Pt is pleasant and cooperative.    Patient/family Goals To see what is going on with her swallowing.     General Information Comments Significantly hypernasal    Clinical Swallow Evaluation    Oral Musculature anomalies present   Structural Abnormalities present  (velopharyngeal cleft)   Dentition other (see comments)  (Multiple missing )   Mucosal Quality good   Mandibular Strength and Mobility intact   Lingual Strength and Mobility WFL   Velar Elevation impaired   Laryngeal Function Throat clear;Cough;Swallow;Voicing initiated   Additional Documentation Yes   Additional evaluation(s) completed today Yes   Rationale for completing additional evaluation View pharyngeal phase and r/o aspiration.    Clinical Swallow Eval: Thin Liquid Texture Trial   Mode of Presentation, Thin Liquids cup;self-fed   Volume of Liquid or Food Presented single sip x1, 3 oz drank consecutively    Oral Phase of Swallow WFL   Pharyngeal Phase of Swallow repeated swallows   Diagnostic Statement Pt tolerated single sips with no overt s/s of aspiration.  Pt unable to drink 3 oz consecutively and noted to 'break' between sips.     VFSS Evaluation   VFSS Additional Documentation Yes   VFSS Eval: Radiology   Radiologist [facility] Radiology Resident    Views Taken left lateral;A/P   Physical Location of Procedure [facility] Radiology #2    VFSS Eval: Thin Liquid Texture Trial   Mode of Presentation, Thin Liquid cup;self-fed   Order of Presentation 1, 2, 3, 6, 7, 8, 12, 14   Preparatory Phase WFL   Oral Phase, Thin Liquid Premature pharyngeal entry   Pharyngeal Phase, Thin Liquid Delayed swallow reflex;Residue in valleculae;Residue in pyriform sinus;Pharyngeal wall coating   Rosenbek's Penetration Aspiration Scale: Thin Liquid Trial Results 8 - contrast passes glottis, visible subglottic residue remains, absent patient response (aspiration)   Response to Aspiration absent response, silent aspiration   Successful Strategies Trialed During Procedure, Thin Liquid other (see comments)  (chin tuck, hard swallow not effective )   Diagnostic Statement Surgical hardware present upon visualization.  Premature spillage to the  laryngeal vestibule and pyriforms.  Swallow elicited with reduced BOT movement, reduced constriction of the pharyngeal wall, and reduced epiglottic inversion.  Residue remained in the valleculae, pyriforms, and laryngeal vestibule.  Pt spontaneously elicited a second swallow which reduced but did not fully clear remaining residue.  Silent aspiration observed during and after the swallow response on residue.  Prominent CP m. noted.    VFSS Eval: Mildly Thick Liquids    Mode of Presentation cup;self-fed   Order of Presentation 4, 5   Preparatory Phase WFL   Oral Phase Premature pharyngeal entry   Pharyngeal Phase Delayed swallow reflex;Residue in valleculae;Residue in pyriform sinus   Rosenbek's Penetration Aspiration Scale 8 - contrast passes glottis, visible subglottic residue remains, absent patient response (aspiration)   Response to Aspiration absent response, silent aspiration   Diagnostic Statement Premature spillage to the laryngeal vestibule and pyriforms.  Swallow elicited with reduced BOT movement, reduced constriction of the pharyngeal wall, and reduced epiglottic inversion.  Residue remained in the valleculae, pyriforms, laryngeal vestibule and pharyngeal wall.  Pt spontaneously elicited a second swallow which reduced but did not fully clear remaining residue.  Silent aspiration observed during the swallow response with residue lining the underside of the epiglottis.     VFSS Eval: Puree Solid Texture Trial   Mode of Presentation, Puree spoon;self-fed   Order of Presentation 9, 10   Preparatory Phase WFL   Oral Phase, Puree Premature pharyngeal entry;Residue in oral cavity   Pharyngeal Phase, Puree Delayed swallow reflex;Residue in valleculae;Residue in pyriform sinus   Rosenbek's Penetration Aspiration Scale: Puree Food Trial Results 1 - no aspiration, contrast does not enter airway   Successful Strategies Trialed During Procedure, Puree hard swallow   Diagnostic Statement Residue present in pyriforms  from a prior trial spilling into the laryngeal vestibule.  Swallow executed with reduced BOT contraction and backflow to the nasal cavity.  Residue remaining in the valleculae and pyriforms with pt eliciting a second swallow to clear.  CP m. noted to be tight with backflow from superior esophagus x1.     VFSS Eval: Regular Texture Trial (Solid)   Mode of Presentation self-fed   Order of Presentation 11, 13   Preparatory Phase WFL   Oral Phase Premature pharyngeal entry;Residue in oral cavity   Pharyngeal Phase Delayed swallow reflex;Pharyngeal wall coating;Residue in valleculae   Rosenbek's Penetration Aspiration Scale 1 - no aspiration, contrast does not enter airway   Diagnostic Statement Penetration of residue present in pyriforms from a prior trial.  Swallow executed with reduced BOT contraction and backflow to the nasal cavity.  Residue remaining in the valleculae, pyriforms, and posterior pharyngeal wall with pt multiple swallows in attempt to clear.  Barium tablet transitioned to the valleculae then pyriforms prior to clearing the pharynx.  Of note, pt was served half of the 10 mm tablet per pt request.     Swallow Compensations   Swallow Compensations Pacing;Reduce amounts;Alternate viscosity of consistencies;Effortful swallow;Multiple swallow   Educational Assessment   Barriers to Learning No barriers   Esophageal Phase of Swallow   Patient reports or presents with symptoms of esophageal dysphagia Yes   Esophageal sweep performed during today s vidofluoroscopic exam  Yes   General Therapy Interventions   Planned Therapy Interventions Dysphagia Treatment   Dysphagia treatment Oropharyngeal exercise training;Instruction of safe swallow strategies;Compensatory strategies for swallowing;Modified diet education   Swallow Eval: Clinical Impressions   Skilled Criteria for Therapy Intervention Skilled criteria met.  Treatment indicated.   Dysphagia Outcome Severity Scale (GEE) Level 2 - GEE   Treatment Diagnosis  Moderate to severe oropharyngeal dysphagia    Diet texture recommendations Soft & Bite Sized diet (level 6);Thin liquids (level 0)   Recommended Feeding/Eating Techniques alternate between small bites and sips of food/liquid;maintain upright posture during/after eating for 30 mins;small sips/bites;other (see comments)  (Pacing, multiple swallows )   Rehab Potential fair, will monitor progress closely   Therapy Frequency other (see comments)  (1x/week)   Predicted Duration of Therapy Intervention (days/wks) up to 12 weeks    Anticipated Discharge Disposition home   Risks and Benefits of Treatment have been explained. Yes   Patient, family and/or staff in agreement with Plan of Care Yes   Clinical Impression Comments Pt presents with moderate to severe dysphagia under flouroscopy.  Speech production is hypernasal which is baseline for this patient.  Evidence of velopharyngeal cleft present upon visualization of the oral cavity.  Lingual and labial movements WFL.  Persistent premature pharyngeal entry with spillage to the pyriforms and laryngeal vestibule was noted on liquid trials.   Pharyngeal swallow response delayed and executed with reduced epiglottic inversion and reduced constriction of the pharyngeal walls.  Backflow to the nasal cavity was noted; however, no nasal regurgitation was observed.  Residue remained in the  valleculae, pyriforms, along pharyngeal walls, and laryngeal vestibule with pt eliciting multiple swallows in attempt to clear.  Silent aspiration was observed on thin and nectar thick liquid trials both during and after the swallow response on residue.  Residue was increased upon items of increased viscosity.  A prominent CP m. was noted with backflow from the superior esophagus noted on pureed textures.  Pt is demonstrating an advancement of her dysphagia at this time and is at increased risk for aspiration before, during, and after the swallow response.  Despite this risk, a soft and bite sized  diet and thin liquids (IDDSI level 6;0) is cautiously recommended.  Pt will need to take small bites/sips, pace self, alternate consistencies, and swallow multiple times per bite/sip.  A course of ST is also recommended to train pharyngeal strengthening exercises with the plan to repeat imaging in 8-12 weeks' time.  Pt was educated on these recommendations and seen for one treatment session following today's session.  Pt is in agreement with pursuing a course of ST to manage dysphagia.     Swallow Goals   SLP Swallow Goals 1;2;3   Swallow Goal 1   Goal Identifier 1   Goal Description Pt will tolerate soft and bite sized diet and thin liquids with no evidence of pulmonary compromise over 12 weeks' time.     Target Date 11/30/21   Swallow Goal 2   Goal Identifier 2   Goal Description Pt will demonstrate understanding and independent implementation of aspiration precautions and safe swallow strategies on 5/5 opp.     Target Date 11/30/21   Swallow Goal 3   Goal Identifier 3   Goal Description Pt will independently complete trained oropharyngeal strengthening exercises as recommended by the treating clinician.    Target Date 11/30/21     Thank you for the referral of Giovana Joaquin.  If you have any questions about this report, please contact me using the information below.         Giovana Rascon MS CCC-SLP    Speech Language Pathologist   Clinical Specialist Level 1   Rehabilitation Services  [facility] [address] [place] [id number]  Office: [phone]  [email or web address]  [email or web address]

## 2021-09-02 NOTE — PROGRESS NOTES
[facility]                                                                                   OUTPATIENT SPEECH LANGUAGE PATHOLOGY    PLAN OF TREATMENT FOR OUTPATIENT REHABILITATION   Patient's Last Name, First Name, Giovana Sosa YOB: 1980   Provider's Name   [facility]   Medical Record No.  [id number]     Onset Date: 08/11/21 Start of Care Date:  09/01/21     Medical Diagnosis:   VPI      SLP Treatment Diagnosis:   Moderate to severe oropharyngeal dysphagia  Plan of Treatment  Frequency/Duration: 1x/week   /   12 weeks    Certification date from  08/11/21   To     11/30/21       See note for plan of treatment details and functional goals     Giovana Rascon, SLP                         I CERTIFY THE NEED FOR THESE SERVICES FURNISHED UNDER        THIS PLAN OF TREATMENT AND WHILE UNDER MY CARE     (Physician attestation of this document indicates review and certification of the therapy plan).              Referring Provider:  Hammad Lowe    Initial Assessment  See Epic Evaluation-              Speech-Language Pathology Department   EVALUATION  [facility]  VIDEO SWALLOW STUDY EVALUATION      09/01/21 1000       Present No   General Information   Type Of Visit Initial   Start Of Care Date 09/01/21   Referring Physician Hammad Lowe MD    Orders Evaluate And Treat   Orders Comment Video swallow study    Medical Diagnosis VPI    Onset Of Illness/injury Or Date Of Surgery 08/11/21   Precautions/limitations No Known Precautions/limitations   Hearing WFL for conversational speech production    Pertinent History of Current Problem/OT: Additional Occupational Profile Info Giovana is a 41-year-old female with a complicated PMH including clival chordoma with soft palate resection in 2001, pharyngoplasty, PP radiation, velopharyngeal insufficiency,  radiation induced narcolepsy, chronic neck/hip pain, depression.  Per most recent ENT note, pt also has mild dysphagia, reflux, and hoarse voice.  Chart review reveals a history of a video swallow study in 2017; at that time, pt was diagnosed with mild to moderate oropharyngeal dysphagia and a DDL3 and thin liquids were recommended.  Pt was referred by ENT due to her dysphagia and she describes her dysphagia as longstanding but worsening over the past year.  Pt reports a sensation of globus across textures as well as an inability to initiate a swallow response.  Pt reports difficulty with pills and a sense that they get stuck on a 'ledge.'  Pt had PNA with sepsis in 2018; no PNA's since that time.      Respiratory Status Room air   Prior Level Of Function Swallowing   Prior Level Of Function Comment Regular textures and thin liquids    Living Environment House/[facility]  (with mother )   General Observations Pt is pleasant and cooperative.    Patient/family Goals To see what is going on with her swallowing.     General Information Comments Significantly hypernasal    Clinical Swallow Evaluation   Oral Musculature anomalies present   Structural Abnormalities present  (velopharyngeal cleft)   Dentition other (see comments)  (Multiple missing )   Mucosal Quality good   Mandibular Strength and Mobility intact   Lingual Strength and Mobility WFL   Velar Elevation impaired   Laryngeal Function Throat clear;Cough;Swallow;Voicing initiated   Additional Documentation Yes   Additional evaluation(s) completed today Yes   Rationale for completing additional evaluation View pharyngeal phase and r/o aspiration.    Clinical Swallow Eval: Thin Liquid Texture Trial   Mode of Presentation, Thin Liquids cup;self-fed   Volume of Liquid or Food Presented single sip x1, 3 oz drank consecutively    Oral Phase of Swallow WFL   Pharyngeal Phase of Swallow repeated swallows   Diagnostic Statement Pt tolerated single sips with no overt s/s of  aspiration.  Pt unable to drink 3 oz consecutively and noted to 'break' between sips.     VFSS Evaluation   VFSS Additional Documentation Yes   VFSS Eval: Radiology   Radiologist [facility] Radiology Resident    Views Taken left lateral;A/P   Physical Location of Procedure [facility] Radiology #2    VFSS Eval: Thin Liquid Texture Trial   Mode of Presentation, Thin Liquid cup;self-fed   Order of Presentation 1, 2, 3, 6, 7, 8, 12, 14   Preparatory Phase WFL   Oral Phase, Thin Liquid Premature pharyngeal entry   Pharyngeal Phase, Thin Liquid Delayed swallow reflex;Residue in valleculae;Residue in pyriform sinus;Pharyngeal wall coating   Rosenbek's Penetration Aspiration Scale: Thin Liquid Trial Results 8 - contrast passes glottis, visible subglottic residue remains, absent patient response (aspiration)   Response to Aspiration absent response, silent aspiration   Successful Strategies Trialed During Procedure, Thin Liquid other (see comments)  (chin tuck, hard swallow not effective )   Diagnostic Statement Surgical hardware present upon visualization.  Premature spillage to the laryngeal vestibule and pyriforms.  Swallow elicited with reduced BOT movement, reduced constriction of the pharyngeal wall, and reduced epiglottic inversion.  Residue remained in the valleculae, pyriforms, and laryngeal vestibule.  Pt spontaneously elicited a second swallow which reduced but did not fully clear remaining residue.  Silent aspiration observed during and after the swallow response on residue.  Prominent CP m. noted.    VFSS Eval: Mildly Thick Liquids    Mode of Presentation cup;self-fed   Order of Presentation 4, 5   Preparatory Phase WFL   Oral Phase Premature pharyngeal entry   Pharyngeal Phase Delayed swallow reflex;Residue in valleculae;Residue in pyriform sinus   Rosenbek's Penetration Aspiration Scale 8 - contrast passes glottis, visible subglottic residue remains, absent patient response (aspiration)    Response to Aspiration absent response, silent aspiration   Diagnostic Statement Premature spillage to the laryngeal vestibule and pyriforms.  Swallow elicited with reduced BOT movement, reduced constriction of the pharyngeal wall, and reduced epiglottic inversion.  Residue remained in the valleculae, pyriforms, laryngeal vestibule and pharyngeal wall.  Pt spontaneously elicited a second swallow which reduced but did not fully clear remaining residue.  Silent aspiration observed during the swallow response with residue lining the underside of the epiglottis.     VFSS Eval: Puree Solid Texture Trial   Mode of Presentation, Puree spoon;self-fed   Order of Presentation 9, 10   Preparatory Phase WFL   Oral Phase, Puree Premature pharyngeal entry;Residue in oral cavity   Pharyngeal Phase, Puree Delayed swallow reflex;Residue in valleculae;Residue in pyriform sinus   Rosenbek's Penetration Aspiration Scale: Puree Food Trial Results 1 - no aspiration, contrast does not enter airway   Successful Strategies Trialed During Procedure, Puree hard swallow   Diagnostic Statement Residue present in pyriforms from a prior trial spilling into the laryngeal vestibule.  Swallow executed with reduced BOT contraction and backflow to the nasal cavity.  Residue remaining in the valleculae and pyriforms with pt eliciting a second swallow to clear.  CP m. noted to be tight with backflow from superior esophagus x1.     VFSS Eval: Regular Texture Trial (Solid)   Mode of Presentation self-fed   Order of Presentation 11, 13   Preparatory Phase WFL   Oral Phase Premature pharyngeal entry;Residue in oral cavity   Pharyngeal Phase Delayed swallow reflex;Pharyngeal wall coating;Residue in valleculae   Rosenbek's Penetration Aspiration Scale 1 - no aspiration, contrast does not enter airway   Diagnostic Statement Penetration of residue present in pyriforms from a prior trial.  Swallow executed with reduced BOT contraction and backflow to the nasal  cavity.  Residue remaining in the valleculae, pyriforms, and posterior pharyngeal wall with pt multiple swallows in attempt to clear.  Barium tablet transitioned to the valleculae then pyriforms prior to clearing the pharynx.  Of note, pt was served half of the 10 mm tablet per pt request.     Swallow Compensations   Swallow Compensations Pacing;Reduce amounts;Alternate viscosity of consistencies;Effortful swallow;Multiple swallow   Educational Assessment   Barriers to Learning No barriers   Esophageal Phase of Swallow   Patient reports or presents with symptoms of esophageal dysphagia Yes   Esophageal sweep performed during today s vidofluoroscopic exam  Yes   General Therapy Interventions   Planned Therapy Interventions Dysphagia Treatment   Dysphagia treatment Oropharyngeal exercise training;Instruction of safe swallow strategies;Compensatory strategies for swallowing;Modified diet education   Swallow Eval: Clinical Impressions   Skilled Criteria for Therapy Intervention Skilled criteria met.  Treatment indicated.   Dysphagia Outcome Severity Scale (GEE) Level 2 - GEE   Treatment Diagnosis Moderate to severe oropharyngeal dysphagia    Diet texture recommendations Soft & Bite Sized diet (level 6);Thin liquids (level 0)   Recommended Feeding/Eating Techniques alternate between small bites and sips of food/liquid;maintain upright posture during/after eating for 30 mins;small sips/bites;other (see comments)  (Pacing, multiple swallows )   Rehab Potential fair, will monitor progress closely   Therapy Frequency other (see comments)  (1x/week)   Predicted Duration of Therapy Intervention (days/wks) up to 12 weeks    Anticipated Discharge Disposition home   Risks and Benefits of Treatment have been explained. Yes   Patient, family and/or staff in agreement with Plan of Care Yes   Clinical Impression Comments Pt presents with moderate to severe dysphagia under flouroscopy.  Speech production is hypernasal which is  baseline for this patient.  Evidence of velopharyngeal cleft present upon visualization of the oral cavity.  Lingual and labial movements WFL.  Persistent premature pharyngeal entry with spillage to the pyriforms and laryngeal vestibule was noted on liquid trials.   Pharyngeal swallow response delayed and executed with reduced epiglottic inversion and reduced constriction of the pharyngeal walls.  Backflow to the nasal cavity was noted; however, no nasal regurgitation was observed.  Residue remained in the  valleculae, pyriforms, along pharyngeal walls, and laryngeal vestibule with pt eliciting multiple swallows in attempt to clear.  Silent aspiration was observed on thin and nectar thick liquid trials both during and after the swallow response on residue.  Residue was increased upon items of increased viscosity.  A prominent CP m. was noted with backflow from the superior esophagus noted on pureed textures.  Pt is demonstrating an advancement of her dysphagia at this time and is at increased risk for aspiration before, during, and after the swallow response.  Despite this risk, a soft and bite sized diet and thin liquids (IDDSI level 6;0) is cautiously recommended.  Pt will need to take small bites/sips, pace self, alternate consistencies, and swallow multiple times per bite/sip.  A course of ST is also recommended to train pharyngeal strengthening exercises with the plan to repeat imaging in 8-12 weeks' time.  Pt was educated on these recommendations and seen for one treatment session following today's session.  Pt is in agreement with pursuing a course of ST to manage dysphagia.     Swallow Goals   SLP Swallow Goals 1;2;3   Swallow Goal 1   Goal Identifier 1   Goal Description Pt will tolerate soft and bite sized diet and thin liquids with no evidence of pulmonary compromise over 12 weeks' time.     Target Date 11/30/21   Swallow Goal 2   Goal Identifier 2   Goal Description Pt will demonstrate understanding and  independent implementation of aspiration precautions and safe swallow strategies on 5/5 opp.     Target Date 11/30/21   Swallow Goal 3   Goal Identifier 3   Goal Description Pt will independently complete trained oropharyngeal strengthening exercises as recommended by the treating clinician.    Target Date 11/30/21   Total Session Time   SLP Eval: oral/pharyngeal swallow function, clinical minutes (68216) 15   SLP Eval: VideoFluoroscopic Swallow function Minutes (09446) 20   Total Evaluation Time 35   Thank you for the referral of Giovana Joaquin.  If you have any questions about this report, please contact me using the information below.         Giovana Rascon MS CCC-SLP    Speech Language Pathologist   Clinical Specialist Level 1   Rehabilitation Services  [facility] [address] [place] [id number]  Office: [phone]  [email or web address]

## 2021-09-02 NOTE — ED PROVIDER NOTES
History   Chief Complaint:  Neck Pain       HPI   Giovana Joaquin is a 41 year old female with history of migraine, depression, hypertension, and brain tumor who presents with neck pain. The patient says that she has chronic neck pain, but her neck pain has been worse than normal over the past couple days. She says that the pain is sharp and rates it as a 9/10. The patient says that she also has pain and numbness/tingling in her bilateral arms and hands. She denies any new weakness or any numbness/tingling in her legs. She denies any chest pain or shortness of breath. The patient denies any events that may have exacerbated her neck pain.      Review of Systems   Respiratory: Negative for shortness of breath.    Cardiovascular: Negative for chest pain.   Musculoskeletal: Positive for myalgias and neck pain.   Neurological: Positive for numbness. Negative for weakness.   All other systems reviewed and are negative.        Allergies:  Zolpidem  Ambien [Zolpidem Tartrate]  Amoxicillin  Cephalexin  Cephalosporins  Levaquin [Levofloxacin]  Morphine    Medications:  Retin A  Imitrex  Sudogest  Percocet  Pamelor  Myrbetriq   Antivert  Magnesium oxide  Fetzima  Atarax  Gabapentin  Nexium  Ciprodex  Evoxac  Rexulti  Strattera  Baclofen  Tenormin  Afluria     Past Medical History:    Velopharyngeal insufficiency  Obesity  Migraine  Depression   IBS  Hypertension   GERD  Castro-Danlos syndrome  Cryoglobulinemia  Chronic pain   Brain tumor  Anxiety  ADD     carpal tunnel syndrome      Past Surgical History:    Soft palette removed, throat x4  Proton particle radiation  Neck fusion  EGD  DaVinci assisted uvulopalatopharyngoplasty   Brain tumor removal  Biopsy      Family History:    Uterine cancer  rheumatoid arthritis   Breast cancer     Social History:  Patient presents with her girlfriend.    Physical Exam     Patient Vitals for the past 24 hrs:   BP Temp Temp src Pulse Resp SpO2 Weight   09/02/21 0334 (!) 150/82 -- -- 99  18 100 % --   09/02/21 0005 (!) 163/92 97  F (36.1  C) Temporal 100 18 100 % 106.6 kg (235 lb 0.2 oz)       Physical Exam  Constitutional:  Oriented to person, place, and time. well appearing  HENT:   Head:    Normocephalic.   Mouth/Throat:   Oropharynx is clear and moist.   Eyes:    EOM are normal. Pupils are equal, round, and reactive to light.   Neck:    Neck supple.   Cardiovascular:  Normal rate, regular rhythm and normal heart sounds.      Exam reveals no gallop and no friction rub.       No murmur heard.  Pulmonary/Chest:  Effort normal and breath sounds normal.      No respiratory distress. No wheezes. No rales.      No reproducible chest wall pain.  Abdominal:   Soft. No distension. No tenderness. No rebound and no guarding.   Musculoskeletal:  Normal range of motion. No midline spinal tenderness. Bilateral cervical paraspinal tenderness.   Neurological:   Alert and oriented to person, place, and time.           Moves all 4 extremities spontaneously    5/5 strength in all 4 extremities.  Sensation intact to light touch in all 4 extremities.   strength fully intact to all distrubution of her bilateral upper extremities.  Cranial nerves intact    Skin:    No rash noted. No pallor.     Emergency Department Course     ECG  ECG taken at 0248, ECG read at 0250  Normal sinus rhythm  Normal ECG   Rate 85 bpm. DE interval 136 ms. QRS duration 92 ms. QT/QTc 372/442 ms. P-R-T axes 38 19 10.        Laboratory:    CBC: WBC: 7.3, HGB: 10.0 (L), PLT: 299  BMP: Glucose 113 (H), Anion Gap: 2 (L), o/w WNL (Creatinine: 0.70)  UA: AWNL  Troponin (Collected 0245): <0.015     Procedures    Emergency Department Course:    Reviewed:  I reviewed nursing notes, vitals, past medical history and care everywhere       Assessments:  0213 I performed an exam of the patient as documented above.   0330 Patient rechecked and updated.      Interventions:  0249 500 mL IV  0253 Toradol 10 mg IV     Disposition:  The patient was discharged to  home.       Impression & Plan       Medical Decision Making:  Giovana Joaquin is a 41 year old female who presents to the emergency department today for evaluation of chronic neck pain with exacerbation and numbness/tingling bilaterally. Despite her subjective paraesthesias sensation is intact by tough equally in both upper extremities as well as strength fully intact. 5/5 strength as noted in all 4 extremities. With her atypical symptoms and perioral paraesthesias I did get an EKG as a screening which would be unlikely to represent ACS as well as lab work up to check for electrolyte abnormalities which is thankfully negative. A this time she has no huge weakness and is atraumatic. I see no reason for imaging here although she may need this in the outpatient setting. She does have a pain management and neurosurgery that follows her. She will be discharged home with a course of medrol Dosepak and flexeril, lidocaine patches. She was told to return for new or worsening numbness/tingling, weakness, or other symptoms.        Diagnosis:    ICD-10-CM    1. Chronic neck pain  M54.2     G89.29    2. Paresthesias  R20.2        Discharge Medications:  New Prescriptions    CYCLOBENZAPRINE (FLEXERIL) 10 MG TABLET    Take 1 tablet (10 mg) by mouth 3 times daily as needed    LIDOCAINE (LIDODERM) 5 % PATCH    Place 1 patch onto the skin every 24 hours for 10 days    METHYLPREDNISOLONE (MEDROL) 4 MG TABLET    Take 1 tablet (4 mg) by mouth daily       Scribe Disclosure:  NICOLE, Valeriy Causey, am serving as a scribe at 1:59 AM on 9/2/2021 to document services personally performed by Eric Robison MD based on my observations and the provider's statements to me.          Eric Robison MD  09/02/21 2331

## 2021-09-02 NOTE — ED TRIAGE NOTES
Pt arrives with neck pain and bilateral arm and hand numbness.  Denies N/T in legs, nausea.  Pt states she does have some burning with urination

## 2021-09-02 NOTE — PROGRESS NOTES
Video Esophagram Review Rounds  Imaging Review of MBSS conducted with attending physician Nitza Porter and reviewed/discussed with SLP Mary Anne Marin, Selene Patel, Giovana Rascon and/or Neha Garcia    Relevant Background:  Clival chordoma with soft palate resection 2001, followed by radiation   Piror Xray Video Swallow Exam in 2017  Worsening globus and inability to initiate swallow   PNA in 2018      MBSS Date: 9/2/21    Findings:  Pharyngeal Weakness:Yes  Epiglottic dysfunction: Yes  Penetration: Yes  Aspiration: Yes  UES dysfunction: Yes  Details: unsafe swallow, with pharyngeal weakness, nasopharyngeal regurgitation, does not cough with penetration and aspiration, appears to have narrowing at the UES - worse now than 2021, unclear if it is UES dysfunction vs poor pharyngeal weakness      Recommendations:  Diet: current  Swallow therapy    Office visit, [facility]NE                              2017 Xray Video Swallow Exam

## 2021-09-08 NOTE — PROGRESS NOTES
Spoke to patient in regards to scheduling appointment with provider on Friday. Pt has a conflict on that day but will see if she can make it work and will call writer back. Direct dial number for this writer provided to patient.

## 2021-09-08 NOTE — LETTER
9/8/2021       RE: Giovana Joaquin  [address]  [place] [address]     Dear Colleague,    Thank you for referring your patient, Giovana Joaquin, to the [facility] [place] at [facility]. Please see a copy of my visit note below.    HISTORY OF PRESENT ILLNESS:  Lincoln aly returns to clinic status post placement of bilateral PE tubes in early August of this year.  Unfortunately, she had an avulsion of the tube and was seen here to clinic by her associated staff and found to have extrusion of the left tube and the right tube was in the middle ear.    She describes fullness in that ear.  She has had no bleeding or exudate.  She has had no actual otalgia.    PHYSICAL EXAMINATION:  My examination today shows the ear within the middle ear.  This is easily removed under microscopic visualization.  She continues to have two bilateral perforations, which is necessary status post myringotomy for upcoming HBO therapy.    AUDIOGRAM:  She did have an audiogram in the interim as well, which shows bilateral downsloping hearing loss, which is sensorineural.  She because of the myringotomies a mild conductive effect.    ASSESSMENT AND PLAN:  She also had a swallow study, which showed mild aspiration and she is seeing speech pathology for that and will see Dr. Porter in lieu of laryngeal issues.  She will follow up with me as needed.          Again, thank you for allowing me to participate in the care of your patient.      Sincerely,    Hammad Lowe MD

## 2021-09-08 NOTE — TELEPHONE ENCOUNTER
FUTURE VISIT INFORMATION      FUTURE VISIT INFORMATION:    Date: 9/10/21    Time: 10 AM    Location: [facility]  REFERRAL INFORMATION:    Referring provider: Dr. Hammad Lowe    Referring providers clinic: [facility] - ENT    Reason for visit/diagnosis: Swallow Results    RECORDS REQUESTED FROM:       Clinic name Comments Records Status Imaging Status   [facility] 9/8/21 - ENT OV with Dr. Lowe  9/1/21 - ENT OV with Julieta Lott NP  9/1/21 - SPEECH OV with ALEXANDRU Charles [facility] 9/2/21 - ED OV with Dr. Ricki Ontiveros    [facility] - Surgery 7/26/06 - OP Note for SUPERIORLY BASED PHARYNGEAL FLAP WITH RESURFACING OF THE PHARYNX WITH ALLODERM with Dr. Mynor Ontiveros    [facility] - Imaging 9/1/21 - XR Video Swallow  8/11/21 - CT Sinus  2/11/19 - CT Neck  9/5/17 - XR Video Swallow  2/17/15 - MRA Neck [facility] 6/22/19 - Admission with Dr. Mijares  5/12/19 - Admission with Dr. Gonzalez [facility]

## 2021-09-08 NOTE — PROGRESS NOTES
HISTORY OF PRESENT ILLNESS:  Lincoln aly returns to clinic status post placement of bilateral PE tubes in early August of this year.  Unfortunately, she had an avulsion of the tube and was seen here to clinic by her associated staff and found to have extrusion of the left tube and the right tube was in the middle ear.    She describes fullness in that ear.  She has had no bleeding or exudate.  She has had no actual otalgia.    PHYSICAL EXAMINATION:  My examination today shows the ear within the middle ear.  This is easily removed under microscopic visualization.  She continues to have two bilateral perforations, which is necessary status post myringotomy for upcoming HBO therapy.    AUDIOGRAM:  She did have an audiogram in the interim as well, which shows bilateral downsloping hearing loss, which is sensorineural.  She because of the myringotomies a mild conductive effect.    ASSESSMENT AND PLAN:  She also had a swallow study, which showed mild aspiration and she is seeing speech pathology for that and will see Dr. Porter in lieu of laryngeal issues.  She will follow up with me as needed.

## 2021-09-08 NOTE — PATIENT INSTRUCTIONS
1.  You were seen in the E[facility] Clinic today by Dr. Lowe. The following has been recommended:   - Referral to Dr. Porter    2.  Plan is to return to clinic as needed with Dr. Lowe.    If you have any questions or concerns after your appointment, please call the clinic.   - Clinic phone: [phone]. Press option #1 for scheduling related needs. Press option #3 for Nurse advice.    Esperanza Sweenye RN  [facility]  Department of Otolaryngology

## 2021-09-10 NOTE — PATIENT INSTRUCTIONS
1.  You were seen in the ENT Clinic today by . If you have any questions or concerns after your appointment, please call [phone]. Press option #1 for scheduling related needs. Press option #3 for Nurse advice.    2.   has recommended  the following:   - Start taking Bactrim as instructed. Prescription sent to your pharmacy   - start taking Flucanozole as instructed. Prescription sent to your pharmacy   - start using Nystatin swish and swallow as instructed. Prescription sent to your pharmacy   - GI referral to [facility] to discuss possible reflux. They will contact you to schedule    3.  Plan is to return to clinic 12/14/21 at 11:00 am      Robina Paul LPN  [phone]  [facility] - Otolaryngology

## 2021-09-10 NOTE — LETTER
9/10/2021       RE: Giovana Joaquin  [address]  [place] [address]     Dear Colleague,    Thank you for referring your patient, Giovana Joaquin, to the [facility] EAR NOSE AND TH[facility]AT [facility]INIC [place] at [facility]. Please see a copy of my visit note below.        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 Joaquin is a 41 year old female who presents to us today with dysphagia.      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 Has had multiple surgeries. Has had a GJ tube in the past. Has had a trach in the past. She has hardware in her neck placed at the time of her resection.     she presents today for evaluation. she reports:  - It all started in January   - Had a cordoma brain tumor  - Cut through her mouth and took it out  - Had radiation in [place]  - 6 throat surgeires to repair the soft palate  - In the last few years it s gotten worse  - She has tricks that she does  - Uses thigns that are carbonated  - Eats regular food  - It has to be moist  - Meals take her a  long time  - She s lost a lot of teeth  - She can only chew on her front teeth - that takes her a long time  - She coughs out food often  - 2 years ago had a sepsis and PNA  - Has been watching her lungs   - With he primary doctor she s watching this  - The hardware - This went in at the same time as the surgery  - No cuts on the front of the neck      Dysphonia  - Voice gets in her way  - She s had a lot of speech therapy  - Recently she had a  long bout of laryngitis  - Her voice has been coming and going   - That was a while ago   - People have a difficult time on the telephone  - Tried a prosthesis   - On disability     Coughing/throat clear  - She does cough    Dyspnea  - Denies     GERD/LPRD  - She does   - Intermittent   - Takes Nexium  - Had an endoscopy in 2011    Has a nasal spray she uses every morning  She takes saliva stimulator   PAST MEDICAL HISTORY:   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     Gastro-oesophageal reflux disease      Hypertension      IBS (irritable bowel syndrome)      MDD (major depressive disorder)      Migraine      Obesity (BMI 30-39.9)      Velopharyngeal insufficiency, acquired        PAST SURGICAL HISTORY:   Past Surgical History:   Procedure Laterality Date     BIOPSY       brain tumor removal       DAVINCI ASSISTED UVULOPALATOPHARYNGOPLASTY  5/7/2013    Procedure: DAVINCI ASSISTED UVULOPALATOPHARYNGOPLASTY;  Davinci Pharyngoplasty ;  Surgeon: Hammad Lowe MD;  Location: [facility]     ESOPHAGOSCOPY, GASTROSCOPY, DUODENOSCOPY (EGD), COMBINED  12/5/2011    Procedure:COMBINED ESOPHAGOSCOPY, GASTROSCOPY, DUODENOSCOPY (EGD); Surgeon:CEDRICK PABLO; Location:[facility]     neck fusion to c3      to C4      proton particle radiation       soft palette removed, throat x4         CURRENT MEDICATIONS:   Current Outpatient Medications:      AFLURIA QUADRIVALENT 0.5 ML injection, ADM 0.5ML IM UTD, Disp: , Rfl:      atenolol (TENORMIN) 25 MG tablet, Take 1 tablet (25 mg) by mouth daily . Patient needs to see primary provider for further refills., Disp: 14 tablet, Rfl: 0     atomoxetine (STRATTERA) 60 MG capsule, Take 80 mg by  mouth daily , Disp: , Rfl:      BACLOFEN PO, Take 10 mg by mouth 3 times daily , Disp: , Rfl:      brexpiprazole (REXULTI) 0.5 MG tablet, Take 1 mg by mouth daily, Disp: , Rfl:      buprenorphine (BUTRANS) 10 MCG/HR WK patch, Place 1 patch onto the skin every 7 days, Disp: , Rfl:      cevimeline (EVOXAC) 30 MG capsule, Take 1 capsule (30 mg) by mouth 3 times daily, Disp: 90 capsule, Rfl: 3     Cholecalciferol (VITAMIN D) 2000 UNITS CAPS, , Disp: , Rfl:      cyclobenzaprine (FLEXERIL) 10 MG tablet, Take 1 tablet (10 mg) by mouth 3 times daily as needed, Disp: 20 tablet, Rfl: 0     esomeprazole (NEXIUM) 40 MG capsule, Take 1 capsule (40 mg) by mouth every morning (before breakfast) One hour before meals, Disp: 90 capsule, Rfl: 3     fluconazole (DIFLUCAN) 100 MG tablet, Take 200mg(2 tablets) the first day, then take 100mg (1 tablet) for the remaining 13 days., Disp: 15 tablet, Rfl: 0     GABAPENTIN 300 MG OR CAPS, Takes 600 mg qid, Disp: , Rfl:      hydrOXYzine (ATARAX) 25 MG tablet, , Disp: , Rfl:      levomilnacipran (FETZIMA) 80 MG 24 HR capsule, Take 80 mg by mouth daily, Disp: , Rfl:      MAGNESIUM OXIDE PO, Take 500 mg by mouth daily, Disp: , Rfl:      meclizine (ANTIVERT) 12.5 MG tablet, TAKE 1 TABLET(12.5 MG) BY MOUTH THREE TIMES DAILY AS NEEDED FOR DIZZINESS, Disp: 15 tablet, Rfl: 0     methylPREDNISolone (MEDROL) 4 MG tablet, Take 1 tablet (4 mg) by mouth daily, Disp: 21 tablet, Rfl: 0     mirabegron (MYRBETRIQ) 50 MG 24 hr tablet, Take 1 tablet (50 mg) by mouth daily, Disp: 90 tablet, Rfl: 3     mirabegron (MYRBETRIQ) 50 MG 24 hr tablet, Take 1 tablet (50 mg) by mouth daily . Patient needs to see primary provider for further refills., Disp: 30 tablet, Rfl: 0     Multiple Vitamins-Minerals (MULTIVITAL PO), Take by mouth daily, Disp: , Rfl:      nortriptyline (PAMELOR) 25 MG capsule, Take 1 capsule (25 mg) by mouth At Bedtime Call clinic to schedule follow up appointment., Disp: 90 capsule, Rfl: 0      nystatin (MYCOSTATIN) 026164 UNIT/ML suspension, Take 5 mLs (500,000 Units) by mouth 4 times daily for 14 days Take 5mls(swish and swallow) by mouth 4 times daily for 14 days., Disp: 280 mL, Rfl: 0     oxyCODONE-acetaminophen (PERCOCET) 5-325 MG per tablet, Take 0.05 tablets by mouth 2 times daily , Disp: , Rfl:      pseudoePHEDrine (SUDOGEST 12 HOUR) 120 MG 12 hr tablet, TAKE 1 TABLET BY MOUTH EVERY 12 HOURS AS NEEDED FOR CONGESTION, Disp: 60 tablet, Rfl: 2     sulfamethoxazole-trimethoprim (BACTRIM DS) 800-160 MG tablet, Take 1 tablet by mouth 2 times daily for 10 days, Disp: 20 tablet, Rfl: 2     SUMAtriptan (IMITREX) 100 MG tablet, Take 1 tablet (100 mg) by mouth at onset of headache for migraine May repeat after one hour, max 200 mg in 24 hours, Disp: 12 tablet, Rfl: 0     tretinoin (RETIN-A) 0.025 % external cream, Apply small pea-sized amount to affected skin at bedtime, use sunscreen during day, Disp: 30 g, Rfl: 1     lidocaine (LIDODERM) 5 % patch, Place 1 patch onto the skin every 24 hours for 10 days (Patient not taking: Reported on 9/10/2021), Disp: 10 patch, Rfl: 0    ALLERGIES: Zolpidem, Ambien [zolpidem tartrate], Amoxicillin, Cephalexin, Cephalosporins, Levaquin [levofloxacin], and Morphine    SOCIAL HISTORY:    Social History     Socioeconomic History     Marital status: Single     Spouse name: Not on file     Number of children: Not on file     Years of education: Not on file     Highest education level: Not on file   Occupational History     Not on file   Tobacco Use     Smoking status: Former Smoker     Packs/day: 0.20     Years: 10.00     Pack years: 2.00     Types: Cigarettes     Quit date: 2013     Years since quittin.6     Smokeless tobacco: Never Used     Tobacco comment: e-cig   Substance and Sexual Activity     Alcohol use: Not Currently     Comment: socially     Drug use: No     Sexual activity: Never   Other Topics Concern     Parent/sibling w/ CABG, MI or angioplasty before 65F  55M? Not Asked   Social History Narrative     Not on file     Social Determinants of Health     Financial Resource Strain:      Difficulty of Paying Living Expenses:    Food Insecurity:      Worried About Running Out of Food in the Last Year:      Ran Out of Food in the Last Year:    Transportation Needs:      Lack of Transportation (Medical):      Lack of Transportation (Non-Medical):    Physical Activity:      Days of Exercise per Week:      Minutes of Exercise per Session:    Stress:      Feeling of Stress :    Social Connections:      Frequency of Communication with Friends and Family:      Frequency of Social Gatherings with Friends and Family:      Attends [employer] Services:      Active Member of Clubs or Organizations:      Attends Club or Organization Meetings:      Marital Status:    Intimate Partner Violence: Not At Risk     Fear of Current or Ex-Partner: No     Emotionally Abused: No     Physically Abused: No     Sexually Abused: No         FAMILY HISTORY:   Family History   Problem Relation Age of Onset     Uterine Cancer Mother      Arthritis Father         RA     Breast Cancer Maternal Grandmother      Musculoskeletal Disorder Maternal Grandmother         MS     Arthritis Paternal Grandmother      Non-contributory for problems with anesthesia    REVIEW OF SYSTEMS:   The patient was asked a 14 point review of systems regarding constitutional symptoms, eye symptoms, ears, nose, mouth, throat symptoms, cardiovascular symptoms, respiratory symptoms, gastrointestinal symptoms, genitourinary symptoms, musculoskeletal symptoms, integumentary symptoms, neurological symptoms, psychiatric symptoms, endocrine symptoms, hematologic/lymphatic symptoms, and allergic/ immunologic symptoms.   The pertinent factors have been included in the HPI and below.  Patient Supplied Answers to Review of Systems  [facility] ENT ROS 8/11/2021   Constitutional Problems with sleep   Neurology Headache   Psychology -   Ears, Nose, Throat  Hearing loss, Ear pain, Ringing/noise in ears, Nasal congestion or drainage, Sore throat, Trouble swallowing, Hoarseness, Coughing up blood   Cardiopulmonary Cough   Gastrointestinal/Genitourinary -   Musculoskeletal Sore or stiff joints, Swollen joints, Back pain, Neck pain, Swollen legs/feet   Allergy/Immunology Allergies or hay fever   Hematologic Bleeding problems, Easy bruising   Endocrine Thirst, Heat or cold intolerance, Frequent urination       Physical Examination   The patient underwent a physical examination as described below. The pertinent positive and negative findings are summarized after the description of the examination.  Constitutional: The patient's developmental and nutritional status was assessed. The patient's voice quality was assessed.  Head and Face: The head and face were inspected for deformities. The sinuses were palpated. The salivary glands were palpated. Facial muscle strength was assessed bilaterally.  Eyes: Extraocular movements and primary gaze alignment were assessed.  Ears, Nose, Mouth and Throat: The ears and nose were examined for deformities. The nasal septum, mucosa, and turbinates were inspected by anterior rhinoscopy. The lips, teeth, and gums were examined for abnormalities. The oral mucosa, tongue, palate, tonsils, lateral and posterior pharynx were inspected for the presence of asymmetry or mucosal lesions.    Neck: The tracheal position was noted, and the neck mass palpated to determine if there were any asymmetries, abnormal neck masses, thyromegally, or thyroid nodules.  Respiratory: The nature of the breathing and chest expansion/symmetry was observed.  Cardiovascular: The patient was examined to determine the presence of any edema or jugular venous distension.  Abdomen: The contour of the abdomen was noted.  Lymphatic: The patient was examined for infraclavicular lymphadenopathy.  Musculoskeletal: The patient was inspected for the presence of skeletal  deformities.  Extremities: The extremities were examined for any clubbing or cyanosis.  Skin: The skin was examined for inflammatory or neoplastic conditions.  Neurologic: The patient's orientation, mood, and affect were noted. The cranial nerve  functions were examined.  Other pertinent positive and negative findings on physical examination:      OC/OP: no lesions, soft palate surgical changes  Neck: no lesions     All other physical examination findings were within normal limits and noncontributory.  Procedures   Flexible laryngoscopy (CPT 56630)      Pre-procedure diagnosis: dysphonia  Post-procedure diagnosis: same as above  Indication for procedure: Ms. Joaquin is a 41 year old female with see above  Procedure(s): Fiberoptic Laryngoscopy    Details of Procedure: After informed consent was obtained, the patient was seated in the examination chair.  The areas of the nasopharynx as well as the hypopharynx were anesthetized with topical 4% lidocaine with 0.25% phenylephrine atomizer.  Examination of the base of tongue was performed first.  Attention was directed to any evidence of masses in the area or evidence of leukoplakia or candidal infection.  Attention was directed to the epiglottis where its size and position was determined and its movement on phonation of the vowel  e .  The piriform sinuses were then inspected for any mass lesions or pooling of secretions.  Attention was then directed to the larynx. The vocal folds were inspected for infection or any areas of leukoplakia, for masses, polypoid degeneration, or hemorrhage.  Having done this, the arytenoids and vocal processes were inspected for erythema or evidence of granuloma formation.  The posterior commissure was then inspected for evidence of inflammatory changes in the mucosa and heaping up of mucosal tissue. The patient was then instructed to say the vowel  e .  Adduction of vocal folds to the midline was observed for any evidence of paresis or  paralysis of the larynx or asymmetry in rotation of the larynx to the left or right. The patient was asked to breathe and the degree of abduction was noted bilaterally.  Subglottic view of the larynx was obtained for any additional mass lesions or mucosal changes.  Finally the post cricoid was examined for evidence of pooling of secretions, as well as the pharyngeal wall mucosa.   Anesthesia type: 0.25% phenylephrine    Findings:  Anatomic/physiological deviations: bilateral vocal fold thickening and erythema, and severe postcricoid reflux changes, inhalation phonation helped decrease the supraglottic hyperfunction   Right vocal process: No restriction of mobility   Left vocal process: No restriction of mobility  Glottal gap: Complete glottal closure  Supraglottic structures: Normal  Hypopharynx: Normal     Estimated Blood Loss: minimal  Complications: None  Disposition: Patient tolerated the procedure well    Nasopharynx                  Fiberoptic Endoscopic Evaluation of Swallowing (CPT 80927)  and Interpretation of Swallowing (CPT 33241)    Indications: See above notes for complete history and physical. Patient complains of dysphagia to both solids and liquids and/or there is suggestion on history and endoscopic exam of the presence of dysphagia causing medical complaints.  Swallowing evaluation is being performed to assess the presence and degree of dysphagia, and to recommend a safe diet.     Pulmonary Status:  No PNA   Current Diet:              soft                                       thin liquids (carbonated)     Consistency Amounts:  Thin Liquid: sip   Puree: 1 tsp  Solid: none            Positioning: upright in a chair  Oral Peripheral Exam: See physical exam section.  Anatomic Notes: See Videostroboscopy report for assessment of anatomy and laryngeal functioning  Pharyngeal secretions prior to administration of liquid or food: Yes  Oral Phase Abnormal Findings: No abnormal behavior observed  Behavioral  Adaptations: Repeat dry swallows  Pharyngeal Phase Abnormal Findings: vallecular residue, pyriform sinus residue, pharyngeal wall residue, postcricoid residue and premature spillage to vocal folds and penetration, no aspiration    Recommended Diet:  soft                                        thin liquids                      Transnasal Flexible Esophagoscopy (CPT 29180)      Pre-Procedure Diagnosis: Dysphagia and throat symptoms of dysphagia  Post Procedure Diagnosis: Same  Indication for procedure:  Ms. Joaquin is a 41 year old female with dysphagia    Procedure(s): Transnasal esophagoscopy     Details of Procedure: After informed consent, the nasal cavity was topically decongested with 1% neosynephrine and anesthetized with 4% lidocaine. After adequate topical anesthesia was applied trans nasal esophagoscopy (TNE) was begun. The TNE Scope was generously lubricated with 2% viscous lidocaine. The Olympus transnasal esophagoscope was passed along the floor or middle meatus of the more open nasal cavity. The larynx and hypopharynx was visualized. The patient was then asked to place his chin on his chest and the scope was placed in the post-cricoid area. The patient was then asked to swallow and the scope advanced when the UES opened.    Once in the esophagus, air was insufflated and the esophagus visualized. The entire esophagus down to the squamocolumnar junction and proximal stomach were visualized. The scope was slowly withdrawn to get a more detailed view on the way out. Prior to termination of the procedure, all excess air was suctioned from the esophagus. Having completed this the operation was completed and the scope withdrawn atraumatically.   Anesthesia type: 4% topical lidocaine    Findings:     >Other findings and procedures: TNE passes with difficulty through the UES, did not tolerate the scope, had severe pain when the scope was through the UES    Estimated Blood Loss: Minimal  Complication(s):  None  Disposition: Patient did not tolerate the procedure well              Review of Relevant Clinical Data   Notes: Hammad Lowe 9/8/21  Radiology: CT neck 2/11/19       Procedures:    Relevant Background:  Clival chordoma with soft palate resection 2001, followed by radiation   Piror Xray Video Swallow Exam in 2017  Worsening globus and inability to initiate swallow   PNA in 2018        MBSS Date: 9/2/21     Findings:  Pharyngeal Weakness:Yes  Epiglottic dysfunction: Yes  Penetration: Yes  Aspiration: Yes  UES dysfunction: Yes  Details: unsafe swallow, with pharyngeal weakness, nasopharyngeal regurgitation, does not cough with penetration and aspiration, appears to have narrowing at the UES - worse now than 2021, unclear if it is UES dysfunction vs poor pharyngeal weakness        Recommendations:  Diet: current  Swallow therapy     Office visit, TNE                                      2017 Xray Video Swallow Exam      Labs:  Lab Results   Component Value Date    TSH 1.03 07/14/2016     Lab Results   Component Value Date     09/02/2021    CO2 31 09/02/2021    BUN 13 09/02/2021    PHOS 5.3 (H) 05/10/2013     Lab Results   Component Value Date    WBC 7.3 09/02/2021    HGB 10.0 (L) 09/02/2021    HCT 34.3 (L) 09/02/2021    MCV 92 09/02/2021     09/02/2021     Lab Results   Component Value Date    PTT 34 08/11/2014    INR 0.99 02/11/2019     No results found for: ANGELICA  No components found for: RHEUMATOIDFACTOR,  RF  Lab Results   Component Value Date    CRP 29.0 (H) 02/11/2019    CRP 7.1 08/11/2014    CRP <3.0 04/05/2008     No components found for: CKTOT, URICACID  No components found for: C3, C4, DSDNAAB, NDNAABIFA  No results found for: MPOAB    Patient reported Quality of Life (QOL) Measures   Patient Supplied Answers To VHI Questionnaire  Voice Handicap Index (VHI-10) 9/10/2021   My voice makes it difficult for people to hear me 3   People have difficulty understanding me in a noisy room 4   My voice  "difficulties restrict my personal and social life.  3   I feel left out of conversations because of my voice 2   I feel as though I have to strain to produce voice 2   The clarity of my voice is unpredictable 3   My voice problem upsets me 2   My voice makes me feel handicapped 4   People ask, \"What's wrong with your voice?\" 2   VHI-10 25         Patient Supplied Answers To EAT Questionnaire  No flowsheet data found.      Patient Supplied Answers To CSI Questionnaire  No flowsheet data found.      Patient Supplied Answers to Dyspnea Index Questionnaire:  No flowsheet data found.    Impression & Plan     IMPRESSION: Ms. Joaquin is a 41 year old female who is being seen for the followin. Dysphagia  - for a long time  - in the setting of clival chordoma with soft palate resection , followed by radiation   - had prior Xray Video Swallow Exam in 2017  Worsening globus and inability to initiate swallow   PNA in 2018  - Xray Video Swallow Exam 21- unsafe swallow, with pharyngeal weakness, nasopharyngeal regurgitation, does not cough with penetration and aspiration, appears to have narrowing at the UES - worse now than , unclear if it is UES dysfunction vs poor pharyngeal weakness  - PNA 2 years ago  - feels like she eats everything she wants - has limitation due to only having front teeth  - FEES shows  vallecular residue, pyriform sinus residue, pharyngeal wall residue, postcricoid residue and premature spillage to vocal folds and penetration, no aspiration  - TNE shows that it passes with difficulty through the UES, did not tolerate the scope, had severe pain when the scope was through the UES  - discussed she likely does have some tightness of the UES that if dilated could improve her swallow though her main issue with her swallow is the pharyngeal weakness  - the soft palate and nasal regurgitation is likely not possible to be fixed given the abnormal anatomy  - discussed options for dilation with MAC " - transnasal and transoral or with general anesthesia with MDL  - discussed that improving UES patency can also make reflux worse and she has significant reflux changes   Plan  - GI for Dr Soni to have reflux management and consider transoral dilation  - return after to decide if she wants to proceed with dilation and if yes with which option      2. Dysphonia  - voice bothers   - people cant understand her  - tried multiple things for her soft palate  - scope shows bilateral vocal fold thickening and erythema, and severe postcricoid reflux changes  - symptoms due to dysphonia from vocal folds as well as disarthria  - will treat for fungal and bacterial laryngitis   Plan  - abx (bactrim) and antifungal (fluconazole and nystatin)  - follow up on October 14 at 11AM  - will need to be seen with voice SLP at that time to address muscle tension dysphonia    RETURN VISIT: 1 month    Thank you for the kind referral and for allowing me to share in the care of Ms. Joaquin. If you have any questions, please do not hesitate to contact me.    Nitza Porter MD    Laryngology    [facility]  Department of  Otolaryngology - Head and Neck Surgery  Clinics [place] [address]  Appointment line: [phone]  Fax: [phone]  [email or web address]        Again, thank you for allowing me to participate in the care of your patient.      Sincerely,    Nitza Porter MD

## 2021-09-10 NOTE — PROGRESS NOTES
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 Joaquin is a 41 year old female who presents to us today with dysphagia.      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 Has had multiple surgeries. Has had a GJ tube in the past. Has had a trach in the past. She has hardware in her neck placed at the time of her resection.     she presents today for evaluation. she reports:  - It all started in January   - Had a cordoma brain tumor  - Cut through her mouth and took it out  - Had radiation in [place]  - 6 throat surgeires to repair the soft palate  - In the last few years it s gotten worse  - She has tricks that she does  - Uses thigns that are carbonated  - Eats regular food  - It has to be moist  - Meals take her a  long time  - She s lost a lot of teeth  - She can only chew on her front teeth - that takes her a long time  - She coughs out food often  - 2 years ago had a sepsis and PNA  - Has been watching her lungs   - With he primary doctor she s watching this  - The hardware - This went in at the same time as the surgery  - No cuts on the front of the neck      Dysphonia  - Voice gets in her way  - She s had a lot of speech therapy  - Recently she had a long bout of laryngitis  - Her voice has been coming and going   - That was a while ago   - People have a difficult time on the telephone  - Tried a prosthesis   - On disability     Coughing/throat clear  - She does cough    Dyspnea  - Denies     GERD/LPRD  - She does   - Intermittent   - Takes Nexium  - Had an endoscopy in  2011    Has a nasal spray she uses every morning  She takes saliva stimulator   PAST MEDICAL HISTORY:   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     Gastro-oesophageal reflux disease      Hypertension      IBS (irritable bowel syndrome)      MDD (major depressive disorder)      Migraine      Obesity (BMI 30-39.9)      Velopharyngeal insufficiency, acquired        PAST SURGICAL HISTORY:   Past Surgical History:   Procedure Laterality Date     BIOPSY       brain tumor removal       DAVINCI ASSISTED UVULOPALATOPHARYNGOPLASTY  5/7/2013    Procedure: DAVINCI ASSISTED UVULOPALATOPHARYNGOPLASTY;  Davinci Pharyngoplasty ;  Surgeon: Hammad Lowe MD;  Location: [facility] OR     ESOPHAGOSCOPY, GASTROSCOPY, DUODENOSCOPY (EGD), COMBINED  12/5/2011    Procedure:COMBINED ESOPHAGOSCOPY, GASTROSCOPY, DUODENOSCOPY (EGD); Surgeon:CEDRICK PABLO; Location:[facility]     neck fusion to c3      to C4      proton particle radiation       soft palette removed, throat x4         CURRENT MEDICATIONS:   Current Outpatient Medications:      AFLURIA QUADRIVALENT 0.5 ML injection, ADM 0.5ML IM UTD, Disp: , Rfl:      atenolol (TENORMIN) 25 MG tablet, Take 1 tablet (25 mg) by mouth daily . Patient needs to see primary provider for further refills., Disp: 14 tablet, Rfl: 0     atomoxetine (STRATTERA) 60 MG capsule, Take 80 mg by mouth daily , Disp: , Rfl:      BACLOFEN PO, Take 10 mg by mouth 3 times daily , Disp: , Rfl:      brexpiprazole (REXULTI) 0.5 MG tablet, Take 1 mg by mouth daily, Disp: , Rfl:      buprenorphine (BUTRANS) 10 MCG/HR WK patch, Place 1 patch onto the skin every 7 days, Disp: , Rfl:      cevimeline (EVOXAC) 30 MG capsule, Take 1  capsule (30 mg) by mouth 3 times daily, Disp: 90 capsule, Rfl: 3     Cholecalciferol (VITAMIN D) 2000 UNITS CAPS, , Disp: , Rfl:      cyclobenzaprine (FLEXERIL) 10 MG tablet, Take 1 tablet (10 mg) by mouth 3 times daily as needed, Disp: 20 tablet, Rfl: 0     esomeprazole (NEXIUM) 40 MG capsule, Take 1 capsule (40 mg) by mouth every morning (before breakfast) One hour before meals, Disp: 90 capsule, Rfl: 3     fluconazole (DIFLUCAN) 100 MG tablet, Take 200mg(2 tablets) the first day, then take 100mg (1 tablet) for the remaining 13 days., Disp: 15 tablet, Rfl: 0     GABAPENTIN 300 MG OR CAPS, Takes 600 mg qid, Disp: , Rfl:      hydrOXYzine (ATARAX) 25 MG tablet, , Disp: , Rfl:      levomilnacipran (FETZIMA) 80 MG 24 HR capsule, Take 80 mg by mouth daily, Disp: , Rfl:      MAGNESIUM OXIDE PO, Take 500 mg by mouth daily, Disp: , Rfl:      meclizine (ANTIVERT) 12.5 MG tablet, TAKE 1 TABLET(12.5 MG) BY MOUTH THREE TIMES DAILY AS NEEDED FOR DIZZINESS, Disp: 15 tablet, Rfl: 0     methylPREDNISolone (MEDROL) 4 MG tablet, Take 1 tablet (4 mg) by mouth daily, Disp: 21 tablet, Rfl: 0     mirabegron (MYRBETRIQ) 50 MG 24 hr tablet, Take 1 tablet (50 mg) by mouth daily, Disp: 90 tablet, Rfl: 3     mirabegron (MYRBETRIQ) 50 MG 24 hr tablet, Take 1 tablet (50 mg) by mouth daily . Patient needs to see primary provider for further refills., Disp: 30 tablet, Rfl: 0     Multiple Vitamins-Minerals (MULTIVITAL PO), Take by mouth daily, Disp: , Rfl:      nortriptyline (PAMELOR) 25 MG capsule, Take 1 capsule (25 mg) by mouth At Bedtime Call clinic to schedule follow up appointment., Disp: 90 capsule, Rfl: 0     nystatin (MYCOSTATIN) 538477 UNIT/ML suspension, Take 5 mLs (500,000 Units) by mouth 4 times daily for 14 days Take 5mls(swish and swallow) by mouth 4 times daily for 14 days., Disp: 280 mL, Rfl: 0     oxyCODONE-acetaminophen (PERCOCET) 5-325 MG per tablet, Take 0.05 tablets by mouth 2 times daily , Disp: , Rfl:      pseudoePHEDrine  (SUDOGEST 12 HOUR) 120 MG 12 hr tablet, TAKE 1 TABLET BY MOUTH EVERY 12 HOURS AS NEEDED FOR CONGESTION, Disp: 60 tablet, Rfl: 2     sulfamethoxazole-trimethoprim (BACTRIM DS) 800-160 MG tablet, Take 1 tablet by mouth 2 times daily for 10 days, Disp: 20 tablet, Rfl: 2     SUMAtriptan (IMITREX) 100 MG tablet, Take 1 tablet (100 mg) by mouth at onset of headache for migraine May repeat after one hour, max 200 mg in 24 hours, Disp: 12 tablet, Rfl: 0     tretinoin (RETIN-A) 0.025 % external cream, Apply small pea-sized amount to affected skin at bedtime, use sunscreen during day, Disp: 30 g, Rfl: 1     lidocaine (LIDODERM) 5 % patch, Place 1 patch onto the skin every 24 hours for 10 days (Patient not taking: Reported on 9/10/2021), Disp: 10 patch, Rfl: 0    ALLERGIES: Zolpidem, Ambien [zolpidem tartrate], Amoxicillin, Cephalexin, Cephalosporins, Levaquin [levofloxacin], and Morphine    SOCIAL HISTORY:    Social History     Socioeconomic History     Marital status: Single     Spouse name: Not on file     Number of children: Not on file     Years of education: Not on file     Highest education level: Not on file   Occupational History     Not on file   Tobacco Use     Smoking status: Former Smoker     Packs/day: 0.20     Years: 10.00     Pack years: 2.00     Types: Cigarettes     Quit date: 2013     Years since quittin.6     Smokeless tobacco: Never Used     Tobacco comment: e-cig   Substance and Sexual Activity     Alcohol use: Not Currently     Comment: socially     Drug use: No     Sexual activity: Never   Other Topics Concern     Parent/sibling w/ CABG, MI or angioplasty before 65F 55M? Not Asked   Social History Narrative     Not on file     Social Determinants of Health     Financial Resource Strain:      Difficulty of Paying Living Expenses:    Food Insecurity:      Worried About Running Out of Food in the Last Year:      Ran Out of Food in the Last Year:    Transportation Needs:      Lack of Transportation  (Medical):      Lack of Transportation (Non-Medical):    Physical Activity:      Days of Exercise per Week:      Minutes of Exercise per Session:    Stress:      Feeling of Stress :    Social Connections:      Frequency of Communication with Friends and Family:      Frequency of Social Gatherings with Friends and Family:      Attends Shinto Services:      Active Member of Clubs or Organizations:      Attends Club or Organization Meetings:      Marital Status:    Intimate Partner Violence: Not At Risk     Fear of Current or Ex-Partner: No     Emotionally Abused: No     Physically Abused: No     Sexually Abused: No         FAMILY HISTORY:   Family History   Problem Relation Age of Onset     Uterine Cancer Mother      Arthritis Father         RA     Breast Cancer Maternal Grandmother      Musculoskeletal Disorder Maternal Grandmother         MS     Arthritis Paternal Grandmother      Non-contributory for problems with anesthesia    REVIEW OF SYSTEMS:   The patient was asked a 14 point review of systems regarding constitutional symptoms, eye symptoms, ears, nose, mouth, throat symptoms, cardiovascular symptoms, respiratory symptoms, gastrointestinal symptoms, genitourinary symptoms, musculoskeletal symptoms, integumentary symptoms, neurological symptoms, psychiatric symptoms, endocrine symptoms, hematologic/lymphatic symptoms, and allergic/ immunologic symptoms.   The pertinent factors have been included in the HPI and below.  Patient Supplied Answers to Review of Systems  [facility] ROS 8/11/2021   Constitutional Problems with sleep   Neurology Headache   Psychology -   Ears, Nose, Throat Hearing loss, Ear pain, Ringing/noise in ears, Nasal congestion or drainage, Sore throat, Trouble swallowing, Hoarseness, Coughing up blood   Cardiopulmonary Cough   Gastrointestinal/Genitourinary -   Musculoskeletal Sore or stiff joints, Swollen joints, Back pain, Neck pain, Swollen legs/feet   Allergy/Immunology Allergies or hay  fever   Hematologic Bleeding problems, Easy bruising   Endocrine Thirst, Heat or cold intolerance, Frequent urination       Physical Examination   The patient underwent a physical examination as described below. The pertinent positive and negative findings are summarized after the description of the examination.  Constitutional: The patient's developmental and nutritional status was assessed. The patient's voice quality was assessed.  Head and Face: The head and face were inspected for deformities. The sinuses were palpated. The salivary glands were palpated. Facial muscle strength was assessed bilaterally.  Eyes: Extraocular movements and primary gaze alignment were assessed.  Ears, Nose, Mouth and Throat: The ears and nose were examined for deformities. The nasal septum, mucosa, and turbinates were inspected by anterior rhinoscopy. The lips, teeth, and gums were examined for abnormalities. The oral mucosa, tongue, palate, tonsils, lateral and posterior pharynx were inspected for the presence of asymmetry or mucosal lesions.    Neck: The tracheal position was noted, and the neck mass palpated to determine if there were any asymmetries, abnormal neck masses, thyromegally, or thyroid nodules.  Respiratory: The nature of the breathing and chest expansion/symmetry was observed.  Cardiovascular: The patient was examined to determine the presence of any edema or jugular venous distension.  Abdomen: The contour of the abdomen was noted.  Lymphatic: The patient was examined for infraclavicular lymphadenopathy.  Musculoskeletal: The patient was inspected for the presence of skeletal deformities.  Extremities: The extremities were examined for any clubbing or cyanosis.  Skin: The skin was examined for inflammatory or neoplastic conditions.  Neurologic: The patient's orientation, mood, and affect were noted. The cranial nerve  functions were examined.  Other pertinent positive and negative findings on physical examination:       OC/OP: no lesions, soft palate surgical changes  Neck: no lesions     All other physical examination findings were within normal limits and noncontributory.  Procedures   Flexible laryngoscopy (CPT 27970)      Pre-procedure diagnosis: dysphonia  Post-procedure diagnosis: same as above  Indication for procedure: Ms. Joaquin is a 41 year old female with see above  Procedure(s): Fiberoptic Laryngoscopy    Details of Procedure: After informed consent was obtained, the patient was seated in the examination chair.  The areas of the nasopharynx as well as the hypopharynx were anesthetized with topical 4% lidocaine with 0.25% phenylephrine atomizer.  Examination of the base of tongue was performed first.  Attention was directed to any evidence of masses in the area or evidence of leukoplakia or candidal infection.  Attention was directed to the epiglottis where its size and position was determined and its movement on phonation of the vowel  e .  The piriform sinuses were then inspected for any mass lesions or pooling of secretions.  Attention was then directed to the larynx. The vocal folds were inspected for infection or any areas of leukoplakia, for masses, polypoid degeneration, or hemorrhage.  Having done this, the arytenoids and vocal processes were inspected for erythema or evidence of granuloma formation.  The posterior commissure was then inspected for evidence of inflammatory changes in the mucosa and heaping up of mucosal tissue. The patient was then instructed to say the vowel  e .  Adduction of vocal folds to the midline was observed for any evidence of paresis or paralysis of the larynx or asymmetry in rotation of the larynx to the left or right. The patient was asked to breathe and the degree of abduction was noted bilaterally.  Subglottic view of the larynx was obtained for any additional mass lesions or mucosal changes.  Finally the post cricoid was examined for evidence of pooling of secretions, as well as  the pharyngeal wall mucosa.   Anesthesia type: 0.25% phenylephrine    Findings:  Anatomic/physiological deviations: bilateral vocal fold thickening and erythema, and severe postcricoid reflux changes, inhalation phonation helped decrease the supraglottic hyperfunction   Right vocal process: No restriction of mobility   Left vocal process: No restriction of mobility  Glottal gap: Complete glottal closure  Supraglottic structures: Normal  Hypopharynx: Normal     Estimated Blood Loss: minimal  Complications: None  Disposition: Patient tolerated the procedure well    Nasopharynx                  Fiberoptic Endoscopic Evaluation of Swallowing (CPT 42211)  and Interpretation of Swallowing (CPT 11116)    Indications: See above notes for complete history and physical. Patient complains of dysphagia to both solids and liquids and/or there is suggestion on history and endoscopic exam of the presence of dysphagia causing medical complaints.  Swallowing evaluation is being performed to assess the presence and degree of dysphagia, and to recommend a safe diet.     Pulmonary Status:  No PNA   Current Diet:              soft                                       thin liquids (carbonated)     Consistency Amounts:  Thin Liquid: sip   Puree: 1 tsp  Solid: none            Positioning: upright in a chair  Oral Peripheral Exam: See physical exam section.  Anatomic Notes: See Videostroboscopy report for assessment of anatomy and laryngeal functioning  Pharyngeal secretions prior to administration of liquid or food: Yes  Oral Phase Abnormal Findings: No abnormal behavior observed  Behavioral Adaptations: Repeat dry swallows  Pharyngeal Phase Abnormal Findings: vallecular residue, pyriform sinus residue, pharyngeal wall residue, postcricoid residue and premature spillage to vocal folds and penetration, no aspiration    Recommended Diet:  soft                                        thin liquids                      Transnasal Flexible  Esophagoscopy (CPT 83895)      Pre-Procedure Diagnosis: Dysphagia and throat symptoms of dysphagia  Post Procedure Diagnosis: Same  Indication for procedure:  Ms. Joaquin is a 41 year old female with dysphagia    Procedure(s): Transnasal esophagoscopy     Details of Procedure: After informed consent, the nasal cavity was topically decongested with 1% neosynephrine and anesthetized with 4% lidocaine. After adequate topical anesthesia was applied trans nasal esophagoscopy (TNE) was begun. The TNE Scope was generously lubricated with 2% viscous lidocaine. The Olympus transnasal esophagoscope was passed along the floor or middle meatus of the more open nasal cavity. The larynx and hypopharynx was visualized. The patient was then asked to place his chin on his chest and the scope was placed in the post-cricoid area. The patient was then asked to swallow and the scope advanced when the UES opened.    Once in the esophagus, air was insufflated and the esophagus visualized. The entire esophagus down to the squamocolumnar junction and proximal stomach were visualized. The scope was slowly withdrawn to get a more detailed view on the way out. Prior to termination of the procedure, all excess air was suctioned from the esophagus. Having completed this the operation was completed and the scope withdrawn atraumatically.   Anesthesia type: 4% topical lidocaine    Findings:     >Other findings and procedures: TNE passes with difficulty through the UES, did not tolerate the scope, had severe pain when the scope was through the UES    Estimated Blood Loss: Minimal  Complication(s): None  Disposition: Patient did not tolerate the procedure well              Review of Relevant Clinical Data   Notes: Hammad Lowe 9/8/21  Radiology: CT neck 2/11/19       Procedures:    Relevant Background:  Clival chordoma with soft palate resection 2001, followed by radiation   Piror Xray Video Swallow Exam in 2017  Worsening globus and inability to  "initiate swallow   PNA in 2018        MBSS Date: 9/2/21     Findings:  Pharyngeal Weakness:Yes  Epiglottic dysfunction: Yes  Penetration: Yes  Aspiration: Yes  UES dysfunction: Yes  Details: unsafe swallow, with pharyngeal weakness, nasopharyngeal regurgitation, does not cough with penetration and aspiration, appears to have narrowing at the UES - worse now than 2021, unclear if it is UES dysfunction vs poor pharyngeal weakness        Recommendations:  Diet: current  Swallow therapy     Office visit, TNE                                      2017 Xray Video Swallow Exam      Labs:  Lab Results   Component Value Date    TSH 1.03 07/14/2016     Lab Results   Component Value Date     09/02/2021    CO2 31 09/02/2021    BUN 13 09/02/2021    PHOS 5.3 (H) 05/10/2013     Lab Results   Component Value Date    WBC 7.3 09/02/2021    HGB 10.0 (L) 09/02/2021    HCT 34.3 (L) 09/02/2021    MCV 92 09/02/2021     09/02/2021     Lab Results   Component Value Date    PTT 34 08/11/2014    INR 0.99 02/11/2019     No results found for: ANGELICA  No components found for: RHEUMATOIDFACTOR,  RF  Lab Results   Component Value Date    CRP 29.0 (H) 02/11/2019    CRP 7.1 08/11/2014    CRP <3.0 04/05/2008     No components found for: CKTOT, URICACID  No components found for: C3, C4, DSDNAAB, NDNAABIFA  No results found for: MPOAB    Patient reported Quality of Life (QOL) Measures   Patient Supplied Answers To VHI Questionnaire  Voice Handicap Index (VHI-10) 9/10/2021   My voice makes it difficult for people to hear me 3   People have difficulty understanding me in a noisy room 4   My voice difficulties restrict my personal and social life.  3   I feel left out of conversations because of my voice 2   I feel as though I have to strain to produce voice 2   The clarity of my voice is unpredictable 3   My voice problem upsets me 2   My voice makes me feel handicapped 4   People ask, \"What's wrong with your voice?\" 2   VHI-10 25 "         Patient Supplied Answers To EAT Questionnaire  No flowsheet data found.      Patient Supplied Answers To CSI Questionnaire  No flowsheet data found.      Patient Supplied Answers to Dyspnea Index Questionnaire:  No flowsheet data found.    Impression & Plan     IMPRESSION: Ms. Joaquin is a 41 year old female who is being seen for the followin. Dysphagia  - for a long time  - in the setting of clival chordoma with soft palate resection , followed by radiation   - had prior Xray Video Swallow Exam in 2017  Worsening globus and inability to initiate swallow   PNA in 2018  - Xray Video Swallow Exam 21- unsafe swallow, with pharyngeal weakness, nasopharyngeal regurgitation, does not cough with penetration and aspiration, appears to have narrowing at the UES - worse now than , unclear if it is UES dysfunction vs poor pharyngeal weakness  - PNA 2 years ago  - feels like she eats everything she wants - has limitation due to only having front teeth  - FEES shows  vallecular residue, pyriform sinus residue, pharyngeal wall residue, postcricoid residue and premature spillage to vocal folds and penetration, no aspiration  - TNE shows that it passes with difficulty through the UES, did not tolerate the scope, had severe pain when the scope was through the UES  - discussed she likely does have some tightness of the UES that if dilated could improve her swallow though her main issue with her swallow is the pharyngeal weakness  - the soft palate and nasal regurgitation is likely not possible to be fixed given the abnormal anatomy  - discussed options for dilation with MAC - transnasal and transoral or with general anesthesia with MDL  - discussed that improving UES patency can also make reflux worse and she has significant reflux changes   Plan  - GI for Dr Soni to have reflux management and consider transoral dilation  - return after to decide if she wants to proceed with dilation and if yes with which  option      2. Dysphonia  - voice bothers   - people cant understand her  - tried multiple things for her soft palate  - scope shows bilateral vocal fold thickening and erythema, and severe postcricoid reflux changes  - symptoms due to dysphonia from vocal folds as well as disarthria  - will treat for fungal and bacterial laryngitis   Plan  - abx (bactrim) and antifungal (fluconazole and nystatin)  - follow up on October 14 at 11AM  - will need to be seen with voice SLP at that time to address muscle tension dysphonia    RETURN VISIT: 1 month    Thank you for the kind referral and for allowing me to share in the care of Ms. Joaquin. If you have any questions, please do not hesitate to contact me.    Nitza Porter MD    Laryngology    [facility]  Department of  Otolaryngology - Head and Neck Surgery  Clinics [place] [address]  Appointment line: [phone]  Fax: [phone]  [email or web address]

## 2021-10-13 NOTE — PROGRESS NOTES
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 Joaquin is a 41 year old female is being followed for dysphagia. She was initially seen on 9/10/21. Please refer to this note for full history.     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 Has had multiple surgeries. Has had a GJ tube in the past. Has had a trach in the past. She has hardware in her neck placed at the time of her resection.     Today, she presents for follow up. 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     Gastro-oesophageal reflux disease      Hypertension      IBS (irritable bowel syndrome)      MDD (major depressive disorder)      Migraine      Obesity (BMI 30-39.9)      Velopharyngeal insufficiency, acquired        PAST SURGICAL HISTORY:   Past Surgical History:   Procedure Laterality Date     BIOPSY       brain tumor removal       DAVINCI ASSISTED UVULOPALATOPHARYNGOPLASTY  2013    Procedure: DAVINCI ASSISTED UVULOPALATOPHARYNGOPLASTY;  Davinci Pharyngoplasty ;  Surgeon: Hammad Lowe MD;  Location: [facility] OR     ESOPHAGOSCOPY, GASTROSCOPY,  DUODENOSCOPY (EGD), COMBINED  12/5/2011    Procedure:COMBINED ESOPHAGOSCOPY, GASTROSCOPY, DUODENOSCOPY (EGD); Surgeon:CEDRICK PABLO; Location:[facility]     neck fusion to c3      to C4      proton particle radiation       soft palette removed, throat x4         CURRENT MEDICATIONS:   Current Outpatient Medications:      AFLURIA QUADRIVALENT 0.5 ML injection, ADM 0.5ML IM UTD, Disp: , Rfl:      atenolol (TENORMIN) 25 MG tablet, Take 1 tablet (25 mg) by mouth daily . Patient needs to see primary provider for further refills., Disp: 14 tablet, Rfl: 0     atomoxetine (STRATTERA) 60 MG capsule, Take 80 mg by mouth daily , Disp: , Rfl:      BACLOFEN PO, Take 10 mg by mouth 3 times daily , Disp: , Rfl:      brexpiprazole (REXULTI) 0.5 MG tablet, Take 1 mg by mouth daily, Disp: , Rfl:      buprenorphine (BUTRANS) 10 MCG/HR WK patch, Place 1 patch onto the skin every 7 days, Disp: , Rfl:      cevimeline (EVOXAC) 30 MG capsule, Take 1 capsule (30 mg) by mouth 3 times daily, Disp: 90 capsule, Rfl: 3     Cholecalciferol (VITAMIN D) 2000 UNITS CAPS, , Disp: , Rfl:      cyclobenzaprine (FLEXERIL) 10 MG tablet, Take 1 tablet (10 mg) by mouth 3 times daily as needed, Disp: 20 tablet, Rfl: 0     esomeprazole (NEXIUM) 40 MG capsule, Take 1 capsule (40 mg) by mouth every morning (before breakfast) One hour before meals, Disp: 90 capsule, Rfl: 3     fluconazole (DIFLUCAN) 100 MG tablet, Take 200mg(2 tablets) the first day, then take 100mg (1 tablet) for the remaining 13 days., Disp: 15 tablet, Rfl: 0     GABAPENTIN 300 MG OR CAPS, Takes 600 mg qid, Disp: , Rfl:      hydrOXYzine (ATARAX) 25 MG tablet, , Disp: , Rfl:      levomilnacipran (FETZIMA) 80 MG 24 HR capsule, Take 80 mg by mouth daily, Disp: , Rfl:      MAGNESIUM OXIDE PO, Take 500 mg by mouth daily, Disp: , Rfl:      meclizine (ANTIVERT) 12.5 MG tablet, TAKE 1 TABLET(12.5 MG) BY MOUTH THREE TIMES DAILY AS NEEDED FOR DIZZINESS, Disp: 15 tablet, Rfl: 0     methylPREDNISolone (MEDROL)  4 MG tablet, Take 1 tablet (4 mg) by mouth daily, Disp: 21 tablet, Rfl: 0     mirabegron (MYRBETRIQ) 50 MG 24 hr tablet, Take 1 tablet (50 mg) by mouth daily, Disp: 90 tablet, Rfl: 3     mirabegron (MYRBETRIQ) 50 MG 24 hr tablet, Take 1 tablet (50 mg) by mouth daily . Patient needs to see primary provider for further refills., Disp: 30 tablet, Rfl: 0     Multiple Vitamins-Minerals (MULTIVITAL PO), Take by mouth daily, Disp: , Rfl:      nortriptyline (PAMELOR) 25 MG capsule, Take 1 capsule (25 mg) by mouth At Bedtime Call clinic to schedule follow up appointment., Disp: 90 capsule, Rfl: 0     oxyCODONE-acetaminophen (PERCOCET) 5-325 MG per tablet, Take 0.05 tablets by mouth 2 times daily , Disp: , Rfl:      pseudoePHEDrine (SUDOGEST 12 HOUR) 120 MG 12 hr tablet, TAKE 1 TABLET BY MOUTH EVERY 12 HOURS AS NEEDED FOR CONGESTION, Disp: 60 tablet, Rfl: 2     SUMAtriptan (IMITREX) 100 MG tablet, Take 1 tablet (100 mg) by mouth at onset of headache for migraine May repeat after one hour, max 200 mg in 24 hours, Disp: 12 tablet, Rfl: 0     tretinoin (RETIN-A) 0.025 % external cream, Apply small pea-sized amount to affected skin at bedtime, use sunscreen during day, Disp: 30 g, Rfl: 1    ALLERGIES: Zolpidem, Ambien [zolpidem tartrate], Amoxicillin, Cephalexin, Cephalosporins, Levaquin [levofloxacin], and Morphine    SOCIAL HISTORY:    Social History     Socioeconomic History     Marital status: Single     Spouse name: Not on file     Number of children: Not on file     Years of education: Not on file     Highest education level: Not on file   Occupational History     Not on file   Tobacco Use     Smoking status: Former Smoker     Packs/day: 0.20     Years: 10.00     Pack years: 2.00     Types: Cigarettes     Quit date: 2013     Years since quittin.7     Smokeless tobacco: Never Used     Tobacco comment: e-cig   Substance and Sexual Activity     Alcohol use: Not Currently     Comment: socially     Drug use: No      Sexual activity: Never   Other Topics Concern     Parent/sibling w/ CABG, MI or angioplasty before 65F 55M? Not Asked   Social History Narrative     Not on file     Social Determinants of Health     Financial Resource Strain:      Difficulty of Paying Living Expenses:    Food Insecurity:      Worried About Running Out of Food in the Last Year:      Ran Out of Food in the Last Year:    Transportation Needs:      Lack of Transportation (Medical):      Lack of Transportation (Non-Medical):    Physical Activity:      Days of Exercise per Week:      Minutes of Exercise per Session:    Stress:      Feeling of Stress :    Social Connections:      Frequency of Communication with Friends and Family:      Frequency of Social Gatherings with Friends and Family:      Attends Sabianism Services:      Active Member of Clubs or Organizations:      Attends Club or Organization Meetings:      Marital Status:    Intimate Partner Violence: Not At Risk     Fear of Current or Ex-Partner: No     Emotionally Abused: No     Physically Abused: No     Sexually Abused: No         FAMILY HISTORY:   Family History   Problem Relation Age of Onset     Uterine Cancer Mother      Arthritis Father         RA     Breast Cancer Maternal Grandmother      Musculoskeletal Disorder Maternal Grandmother         MS     Arthritis Paternal Grandmother       Non-contributory for problems with anesthesia    REVIEW OF SYSTEMS:   The patient was asked a 14 point review of systems regarding constitutional symptoms, eye symptoms, ears, nose, mouth, throat symptoms, cardiovascular symptoms, respiratory symptoms, gastrointestinal symptoms, genitourinary symptoms, musculoskeletal symptoms, integumentary symptoms, neurological symptoms, psychiatric symptoms, endocrine symptoms, hematologic/lymphatic symptoms, and allergic/ immunologic symptoms.   The pertinent factors have been included in the HPI and below.  Patient Supplied Answers to Review of Systems  [facility] ENT ROS  8/11/2021   Constitutional Problems with sleep   Neurology Headache   Psychology -   Ears, Nose, Throat Hearing loss, Ear pain, Ringing/noise in ears, Nasal congestion or drainage, Sore throat, Trouble swallowing, Hoarseness, Coughing up blood   Cardiopulmonary Cough   Gastrointestinal/Genitourinary -   Musculoskeletal Sore or stiff joints, Swollen joints, Back pain, Neck pain, Swollen legs/feet   Allergy/Immunology Allergies or hay fever   Hematologic Bleeding problems, Easy bruising   Endocrine Thirst, Heat or cold intolerance, Frequent urination       Physical Examination   The patient underwent a physical examination as described below. The pertinent positive and negative findings are summarized after the description of the examination.  Constitutional: The patient's developmental and nutritional status was assessed. The patient's voice quality was assessed.  Head and Face: The head and face were inspected for deformities. The sinuses were palpated. The salivary glands were palpated. Facial muscle strength was assessed bilaterally.  Eyes: Extraocular movements and primary gaze alignment were assessed.  Ears, Nose, Mouth and Throat: The ears and nose were examined for deformities. The nasal septum, mucosa, and turbinates were inspected by anterior rhinoscopy. The lips, teeth, and gums were examined for abnormalities. The oral mucosa, tongue, palate, tonsils, lateral and posterior pharynx were inspected for the presence of asymmetry or mucosal lesions.    Neck: The tracheal position was noted, and the neck mass palpated to determine if there were any asymmetries, abnormal neck masses, thyromegally, or thyroid nodules.  Respiratory: The nature of the breathing and chest expansion/symmetry was observed.  Cardiovascular: The patient was examined to determine the presence of any edema or jugular venous distension.  Abdomen: The contour of the abdomen was noted.  Lymphatic: The patient was examined for infraclavicular  lymphadenopathy.  Musculoskeletal: The patient was inspected for the presence of skeletal deformities.  Extremities: The extremities were examined for any clubbing or cyanosis.  Skin: The skin was examined for inflammatory or neoplastic conditions.  Neurologic: The patient's orientation, mood, and affect were noted. The cranial nerve  functions were examined.  Other pertinent positive and negative findings on physical examination:   OC/OP: no lesions   Neck: no lesions   All other physical examination findings were within normal limits and noncontributory.    Procedures   Flexible laryngoscopy (CPT 51157)      Pre-procedure diagnosis: dysphonia   Post-procedure diagnosis: same as above  Indication for procedure: Ms. Joaquin is a 41 year old female with see above  Procedure(s): Fiberoptic Laryngoscopy    Details of Procedure: After informed consent was obtained, the patient was seated in the examination chair.  The areas of the nasopharynx as well as the hypopharynx were anesthetized with topical 4% lidocaine with 0.25% phenylephrine atomizer.  Examination of the base of tongue was performed first.  Attention was directed to any evidence of masses in the area or evidence of leukoplakia or candidal infection.  Attention was directed to the epiglottis where its size and position was determined and its movement on phonation of the vowel  e .  The piriform sinuses were then inspected for any mass lesions or pooling of secretions.  Attention was then directed to the larynx. The vocal folds were inspected for infection or any areas of leukoplakia, for masses, polypoid degeneration, or hemorrhage.  Having done this, the arytenoids and vocal processes were inspected for erythema or evidence of granuloma formation.  The posterior commissure was then inspected for evidence of inflammatory changes in the mucosa and heaping up of mucosal tissue. The patient was then instructed to say the vowel  e .  Adduction of vocal folds to the  midline was observed for any evidence of paresis or paralysis of the larynx or asymmetry in rotation of the larynx to the left or right. The patient was asked to breathe and the degree of abduction was noted bilaterally.  Subglottic view of the larynx was obtained for any additional mass lesions or mucosal changes.  Finally the post cricoid was examined for evidence of pooling of secretions, as well as the pharyngeal wall mucosa.   Anesthesia type: 0.25% phenylephrine    Findings:  Anatomic/physiological deviations: nasopharyngeal scarring, thickened vocal folds, supraglottic hyperfunction, paradoxical vocal fold motion    Right vocal process: No restriction of mobility   Left vocal process: No restriction of mobility  Glottal gap: Complete glottal closure  Supraglottic structures: Normal  Hypopharynx: Normal     Estimated Blood Loss: minimal  Complications: None  Disposition: Patient tolerated the procedure well                Fiberoptic Endoscopic Evaluation of Swallowing (CPT 25875)  and Interpretation of Swallowing (CPT 24120)    Indications: See above notes for complete history and physical. Patient complains of dysphagia to both solids and liquids and/or there is suggestion on history and endoscopic exam of the presence of dysphagia causing medical complaints.  Swallowing evaluation is being performed to assess the presence and degree of dysphagia, and to recommend a safe diet.     Pulmonary Status:  No PNA   Current Diet:              regular                                        thin liquids      Consistency Amounts:  Thin Liquid: sip   Puree: 1 tsp  Solid: cookies            Positioning: upright in a chair  Oral Peripheral Exam: See physical exam section.  Anatomic Notes: See Videostroboscopy report for assessment of anatomy and laryngeal functioning  Pharyngeal secretions prior to administration of liquid or food: No  Oral Phase Abnormal Findings: No abnormal behavior observed  Behavioral Adaptations:  "Repeat dry swallows  Pharyngeal Phase Abnormal Findings: penetration, pharyngeal, nasopharyngeal residue with solids     Recommended Diet:  regular                                        thin liquids                   Review of Relevant Clinical Data      Labs:  Lab Results   Component Value Date    TSH 1.03 07/14/2016     Lab Results   Component Value Date     09/02/2021    CO2 31 09/02/2021    BUN 13 09/02/2021    PHOS 5.3 (H) 05/10/2013     Lab Results   Component Value Date    WBC 7.3 09/02/2021    HGB 10.0 (L) 09/02/2021    HCT 34.3 (L) 09/02/2021    MCV 92 09/02/2021     09/02/2021     Lab Results   Component Value Date    PTT 34 08/11/2014    INR 0.99 02/11/2019     No results found for: ANGELICA  No components found for: RHEUMATOIDFACTOR,  RF  Lab Results   Component Value Date    CRP 29.0 (H) 02/11/2019    CRP 7.1 08/11/2014    CRP <3.0 04/05/2008     No components found for: CKTOT, URICACID  No components found for: C3, C4, DSDNAAB, NDNAABIFA  No results found for: MPOAB    Patient reported Quality of Life (QOL) Measures   Patient Supplied Answers To VHI Questionnaire  Voice Handicap Index (VHI-10) 10/14/2021   My voice makes it difficult for people to hear me 3   People have difficulty understanding me in a noisy room 3   My voice difficulties restrict my personal and social life.  2   I feel left out of conversations because of my voice 1   My voice problem causes me to lose income 0   I feel as though I have to strain to produce voice 2   The clarity of my voice is unpredictable 2   My voice problem upsets me 2   My voice makes me feel handicapped 3   People ask, \"What's wrong with your voice?\" 0   VHI-10 18         Patient Supplied Answers To EAT Questionnaire  Eating Assessment Tool (EAT-10) 10/14/2021   My swallowing problem has caused me to lose weight 0   My swallowing problem interferes with my ability to go out for meals 0   Swallowing liquids takes extra effort 2   Swallowing solids " takes extra effort 3   Swallowing pills takes extra effort 3   Swallowing is painful 2   The pleasure of eating is affected by my swallowing 1   When I swallow food sticks in my throat 3   I cough when I eat 3   Swallowing is stressful 2   EAT-10 19         Patient Supplied Answers To CSI Questionnaire  Cough Severity Index (CSI) 10/14/2021   My cough is worse when I lie down 2   My coughing problem causes me to restrict my personal and social life 0   I tend to avoid places because of my cough problem 0   I feel embarrassed because of my coughing problem 0   People ask, ''What's wrong?'' because I cough a lot 0   I run out of air when I cough 2   My coughing problem affects my voice 4   My coughing problem limits my physical activity 0   My coughing problem upsets me 2   People ask me if I am sick because I cough a lot 0   CSI Score 10         Patient Supplied Answers to Dyspnea Index Questionnaire:  Dyspnea Index 10/14/2021   1. I have trouble getting air in. 0   2. I feel tightness in my throat when I am having patricia breathing problem. 0   3. It takes more effort to breathe than it used to. 0   4. Change in weather affect my breathing problem. 0   5. My breathing gets worse with stress. 0   6. I make sound/noise breathing in 2   7. I have to strain to breathe. 0   8. My shortness of breath gets worse with exercise or physical activity 0   9. My breathing problem makes me feel stressed. 1   10. My breathing problem casuses me to restrict my personal and social life. 0   Dyspnea Index Total Score 3       Impression & Plan     IMPRESSION: Ms. Joaquin is a 41 year old female who is being seen for the followin. Dysphagia  - for a long time  - in the setting of clival chordoma with soft palate resection , followed by radiation   - had prior Xray Video Swallow Exam in 2017  Worsening globus and inability to initiate swallow   PNA in 2018  - Xray Video Swallow Exam 21- unsafe swallow, with pharyngeal weakness,  nasopharyngeal regurgitation, does not cough with penetration and aspiration, appears to have narrowing at the UES - worse now than 2021, unclear if it is UES dysfunction vs poor pharyngeal weakness  - PNA 2 years ago  - feels like she eats everything she wants - has limitation due to only having front teeth  - FEES shows  vallecular residue, pyriform sinus residue, pharyngeal wall residue, postcricoid residue and premature spillage to vocal folds and penetration, no aspiration  - TNE shows that it passes with difficulty through the UES, did not tolerate the scope, had severe pain when the scope was through the UES  - discussed she likely does have some tightness of the UES that if dilated could improve her swallow though her main issue with her swallow is the pharyngeal weakness  - the soft palate and nasal regurgitation is likely not possible to be fixed given the abnormal anatomy  - discussed options for dilation with MAC - transnasal and transoral or with general anesthesia with MDL  - discussed that improving UES patency can also make reflux worse and she has significant reflux changes   - symptoms a lot better after her TNE - thinks she was passively dilated  - FEES shows penetration, pharyngeal, nasopharyngeal residue with solids   - discussed proceeding with anotehr dilation   - does plan to have spine surgeyr - discussed we will need to help with this given her high risk of worsening dysphagia after  Plan  -  TNE with dilation  - schedule surgery   - Xray Video Swallow Exam after in 2-4 weeks followed by office visit    2. Dysphonia  - voice bothers   - people cant understand her  - tried multiple things for her soft palate  - scope shows bilateral vocal fold thickening and erythema, and severe postcricoid reflux changes  - symptoms due to dysphonia from vocal folds as well as disarthria  - will treat for fungal and bacterial laryngitis   - received abx (bactrim) and antifungal (fluconazole and nystatin)  -  voice improved but thinks its due to decresa in ccough  - now has started coughign asa for the past week and her vocie is back   - scope shows nasopharyngeal scarring, thickened vocal folds, supraglottic hyperfunction, paradoxical vocal fold motion   Plan  - voice therapy after dilation     RETURN VISIT: after dilation    Nitza Porter MD    Laryngology    [facility]  Department of  Otolaryngology - Head and Neck Surgery  Clinics & Surgery Center  [address] [place] [address]  Appointment line: [phone]  Fax: [phone]  [email or web address]

## 2021-10-14 NOTE — NURSING NOTE
Chief Complaint   Patient presents with     RECHECK     follow up       Pulse 87, height 1.524 m (5'), weight 111.1 kg (245 lb), SpO2 100 %, not currently breastfeeding.    Ino Lemons EMT

## 2021-10-14 NOTE — PATIENT INSTRUCTIONS
1.  You were seen in the ENT Clinic today by . If you have any questions or concerns after your appointment, please call [phone]. Press option #1 for scheduling related needs. Press option #3 for Nurse advice.    2.   has recommended  the following:   - schedule surgery   -chest xray    3.  Plan is to return to clinic for post operative follow up      Robina Paul LPN  [phone]  [facility] - Otolaryngology

## 2021-10-14 NOTE — LETTER
10/14/2021       RE: Giovana Joaquin  [address]  [place] [address]     Dear Colleague,    Thank you for referring your patient, Giovana Joaquin, to the [facility] [place] at [facility]. Please see a copy of my visit note below.    [facility]  John Conrad Jr., M.D., F.A.C.S.  Marti Michele M.D., M.P.H.  Nitza Porter M.D.  Celeste Rodas, Ph.D., CCC-SLP  Jarad Pa, Ph.D., [facility]-SLP  Gina Johansen M.M. (voice), M.A., CCC-SLP  Cordell Dennis M.M. (voice), M.A., CCC-SLP  AYO Soni (voice), M.S., CCC-SLP    [facility]  VOICE EVALUATION/LARYNGEAL EXAMINATION REPORT    Patient: Giovana Joaquin  Date of Service: 10/14/2021    HISTORY  PATIENT INFORMATION  Giovana Joaquin was seen for brief consultation in conjunction with a visit to Dr. Porter today.  Please refer to the physician's dictation for a more complete history and impressions.     Giovana reports that her voice has been much worse secondary to increased coughing, secondary to worsened swallow. These three symptoms were much improved and she wasn't having any concerns about a raspy voice following her recent TNE by Dr. Porter, but the coughing and swallowing concerns returned as the benefit faded. Her voice is raspy and hyperfunctional today, but did show some improvement with N-initial resonant voice tasks.     Evaluation was begun but has not been completed. We have agreed that Giovana would benefit from a course of speech therapy and she will schedule this following esophageal dilation, as her voice measurements will be changed. At that time, the evaluation will be completed and a full report will follow.     Frequency: 3 biweekly virtual visits, 1 new eval and 2 return visits.     DIAGNOSIS/REASON FOR REFERRAL  Evaluate, perform laryngeal exam, treat as appropriate for:  Dysphonia (R49.0)    No charge for today's session; charges  will be billed at the completion of the evaluation  NO CHARGE FACILITY FEE/ND NO CHARGE LOS ([id number])    Mildred Soni (voice), M.S., CCC-SLP  Speech-Language Pathologist  [facility]  [phone]  [email or web address]  Pronouns: she/her/hers      Again, thank you for allowing me to participate in the care of your patient.      Sincerely,    Rissa Edwards, SLP

## 2021-10-14 NOTE — PROGRESS NOTES
[facility]  John Conrad Jr., M.D., F.A.C.S.  Marti Michele M.D., M.P.H.  Nitza Porter M.D.  Celeste Rodas, Ph.D., CCC-SLP  Jarad Pa, Ph.D., [facility]-SLP  Gina Johansen M.M. (voice), M.A., CCC-SLP  Cordell Dennis M.M. (voice), MOLIVA., CCC-SLP  AYO Soni (voice), M.S., CCC-SLP    [facility]  VOICE EVALUATION/LARYNGEAL EXAMINATION REPORT    Patient: Giovana Joaquin  Date of Service: 10/14/2021    HISTORY  PATIENT INFORMATION  Giovana Joaquin was seen for brief consultation in conjunction with a visit to Dr. Porter today.  Please refer to the physician's dictation for a more complete history and impressions.     Giovana reports that her voice has been much worse secondary to increased coughing, secondary to worsened swallow. These three symptoms were much improved and she wasn't having any concerns about a raspy voice following her recent TNE by Dr. Porter, but the coughing and swallowing concerns returned as the benefit faded. Her voice is raspy and hyperfunctional today, but did show some improvement with N-initial resonant voice tasks.     Evaluation was begun but has not been completed. We have agreed that Giovana would benefit from a course of speech therapy and she will schedule this following esophageal dilation, as her voice measurements will be changed. At that time, the evaluation will be completed and a full report will follow.     Frequency: 3 biweekly virtual visits, 1 new eval and 2 return visits.     DIAGNOSIS/REASON FOR REFERRAL  Evaluate, perform laryngeal exam, treat as appropriate for:  Dysphonia (R49.0)    No charge for today's session; charges will be billed at the completion of the evaluation  NO CHARGE FACILITY FEE/OR NO CHARGE LOS ([id number])    Mildred Soni (voice), M.S., CCC-SLP  Speech-Language Pathologist  [facility]  [phone]  [email or web address]  Pronouns: she/her/hers

## 2021-10-14 NOTE — LETTER
10/14/2021       RE: Giovana Joaquin  [address]  [place] [address]     Dear Colleague,    Thank you for referring your patient, Giovana Joaquin, to the [facility] EAR NOSE AND THROAT CLINIC [place] [facility]. Please see a copy of my visit note below.        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 Joaquin is a 41 year old female is being followed for dysphagia. She was initially seen on 9/10/21. Please refer to this note for full history.     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 Has had multiple surgeries. Has had a GJ tube in the past. Has had a trach in the past. She has hardware in her neck placed at the time of her resection.     Today, she presents for follow up. 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     Gastro-oesophageal reflux disease      Hypertension      IBS (irritable bowel syndrome)      MDD (major depressive disorder)      Migraine      Obesity (BMI 30-39.9)      Velopharyngeal insufficiency, acquired        PAST SURGICAL  HISTORY:   Past Surgical History:   Procedure Laterality Date     BIOPSY       brain tumor removal       DAVINCI ASSISTED UVULOPALATOPHARYNGOPLASTY  5/7/2013    Procedure: DAVINCI ASSISTED UVULOPALATOPHARYNGOPLASTY;  Davinci Pharyngoplasty ;  Surgeon: Hammad Lowe MD;  Location:  OR     ESOPHAGOSCOPY, GASTROSCOPY, DUODENOSCOPY (EGD), COMBINED  12/5/2011    Procedure:COMBINED ESOPHAGOSCOPY, GASTROSCOPY, DUODENOSCOPY (EGD); Surgeon:CEDRICK PABLO; Location:[facility]     neck fusion to c3      to C4      proton particle radiation       soft palette removed, throat x4         CURRENT MEDICATIONS:   Current Outpatient Medications:      AFLURIA QUADRIVALENT 0.5 ML injection, ADM 0.5ML IM UTD, Disp: , Rfl:      atenolol (TENORMIN) 25 MG tablet, Take 1 tablet (25 mg) by mouth daily . Patient needs to see primary provider for further refills., Disp: 14 tablet, Rfl: 0     atomoxetine (STRATTERA) 60 MG capsule, Take 80 mg by mouth daily , Disp: , Rfl:      BACLOFEN PO, Take 10 mg by mouth 3 times daily , Disp: , Rfl:      brexpiprazole (REXULTI) 0.5 MG tablet, Take 1 mg by mouth daily, Disp: , Rfl:      buprenorphine (BUTRANS) 10 MCG/HR WK patch, Place 1 patch onto the skin every 7 days, Disp: , Rfl:      cevimeline (EVOXAC) 30 MG capsule, Take 1 capsule (30 mg) by mouth 3 times daily, Disp: 90 capsule, Rfl: 3     Cholecalciferol (VITAMIN D) 2000 UNITS CAPS, , Disp: , Rfl:      cyclobenzaprine (FLEXERIL) 10 MG tablet, Take 1 tablet (10 mg) by mouth 3 times daily as needed, Disp: 20 tablet, Rfl: 0     esomeprazole (NEXIUM) 40 MG capsule, Take 1 capsule (40 mg) by mouth every morning (before breakfast) One hour before meals, Disp: 90 capsule, Rfl: 3     fluconazole (DIFLUCAN) 100 MG tablet, Take 200mg(2 tablets) the first day, then take 100mg (1 tablet) for the remaining 13 days., Disp: 15 tablet, Rfl: 0     GABAPENTIN 300 MG OR CAPS, Takes 600 mg qid, Disp: , Rfl:      hydrOXYzine (ATARAX) 25 MG tablet, , Disp: , Rfl:       levomilnacipran (FETZIMA) 80 MG 24 HR capsule, Take 80 mg by mouth daily, Disp: , Rfl:      MAGNESIUM OXIDE PO, Take 500 mg by mouth daily, Disp: , Rfl:      meclizine (ANTIVERT) 12.5 MG tablet, TAKE 1 TABLET(12.5 MG) BY MOUTH THREE TIMES DAILY AS NEEDED FOR DIZZINESS, Disp: 15 tablet, Rfl: 0     methylPREDNISolone (MEDROL) 4 MG tablet, Take 1 tablet (4 mg) by mouth daily, Disp: 21 tablet, Rfl: 0     mirabegron (MYRBETRIQ) 50 MG 24 hr tablet, Take 1 tablet (50 mg) by mouth daily, Disp: 90 tablet, Rfl: 3     mirabegron (MYRBETRIQ) 50 MG 24 hr tablet, Take 1 tablet (50 mg) by mouth daily . Patient needs to see primary provider for further refills., Disp: 30 tablet, Rfl: 0     Multiple Vitamins-Minerals (MULTIVITAL PO), Take by mouth daily, Disp: , Rfl:      nortriptyline (PAMELOR) 25 MG capsule, Take 1 capsule (25 mg) by mouth At Bedtime Call clinic to schedule follow up appointment., Disp: 90 capsule, Rfl: 0     oxyCODONE-acetaminophen (PERCOCET) 5-325 MG per tablet, Take 0.05 tablets by mouth 2 times daily , Disp: , Rfl:      pseudoePHEDrine (SUDOGEST 12 HOUR) 120 MG 12 hr tablet, TAKE 1 TABLET BY MOUTH EVERY 12 HOURS AS NEEDED FOR CONGESTION, Disp: 60 tablet, Rfl: 2     SUMAtriptan (IMITREX) 100 MG tablet, Take 1 tablet (100 mg) by mouth at onset of headache for migraine May repeat after one hour, max 200 mg in 24 hours, Disp: 12 tablet, Rfl: 0     tretinoin (RETIN-A) 0.025 % external cream, Apply small pea-sized amount to affected skin at bedtime, use sunscreen during day, Disp: 30 g, Rfl: 1    ALLERGIES: Zolpidem, Ambien [zolpidem tartrate], Amoxicillin, Cephalexin, Cephalosporins, Levaquin [levofloxacin], and Morphine    SOCIAL HISTORY:    Social History     Socioeconomic History     Marital status: Single     Spouse name: Not on file     Number of children: Not on file     Years of education: Not on file     Highest education level: Not on file   Occupational History     Not on file   Tobacco Use      Smoking status: Former Smoker     Packs/day: 0.20     Years: 10.00     Pack years: 2.00     Types: Cigarettes     Quit date: 2013     Years since quittin.7     Smokeless tobacco: Never Used     Tobacco comment: e-cig   Substance and Sexual Activity     Alcohol use: Not Currently     Comment: socially     Drug use: No     Sexual activity: Never   Other Topics Concern     Parent/sibling w/ CABG, MI or angioplasty before 65F 55M? Not Asked   Social History Narrative     Not on file     Social Determinants of Health     Financial Resource Strain:      Difficulty of Paying Living Expenses:    Food Insecurity:      Worried About Running Out of Food in the Last Year:      Ran Out of Food in the Last Year:    Transportation Needs:      Lack of Transportation (Medical):      Lack of Transportation (Non-Medical):    Physical Activity:      Days of Exercise per Week:      Minutes of Exercise per Session:    Stress:      Feeling of Stress :    Social Connections:      Frequency of Communication with Friends and Family:      Frequency of Social Gatherings with Friends and Family:      Attends Mu-ism Services:      Active Member of Clubs or Organizations:      Attends Club or Organization Meetings:      Marital Status:    Intimate Partner Violence: Not At Risk     Fear of Current or Ex-Partner: No     Emotionally Abused: No     Physically Abused: No     Sexually Abused: No         FAMILY HISTORY:   Family History   Problem Relation Age of Onset     Uterine Cancer Mother      Arthritis Father         RA     Breast Cancer Maternal Grandmother      Musculoskeletal Disorder Maternal Grandmother         MS     Arthritis Paternal Grandmother       Non-contributory for problems with anesthesia    REVIEW OF SYSTEMS:   The patient was asked a 14 point review of systems regarding constitutional symptoms, eye symptoms, ears, nose, mouth, throat symptoms, cardiovascular symptoms, respiratory symptoms, gastrointestinal symptoms,  genitourinary symptoms, musculoskeletal symptoms, integumentary symptoms, neurological symptoms, psychiatric symptoms, endocrine symptoms, hematologic/lymphatic symptoms, and allergic/ immunologic symptoms.   The pertinent factors have been included in the HPI and below.  Patient Supplied Answers to Review of Systems  [facility] E[facility] ROS 8/11/2021   Constitutional Problems with sleep   Neurology Headache   Psychology -   Ears, Nose, Throat Hearing loss, Ear pain, Ringing/noise in ears, Nasal congestion or drainage, Sore throat, Trouble swallowing, Hoarseness, Coughing up blood   Cardiopulmonary Cough   Gastrointestinal/Genitourinary -   Musculoskeletal Sore or stiff joints, Swollen joints, Back pain, Neck pain, Swollen legs/feet   Allergy/Immunology Allergies or hay fever   Hematologic Bleeding problems, Easy bruising   Endocrine Thirst, Heat or cold intolerance, Frequent urination       Physical Examination   The patient underwent a physical examination as described below. The pertinent positive and negative findings are summarized after the description of the examination.  Constitutional: The patient's developmental and nutritional status was assessed. The patient's voice quality was assessed.  Head and Face: The head and face were inspected for deformities. The sinuses were palpated. The salivary glands were palpated. Facial muscle strength was assessed bilaterally.  Eyes: Extraocular movements and primary gaze alignment were assessed.  Ears, Nose, Mouth and Throat: The ears and nose were examined for deformities. The nasal septum, mucosa, and turbinates were inspected by anterior rhinoscopy. The lips, teeth, and gums were examined for abnormalities. The oral mucosa, tongue, palate, tonsils, lateral and posterior pharynx were inspected for the presence of asymmetry or mucosal lesions.    Neck: The tracheal position was noted, and the neck mass palpated to determine if there were any asymmetries, abnormal neck masses,  thyromegally, or thyroid nodules.  Respiratory: The nature of the breathing and chest expansion/symmetry was observed.  Cardiovascular: The patient was examined to determine the presence of any edema or jugular venous distension.  Abdomen: The contour of the abdomen was noted.  Lymphatic: The patient was examined for infraclavicular lymphadenopathy.  Musculoskeletal: The patient was inspected for the presence of skeletal deformities.  Extremities: The extremities were examined for any clubbing or cyanosis.  Skin: The skin was examined for inflammatory or neoplastic conditions.  Neurologic: The patient's orientation, mood, and affect were noted. The cranial nerve  functions were examined.  Other pertinent positive and negative findings on physical examination:   OC/OP: no lesions   Neck: no lesions   All other physical examination findings were within normal limits and noncontributory.    Procedures   Flexible laryngoscopy (CPT 27090)      Pre-procedure diagnosis: dysphonia   Post-procedure diagnosis: same as above  Indication for procedure: Ms. Joaquin is a 41 year old female with see above  Procedure(s): Fiberoptic Laryngoscopy    Details of Procedure: After informed consent was obtained, the patient was seated in the examination chair.  The areas of the nasopharynx as well as the hypopharynx were anesthetized with topical 4% lidocaine with 0.25% phenylephrine atomizer.  Examination of the base of tongue was performed first.  Attention was directed to any evidence of masses in the area or evidence of leukoplakia or candidal infection.  Attention was directed to the epiglottis where its size and position was determined and its movement on phonation of the vowel  e .  The piriform sinuses were then inspected for any mass lesions or pooling of secretions.  Attention was then directed to the larynx. The vocal folds were inspected for infection or any areas of leukoplakia, for masses, polypoid degeneration, or hemorrhage.   Having done this, the arytenoids and vocal processes were inspected for erythema or evidence of granuloma formation.  The posterior commissure was then inspected for evidence of inflammatory changes in the mucosa and heaping up of mucosal tissue. The patient was then instructed to say the vowel  e .  Adduction of vocal folds to the midline was observed for any evidence of paresis or paralysis of the larynx or asymmetry in rotation of the larynx to the left or right. The patient was asked to breathe and the degree of abduction was noted bilaterally.  Subglottic view of the larynx was obtained for any additional mass lesions or mucosal changes.  Finally the post cricoid was examined for evidence of pooling of secretions, as well as the pharyngeal wall mucosa.   Anesthesia type: 0.25% phenylephrine    Findings:  Anatomic/physiological deviations: nasopharyngeal scarring, thickened vocal folds, supraglottic hyperfunction, paradoxical vocal fold motion    Right vocal process: No restriction of mobility   Left vocal process: No restriction of mobility  Glottal gap: Complete glottal closure  Supraglottic structures: Normal  Hypopharynx: Normal     Estimated Blood Loss: minimal  Complications: None  Disposition: Patient tolerated the procedure well                Fiberoptic Endoscopic Evaluation of Swallowing (CPT 63115)  and Interpretation of Swallowing (CPT 33446)    Indications: See above notes for complete history and physical. Patient complains of dysphagia to both solids and liquids and/or there is suggestion on history and endoscopic exam of the presence of dysphagia causing medical complaints.  Swallowing evaluation is being performed to assess the presence and degree of dysphagia, and to recommend a safe diet.     Pulmonary Status:  No PNA   Current Diet:              regular                                        thin liquids      Consistency Amounts:  Thin Liquid: sip   Puree: 1 tsp  Solid: cookies             Positioning: upright in a chair  Oral Peripheral Exam: See physical exam section.  Anatomic Notes: See Videostroboscopy report for assessment of anatomy and laryngeal functioning  Pharyngeal secretions prior to administration of liquid or food: No  Oral Phase Abnormal Findings: No abnormal behavior observed  Behavioral Adaptations: Repeat dry swallows  Pharyngeal Phase Abnormal Findings: penetration, pharyngeal, nasopharyngeal residue with solids     Recommended Diet:  regular                                        thin liquids                   Review of Relevant Clinical Data      Labs:  Lab Results   Component Value Date    TSH 1.03 07/14/2016     Lab Results   Component Value Date     09/02/2021    CO2 31 09/02/2021    BUN 13 09/02/2021    PHOS 5.3 (H) 05/10/2013     Lab Results   Component Value Date    WBC 7.3 09/02/2021    HGB 10.0 (L) 09/02/2021    HCT 34.3 (L) 09/02/2021    MCV 92 09/02/2021     09/02/2021     Lab Results   Component Value Date    PTT 34 08/11/2014    INR 0.99 02/11/2019     No results found for: ANGELICA  No components found for: RHEUMATOIDFACTOR,  RF  Lab Results   Component Value Date    CRP 29.0 (H) 02/11/2019    CRP 7.1 08/11/2014    CRP <3.0 04/05/2008     No components found for: CKTOT, URICACID  No components found for: C3, C4, DSDNAAB, NDNAABIFA  No results found for: MPOAB    Patient reported Quality of Life (QOL) Measures   Patient Supplied Answers To VHI Questionnaire  Voice Handicap Index (VHI-10) 10/14/2021   My voice makes it difficult for people to hear me 3   People have difficulty understanding me in a noisy room 3   My voice difficulties restrict my personal and social life.  2   I feel left out of conversations because of my voice 1   My voice problem causes me to lose income 0   I feel as though I have to strain to produce voice 2   The clarity of my voice is unpredictable 2   My voice problem upsets me 2   My voice makes me feel handicapped 3   People ask,  "\"What's wrong with your voice?\" 0   VHI-10 18         Patient Supplied Answers To EAT Questionnaire  Eating Assessment Tool (EAT-10) 10/14/2021   My swallowing problem has caused me to lose weight 0   My swallowing problem interferes with my ability to go out for meals 0   Swallowing liquids takes extra effort 2   Swallowing solids takes extra effort 3   Swallowing pills takes extra effort 3   Swallowing is painful 2   The pleasure of eating is affected by my swallowing 1   When I swallow food sticks in my throat 3   I cough when I eat 3   Swallowing is stressful 2   EAT-10 19         Patient Supplied Answers To CSI Questionnaire  Cough Severity Index (CSI) 10/14/2021   My cough is worse when I lie down 2   My coughing problem causes me to restrict my personal and social life 0   I tend to avoid places because of my cough problem 0   I feel embarrassed because of my coughing problem 0   People ask, ''What's wrong?'' because I cough a lot 0   I run out of air when I cough 2   My coughing problem affects my voice 4   My coughing problem limits my physical activity 0   My coughing problem upsets me 2   People ask me if I am sick because I cough a lot 0   CSI Score 10         Patient Supplied Answers to Dyspnea Index Questionnaire:  Dyspnea Index 10/14/2021   1. I have trouble getting air in. 0   2. I feel tightness in my throat when I am having patricia breathing problem. 0   3. It takes more effort to breathe than it used to. 0   4. Change in weather affect my breathing problem. 0   5. My breathing gets worse with stress. 0   6. I make sound/noise breathing in 2   7. I have to strain to breathe. 0   8. My shortness of breath gets worse with exercise or physical activity 0   9. My breathing problem makes me feel stressed. 1   10. My breathing problem casuses me to restrict my personal and social life. 0   Dyspnea Index Total Score 3       Impression & Plan     IMPRESSION: Ms. Joaquin is a 41 year old female who is being seen " for the followin. Dysphagia  - for a long time  - in the setting of clival chordoma with soft palate resection , followed by radiation   - had prior Xray Video Swallow Exam in 2017  Worsening globus and inability to initiate swallow   PNA in 2018  - Xray Video Swallow Exam 21- unsafe swallow, with pharyngeal weakness, nasopharyngeal regurgitation, does not cough with penetration and aspiration, appears to have narrowing at the UES - worse now than , unclear if it is UES dysfunction vs poor pharyngeal weakness  - PNA 2 years ago  - feels like she eats everything she wants - has limitation due to only having front teeth  - FEES shows  vallecular residue, pyriform sinus residue, pharyngeal wall residue, postcricoid residue and premature spillage to vocal folds and penetration, no aspiration  - TNE shows that it passes with difficulty through the UES, did not tolerate the scope, had severe pain when the scope was through the UES  - discussed she likely does have some tightness of the UES that if dilated could improve her swallow though her main issue with her swallow is the pharyngeal weakness  - the soft palate and nasal regurgitation is likely not possible to be fixed given the abnormal anatomy  - discussed options for dilation with MAC - transnasal and transoral or with general anesthesia with MDL  - discussed that improving UES patency can also make reflux worse and she has significant reflux changes   - symptoms a lot better after her TNE - thinks she was passively dilated  - FEES shows penetration, pharyngeal, nasopharyngeal residue with solids   - discussed proceeding with anotehr dilation   - does plan to have spine surgeyr - discussed we will need to help with this given her high risk of worsening dysphagia after  Plan  -  TNE with dilation  - schedule surgery   - Xray Video Swallow Exam after in 2-4 weeks followed by office visit    2. Dysphonia  - voice bothers   - people cant understand  her  - tried multiple things for her soft palate  - scope shows bilateral vocal fold thickening and erythema, and severe postcricoid reflux changes  - symptoms due to dysphonia from vocal folds as well as disarthria  - will treat for fungal and bacterial laryngitis   - received abx (bactrim) and antifungal (fluconazole and nystatin)  - voice improved but thinks its due to decresa in ccough  - now has started coughign agani for the past week and her vocie is back   - scope shows nasopharyngeal scarring, thickened vocal folds, supraglottic hyperfunction, paradoxical vocal fold motion   Plan  - voice therapy after dilation     RETURN VISIT: after dilation    Nitza Porter MD    Laryngology    [facility]  Department of  Otolaryngology - Head and Neck Surgery  Clinics & Surgery Center  [address] [place] [address]  Appointment line: [phone]  Fax: [phone]  [email or web address]

## 2021-10-21 NOTE — TELEPHONE ENCOUNTER
Spoke to patient to discuss plan for TNE procedure.    Patient had questions regarding the numbing process if she were to proceed with the TNE in clinic. Informed patient that the numbing would be similar to previously, but in addition Dr. Porter would give her viscous lidocaine to swallow to help numb the esophagus. However, she could still have pain/discomfort with this approach.     Given the amount of pain at the last clinic visit, patient states that she will still be very uncomfortable and that she would prefer to do this in the OR.    Informed patient that it may be a few weeks before the procedure due to OR availability in the OR at this time. Patient is okay with waiting.    Patient has no further questions at this time. Patient will call back with any new concerns or questions.    Esperanza Sweeney RN on 10/21/2021 at 10:12 AM

## 2021-10-27 NOTE — TELEPHONE ENCOUNTER
Called patient back on [phone] and spoke with the patient. She accepted the 11/10 surgery date at [place] with Dr. Porter. Patient is aware they needs to have a pre-op physical within 30 days of surgery with their PCP. Patient is aware they will get a call from a pre-op prior to surgery to discuss arrival time and when to stop eating/drinking. Patient is aware that Central Scheduling will call to help schedule their COVID test. Surgery packet sent in the mail. No further questions at this time.

## 2021-11-02 NOTE — TELEPHONE ENCOUNTER
Called patient to ask if she can move to 11/3 instead of her scheduled date. Patient has pneumonia and cannot move her OR date up. Notified team.

## 2021-11-02 NOTE — TELEPHONE ENCOUNTER
Spoke with the patient and let her know we rescheduled to 11/17 due to her recent dx of pneumonia. Patient understood. No further questions at this time.

## 2021-11-15 NOTE — TELEPHONE ENCOUNTER
[facility] Call Center    Phone Message    May a detailed message be left on voicemail: yes     Reason for Call: Other: pt requesting a lab order for UTI and ear infection.     Action Taken: Message routed to:  [facility]    Travel Screening: Not Applicable

## 2021-11-15 NOTE — PROGRESS NOTES
Reached out to patient to check in prior to surgery. Received a message regarding patient concern for UTI and proceeding with surgery.     Patient states she is currently at the doctor getting some labs and seeing her PCP to determine if she has UTI. Patient will call back when she has more information / when confirmed if she has UTI or not. Informed patient that if she does have a UTI we will need to postpone surgery for 2 weeks.    Patient in understanding and will call writer back to discuss once more information is obtained.    Esperanza Sweeney RN on 11/15/2021 at 10:08 AM

## 2021-11-16 NOTE — TELEPHONE ENCOUNTER
RN called and spoke to patient and patient's mother, and was given an update that patient has an appt today at [place] location to be seen by a provider for UTI and ear infection.    Inés Malagon RN on 11/16/2021 at 12:11 PM

## 2021-11-16 NOTE — PROGRESS NOTES
Spoke to patient with Dr. Porter. Patient reports UTI symptoms, pain when urinating, spasms. Patient likely has UTI. Patient has an appointment this afternoon at 3:00pm with PCP office to get evaluated. Per Dr. Porter, given that she likely has UTI, we will postpone her surgery - hopefully for the week after Thanksgiving. Our surgery scheduler will call patient to confirm date.    Patient in agreement to plan and understanding of information. No further questions at this time.    Esperanza Sweeney RN on 11/16/2021 at 9:55 AM

## 2021-11-29 NOTE — TELEPHONE ENCOUNTER
Left message to confirm for Friday. Asked patient to call back direct number.     Amber Arnold on 11/29/2021 at 8:55 AM

## 2021-11-30 NOTE — TELEPHONE ENCOUNTER
Left message regarding schedued procedure with Dr. Porter.  Writer left call back number on the patients voicemail.     Amber Arnold on 11/30/2021 at 8:21 AM   P: [phone]

## 2021-12-01 NOTE — PROGRESS NOTES
Spoke to patient regarding procedure with Dr. Porter that is currently scheduled for Friday 12/3/21.    Patient states that she has not been feeling well. Patient has stated that she has been trying to get a hold of her liver doctor to be seen. She does still want to do the esophageal dilation, however wants to wait until she gets her liver checked out.    Discussed that when patient does get rescheduled she will need a new pre-op and Covid test.    Provided patient with direct line to call back after she meets with her physician. Patient has no further questions at this time.    Esperanza Sweeney RN on 12/1/2021 at 3:50 PM

## 2021-12-22 NOTE — TELEPHONE ENCOUNTER
[facility] Call Center    Phone Message    May a detailed message be left on voicemail: yes     Reason for Call: Symptoms or Concerns     If patient has red-flag symptoms, warm transfer to triage line    Current symptom or concern: patient called because she is having another case of thrush she believes, and is having yellow discharge coming out of her ears.     She is wondering if Dr Porter would prescribed something for this?      [id number] - [facility] [place] [address] [facility]          Action Taken: Other: ENT    Travel Screening: Not Applicable

## 2021-12-23 NOTE — TELEPHONE ENCOUNTER
"Recommend Nystatin swish and swallow for thrush   Patient should see her PCP regarding ear concerns and if needed they can refer to otology    - Patient states that she has leftover nystatin swish and swallow and will restart this. Discussed instructions for prescription.  - \"Last time I had thrush I also had symptoms in the ear\"  - \"My doctor at the time gave me fluconazole\" - tried to ask patient who she saw, she stated she thinks her PCP ?    Encouraged patient to call PCP office to see if they will prescribed her something for the ear and/or get in to take a look at it. Patient in agreement. Patient appeared upset that I was not able to give her an additional prescription for the ear. Writer will discuss further with Dr. Porter, still recommend patient reach out to PCP if we are not able to prescribe anything else. Patient has no further questions at this time.    Esperanza Sweeney RN on 12/23/2021 at 1:20 PM    "

## 2022-01-01 ENCOUNTER — OFFICE VISIT (OUTPATIENT)
Dept: INTERNAL MEDICINE | Facility: CLINIC | Age: 42
End: 2022-01-01
Payer: MEDICARE

## 2022-01-01 ENCOUNTER — APPOINTMENT (OUTPATIENT)
Dept: SPEECH THERAPY | Facility: CLINIC | Age: 42
DRG: 177 | End: 2022-01-01
Payer: MEDICARE

## 2022-01-01 ENCOUNTER — OFFICE VISIT (OUTPATIENT)
Dept: AUDIOLOGY | Facility: CLINIC | Age: 42
End: 2022-01-01
Payer: MEDICARE

## 2022-01-01 ENCOUNTER — HOSPITAL ENCOUNTER (EMERGENCY)
Facility: CLINIC | Age: 42
Discharge: LEFT BEFORE DISCHARGE | End: 2022-06-24
Payer: MEDICARE

## 2022-01-01 ENCOUNTER — APPOINTMENT (OUTPATIENT)
Dept: SPEECH THERAPY | Facility: CLINIC | Age: 42
DRG: 177 | End: 2022-01-01
Attending: STUDENT IN AN ORGANIZED HEALTH CARE EDUCATION/TRAINING PROGRAM
Payer: MEDICARE

## 2022-01-01 ENCOUNTER — APPOINTMENT (OUTPATIENT)
Dept: GENERAL RADIOLOGY | Facility: CLINIC | Age: 42
DRG: 177 | End: 2022-01-01
Payer: MEDICARE

## 2022-01-01 ENCOUNTER — OFFICE VISIT (OUTPATIENT)
Dept: OTOLARYNGOLOGY | Facility: CLINIC | Age: 42
End: 2022-01-01
Payer: MEDICARE

## 2022-01-01 ENCOUNTER — PATIENT OUTREACH (OUTPATIENT)
Dept: CARE COORDINATION | Facility: CLINIC | Age: 42
End: 2022-01-01

## 2022-01-01 ENCOUNTER — TELEPHONE (OUTPATIENT)
Dept: OTOLARYNGOLOGY | Facility: CLINIC | Age: 42
End: 2022-01-01
Payer: COMMERCIAL

## 2022-01-01 ENCOUNTER — THERAPY VISIT (OUTPATIENT)
Dept: SPEECH THERAPY | Facility: CLINIC | Age: 42
End: 2022-01-01
Payer: MEDICARE

## 2022-01-01 ENCOUNTER — TELEPHONE (OUTPATIENT)
Dept: INTERNAL MEDICINE | Facility: CLINIC | Age: 42
End: 2022-01-01

## 2022-01-01 ENCOUNTER — HOSPITAL ENCOUNTER (INPATIENT)
Facility: CLINIC | Age: 42
LOS: 8 days | Discharge: HOME | DRG: 177 | End: 2022-07-15
Attending: EMERGENCY MEDICINE | Admitting: INTERNAL MEDICINE
Payer: MEDICARE

## 2022-01-01 ENCOUNTER — APPOINTMENT (OUTPATIENT)
Dept: GENERAL RADIOLOGY | Facility: CLINIC | Age: 42
DRG: 177 | End: 2022-01-01
Attending: EMERGENCY MEDICINE
Payer: MEDICARE

## 2022-01-01 ENCOUNTER — APPOINTMENT (OUTPATIENT)
Dept: CT IMAGING | Facility: CLINIC | Age: 42
DRG: 177 | End: 2022-01-01
Payer: MEDICARE

## 2022-01-01 ENCOUNTER — PATIENT OUTREACH (OUTPATIENT)
Dept: OTOLARYNGOLOGY | Facility: CLINIC | Age: 42
End: 2022-01-01
Payer: COMMERCIAL

## 2022-01-01 ENCOUNTER — APPOINTMENT (OUTPATIENT)
Dept: CT IMAGING | Facility: CLINIC | Age: 42
DRG: 177 | End: 2022-01-01
Attending: EMERGENCY MEDICINE
Payer: MEDICARE

## 2022-01-01 ENCOUNTER — APPOINTMENT (OUTPATIENT)
Dept: INTERVENTIONAL RADIOLOGY/VASCULAR | Facility: CLINIC | Age: 42
DRG: 177 | End: 2022-01-01
Payer: MEDICARE

## 2022-01-01 ENCOUNTER — HOSPITAL ENCOUNTER (EMERGENCY)
Facility: CLINIC | Age: 42
Discharge: LEFT BEFORE DISCHARGE | End: 2022-07-05
Payer: COMMERCIAL

## 2022-01-01 ENCOUNTER — HEALTH MAINTENANCE LETTER (OUTPATIENT)
Age: 42
End: 2022-01-01

## 2022-01-01 VITALS
HEART RATE: 93 BPM | RESPIRATION RATE: 20 BRPM | DIASTOLIC BLOOD PRESSURE: 73 MMHG | WEIGHT: 202.9 LBS | HEIGHT: 60 IN | OXYGEN SATURATION: 98 % | SYSTOLIC BLOOD PRESSURE: 105 MMHG | BODY MASS INDEX: 39.83 KG/M2 | TEMPERATURE: 99.1 F

## 2022-01-01 VITALS
HEIGHT: 60 IN | WEIGHT: 214.7 LBS | SYSTOLIC BLOOD PRESSURE: 114 MMHG | TEMPERATURE: 97.7 F | RESPIRATION RATE: 16 BRPM | OXYGEN SATURATION: 99 % | DIASTOLIC BLOOD PRESSURE: 62 MMHG | BODY MASS INDEX: 42.15 KG/M2 | HEART RATE: 89 BPM

## 2022-01-01 VITALS
SYSTOLIC BLOOD PRESSURE: 102 MMHG | DIASTOLIC BLOOD PRESSURE: 53 MMHG | WEIGHT: 241 LBS | BODY MASS INDEX: 47.32 KG/M2 | OXYGEN SATURATION: 100 % | TEMPERATURE: 97.5 F | HEART RATE: 85 BPM | HEIGHT: 60 IN | RESPIRATION RATE: 19 BRPM

## 2022-01-01 VITALS
OXYGEN SATURATION: 99 % | BODY MASS INDEX: 48.1 KG/M2 | DIASTOLIC BLOOD PRESSURE: 54 MMHG | HEIGHT: 60 IN | SYSTOLIC BLOOD PRESSURE: 115 MMHG | WEIGHT: 245 LBS | HEART RATE: 91 BPM

## 2022-01-01 VITALS
SYSTOLIC BLOOD PRESSURE: 98 MMHG | OXYGEN SATURATION: 94 % | DIASTOLIC BLOOD PRESSURE: 35 MMHG | HEART RATE: 102 BPM | RESPIRATION RATE: 20 BRPM | TEMPERATURE: 98.3 F

## 2022-01-01 DIAGNOSIS — R13.12 OROPHARYNGEAL DYSPHAGIA: ICD-10-CM

## 2022-01-01 DIAGNOSIS — H90.A21 SENSORINEURAL HEARING LOSS (SNHL) OF RIGHT EAR WITH RESTRICTED HEARING OF LEFT EAR: Primary | ICD-10-CM

## 2022-01-01 DIAGNOSIS — B37.0 THRUSH: ICD-10-CM

## 2022-01-01 DIAGNOSIS — Z87.891 PERSONAL HISTORY OF TOBACCO USE, PRESENTING HAZARDS TO HEALTH: ICD-10-CM

## 2022-01-01 DIAGNOSIS — Z11.59 ENCOUNTER FOR SCREENING FOR OTHER VIRAL DISEASES: Primary | ICD-10-CM

## 2022-01-01 DIAGNOSIS — R13.12 OROPHARYNGEAL DYSPHAGIA: Primary | ICD-10-CM

## 2022-01-01 DIAGNOSIS — H90.A12 CONDUCTIVE HEARING LOSS OF LEFT EAR WITH RESTRICTED HEARING OF RIGHT EAR: ICD-10-CM

## 2022-01-01 DIAGNOSIS — Z71.89 OTHER SPECIFIED COUNSELING: ICD-10-CM

## 2022-01-01 DIAGNOSIS — H92.13 OTORRHEA, BILATERAL: ICD-10-CM

## 2022-01-01 DIAGNOSIS — Z11.52 ENCOUNTER FOR SCREENING LABORATORY TESTING FOR SEVERE ACUTE RESPIRATORY SYNDROME CORONAVIRUS 2 (SARS-COV-2): ICD-10-CM

## 2022-01-01 DIAGNOSIS — J86.9 EMPYEMA LUNG (H): Primary | ICD-10-CM

## 2022-01-01 DIAGNOSIS — R49.0 DYSPHONIA: Primary | ICD-10-CM

## 2022-01-01 DIAGNOSIS — R63.4 LOSS OF WEIGHT: ICD-10-CM

## 2022-01-01 DIAGNOSIS — N39.0 URINARY TRACT INFECTION WITHOUT HEMATURIA, SITE UNSPECIFIED: ICD-10-CM

## 2022-01-01 DIAGNOSIS — R63.4 WEIGHT LOSS: ICD-10-CM

## 2022-01-01 DIAGNOSIS — R63.4 WEIGHT LOSS: Primary | ICD-10-CM

## 2022-01-01 LAB
ALBUMIN SERPL-MCNC: 1.8 G/DL (ref 3.4–5)
ALBUMIN UR-MCNC: 100 MG/DL
ALP SERPL-CCNC: 162 U/L (ref 40–150)
ALT SERPL W P-5'-P-CCNC: 21 U/L (ref 0–50)
ANION GAP SERPL CALCULATED.3IONS-SCNC: 4 MMOL/L (ref 7–15)
ANION GAP SERPL CALCULATED.3IONS-SCNC: 5 MMOL/L (ref 7–15)
ANION GAP SERPL CALCULATED.3IONS-SCNC: 6 MMOL/L (ref 3–14)
ANION GAP SERPL CALCULATED.3IONS-SCNC: 6 MMOL/L (ref 7–15)
ANION GAP SERPL CALCULATED.3IONS-SCNC: 8 MMOL/L (ref 7–15)
ANION GAP SERPL CALCULATED.3IONS-SCNC: 9 MMOL/L (ref 7–15)
APAP SERPL-MCNC: <5 UG/ML (ref 10–30)
APPEARANCE UR: ABNORMAL
AST SERPL W P-5'-P-CCNC: 11 U/L (ref 0–45)
ATRIAL RATE - MUSE: 79 BPM
BACTERIA BLD CULT: NO GROWTH
BACTERIA BLD CULT: NO GROWTH
BACTERIA PLR CULT: ABNORMAL
BACTERIA UR CULT: NO GROWTH
BASE EXCESS BLDV CALC-SCNC: 8.2 MMOL/L (ref -7.7–1.9)
BASE EXCESS BLDV CALC-SCNC: 8.3 MMOL/L (ref -7.7–1.9)
BASOPHILS # BLD AUTO: 0.1 10E3/UL (ref 0–0.2)
BASOPHILS NFR BLD AUTO: 0 %
BILIRUB SERPL-MCNC: 0.4 MG/DL (ref 0.2–1.3)
BILIRUB UR QL STRIP: NEGATIVE
BUN SERPL-MCNC: 3.7 MG/DL (ref 6–20)
BUN SERPL-MCNC: 4.2 MG/DL (ref 6–20)
BUN SERPL-MCNC: 4.2 MG/DL (ref 6–20)
BUN SERPL-MCNC: 4.3 MG/DL (ref 6–20)
BUN SERPL-MCNC: 4.3 MG/DL (ref 6–20)
BUN SERPL-MCNC: 4.7 MG/DL (ref 6–20)
BUN SERPL-MCNC: 5.7 MG/DL (ref 6–20)
BUN SERPL-MCNC: 7 MG/DL (ref 7–30)
CALCIUM SERPL-MCNC: 8.5 MG/DL (ref 8.6–10)
CALCIUM SERPL-MCNC: 8.5 MG/DL (ref 8.6–10)
CALCIUM SERPL-MCNC: 8.6 MG/DL (ref 8.6–10)
CALCIUM SERPL-MCNC: 8.6 MG/DL (ref 8.6–10)
CALCIUM SERPL-MCNC: 8.7 MG/DL (ref 8.6–10)
CALCIUM SERPL-MCNC: 8.8 MG/DL (ref 8.6–10)
CALCIUM SERPL-MCNC: 9 MG/DL (ref 8.6–10)
CALCIUM SERPL-MCNC: 9.2 MG/DL (ref 8.5–10.1)
CHLORIDE BLD-SCNC: 95 MMOL/L (ref 94–109)
CHLORIDE SERPL-SCNC: 101 MMOL/L (ref 98–107)
CHLORIDE SERPL-SCNC: 102 MMOL/L (ref 98–107)
CHLORIDE SERPL-SCNC: 103 MMOL/L (ref 98–107)
CHLORIDE SERPL-SCNC: 104 MMOL/L (ref 98–107)
CHLORIDE SERPL-SCNC: 99 MMOL/L (ref 98–107)
CO2 SERPL-SCNC: 34 MMOL/L (ref 20–32)
COLOR UR AUTO: YELLOW
CREAT SERPL-MCNC: 0.51 MG/DL (ref 0.51–0.95)
CREAT SERPL-MCNC: 0.52 MG/DL (ref 0.51–0.95)
CREAT SERPL-MCNC: 0.54 MG/DL (ref 0.51–0.95)
CREAT SERPL-MCNC: 0.54 MG/DL (ref 0.51–0.95)
CREAT SERPL-MCNC: 0.55 MG/DL (ref 0.51–0.95)
CREAT SERPL-MCNC: 0.55 MG/DL (ref 0.52–1.04)
CREAT SERPL-MCNC: 0.56 MG/DL (ref 0.51–0.95)
CREAT SERPL-MCNC: 0.58 MG/DL (ref 0.51–0.95)
CREAT SERPL-MCNC: 0.6 MG/DL (ref 0.52–1.04)
CRP SERPL-MCNC: 157 MG/L
CRP SERPL-MCNC: 250 MG/L (ref 0–8)
DEPRECATED HCO3 PLAS-SCNC: 29 MMOL/L (ref 22–29)
DEPRECATED HCO3 PLAS-SCNC: 29 MMOL/L (ref 22–29)
DEPRECATED HCO3 PLAS-SCNC: 30 MMOL/L (ref 22–29)
DEPRECATED HCO3 PLAS-SCNC: 30 MMOL/L (ref 22–29)
DEPRECATED HCO3 PLAS-SCNC: 31 MMOL/L (ref 22–29)
DEPRECATED HCO3 PLAS-SCNC: 32 MMOL/L (ref 22–29)
DEPRECATED HCO3 PLAS-SCNC: 33 MMOL/L (ref 22–29)
DIASTOLIC BLOOD PRESSURE - MUSE: NORMAL MMHG
EOSINOPHIL # BLD AUTO: 0 10E3/UL (ref 0–0.7)
EOSINOPHIL NFR BLD AUTO: 0 %
ERYTHROCYTE [DISTWIDTH] IN BLOOD BY AUTOMATED COUNT: 20.5 % (ref 10–15)
ERYTHROCYTE [DISTWIDTH] IN BLOOD BY AUTOMATED COUNT: 20.5 % (ref 10–15)
ERYTHROCYTE [DISTWIDTH] IN BLOOD BY AUTOMATED COUNT: 20.7 % (ref 10–15)
ERYTHROCYTE [DISTWIDTH] IN BLOOD BY AUTOMATED COUNT: 20.8 % (ref 10–15)
ERYTHROCYTE [DISTWIDTH] IN BLOOD BY AUTOMATED COUNT: 21 % (ref 10–15)
ERYTHROCYTE [DISTWIDTH] IN BLOOD BY AUTOMATED COUNT: 21.2 % (ref 10–15)
ERYTHROCYTE [DISTWIDTH] IN BLOOD BY AUTOMATED COUNT: 22 % (ref 10–15)
ERYTHROCYTE [DISTWIDTH] IN BLOOD BY AUTOMATED COUNT: 22.4 % (ref 10–15)
GFR SERPL CREATININE-BSD FRML MDRD: >90 ML/MIN/1.73M2
GLUCOSE BLD-MCNC: 101 MG/DL (ref 70–99)
GLUCOSE BLDC GLUCOMTR-MCNC: 70 MG/DL (ref 70–99)
GLUCOSE BLDC GLUCOMTR-MCNC: 76 MG/DL (ref 70–99)
GLUCOSE SERPL-MCNC: 74 MG/DL (ref 70–99)
GLUCOSE SERPL-MCNC: 77 MG/DL (ref 70–99)
GLUCOSE SERPL-MCNC: 79 MG/DL (ref 70–99)
GLUCOSE SERPL-MCNC: 81 MG/DL (ref 70–99)
GLUCOSE SERPL-MCNC: 81 MG/DL (ref 70–99)
GLUCOSE SERPL-MCNC: 85 MG/DL (ref 70–99)
GLUCOSE SERPL-MCNC: 90 MG/DL (ref 70–99)
GLUCOSE SERPL-MCNC: 92 MG/DL (ref 70–99)
GLUCOSE UR STRIP-MCNC: NEGATIVE MG/DL
GRAM STAIN RESULT: ABNORMAL
HCG UR QL: NEGATIVE
HCO3 BLDV-SCNC: 34 MMOL/L (ref 21–28)
HCO3 BLDV-SCNC: 35 MMOL/L (ref 21–28)
HCT VFR BLD AUTO: 29.4 % (ref 35–47)
HCT VFR BLD AUTO: 30.4 % (ref 35–47)
HCT VFR BLD AUTO: 30.9 % (ref 35–47)
HCT VFR BLD AUTO: 31 % (ref 35–47)
HCT VFR BLD AUTO: 31.8 % (ref 35–47)
HCT VFR BLD AUTO: 33.7 % (ref 35–47)
HCT VFR BLD AUTO: 34.8 % (ref 35–47)
HCT VFR BLD AUTO: 36.6 % (ref 35–47)
HGB BLD-MCNC: 10.3 G/DL (ref 11.7–15.7)
HGB BLD-MCNC: 7.9 G/DL (ref 11.7–15.7)
HGB BLD-MCNC: 8.4 G/DL (ref 11.7–15.7)
HGB BLD-MCNC: 8.5 G/DL (ref 11.7–15.7)
HGB BLD-MCNC: 9.4 G/DL (ref 11.7–15.7)
HGB BLD-MCNC: 9.5 G/DL (ref 11.7–15.7)
HGB UR QL STRIP: NEGATIVE
HOLD SPECIMEN: NORMAL
HYALINE CASTS: 139 /LPF
IMM GRANULOCYTES # BLD: 0.1 10E3/UL
IMM GRANULOCYTES NFR BLD: 1 %
INTERPRETATION ECG - MUSE: NORMAL
KETONES UR STRIP-MCNC: NEGATIVE MG/DL
LACTATE SERPL-SCNC: 0.7 MMOL/L (ref 0.7–2)
LACTATE SERPL-SCNC: 1.3 MMOL/L (ref 0.7–2)
LACTATE SERPL-SCNC: 1.7 MMOL/L (ref 0.7–2)
LDH SERPL L TO P-CCNC: 284 U/L (ref 0–250)
LEUKOCYTE ESTERASE UR QL STRIP: NEGATIVE
LYMPHOCYTES # BLD AUTO: 1.7 10E3/UL (ref 0.8–5.3)
LYMPHOCYTES NFR BLD AUTO: 11 %
MAGNESIUM SERPL-MCNC: 1.9 MG/DL (ref 1.7–2.3)
MCH RBC QN AUTO: 20.7 PG (ref 26.5–33)
MCH RBC QN AUTO: 21 PG (ref 26.5–33)
MCH RBC QN AUTO: 21.1 PG (ref 26.5–33)
MCH RBC QN AUTO: 21.2 PG (ref 26.5–33)
MCH RBC QN AUTO: 21.3 PG (ref 26.5–33)
MCH RBC QN AUTO: 21.4 PG (ref 26.5–33)
MCH RBC QN AUTO: 21.4 PG (ref 26.5–33)
MCH RBC QN AUTO: 21.5 PG (ref 26.5–33)
MCHC RBC AUTO-ENTMCNC: 26.7 G/DL (ref 31.5–36.5)
MCHC RBC AUTO-ENTMCNC: 26.9 G/DL (ref 31.5–36.5)
MCHC RBC AUTO-ENTMCNC: 27.2 G/DL (ref 31.5–36.5)
MCHC RBC AUTO-ENTMCNC: 27.3 G/DL (ref 31.5–36.5)
MCHC RBC AUTO-ENTMCNC: 27.4 G/DL (ref 31.5–36.5)
MCHC RBC AUTO-ENTMCNC: 27.9 G/DL (ref 31.5–36.5)
MCHC RBC AUTO-ENTMCNC: 28 G/DL (ref 31.5–36.5)
MCHC RBC AUTO-ENTMCNC: 28.1 G/DL (ref 31.5–36.5)
MCV RBC AUTO: 75 FL (ref 78–100)
MCV RBC AUTO: 76 FL (ref 78–100)
MCV RBC AUTO: 77 FL (ref 78–100)
MCV RBC AUTO: 78 FL (ref 78–100)
MONOCYTES # BLD AUTO: 1.1 10E3/UL (ref 0–1.3)
MONOCYTES NFR BLD AUTO: 7 %
MRSA DNA SPEC QL NAA+PROBE: NEGATIVE
MUCOUS THREADS #/AREA URNS LPF: PRESENT /LPF
NEUTROPHILS # BLD AUTO: 12.8 10E3/UL (ref 1.6–8.3)
NEUTROPHILS NFR BLD AUTO: 81 %
NITRATE UR QL: NEGATIVE
NRBC # BLD AUTO: 0 10E3/UL
NRBC BLD AUTO-RTO: 0 /100
O2/TOTAL GAS SETTING VFR VENT: 2 %
O2/TOTAL GAS SETTING VFR VENT: 21 %
OXYHGB MFR BLDV: 92 % (ref 70–75)
P AXIS - MUSE: 40 DEGREES
PATH REPORT.COMMENTS IMP SPEC: NORMAL
PATH REPORT.COMMENTS IMP SPEC: NORMAL
PATH REPORT.FINAL DX SPEC: NORMAL
PATH REPORT.GROSS SPEC: NORMAL
PATH REPORT.MICROSCOPIC SPEC OTHER STN: NORMAL
PATH REPORT.RELEVANT HX SPEC: NORMAL
PCO2 BLDV: 52 MM HG (ref 40–50)
PCO2 BLDV: 63 MM HG (ref 40–50)
PH BLDV: 7.36 [PH] (ref 7.32–7.43)
PH BLDV: 7.43 [PH] (ref 7.32–7.43)
PH BODY FLUID SOURCE: NORMAL
PH FLD: 8 PH
PH UR STRIP: 6 [PH] (ref 5–7)
PLATELET # BLD AUTO: 353 10E3/UL (ref 150–450)
PLATELET # BLD AUTO: 353 10E3/UL (ref 150–450)
PLATELET # BLD AUTO: 364 10E3/UL (ref 150–450)
PLATELET # BLD AUTO: 366 10E3/UL (ref 150–450)
PLATELET # BLD AUTO: 385 10E3/UL (ref 150–450)
PLATELET # BLD AUTO: 396 10E3/UL (ref 150–450)
PLATELET # BLD AUTO: 429 10E3/UL (ref 150–450)
PLATELET # BLD AUTO: 523 10E3/UL (ref 150–450)
PO2 BLDV: 32 MM HG (ref 25–47)
PO2 BLDV: 70 MM HG (ref 25–47)
POTASSIUM BLD-SCNC: 3.2 MMOL/L (ref 3.4–5.3)
POTASSIUM SERPL-SCNC: 3.2 MMOL/L (ref 3.4–5.3)
POTASSIUM SERPL-SCNC: 3.3 MMOL/L (ref 3.4–5.3)
POTASSIUM SERPL-SCNC: 3.5 MMOL/L (ref 3.4–5.3)
POTASSIUM SERPL-SCNC: 3.6 MMOL/L (ref 3.4–5.3)
POTASSIUM SERPL-SCNC: 3.6 MMOL/L (ref 3.4–5.3)
POTASSIUM SERPL-SCNC: 3.7 MMOL/L (ref 3.4–5.3)
POTASSIUM SERPL-SCNC: 3.7 MMOL/L (ref 3.4–5.3)
POTASSIUM SERPL-SCNC: 3.8 MMOL/L (ref 3.4–5.3)
PR INTERVAL - MUSE: 150 MS
PROT SERPL-MCNC: 6.4 G/DL (ref 6.4–8.3)
PROT SERPL-MCNC: 7.6 G/DL (ref 6.8–8.8)
QRS DURATION - MUSE: 146 MS
QT - MUSE: 456 MS
QTC - MUSE: 522 MS
R AXIS - MUSE: 73 DEGREES
RBC # BLD AUTO: 3.75 10E6/UL (ref 3.8–5.2)
RBC # BLD AUTO: 3.94 10E6/UL (ref 3.8–5.2)
RBC # BLD AUTO: 3.96 10E6/UL (ref 3.8–5.2)
RBC # BLD AUTO: 3.98 10E6/UL (ref 3.8–5.2)
RBC # BLD AUTO: 4.1 10E6/UL (ref 3.8–5.2)
RBC # BLD AUTO: 4.39 10E6/UL (ref 3.8–5.2)
RBC # BLD AUTO: 4.48 10E6/UL (ref 3.8–5.2)
RBC # BLD AUTO: 4.9 10E6/UL (ref 3.8–5.2)
RBC URINE: 2 /HPF
SA TARGET DNA: POSITIVE
SALICYLATES SERPL-MCNC: <0.5 MG/DL
SARS-COV-2 RNA RESP QL NAA+PROBE: NEGATIVE
SODIUM SERPL-SCNC: 135 MMOL/L (ref 133–144)
SODIUM SERPL-SCNC: 136 MMOL/L (ref 136–145)
SODIUM SERPL-SCNC: 137 MMOL/L (ref 136–145)
SODIUM SERPL-SCNC: 138 MMOL/L (ref 136–145)
SODIUM SERPL-SCNC: 139 MMOL/L (ref 136–145)
SODIUM SERPL-SCNC: 139 MMOL/L (ref 136–145)
SODIUM SERPL-SCNC: 140 MMOL/L (ref 136–145)
SODIUM SERPL-SCNC: 140 MMOL/L (ref 136–145)
SP GR UR STRIP: 1.03 (ref 1–1.03)
SQUAMOUS EPITHELIAL: 1 /HPF
SYSTOLIC BLOOD PRESSURE - MUSE: NORMAL MMHG
T AXIS - MUSE: -20 DEGREES
UROBILINOGEN UR STRIP-MCNC: NORMAL MG/DL
VENTRICULAR RATE- MUSE: 79 BPM
WBC # BLD AUTO: 12.5 10E3/UL (ref 4–11)
WBC # BLD AUTO: 15.8 10E3/UL (ref 4–11)
WBC # BLD AUTO: 4.5 10E3/UL (ref 4–11)
WBC # BLD AUTO: 4.6 10E3/UL (ref 4–11)
WBC # BLD AUTO: 5.5 10E3/UL (ref 4–11)
WBC # BLD AUTO: 5.7 10E3/UL (ref 4–11)
WBC # BLD AUTO: 5.9 10E3/UL (ref 4–11)
WBC # BLD AUTO: 7.3 10E3/UL (ref 4–11)
WBC URINE: 20 /HPF

## 2022-01-01 PROCEDURE — 32557 INSERT CATH PLEURA W/ IMAGE: CPT | Mod: LT | Performed by: RADIOLOGY

## 2022-01-01 PROCEDURE — 85025 COMPLETE CBC W/AUTO DIFF WBC: CPT | Performed by: EMERGENCY MEDICINE

## 2022-01-01 PROCEDURE — 250N000013 HC RX MED GY IP 250 OP 250 PS 637: Performed by: PEDIATRICS

## 2022-01-01 PROCEDURE — 250N000009 HC RX 250: Performed by: STUDENT IN AN ORGANIZED HEALTH CARE EDUCATION/TRAINING PROGRAM

## 2022-01-01 PROCEDURE — 85027 COMPLETE CBC AUTOMATED: CPT | Performed by: STUDENT IN AN ORGANIZED HEALTH CARE EDUCATION/TRAINING PROGRAM

## 2022-01-01 PROCEDURE — 250N000013 HC RX MED GY IP 250 OP 250 PS 637: Performed by: INTERNAL MEDICINE

## 2022-01-01 PROCEDURE — 71045 X-RAY EXAM CHEST 1 VIEW: CPT

## 2022-01-01 PROCEDURE — 250N000011 HC RX IP 250 OP 636

## 2022-01-01 PROCEDURE — 86140 C-REACTIVE PROTEIN: CPT | Performed by: STUDENT IN AN ORGANIZED HEALTH CARE EDUCATION/TRAINING PROGRAM

## 2022-01-01 PROCEDURE — 80048 BASIC METABOLIC PNL TOTAL CA: CPT | Performed by: STUDENT IN AN ORGANIZED HEALTH CARE EDUCATION/TRAINING PROGRAM

## 2022-01-01 PROCEDURE — 36415 COLL VENOUS BLD VENIPUNCTURE: CPT | Performed by: EMERGENCY MEDICINE

## 2022-01-01 PROCEDURE — 999N000248 HC STATISTIC IV INSERT WITH US BY RN

## 2022-01-01 PROCEDURE — 36415 COLL VENOUS BLD VENIPUNCTURE: CPT | Performed by: STUDENT IN AN ORGANIZED HEALTH CARE EDUCATION/TRAINING PROGRAM

## 2022-01-01 PROCEDURE — G1010 CDSM STANSON: HCPCS | Performed by: STUDENT IN AN ORGANIZED HEALTH CARE EDUCATION/TRAINING PROGRAM

## 2022-01-01 PROCEDURE — 96375 TX/PRO/DX INJ NEW DRUG ADDON: CPT | Performed by: EMERGENCY MEDICINE

## 2022-01-01 PROCEDURE — 250N000013 HC RX MED GY IP 250 OP 250 PS 637

## 2022-01-01 PROCEDURE — 99233 SBSQ HOSP IP/OBS HIGH 50: CPT | Mod: GC | Performed by: INTERNAL MEDICINE

## 2022-01-01 PROCEDURE — 250N000013 HC RX MED GY IP 250 OP 250 PS 637: Performed by: STUDENT IN AN ORGANIZED HEALTH CARE EDUCATION/TRAINING PROGRAM

## 2022-01-01 PROCEDURE — 250N000009 HC RX 250: Performed by: EMERGENCY MEDICINE

## 2022-01-01 PROCEDURE — 250N000011 HC RX IP 250 OP 636: Performed by: STUDENT IN AN ORGANIZED HEALTH CARE EDUCATION/TRAINING PROGRAM

## 2022-01-01 PROCEDURE — 82803 BLOOD GASES ANY COMBINATION: CPT | Performed by: NURSE PRACTITIONER

## 2022-01-01 PROCEDURE — 92550 TYMPANOMETRY & REFLEX THRESH: CPT | Mod: 52 | Performed by: AUDIOLOGIST

## 2022-01-01 PROCEDURE — 87070 CULTURE OTHR SPECIMN AEROBIC: CPT | Performed by: STUDENT IN AN ORGANIZED HEALTH CARE EDUCATION/TRAINING PROGRAM

## 2022-01-01 PROCEDURE — 83605 ASSAY OF LACTIC ACID: CPT | Performed by: EMERGENCY MEDICINE

## 2022-01-01 PROCEDURE — 99233 SBSQ HOSP IP/OBS HIGH 50: CPT | Performed by: INTERNAL MEDICINE

## 2022-01-01 PROCEDURE — 87205 SMEAR GRAM STAIN: CPT | Performed by: STUDENT IN AN ORGANIZED HEALTH CARE EDUCATION/TRAINING PROGRAM

## 2022-01-01 PROCEDURE — 250N000011 HC RX IP 250 OP 636: Performed by: INTERNAL MEDICINE

## 2022-01-01 PROCEDURE — 258N000003 HC RX IP 258 OP 636: Performed by: EMERGENCY MEDICINE

## 2022-01-01 PROCEDURE — 99239 HOSP IP/OBS DSCHRG MGMT >30: CPT | Mod: GC | Performed by: STUDENT IN AN ORGANIZED HEALTH CARE EDUCATION/TRAINING PROGRAM

## 2022-01-01 PROCEDURE — 258N000003 HC RX IP 258 OP 636: Performed by: STUDENT IN AN ORGANIZED HEALTH CARE EDUCATION/TRAINING PROGRAM

## 2022-01-01 PROCEDURE — 99233 SBSQ HOSP IP/OBS HIGH 50: CPT | Mod: GC | Performed by: STUDENT IN AN ORGANIZED HEALTH CARE EDUCATION/TRAINING PROGRAM

## 2022-01-01 PROCEDURE — G1010 CDSM STANSON: HCPCS | Mod: GC | Performed by: RADIOLOGY

## 2022-01-01 PROCEDURE — 85018 HEMOGLOBIN: CPT | Performed by: STUDENT IN AN ORGANIZED HEALTH CARE EDUCATION/TRAINING PROGRAM

## 2022-01-01 PROCEDURE — 83735 ASSAY OF MAGNESIUM: CPT

## 2022-01-01 PROCEDURE — 99285 EMERGENCY DEPT VISIT HI MDM: CPT | Performed by: EMERGENCY MEDICINE

## 2022-01-01 PROCEDURE — 71045 X-RAY EXAM CHEST 1 VIEW: CPT | Mod: 26 | Performed by: RADIOLOGY

## 2022-01-01 PROCEDURE — 82565 ASSAY OF CREATININE: CPT | Performed by: INTERNAL MEDICINE

## 2022-01-01 PROCEDURE — 31575 DIAGNOSTIC LARYNGOSCOPY: CPT | Performed by: OTOLARYNGOLOGY

## 2022-01-01 PROCEDURE — 85014 HEMATOCRIT: CPT | Performed by: STUDENT IN AN ORGANIZED HEALTH CARE EDUCATION/TRAINING PROGRAM

## 2022-01-01 PROCEDURE — C1729 CATH, DRAINAGE: HCPCS

## 2022-01-01 PROCEDURE — 84155 ASSAY OF PROTEIN SERUM: CPT | Performed by: STUDENT IN AN ORGANIZED HEALTH CARE EDUCATION/TRAINING PROGRAM

## 2022-01-01 PROCEDURE — 120N000002 HC R&B MED SURG/OB UMMC

## 2022-01-01 PROCEDURE — 272N000196 HC ACCESSORY CR5

## 2022-01-01 PROCEDURE — 36415 COLL VENOUS BLD VENIPUNCTURE: CPT | Performed by: NURSE PRACTITIONER

## 2022-01-01 PROCEDURE — 99215 OFFICE O/P EST HI 40 MIN: CPT | Mod: GC

## 2022-01-01 PROCEDURE — 92557 COMPREHENSIVE HEARING TEST: CPT | Performed by: AUDIOLOGIST

## 2022-01-01 PROCEDURE — 99221 1ST HOSP IP/OBS SF/LOW 40: CPT | Mod: GC | Performed by: INTERNAL MEDICINE

## 2022-01-01 PROCEDURE — 82947 ASSAY GLUCOSE BLOOD QUANT: CPT | Performed by: INTERNAL MEDICINE

## 2022-01-01 PROCEDURE — 999N000127 HC STATISTIC PERIPHERAL IV START W US GUIDANCE

## 2022-01-01 PROCEDURE — 81025 URINE PREGNANCY TEST: CPT | Performed by: EMERGENCY MEDICINE

## 2022-01-01 PROCEDURE — 92526 ORAL FUNCTION THERAPY: CPT | Mod: GN

## 2022-01-01 PROCEDURE — 250N000011 HC RX IP 250 OP 636: Performed by: EMERGENCY MEDICINE

## 2022-01-01 PROCEDURE — 92610 EVALUATE SWALLOWING FUNCTION: CPT | Mod: GN

## 2022-01-01 PROCEDURE — 86140 C-REACTIVE PROTEIN: CPT | Performed by: EMERGENCY MEDICINE

## 2022-01-01 PROCEDURE — 96361 HYDRATE IV INFUSION ADD-ON: CPT | Performed by: EMERGENCY MEDICINE

## 2022-01-01 PROCEDURE — 272N000500 HC NEEDLE CR2

## 2022-01-01 PROCEDURE — 82310 ASSAY OF CALCIUM: CPT | Performed by: STUDENT IN AN ORGANIZED HEALTH CARE EDUCATION/TRAINING PROGRAM

## 2022-01-01 PROCEDURE — 99285 EMERGENCY DEPT VISIT HI MDM: CPT | Mod: 25 | Performed by: EMERGENCY MEDICINE

## 2022-01-01 PROCEDURE — 70450 CT HEAD/BRAIN W/O DYE: CPT | Mod: 26 | Performed by: STUDENT IN AN ORGANIZED HEALTH CARE EDUCATION/TRAINING PROGRAM

## 2022-01-01 PROCEDURE — 80053 COMPREHEN METABOLIC PANEL: CPT | Performed by: EMERGENCY MEDICINE

## 2022-01-01 PROCEDURE — 250N000009 HC RX 250: Performed by: INTERNAL MEDICINE

## 2022-01-01 PROCEDURE — 87641 MR-STAPH DNA AMP PROBE: CPT | Performed by: STUDENT IN AN ORGANIZED HEALTH CARE EDUCATION/TRAINING PROGRAM

## 2022-01-01 PROCEDURE — G1010 CDSM STANSON: HCPCS

## 2022-01-01 PROCEDURE — 89050 BODY FLUID CELL COUNT: CPT | Performed by: STUDENT IN AN ORGANIZED HEALTH CARE EDUCATION/TRAINING PROGRAM

## 2022-01-01 PROCEDURE — 88305 TISSUE EXAM BY PATHOLOGIST: CPT | Mod: 26 | Performed by: PATHOLOGY

## 2022-01-01 PROCEDURE — 0W9B30Z DRAINAGE OF LEFT PLEURAL CAVITY WITH DRAINAGE DEVICE, PERCUTANEOUS APPROACH: ICD-10-PCS | Performed by: RADIOLOGY

## 2022-01-01 PROCEDURE — 83615 LACTATE (LD) (LDH) ENZYME: CPT | Performed by: STUDENT IN AN ORGANIZED HEALTH CARE EDUCATION/TRAINING PROGRAM

## 2022-01-01 PROCEDURE — 80179 DRUG ASSAY SALICYLATE: CPT

## 2022-01-01 PROCEDURE — 36415 COLL VENOUS BLD VENIPUNCTURE: CPT | Performed by: INTERNAL MEDICINE

## 2022-01-01 PROCEDURE — 93010 ELECTROCARDIOGRAM REPORT: CPT | Performed by: INTERNAL MEDICINE

## 2022-01-01 PROCEDURE — 88305 TISSUE EXAM BY PATHOLOGIST: CPT | Mod: TC | Performed by: STUDENT IN AN ORGANIZED HEALTH CARE EDUCATION/TRAINING PROGRAM

## 2022-01-01 PROCEDURE — 87086 URINE CULTURE/COLONY COUNT: CPT | Performed by: EMERGENCY MEDICINE

## 2022-01-01 PROCEDURE — 74230 X-RAY XM SWLNG FUNCJ C+: CPT | Mod: 26 | Performed by: STUDENT IN AN ORGANIZED HEALTH CARE EDUCATION/TRAINING PROGRAM

## 2022-01-01 PROCEDURE — 96365 THER/PROPH/DIAG IV INF INIT: CPT | Mod: 59 | Performed by: EMERGENCY MEDICINE

## 2022-01-01 PROCEDURE — 74230 X-RAY XM SWLNG FUNCJ C+: CPT

## 2022-01-01 PROCEDURE — 81001 URINALYSIS AUTO W/SCOPE: CPT | Performed by: FAMILY MEDICINE

## 2022-01-01 PROCEDURE — 999N000065 XR CHEST PORT 1 VIEW

## 2022-01-01 PROCEDURE — 81001 URINALYSIS AUTO W/SCOPE: CPT | Performed by: EMERGENCY MEDICINE

## 2022-01-01 PROCEDURE — 81025 URINE PREGNANCY TEST: CPT | Performed by: FAMILY MEDICINE

## 2022-01-01 PROCEDURE — 87075 CULTR BACTERIA EXCEPT BLOOD: CPT | Performed by: STUDENT IN AN ORGANIZED HEALTH CARE EDUCATION/TRAINING PROGRAM

## 2022-01-01 PROCEDURE — 99212 OFFICE O/P EST SF 10 MIN: CPT | Performed by: OTOLARYNGOLOGY

## 2022-01-01 PROCEDURE — C9803 HOPD COVID-19 SPEC COLLECT: HCPCS | Performed by: EMERGENCY MEDICINE

## 2022-01-01 PROCEDURE — 80143 DRUG ASSAY ACETAMINOPHEN: CPT

## 2022-01-01 PROCEDURE — 84132 ASSAY OF SERUM POTASSIUM: CPT

## 2022-01-01 PROCEDURE — 83605 ASSAY OF LACTIC ACID: CPT

## 2022-01-01 PROCEDURE — 258N000003 HC RX IP 258 OP 636: Performed by: INTERNAL MEDICINE

## 2022-01-01 PROCEDURE — 83986 ASSAY PH BODY FLUID NOS: CPT | Performed by: STUDENT IN AN ORGANIZED HEALTH CARE EDUCATION/TRAINING PROGRAM

## 2022-01-01 PROCEDURE — 999N000128 HC STATISTIC PERIPHERAL IV START W/O US GUIDANCE

## 2022-01-01 PROCEDURE — 92526 ORAL FUNCTION THERAPY: CPT | Mod: GN | Performed by: SPEECH-LANGUAGE PATHOLOGIST

## 2022-01-01 PROCEDURE — 87040 BLOOD CULTURE FOR BACTERIA: CPT

## 2022-01-01 PROCEDURE — 96366 THER/PROPH/DIAG IV INF ADDON: CPT | Performed by: EMERGENCY MEDICINE

## 2022-01-01 PROCEDURE — 88112 CYTOPATH CELL ENHANCE TECH: CPT | Mod: 26 | Performed by: PATHOLOGY

## 2022-01-01 PROCEDURE — 32557 INSERT CATH PLEURA W/ IMAGE: CPT

## 2022-01-01 PROCEDURE — 71250 CT THORAX DX C-: CPT | Mod: 26 | Performed by: RADIOLOGY

## 2022-01-01 PROCEDURE — 87635 SARS-COV-2 COVID-19 AMP PRB: CPT | Performed by: EMERGENCY MEDICINE

## 2022-01-01 PROCEDURE — 36415 COLL VENOUS BLD VENIPUNCTURE: CPT

## 2022-01-01 PROCEDURE — 99214 OFFICE O/P EST MOD 30 MIN: CPT | Mod: 25 | Performed by: OTOLARYNGOLOGY

## 2022-01-01 PROCEDURE — 71046 X-RAY EXAM CHEST 2 VIEWS: CPT

## 2022-01-01 PROCEDURE — 258N000003 HC RX IP 258 OP 636

## 2022-01-01 PROCEDURE — 93005 ELECTROCARDIOGRAM TRACING: CPT

## 2022-01-01 PROCEDURE — 272N000192 HC ACCESSORY CR2

## 2022-01-01 PROCEDURE — 83605 ASSAY OF LACTIC ACID: CPT | Performed by: NURSE PRACTITIONER

## 2022-01-01 PROCEDURE — 82805 BLOOD GASES W/O2 SATURATION: CPT

## 2022-01-01 PROCEDURE — 96372 THER/PROPH/DIAG INJ SC/IM: CPT

## 2022-01-01 PROCEDURE — 92611 MOTION FLUOROSCOPY/SWALLOW: CPT | Mod: GN

## 2022-01-01 PROCEDURE — 99222 1ST HOSP IP/OBS MODERATE 55: CPT | Mod: AI | Performed by: INTERNAL MEDICINE

## 2022-01-01 RX ORDER — OXYCODONE HYDROCHLORIDE 10 MG/1
10 TABLET ORAL EVERY 4 HOURS PRN
Status: CANCELLED | OUTPATIENT
Start: 2022-01-01

## 2022-01-01 RX ORDER — NYSTATIN 100000/ML
500000 SUSPENSION, ORAL (FINAL DOSE FORM) ORAL 4 TIMES DAILY
Qty: 140 ML | Refills: 0 | Status: SHIPPED | OUTPATIENT
Start: 2022-01-01 | End: 2022-01-01

## 2022-01-01 RX ORDER — LIDOCAINE 4 G/G
1 PATCH TOPICAL
Status: DISCONTINUED | OUTPATIENT
Start: 2022-01-01 | End: 2022-01-01 | Stop reason: HOSPADM

## 2022-01-01 RX ORDER — OXYCODONE HYDROCHLORIDE 5 MG/1
5-10 TABLET ORAL EVERY 4 HOURS PRN
Status: DISCONTINUED | OUTPATIENT
Start: 2022-01-01 | End: 2022-01-01

## 2022-01-01 RX ORDER — ACETAMINOPHEN 325 MG/1
650 TABLET ORAL EVERY 4 HOURS
Status: DISCONTINUED | OUTPATIENT
Start: 2022-01-01 | End: 2022-01-01 | Stop reason: HOSPADM

## 2022-01-01 RX ORDER — CYCLOBENZAPRINE HCL 5 MG
10 TABLET ORAL 3 TIMES DAILY PRN
Status: DISCONTINUED | OUTPATIENT
Start: 2022-01-01 | End: 2022-01-01

## 2022-01-01 RX ORDER — ENOXAPARIN SODIUM 100 MG/ML
40 INJECTION SUBCUTANEOUS EVERY 24 HOURS
Status: DISCONTINUED | OUTPATIENT
Start: 2022-01-01 | End: 2022-01-01

## 2022-01-01 RX ORDER — DEXTROSE MONOHYDRATE 25 G/50ML
INJECTION, SOLUTION INTRAVENOUS
Status: DISCONTINUED
Start: 2022-01-01 | End: 2022-01-01 | Stop reason: HOSPADM

## 2022-01-01 RX ORDER — GABAPENTIN 300 MG/1
600 CAPSULE ORAL 3 TIMES DAILY
Qty: 1 CAPSULE | Refills: 0 | COMMUNITY
Start: 2022-01-01

## 2022-01-01 RX ORDER — POTASSIUM CHLORIDE 1.5 G/1.58G
40 POWDER, FOR SOLUTION ORAL ONCE
Status: DISCONTINUED | OUTPATIENT
Start: 2022-01-01 | End: 2022-01-01

## 2022-01-01 RX ORDER — NORTRIPTYLINE HCL 25 MG
25 CAPSULE ORAL AT BEDTIME
Status: DISCONTINUED | OUTPATIENT
Start: 2022-01-01 | End: 2022-01-01 | Stop reason: HOSPADM

## 2022-01-01 RX ORDER — PIPERACILLIN SODIUM, TAZOBACTAM SODIUM 3; .375 G/15ML; G/15ML
3.38 INJECTION, POWDER, LYOPHILIZED, FOR SOLUTION INTRAVENOUS EVERY 6 HOURS
Status: DISCONTINUED | OUTPATIENT
Start: 2022-01-01 | End: 2022-01-01

## 2022-01-01 RX ORDER — FEXOFENADINE HCL 60 MG/1
60 TABLET, FILM COATED ORAL DAILY
Status: DISCONTINUED | OUTPATIENT
Start: 2022-01-01 | End: 2022-01-01 | Stop reason: HOSPADM

## 2022-01-01 RX ORDER — AMPICILLIN AND SULBACTAM 2; 1 G/1; G/1
3 INJECTION, POWDER, FOR SOLUTION INTRAMUSCULAR; INTRAVENOUS EVERY 6 HOURS
Status: DISCONTINUED | OUTPATIENT
Start: 2022-01-01 | End: 2022-01-01 | Stop reason: HOSPADM

## 2022-01-01 RX ORDER — IOPAMIDOL 755 MG/ML
100 INJECTION, SOLUTION INTRAVASCULAR ONCE
Status: COMPLETED | OUTPATIENT
Start: 2022-01-01 | End: 2022-01-01

## 2022-01-01 RX ORDER — PIPERACILLIN SODIUM, TAZOBACTAM SODIUM 3; .375 G/15ML; G/15ML
3.38 INJECTION, POWDER, LYOPHILIZED, FOR SOLUTION INTRAVENOUS ONCE
Status: COMPLETED | OUTPATIENT
Start: 2022-01-01 | End: 2022-01-01

## 2022-01-01 RX ORDER — MIRABEGRON 25 MG/1
50 TABLET, FILM COATED, EXTENDED RELEASE ORAL DAILY
Status: DISCONTINUED | OUTPATIENT
Start: 2022-01-01 | End: 2022-01-01

## 2022-01-01 RX ORDER — CYCLOBENZAPRINE HCL 10 MG
10 TABLET ORAL 3 TIMES DAILY
Status: DISCONTINUED | OUTPATIENT
Start: 2022-01-01 | End: 2022-01-01 | Stop reason: HOSPADM

## 2022-01-01 RX ORDER — ONDANSETRON 4 MG/1
4 TABLET, ORALLY DISINTEGRATING ORAL EVERY 6 HOURS PRN
Status: DISCONTINUED | OUTPATIENT
Start: 2022-01-01 | End: 2022-01-01 | Stop reason: HOSPADM

## 2022-01-01 RX ORDER — ONDANSETRON 2 MG/ML
4 INJECTION INTRAMUSCULAR; INTRAVENOUS EVERY 6 HOURS PRN
Status: DISCONTINUED | OUTPATIENT
Start: 2022-01-01 | End: 2022-01-01 | Stop reason: HOSPADM

## 2022-01-01 RX ORDER — SODIUM CHLORIDE 9 MG/ML
INJECTION, SOLUTION INTRAVENOUS CONTINUOUS
Status: DISCONTINUED | OUTPATIENT
Start: 2022-01-01 | End: 2022-01-01

## 2022-01-01 RX ORDER — PROCHLORPERAZINE 25 MG
25 SUPPOSITORY, RECTAL RECTAL EVERY 12 HOURS PRN
Status: DISCONTINUED | OUTPATIENT
Start: 2022-01-01 | End: 2022-01-01 | Stop reason: HOSPADM

## 2022-01-01 RX ORDER — GABAPENTIN 300 MG/1
600 CAPSULE ORAL 3 TIMES DAILY
Status: DISCONTINUED | OUTPATIENT
Start: 2022-01-01 | End: 2022-01-01 | Stop reason: HOSPADM

## 2022-01-01 RX ORDER — PANTOPRAZOLE SODIUM 40 MG/1
40 TABLET, DELAYED RELEASE ORAL
Status: DISCONTINUED | OUTPATIENT
Start: 2022-01-01 | End: 2022-01-01 | Stop reason: HOSPADM

## 2022-01-01 RX ORDER — HYDROMORPHONE HCL IN WATER/PF 6 MG/30 ML
0.2 PATIENT CONTROLLED ANALGESIA SYRINGE INTRAVENOUS
Status: COMPLETED | OUTPATIENT
Start: 2022-01-01 | End: 2022-01-01

## 2022-01-01 RX ORDER — NALOXONE HYDROCHLORIDE 0.4 MG/ML
0.2 INJECTION, SOLUTION INTRAMUSCULAR; INTRAVENOUS; SUBCUTANEOUS
Status: DISCONTINUED | OUTPATIENT
Start: 2022-01-01 | End: 2022-01-01 | Stop reason: HOSPADM

## 2022-01-01 RX ORDER — METOCLOPRAMIDE HYDROCHLORIDE 5 MG/ML
10 INJECTION INTRAMUSCULAR; INTRAVENOUS ONCE
Status: COMPLETED | OUTPATIENT
Start: 2022-01-01 | End: 2022-01-01

## 2022-01-01 RX ORDER — ENOXAPARIN SODIUM 100 MG/ML
40 INJECTION SUBCUTANEOUS EVERY 24 HOURS
Status: DISCONTINUED | OUTPATIENT
Start: 2022-01-01 | End: 2022-01-01 | Stop reason: HOSPADM

## 2022-01-01 RX ORDER — NABUMETONE 500 MG/1
1000 TABLET, FILM COATED ORAL AT BEDTIME
Status: DISCONTINUED | OUTPATIENT
Start: 2022-01-01 | End: 2022-01-01 | Stop reason: HOSPADM

## 2022-01-01 RX ORDER — NALOXONE HYDROCHLORIDE 0.4 MG/ML
0.4 INJECTION, SOLUTION INTRAMUSCULAR; INTRAVENOUS; SUBCUTANEOUS
Status: DISCONTINUED | OUTPATIENT
Start: 2022-01-01 | End: 2022-01-01 | Stop reason: HOSPADM

## 2022-01-01 RX ORDER — HYDROMORPHONE HYDROCHLORIDE 1 MG/ML
0.5 INJECTION, SOLUTION INTRAMUSCULAR; INTRAVENOUS; SUBCUTANEOUS EVERY 4 HOURS PRN
Status: COMPLETED | OUTPATIENT
Start: 2022-01-01 | End: 2022-01-01

## 2022-01-01 RX ORDER — OXYCODONE HYDROCHLORIDE 5 MG/1
5-10 TABLET ORAL EVERY 4 HOURS PRN
Status: DISCONTINUED | OUTPATIENT
Start: 2022-01-01 | End: 2022-01-01 | Stop reason: HOSPADM

## 2022-01-01 RX ORDER — ATENOLOL 25 MG/1
25 TABLET ORAL DAILY
Status: DISCONTINUED | OUTPATIENT
Start: 2022-01-01 | End: 2022-01-01 | Stop reason: HOSPADM

## 2022-01-01 RX ORDER — OXYCODONE HYDROCHLORIDE 10 MG/1
10 TABLET ORAL EVERY 4 HOURS PRN
Status: DISCONTINUED | OUTPATIENT
Start: 2022-01-01 | End: 2022-01-01

## 2022-01-01 RX ORDER — OXYCODONE HYDROCHLORIDE 5 MG/1
5 TABLET ORAL ONCE
Status: COMPLETED | OUTPATIENT
Start: 2022-01-01 | End: 2022-01-01

## 2022-01-01 RX ORDER — PROCHLORPERAZINE MALEATE 5 MG
10 TABLET ORAL EVERY 6 HOURS PRN
Status: DISCONTINUED | OUTPATIENT
Start: 2022-01-01 | End: 2022-01-01 | Stop reason: HOSPADM

## 2022-01-01 RX ORDER — POTASSIUM CHLORIDE 750 MG/1
40 TABLET, EXTENDED RELEASE ORAL ONCE
Status: COMPLETED | OUTPATIENT
Start: 2022-01-01 | End: 2022-01-01

## 2022-01-01 RX ORDER — OXYCODONE AND ACETAMINOPHEN 5; 325 MG/1; MG/1
1 TABLET ORAL EVERY 6 HOURS PRN
Status: DISCONTINUED | OUTPATIENT
Start: 2022-01-01 | End: 2022-01-01

## 2022-01-01 RX ORDER — ATOMOXETINE 80 MG/1
80 CAPSULE ORAL DAILY
Status: DISCONTINUED | OUTPATIENT
Start: 2022-01-01 | End: 2022-01-01 | Stop reason: HOSPADM

## 2022-01-01 RX ORDER — MECLIZINE HCL 12.5 MG 12.5 MG/1
12.5 TABLET ORAL 3 TIMES DAILY PRN
Status: DISCONTINUED | OUTPATIENT
Start: 2022-01-01 | End: 2022-01-01 | Stop reason: HOSPADM

## 2022-01-01 RX ORDER — CEVIMELINE HYDROCHLORIDE 30 MG/1
30 CAPSULE ORAL 3 TIMES DAILY
Status: DISCONTINUED | OUTPATIENT
Start: 2022-01-01 | End: 2022-01-01 | Stop reason: HOSPADM

## 2022-01-01 RX ORDER — POTASSIUM CHLORIDE 750 MG/1
40 TABLET, EXTENDED RELEASE ORAL ONCE
Status: DISCONTINUED | OUTPATIENT
Start: 2022-01-01 | End: 2022-01-01

## 2022-01-01 RX ORDER — MIRABEGRON 50 MG/1
50 TABLET, EXTENDED RELEASE ORAL DAILY
Status: DISCONTINUED | OUTPATIENT
Start: 2022-01-01 | End: 2022-01-01 | Stop reason: HOSPADM

## 2022-01-01 RX ORDER — NYSTATIN 100000/ML
500000 SUSPENSION, ORAL (FINAL DOSE FORM) ORAL 4 TIMES DAILY
Status: DISCONTINUED | OUTPATIENT
Start: 2022-01-01 | End: 2022-01-01 | Stop reason: HOSPADM

## 2022-01-01 RX ORDER — PSEUDOEPHEDRINE HCL 120 MG/1
120 TABLET, FILM COATED, EXTENDED RELEASE ORAL 2 TIMES DAILY
Status: DISCONTINUED | OUTPATIENT
Start: 2022-01-01 | End: 2022-01-01 | Stop reason: HOSPADM

## 2022-01-01 RX ORDER — FLUCONAZOLE 200 MG/1
200 TABLET ORAL DAILY
Status: DISPENSED | OUTPATIENT
Start: 2022-01-01 | End: 2022-01-01

## 2022-01-01 RX ORDER — BACLOFEN 10 MG/1
10 TABLET ORAL 3 TIMES DAILY
Status: DISCONTINUED | OUTPATIENT
Start: 2022-01-01 | End: 2022-01-01 | Stop reason: HOSPADM

## 2022-01-01 RX ORDER — BUPRENORPHINE 10 UG/H
1 PATCH TRANSDERMAL
Status: DISCONTINUED | OUTPATIENT
Start: 2022-01-01 | End: 2022-01-01

## 2022-01-01 RX ORDER — ACETAMINOPHEN 325 MG/1
650 TABLET ORAL EVERY 4 HOURS PRN
Status: DISCONTINUED | OUTPATIENT
Start: 2022-01-01 | End: 2022-01-01

## 2022-01-01 RX ORDER — IBUPROFEN 600 MG/1
600 TABLET, FILM COATED ORAL EVERY 6 HOURS PRN
Status: DISCONTINUED | OUTPATIENT
Start: 2022-01-01 | End: 2022-01-01 | Stop reason: HOSPADM

## 2022-01-01 RX ORDER — LIDOCAINE 40 MG/G
CREAM TOPICAL
Status: DISCONTINUED | OUTPATIENT
Start: 2022-01-01 | End: 2022-01-01 | Stop reason: HOSPADM

## 2022-01-01 RX ADMIN — CEVIMELINE 30 MG: 30 CAPSULE ORAL at 09:32

## 2022-01-01 RX ADMIN — GABAPENTIN 600 MG: 300 CAPSULE ORAL at 19:53

## 2022-01-01 RX ADMIN — ATOMOXETINE 80 MG: 40 CAPSULE ORAL at 10:24

## 2022-01-01 RX ADMIN — NYSTATIN 500000 UNITS: 100000 SUSPENSION ORAL at 10:25

## 2022-01-01 RX ADMIN — ACETAMINOPHEN 650 MG: 325 TABLET, FILM COATED ORAL at 09:35

## 2022-01-01 RX ADMIN — NYSTATIN 500000 UNITS: 100000 SUSPENSION ORAL at 21:07

## 2022-01-01 RX ADMIN — PIPERACILLIN AND TAZOBACTAM 3.38 G: 3; .375 INJECTION, POWDER, FOR SOLUTION INTRAVENOUS at 16:39

## 2022-01-01 RX ADMIN — ATOMOXETINE 80 MG: 40 CAPSULE ORAL at 12:37

## 2022-01-01 RX ADMIN — AMPICILLIN SODIUM AND SULBACTAM SODIUM 3 G: 2; 1 INJECTION, POWDER, FOR SOLUTION INTRAMUSCULAR; INTRAVENOUS at 00:07

## 2022-01-01 RX ADMIN — PIPERACILLIN AND TAZOBACTAM 3.38 G: 3; .375 INJECTION, POWDER, FOR SOLUTION INTRAVENOUS at 08:42

## 2022-01-01 RX ADMIN — Medication 7.5 MG: at 09:33

## 2022-01-01 RX ADMIN — Medication 7.5 MG: at 08:35

## 2022-01-01 RX ADMIN — NYSTATIN 500000 UNITS: 100000 SUSPENSION ORAL at 15:31

## 2022-01-01 RX ADMIN — PIPERACILLIN AND TAZOBACTAM 3.38 G: 3; .375 INJECTION, POWDER, FOR SOLUTION INTRAVENOUS at 00:30

## 2022-01-01 RX ADMIN — BACLOFEN 10 MG: 10 TABLET ORAL at 09:37

## 2022-01-01 RX ADMIN — Medication 7.5 MG: at 17:13

## 2022-01-01 RX ADMIN — Medication 7.5 MG: at 20:18

## 2022-01-01 RX ADMIN — ONDANSETRON 4 MG: 2 INJECTION INTRAMUSCULAR; INTRAVENOUS at 15:07

## 2022-01-01 RX ADMIN — PSEUDOEPHEDRINE HCL 120 MG: 120 TABLET, FILM COATED, EXTENDED RELEASE ORAL at 21:03

## 2022-01-01 RX ADMIN — CYCLOBENZAPRINE 10 MG: 10 TABLET, FILM COATED ORAL at 14:58

## 2022-01-01 RX ADMIN — AMPICILLIN SODIUM AND SULBACTAM SODIUM 3 G: 2; 1 INJECTION, POWDER, FOR SOLUTION INTRAMUSCULAR; INTRAVENOUS at 18:16

## 2022-01-01 RX ADMIN — BACLOFEN 10 MG: 10 TABLET ORAL at 08:20

## 2022-01-01 RX ADMIN — ACETAMINOPHEN 650 MG: 325 TABLET, FILM COATED ORAL at 19:54

## 2022-01-01 RX ADMIN — GABAPENTIN 600 MG: 300 CAPSULE ORAL at 14:33

## 2022-01-01 RX ADMIN — PIPERACILLIN AND TAZOBACTAM 3.38 G: 3; .375 INJECTION, POWDER, FOR SOLUTION INTRAVENOUS at 10:41

## 2022-01-01 RX ADMIN — PSEUDOEPHEDRINE HCL 120 MG: 120 TABLET, FILM COATED, EXTENDED RELEASE ORAL at 13:07

## 2022-01-01 RX ADMIN — PIPERACILLIN AND TAZOBACTAM 3.38 G: 3; .375 INJECTION, POWDER, FOR SOLUTION INTRAVENOUS at 20:26

## 2022-01-01 RX ADMIN — SODIUM CHLORIDE 500 ML: 9 INJECTION, SOLUTION INTRAVENOUS at 16:27

## 2022-01-01 RX ADMIN — NORTRIPTYLINE HYDROCHLORIDE 25 MG: 25 CAPSULE ORAL at 22:00

## 2022-01-01 RX ADMIN — ENOXAPARIN SODIUM 40 MG: 40 INJECTION SUBCUTANEOUS at 10:24

## 2022-01-01 RX ADMIN — CEVIMELINE 30 MG: 30 CAPSULE ORAL at 08:27

## 2022-01-01 RX ADMIN — ATENOLOL 25 MG: 25 TABLET ORAL at 10:08

## 2022-01-01 RX ADMIN — ACETAMINOPHEN 650 MG: 325 TABLET, FILM COATED ORAL at 17:13

## 2022-01-01 RX ADMIN — NYSTATIN 500000 UNITS: 100000 SUSPENSION ORAL at 13:36

## 2022-01-01 RX ADMIN — BACLOFEN 10 MG: 10 TABLET ORAL at 10:07

## 2022-01-01 RX ADMIN — CEVIMELINE 30 MG: 30 CAPSULE ORAL at 19:53

## 2022-01-01 RX ADMIN — NYSTATIN 500000 UNITS: 100000 SUSPENSION ORAL at 16:27

## 2022-01-01 RX ADMIN — ACETAMINOPHEN 650 MG: 325 TABLET, FILM COATED ORAL at 00:06

## 2022-01-01 RX ADMIN — Medication 50 ML: at 21:03

## 2022-01-01 RX ADMIN — PANTOPRAZOLE SODIUM 40 MG: 40 TABLET, DELAYED RELEASE ORAL at 08:27

## 2022-01-01 RX ADMIN — BACLOFEN 10 MG: 10 TABLET ORAL at 09:36

## 2022-01-01 RX ADMIN — MIRABEGRON 50 MG: 50 TABLET, FILM COATED, EXTENDED RELEASE ORAL at 08:29

## 2022-01-01 RX ADMIN — FLUCONAZOLE 200 MG: 200 TABLET ORAL at 08:14

## 2022-01-01 RX ADMIN — NABUMETONE 1000 MG: 500 TABLET ORAL at 23:23

## 2022-01-01 RX ADMIN — SODIUM CHLORIDE: 9 INJECTION, SOLUTION INTRAVENOUS at 07:24

## 2022-01-01 RX ADMIN — AMPICILLIN SODIUM AND SULBACTAM SODIUM 3 G: 2; 1 INJECTION, POWDER, FOR SOLUTION INTRAMUSCULAR; INTRAVENOUS at 06:05

## 2022-01-01 RX ADMIN — FLUCONAZOLE 200 MG: 200 TABLET ORAL at 08:16

## 2022-01-01 RX ADMIN — GABAPENTIN 600 MG: 300 CAPSULE ORAL at 21:03

## 2022-01-01 RX ADMIN — CEVIMELINE 30 MG: 30 CAPSULE ORAL at 15:29

## 2022-01-01 RX ADMIN — NYSTATIN 500000 UNITS: 100000 SUSPENSION ORAL at 20:22

## 2022-01-01 RX ADMIN — HYDROMORPHONE HYDROCHLORIDE 0.2 MG: 0.2 INJECTION, SOLUTION INTRAMUSCULAR; INTRAVENOUS; SUBCUTANEOUS at 19:50

## 2022-01-01 RX ADMIN — OXYCODONE HYDROCHLORIDE 10 MG: 10 TABLET ORAL at 01:08

## 2022-01-01 RX ADMIN — FEXOFENADINE HYDROCHLORIDE 60 MG: 60 TABLET, FILM COATED ORAL at 10:09

## 2022-01-01 RX ADMIN — NYSTATIN 500000 UNITS: 100000 SUSPENSION ORAL at 20:25

## 2022-01-01 RX ADMIN — MIRABEGRON 50 MG: 50 TABLET, FILM COATED, EXTENDED RELEASE ORAL at 13:03

## 2022-01-01 RX ADMIN — BACLOFEN 10 MG: 10 TABLET ORAL at 08:27

## 2022-01-01 RX ADMIN — OXYCODONE HYDROCHLORIDE 10 MG: 5 TABLET ORAL at 00:07

## 2022-01-01 RX ADMIN — CEVIMELINE 30 MG: 30 CAPSULE ORAL at 14:33

## 2022-01-01 RX ADMIN — GABAPENTIN 600 MG: 300 CAPSULE ORAL at 08:19

## 2022-01-01 RX ADMIN — PIPERACILLIN AND TAZOBACTAM 3.38 G: 3; .375 INJECTION, POWDER, FOR SOLUTION INTRAVENOUS at 11:30

## 2022-01-01 RX ADMIN — BACLOFEN 10 MG: 10 TABLET ORAL at 20:18

## 2022-01-01 RX ADMIN — CEVIMELINE 30 MG: 30 CAPSULE ORAL at 08:18

## 2022-01-01 RX ADMIN — NABUMETONE 1000 MG: 500 TABLET ORAL at 22:33

## 2022-01-01 RX ADMIN — FLUCONAZOLE 200 MG: 200 TABLET ORAL at 09:40

## 2022-01-01 RX ADMIN — PIPERACILLIN AND TAZOBACTAM 3.38 G: 3; .375 INJECTION, POWDER, LYOPHILIZED, FOR SOLUTION INTRAVENOUS at 21:30

## 2022-01-01 RX ADMIN — CEVIMELINE 30 MG: 30 CAPSULE ORAL at 09:36

## 2022-01-01 RX ADMIN — ENOXAPARIN SODIUM 40 MG: 40 INJECTION SUBCUTANEOUS at 09:23

## 2022-01-01 RX ADMIN — LEVOMILNACIPRAN HYDROCHLORIDE 80 MG: 80 CAPSULE, EXTENDED RELEASE ORAL at 08:18

## 2022-01-01 RX ADMIN — PANTOPRAZOLE SODIUM 40 MG: 40 TABLET, DELAYED RELEASE ORAL at 10:09

## 2022-01-01 RX ADMIN — BACLOFEN 10 MG: 10 TABLET ORAL at 15:07

## 2022-01-01 RX ADMIN — PROCHLORPERAZINE MALEATE 10 MG: 5 TABLET ORAL at 13:29

## 2022-01-01 RX ADMIN — MIRABEGRON 50 MG: 50 TABLET, FILM COATED, EXTENDED RELEASE ORAL at 08:17

## 2022-01-01 RX ADMIN — SODIUM CHLORIDE: 9 INJECTION, SOLUTION INTRAVENOUS at 21:31

## 2022-01-01 RX ADMIN — ATENOLOL 25 MG: 25 TABLET ORAL at 08:26

## 2022-01-01 RX ADMIN — NYSTATIN 500000 UNITS: 100000 SUSPENSION ORAL at 09:42

## 2022-01-01 RX ADMIN — PIPERACILLIN AND TAZOBACTAM 3.38 G: 3; .375 INJECTION, POWDER, FOR SOLUTION INTRAVENOUS at 06:38

## 2022-01-01 RX ADMIN — ACETAMINOPHEN 650 MG: 325 TABLET, FILM COATED ORAL at 15:08

## 2022-01-01 RX ADMIN — PSEUDOEPHEDRINE HCL 120 MG: 120 TABLET, FILM COATED, EXTENDED RELEASE ORAL at 20:27

## 2022-01-01 RX ADMIN — LIDOCAINE PATCH 4% 1 PATCH: 40 PATCH TOPICAL at 20:25

## 2022-01-01 RX ADMIN — CEVIMELINE 30 MG: 30 CAPSULE ORAL at 07:46

## 2022-01-01 RX ADMIN — LIDOCAINE PATCH 4% 1 PATCH: 40 PATCH TOPICAL at 19:54

## 2022-01-01 RX ADMIN — CYCLOBENZAPRINE 10 MG: 10 TABLET, FILM COATED ORAL at 19:53

## 2022-01-01 RX ADMIN — NYSTATIN 500000 UNITS: 100000 SUSPENSION ORAL at 08:16

## 2022-01-01 RX ADMIN — SODIUM CHLORIDE, POTASSIUM CHLORIDE, SODIUM LACTATE AND CALCIUM CHLORIDE 500 ML: 600; 310; 30; 20 INJECTION, SOLUTION INTRAVENOUS at 03:51

## 2022-01-01 RX ADMIN — PIPERACILLIN AND TAZOBACTAM 3.38 G: 3; .375 INJECTION, POWDER, FOR SOLUTION INTRAVENOUS at 15:14

## 2022-01-01 RX ADMIN — ENOXAPARIN SODIUM 40 MG: 40 INJECTION SUBCUTANEOUS at 08:15

## 2022-01-01 RX ADMIN — NORTRIPTYLINE HYDROCHLORIDE 25 MG: 25 CAPSULE ORAL at 21:02

## 2022-01-01 RX ADMIN — GABAPENTIN 600 MG: 300 CAPSULE ORAL at 23:10

## 2022-01-01 RX ADMIN — PIPERACILLIN AND TAZOBACTAM 3.38 G: 3; .375 INJECTION, POWDER, FOR SOLUTION INTRAVENOUS at 13:03

## 2022-01-01 RX ADMIN — PANTOPRAZOLE SODIUM 40 MG: 40 TABLET, DELAYED RELEASE ORAL at 08:20

## 2022-01-01 RX ADMIN — LEVOMILNACIPRAN HYDROCHLORIDE 80 MG: 80 CAPSULE, EXTENDED RELEASE ORAL at 10:41

## 2022-01-01 RX ADMIN — CYCLOBENZAPRINE 10 MG: 10 TABLET, FILM COATED ORAL at 09:35

## 2022-01-01 RX ADMIN — NYSTATIN 500000 UNITS: 100000 SUSPENSION ORAL at 11:30

## 2022-01-01 RX ADMIN — BACLOFEN 10 MG: 10 TABLET ORAL at 20:26

## 2022-01-01 RX ADMIN — POTASSIUM CHLORIDE 40 MEQ: 750 TABLET, EXTENDED RELEASE ORAL at 14:33

## 2022-01-01 RX ADMIN — OXYCODONE HYDROCHLORIDE 5 MG: 5 TABLET ORAL at 12:37

## 2022-01-01 RX ADMIN — PIPERACILLIN AND TAZOBACTAM 3.38 G: 3; .375 INJECTION, POWDER, FOR SOLUTION INTRAVENOUS at 18:30

## 2022-01-01 RX ADMIN — SODIUM CHLORIDE: 9 INJECTION, SOLUTION INTRAVENOUS at 15:14

## 2022-01-01 RX ADMIN — ENOXAPARIN SODIUM 40 MG: 40 INJECTION SUBCUTANEOUS at 09:42

## 2022-01-01 RX ADMIN — CEVIMELINE 30 MG: 30 CAPSULE ORAL at 13:31

## 2022-01-01 RX ADMIN — NYSTATIN 500000 UNITS: 100000 SUSPENSION ORAL at 16:39

## 2022-01-01 RX ADMIN — PIPERACILLIN AND TAZOBACTAM 3.38 G: 3; .375 INJECTION, POWDER, FOR SOLUTION INTRAVENOUS at 00:18

## 2022-01-01 RX ADMIN — CYCLOBENZAPRINE 10 MG: 10 TABLET, FILM COATED ORAL at 18:09

## 2022-01-01 RX ADMIN — CYCLOBENZAPRINE 10 MG: 10 TABLET, FILM COATED ORAL at 13:30

## 2022-01-01 RX ADMIN — NABUMETONE 1000 MG: 500 TABLET ORAL at 21:03

## 2022-01-01 RX ADMIN — BACLOFEN 10 MG: 10 TABLET ORAL at 21:03

## 2022-01-01 RX ADMIN — MIRABEGRON 50 MG: 50 TABLET, FILM COATED, EXTENDED RELEASE ORAL at 10:24

## 2022-01-01 RX ADMIN — CEVIMELINE 30 MG: 30 CAPSULE ORAL at 19:32

## 2022-01-01 RX ADMIN — CEVIMELINE 30 MG: 30 CAPSULE ORAL at 15:08

## 2022-01-01 RX ADMIN — OXYCODONE HYDROCHLORIDE 10 MG: 5 TABLET ORAL at 08:16

## 2022-01-01 RX ADMIN — ATOMOXETINE 80 MG: 40 CAPSULE ORAL at 13:03

## 2022-01-01 RX ADMIN — CYCLOBENZAPRINE 10 MG: 10 TABLET, FILM COATED ORAL at 08:16

## 2022-01-01 RX ADMIN — METOCLOPRAMIDE HYDROCHLORIDE 10 MG: 5 INJECTION INTRAMUSCULAR; INTRAVENOUS at 19:50

## 2022-01-01 RX ADMIN — OXYCODONE HYDROCHLORIDE 10 MG: 5 TABLET ORAL at 19:31

## 2022-01-01 RX ADMIN — CYCLOBENZAPRINE 10 MG: 10 TABLET, FILM COATED ORAL at 08:18

## 2022-01-01 RX ADMIN — PANTOPRAZOLE SODIUM 40 MG: 40 TABLET, DELAYED RELEASE ORAL at 08:16

## 2022-01-01 RX ADMIN — NORTRIPTYLINE HYDROCHLORIDE 25 MG: 25 CAPSULE ORAL at 21:49

## 2022-01-01 RX ADMIN — LEVOMILNACIPRAN HYDROCHLORIDE 80 MG: 80 CAPSULE, EXTENDED RELEASE ORAL at 10:24

## 2022-01-01 RX ADMIN — CYCLOBENZAPRINE 10 MG: 10 TABLET, FILM COATED ORAL at 20:26

## 2022-01-01 RX ADMIN — MIRABEGRON 50 MG: 50 TABLET, FILM COATED, EXTENDED RELEASE ORAL at 10:07

## 2022-01-01 RX ADMIN — ACETAMINOPHEN 650 MG: 325 TABLET, FILM COATED ORAL at 10:09

## 2022-01-01 RX ADMIN — OXYCODONE HYDROCHLORIDE 10 MG: 10 TABLET ORAL at 19:28

## 2022-01-01 RX ADMIN — GABAPENTIN 600 MG: 300 CAPSULE ORAL at 08:27

## 2022-01-01 RX ADMIN — NABUMETONE 1000 MG: 500 TABLET ORAL at 21:47

## 2022-01-01 RX ADMIN — FLUCONAZOLE 200 MG: 200 TABLET ORAL at 00:29

## 2022-01-01 RX ADMIN — CYCLOBENZAPRINE 10 MG: 10 TABLET, FILM COATED ORAL at 15:08

## 2022-01-01 RX ADMIN — BACLOFEN 10 MG: 10 TABLET ORAL at 13:36

## 2022-01-01 RX ADMIN — NORTRIPTYLINE HYDROCHLORIDE 25 MG: 25 CAPSULE ORAL at 01:08

## 2022-01-01 RX ADMIN — HYDROMORPHONE HYDROCHLORIDE 0.5 MG: 1 INJECTION, SOLUTION INTRAMUSCULAR; INTRAVENOUS; SUBCUTANEOUS at 19:06

## 2022-01-01 RX ADMIN — NABUMETONE 1000 MG: 500 TABLET ORAL at 22:00

## 2022-01-01 RX ADMIN — BACLOFEN 10 MG: 10 TABLET ORAL at 23:11

## 2022-01-01 RX ADMIN — AMPICILLIN SODIUM AND SULBACTAM SODIUM 3 G: 2; 1 INJECTION, POWDER, FOR SOLUTION INTRAMUSCULAR; INTRAVENOUS at 13:28

## 2022-01-01 RX ADMIN — FEXOFENADINE HYDROCHLORIDE 60 MG: 60 TABLET, FILM COATED ORAL at 08:16

## 2022-01-01 RX ADMIN — GABAPENTIN 600 MG: 300 CAPSULE ORAL at 09:36

## 2022-01-01 RX ADMIN — BACLOFEN 10 MG: 10 TABLET ORAL at 14:33

## 2022-01-01 RX ADMIN — ACETAMINOPHEN 650 MG: 325 TABLET, FILM COATED ORAL at 20:25

## 2022-01-01 RX ADMIN — AMPICILLIN SODIUM AND SULBACTAM SODIUM 3 G: 2; 1 INJECTION, POWDER, FOR SOLUTION INTRAMUSCULAR; INTRAVENOUS at 12:29

## 2022-01-01 RX ADMIN — CEVIMELINE 30 MG: 30 CAPSULE ORAL at 13:35

## 2022-01-01 RX ADMIN — CYCLOBENZAPRINE 10 MG: 10 TABLET, FILM COATED ORAL at 15:31

## 2022-01-01 RX ADMIN — ATENOLOL 25 MG: 25 TABLET ORAL at 08:18

## 2022-01-01 RX ADMIN — ATENOLOL 25 MG: 25 TABLET ORAL at 08:28

## 2022-01-01 RX ADMIN — LEVOMILNACIPRAN HYDROCHLORIDE 80 MG: 80 CAPSULE, EXTENDED RELEASE ORAL at 10:06

## 2022-01-01 RX ADMIN — Medication 7.5 MG: at 13:36

## 2022-01-01 RX ADMIN — CYCLOBENZAPRINE 10 MG: 10 TABLET, FILM COATED ORAL at 09:36

## 2022-01-01 RX ADMIN — CEVIMELINE 30 MG: 30 CAPSULE ORAL at 08:17

## 2022-01-01 RX ADMIN — LIDOCAINE: 40 CREAM TOPICAL at 18:45

## 2022-01-01 RX ADMIN — GABAPENTIN 600 MG: 300 CAPSULE ORAL at 14:58

## 2022-01-01 RX ADMIN — GABAPENTIN 600 MG: 300 CAPSULE ORAL at 15:30

## 2022-01-01 RX ADMIN — SODIUM CHLORIDE 73 ML: 9 INJECTION, SOLUTION INTRAVENOUS at 19:18

## 2022-01-01 RX ADMIN — PANTOPRAZOLE SODIUM 40 MG: 40 TABLET, DELAYED RELEASE ORAL at 09:00

## 2022-01-01 RX ADMIN — PIPERACILLIN AND TAZOBACTAM 3.38 G: 3; .375 INJECTION, POWDER, FOR SOLUTION INTRAVENOUS at 21:50

## 2022-01-01 RX ADMIN — BACLOFEN 10 MG: 10 TABLET ORAL at 19:54

## 2022-01-01 RX ADMIN — ACETAMINOPHEN 650 MG: 325 TABLET, FILM COATED ORAL at 16:02

## 2022-01-01 RX ADMIN — ACETAMINOPHEN 650 MG: 325 TABLET, FILM COATED ORAL at 00:02

## 2022-01-01 RX ADMIN — OXYCODONE HYDROCHLORIDE AND ACETAMINOPHEN 1 TABLET: 5; 325 TABLET ORAL at 07:03

## 2022-01-01 RX ADMIN — ENOXAPARIN SODIUM 40 MG: 40 INJECTION SUBCUTANEOUS at 10:06

## 2022-01-01 RX ADMIN — BACLOFEN 10 MG: 10 TABLET ORAL at 19:53

## 2022-01-01 RX ADMIN — PSEUDOEPHEDRINE HCL 120 MG: 120 TABLET, FILM COATED, EXTENDED RELEASE ORAL at 19:54

## 2022-01-01 RX ADMIN — NYSTATIN 500000 UNITS: 100000 SUSPENSION ORAL at 10:24

## 2022-01-01 RX ADMIN — FLUCONAZOLE 200 MG: 200 TABLET ORAL at 08:29

## 2022-01-01 RX ADMIN — NYSTATIN 500000 UNITS: 100000 SUSPENSION ORAL at 19:54

## 2022-01-01 RX ADMIN — CYCLOBENZAPRINE 10 MG: 10 TABLET, FILM COATED ORAL at 13:32

## 2022-01-01 RX ADMIN — NALXONE HYDROCHLORIDE 0.2 MG: 0.4 INJECTION INTRAMUSCULAR; INTRAVENOUS; SUBCUTANEOUS at 04:56

## 2022-01-01 RX ADMIN — BACLOFEN 10 MG: 10 TABLET ORAL at 13:30

## 2022-01-01 RX ADMIN — GABAPENTIN 600 MG: 300 CAPSULE ORAL at 15:14

## 2022-01-01 RX ADMIN — LEVOMILNACIPRAN HYDROCHLORIDE 80 MG: 80 CAPSULE, EXTENDED RELEASE ORAL at 08:28

## 2022-01-01 RX ADMIN — GABAPENTIN 600 MG: 300 CAPSULE ORAL at 15:07

## 2022-01-01 RX ADMIN — PIPERACILLIN AND TAZOBACTAM 3.38 G: 3; .375 INJECTION, POWDER, FOR SOLUTION INTRAVENOUS at 17:23

## 2022-01-01 RX ADMIN — PSEUDOEPHEDRINE HCL 120 MG: 120 TABLET, FILM COATED, EXTENDED RELEASE ORAL at 21:43

## 2022-01-01 RX ADMIN — NORTRIPTYLINE HYDROCHLORIDE 25 MG: 25 CAPSULE ORAL at 21:47

## 2022-01-01 RX ADMIN — OXYCODONE HYDROCHLORIDE 10 MG: 5 TABLET ORAL at 14:58

## 2022-01-01 RX ADMIN — CEVIMELINE 30 MG: 30 CAPSULE ORAL at 19:54

## 2022-01-01 RX ADMIN — FEXOFENADINE HYDROCHLORIDE 60 MG: 60 TABLET, FILM COATED ORAL at 10:24

## 2022-01-01 RX ADMIN — ATOMOXETINE 80 MG: 40 CAPSULE ORAL at 08:27

## 2022-01-01 RX ADMIN — PIPERACILLIN AND TAZOBACTAM 3.38 G: 3; .375 INJECTION, POWDER, FOR SOLUTION INTRAVENOUS at 03:48

## 2022-01-01 RX ADMIN — ATOMOXETINE 80 MG: 40 CAPSULE ORAL at 10:07

## 2022-01-01 RX ADMIN — OXYCODONE HYDROCHLORIDE 10 MG: 5 TABLET ORAL at 13:30

## 2022-01-01 RX ADMIN — NYSTATIN 500000 UNITS: 100000 SUSPENSION ORAL at 19:53

## 2022-01-01 RX ADMIN — FLUCONAZOLE 200 MG: 200 TABLET ORAL at 09:37

## 2022-01-01 RX ADMIN — CEVIMELINE 30 MG: 30 CAPSULE ORAL at 15:00

## 2022-01-01 RX ADMIN — CEVIMELINE 30 MG: 30 CAPSULE ORAL at 17:15

## 2022-01-01 RX ADMIN — PIPERACILLIN AND TAZOBACTAM 3.38 G: 3; .375 INJECTION, POWDER, FOR SOLUTION INTRAVENOUS at 06:06

## 2022-01-01 RX ADMIN — ATOMOXETINE 80 MG: 40 CAPSULE ORAL at 08:32

## 2022-01-01 RX ADMIN — PIPERACILLIN AND TAZOBACTAM 3.38 G: 3; .375 INJECTION, POWDER, FOR SOLUTION INTRAVENOUS at 02:06

## 2022-01-01 RX ADMIN — ATENOLOL 25 MG: 25 TABLET ORAL at 09:35

## 2022-01-01 RX ADMIN — GABAPENTIN 600 MG: 300 CAPSULE ORAL at 10:07

## 2022-01-01 RX ADMIN — GABAPENTIN 600 MG: 300 CAPSULE ORAL at 09:32

## 2022-01-01 RX ADMIN — ACETAMINOPHEN 650 MG: 325 TABLET, FILM COATED ORAL at 13:36

## 2022-01-01 RX ADMIN — BACLOFEN 10 MG: 10 TABLET ORAL at 14:59

## 2022-01-01 RX ADMIN — ACETAMINOPHEN 650 MG: 325 TABLET, FILM COATED ORAL at 09:33

## 2022-01-01 RX ADMIN — GABAPENTIN 600 MG: 300 CAPSULE ORAL at 19:59

## 2022-01-01 RX ADMIN — LEVOMILNACIPRAN HYDROCHLORIDE 80 MG: 80 CAPSULE, EXTENDED RELEASE ORAL at 08:14

## 2022-01-01 RX ADMIN — ACETAMINOPHEN 650 MG: 325 TABLET, FILM COATED ORAL at 18:08

## 2022-01-01 RX ADMIN — ATOMOXETINE 80 MG: 40 CAPSULE ORAL at 08:19

## 2022-01-01 RX ADMIN — PSEUDOEPHEDRINE HCL 120 MG: 120 TABLET, FILM COATED, EXTENDED RELEASE ORAL at 08:17

## 2022-01-01 RX ADMIN — SODIUM CHLORIDE 1000 ML: 9 INJECTION, SOLUTION INTRAVENOUS at 19:50

## 2022-01-01 RX ADMIN — Medication 7.5 MG: at 16:27

## 2022-01-01 RX ADMIN — NALXONE HYDROCHLORIDE 0.2 MG: 0.4 INJECTION INTRAMUSCULAR; INTRAVENOUS; SUBCUTANEOUS at 04:34

## 2022-01-01 RX ADMIN — FEXOFENADINE HYDROCHLORIDE 60 MG: 60 TABLET, FILM COATED ORAL at 13:03

## 2022-01-01 RX ADMIN — CYCLOBENZAPRINE 10 MG: 10 TABLET, FILM COATED ORAL at 15:07

## 2022-01-01 RX ADMIN — BACLOFEN 10 MG: 10 TABLET ORAL at 15:31

## 2022-01-01 RX ADMIN — GABAPENTIN 600 MG: 300 CAPSULE ORAL at 20:26

## 2022-01-01 RX ADMIN — ENOXAPARIN SODIUM 40 MG: 40 INJECTION SUBCUTANEOUS at 08:27

## 2022-01-01 RX ADMIN — PSEUDOEPHEDRINE HCL 120 MG: 120 TABLET, FILM COATED, EXTENDED RELEASE ORAL at 10:27

## 2022-01-01 RX ADMIN — CYCLOBENZAPRINE 10 MG: 10 TABLET, FILM COATED ORAL at 03:49

## 2022-01-01 RX ADMIN — Medication 50 ML: at 14:37

## 2022-01-01 RX ADMIN — CYCLOBENZAPRINE 10 MG: 10 TABLET, FILM COATED ORAL at 19:54

## 2022-01-01 RX ADMIN — MIRABEGRON 50 MG: 50 TABLET, FILM COATED, EXTENDED RELEASE ORAL at 08:16

## 2022-01-01 RX ADMIN — NORTRIPTYLINE HYDROCHLORIDE 25 MG: 25 CAPSULE ORAL at 00:59

## 2022-01-01 RX ADMIN — GABAPENTIN 600 MG: 300 CAPSULE ORAL at 13:29

## 2022-01-01 RX ADMIN — CYCLOBENZAPRINE 10 MG: 10 TABLET, FILM COATED ORAL at 10:08

## 2022-01-01 RX ADMIN — CYCLOBENZAPRINE 10 MG: 10 TABLET, FILM COATED ORAL at 14:33

## 2022-01-01 RX ADMIN — ACETAMINOPHEN 650 MG: 325 TABLET, FILM COATED ORAL at 08:35

## 2022-01-01 RX ADMIN — ACETAMINOPHEN 650 MG: 325 TABLET, FILM COATED ORAL at 08:16

## 2022-01-01 RX ADMIN — LEVOMILNACIPRAN HYDROCHLORIDE 80 MG: 80 CAPSULE, EXTENDED RELEASE ORAL at 09:40

## 2022-01-01 RX ADMIN — PANTOPRAZOLE SODIUM 40 MG: 40 TABLET, DELAYED RELEASE ORAL at 09:36

## 2022-01-01 RX ADMIN — GABAPENTIN 600 MG: 300 CAPSULE ORAL at 15:09

## 2022-01-01 RX ADMIN — OXYCODONE HYDROCHLORIDE 10 MG: 5 TABLET ORAL at 10:07

## 2022-01-01 RX ADMIN — NYSTATIN 500000 UNITS: 100000 SUSPENSION ORAL at 23:10

## 2022-01-01 RX ADMIN — GABAPENTIN 600 MG: 300 CAPSULE ORAL at 13:36

## 2022-01-01 RX ADMIN — NORTRIPTYLINE HYDROCHLORIDE 25 MG: 25 CAPSULE ORAL at 22:34

## 2022-01-01 RX ADMIN — OXYCODONE HYDROCHLORIDE 10 MG: 5 TABLET ORAL at 21:52

## 2022-01-01 RX ADMIN — PIPERACILLIN AND TAZOBACTAM 3.38 G: 3; .375 INJECTION, POWDER, FOR SOLUTION INTRAVENOUS at 04:35

## 2022-01-01 RX ADMIN — PSEUDOEPHEDRINE HCL 120 MG: 120 TABLET, FILM COATED, EXTENDED RELEASE ORAL at 08:18

## 2022-01-01 RX ADMIN — HYDROMORPHONE HYDROCHLORIDE 0.5 MG: 1 INJECTION, SOLUTION INTRAMUSCULAR; INTRAVENOUS; SUBCUTANEOUS at 06:38

## 2022-01-01 RX ADMIN — Medication 7.5 MG: at 07:47

## 2022-01-01 RX ADMIN — CYCLOBENZAPRINE 10 MG: 10 TABLET, FILM COATED ORAL at 21:03

## 2022-01-01 RX ADMIN — CEVIMELINE 30 MG: 30 CAPSULE ORAL at 15:10

## 2022-01-01 RX ADMIN — Medication 50 ML: at 11:25

## 2022-01-01 RX ADMIN — FEXOFENADINE HYDROCHLORIDE 60 MG: 60 TABLET, FILM COATED ORAL at 08:15

## 2022-01-01 RX ADMIN — PSEUDOEPHEDRINE HCL 120 MG: 120 TABLET, FILM COATED, EXTENDED RELEASE ORAL at 10:08

## 2022-01-01 RX ADMIN — CEVIMELINE 30 MG: 30 CAPSULE ORAL at 10:08

## 2022-01-01 RX ADMIN — PANTOPRAZOLE SODIUM 40 MG: 40 TABLET, DELAYED RELEASE ORAL at 07:02

## 2022-01-01 RX ADMIN — ACETAMINOPHEN 650 MG: 325 TABLET, FILM COATED ORAL at 01:17

## 2022-01-01 RX ADMIN — ACETAMINOPHEN 650 MG: 325 TABLET, FILM COATED ORAL at 18:41

## 2022-01-01 RX ADMIN — PIPERACILLIN AND TAZOBACTAM 3.38 G: 3; .375 INJECTION, POWDER, FOR SOLUTION INTRAVENOUS at 10:24

## 2022-01-01 RX ADMIN — NYSTATIN 500000 UNITS: 100000 SUSPENSION ORAL at 17:13

## 2022-01-01 RX ADMIN — NALXONE HYDROCHLORIDE 0.2 MG: 0.4 INJECTION INTRAMUSCULAR; INTRAVENOUS; SUBCUTANEOUS at 03:23

## 2022-01-01 RX ADMIN — NABUMETONE 1000 MG: 500 TABLET ORAL at 21:44

## 2022-01-01 RX ADMIN — IOPAMIDOL 99 ML: 755 INJECTION, SOLUTION INTRAVENOUS at 19:18

## 2022-01-01 RX ADMIN — AMPICILLIN SODIUM AND SULBACTAM SODIUM 3 G: 2; 1 INJECTION, POWDER, FOR SOLUTION INTRAMUSCULAR; INTRAVENOUS at 19:30

## 2022-01-01 RX ADMIN — GABAPENTIN 600 MG: 300 CAPSULE ORAL at 20:19

## 2022-01-01 RX ADMIN — ACETAMINOPHEN 650 MG: 325 TABLET, FILM COATED ORAL at 13:30

## 2022-01-01 RX ADMIN — ACETAMINOPHEN 650 MG: 325 TABLET, FILM COATED ORAL at 21:02

## 2022-01-01 RX ADMIN — Medication 50 ML: at 09:23

## 2022-01-01 RX ADMIN — ACETAMINOPHEN 650 MG: 325 TABLET, FILM COATED ORAL at 15:32

## 2022-01-01 RX ADMIN — Medication 50 ML: at 21:47

## 2022-01-01 RX ADMIN — POTASSIUM CHLORIDE 40 MEQ: 750 TABLET, EXTENDED RELEASE ORAL at 15:30

## 2022-01-01 RX ADMIN — Medication 50 ML: at 20:50

## 2022-01-01 RX ADMIN — ACETAMINOPHEN 650 MG: 325 TABLET, FILM COATED ORAL at 19:53

## 2022-01-01 RX ADMIN — CEVIMELINE 30 MG: 30 CAPSULE ORAL at 20:27

## 2022-01-01 RX ADMIN — OXYCODONE HYDROCHLORIDE AND ACETAMINOPHEN 1 TABLET: 5; 325 TABLET ORAL at 12:40

## 2022-01-01 RX ADMIN — PIPERACILLIN AND TAZOBACTAM 3.38 G: 3; .375 INJECTION, POWDER, FOR SOLUTION INTRAVENOUS at 09:45

## 2022-01-01 RX ADMIN — ACETAMINOPHEN 650 MG: 325 TABLET, FILM COATED ORAL at 01:08

## 2022-01-01 RX ADMIN — CYCLOBENZAPRINE 10 MG: 10 TABLET, FILM COATED ORAL at 23:10

## 2022-01-01 RX ADMIN — OXYCODONE HYDROCHLORIDE 10 MG: 10 TABLET ORAL at 14:33

## 2022-01-01 RX ADMIN — PIPERACILLIN AND TAZOBACTAM 3.38 G: 3; .375 INJECTION, POWDER, FOR SOLUTION INTRAVENOUS at 18:41

## 2022-01-01 RX ADMIN — ATENOLOL 25 MG: 25 TABLET ORAL at 08:16

## 2022-01-01 RX ADMIN — NYSTATIN 500000 UNITS: 100000 SUSPENSION ORAL at 13:31

## 2022-01-01 RX ADMIN — OXYCODONE HYDROCHLORIDE 10 MG: 5 TABLET ORAL at 09:36

## 2022-01-01 RX ADMIN — OXYCODONE HYDROCHLORIDE AND ACETAMINOPHEN 1 TABLET: 5; 325 TABLET ORAL at 00:29

## 2022-01-01 RX ADMIN — PSEUDOEPHEDRINE HCL 120 MG: 120 TABLET, FILM COATED, EXTENDED RELEASE ORAL at 22:33

## 2022-01-01 RX ADMIN — OXYCODONE HYDROCHLORIDE 5 MG: 5 TABLET ORAL at 17:24

## 2022-01-01 RX ADMIN — GABAPENTIN 600 MG: 300 CAPSULE ORAL at 19:54

## 2022-01-01 RX ADMIN — Medication 7.5 MG: at 01:15

## 2022-01-01 RX ADMIN — ATENOLOL 25 MG: 25 TABLET ORAL at 13:03

## 2022-01-01 RX ADMIN — BACLOFEN 10 MG: 10 TABLET ORAL at 15:08

## 2022-01-01 RX ADMIN — GABAPENTIN 600 MG: 300 CAPSULE ORAL at 08:16

## 2022-01-01 ASSESSMENT — ACTIVITIES OF DAILY LIVING (ADL)
ADLS_ACUITY_SCORE: 26
ADLS_ACUITY_SCORE: 26
ADLS_ACUITY_SCORE: 32
ADLS_ACUITY_SCORE: 28
ADLS_ACUITY_SCORE: 32
ADLS_ACUITY_SCORE: 26
ADLS_ACUITY_SCORE: 28
ADLS_ACUITY_SCORE: 26
ADLS_ACUITY_SCORE: 28
ADLS_ACUITY_SCORE: 28
ADLS_ACUITY_SCORE: 35
ADLS_ACUITY_SCORE: 28
ADLS_ACUITY_SCORE: 26
ADLS_ACUITY_SCORE: 28
ADLS_ACUITY_SCORE: 32
ADLS_ACUITY_SCORE: 28
ADLS_ACUITY_SCORE: 30
ADLS_ACUITY_SCORE: 28
ADLS_ACUITY_SCORE: 26
ADLS_ACUITY_SCORE: 32
ADLS_ACUITY_SCORE: 26
ADLS_ACUITY_SCORE: 32
ADLS_ACUITY_SCORE: 28
ADLS_ACUITY_SCORE: 35
ADLS_ACUITY_SCORE: 28
ADLS_ACUITY_SCORE: 24
ADLS_ACUITY_SCORE: 26
ADLS_ACUITY_SCORE: 35
ADLS_ACUITY_SCORE: 28
ADLS_ACUITY_SCORE: 28
ADLS_ACUITY_SCORE: 26
ADLS_ACUITY_SCORE: 28
ADLS_ACUITY_SCORE: 28
ADLS_ACUITY_SCORE: 32
ADLS_ACUITY_SCORE: 26
ADLS_ACUITY_SCORE: 26
ADLS_ACUITY_SCORE: 28
ADLS_ACUITY_SCORE: 26
ADLS_ACUITY_SCORE: 32
ADLS_ACUITY_SCORE: 24
ADLS_ACUITY_SCORE: 28
ADLS_ACUITY_SCORE: 26
ADLS_ACUITY_SCORE: 28
ADLS_ACUITY_SCORE: 28
ADLS_ACUITY_SCORE: 35
ADLS_ACUITY_SCORE: 30
ADLS_ACUITY_SCORE: 28
ADLS_ACUITY_SCORE: 28
ADLS_ACUITY_SCORE: 26
ADLS_ACUITY_SCORE: 28
ADLS_ACUITY_SCORE: 26
ADLS_ACUITY_SCORE: 30
ADLS_ACUITY_SCORE: 28
ADLS_ACUITY_SCORE: 30
ADLS_ACUITY_SCORE: 28
ADLS_ACUITY_SCORE: 32
ADLS_ACUITY_SCORE: 26
ADLS_ACUITY_SCORE: 35
ADLS_ACUITY_SCORE: 35
ADLS_ACUITY_SCORE: 28
ADLS_ACUITY_SCORE: 28
ADLS_ACUITY_SCORE: 32
ADLS_ACUITY_SCORE: 28
ADLS_ACUITY_SCORE: 32
ADLS_ACUITY_SCORE: 30
ADLS_ACUITY_SCORE: 28
ADLS_ACUITY_SCORE: 35
ADLS_ACUITY_SCORE: 28
ADLS_ACUITY_SCORE: 30
ADLS_ACUITY_SCORE: 28
ADLS_ACUITY_SCORE: 35
ADLS_ACUITY_SCORE: 26
ADLS_ACUITY_SCORE: 28
ADLS_ACUITY_SCORE: 28
ADLS_ACUITY_SCORE: 26
ADLS_ACUITY_SCORE: 28
ADLS_ACUITY_SCORE: 28
ADLS_ACUITY_SCORE: 35
ADLS_ACUITY_SCORE: 28
DEPENDENT_IADLS:: INDEPENDENT

## 2022-01-01 ASSESSMENT — ENCOUNTER SYMPTOMS
NAUSEA: 1
DIZZINESS: 1
DIARRHEA: 1
DYSURIA: 1
HEMATURIA: 0
HEADACHES: 1
VOMITING: 1
BACK PAIN: 0
APPETITE CHANGE: 1
UNEXPECTED WEIGHT CHANGE: 1

## 2022-01-01 ASSESSMENT — PAIN SCALES - GENERAL
PAINLEVEL: NO PAIN (0)
PAINLEVEL: EXTREME PAIN (9)
PAINLEVEL: EXTREME PAIN (9)

## 2022-01-01 ASSESSMENT — MIFFLIN-ST. JEOR
SCORE: 1682.81
SCORE: 1664.67

## 2022-01-05 NOTE — TELEPHONE ENCOUNTER
[facility] Call Center    Phone Message    May a detailed message be left on voicemail: yes     Reason for Call: Other: Patient called and is confused why she is scheduled in person on 1/18/2022 with Dr. Porter. Informed patient it appears to be a follow up after medication. She stated it should be for hearing not a medication follow up. She would like to talk with clinic to ensure she is scheduled correctly. Can leave detailed message if no answer.     Action Taken: Message routed to:  [facility] [[id number]]    Travel Screening: Not Applicable

## 2022-01-06 NOTE — TELEPHONE ENCOUNTER
Spoke to patient in regards to upcoming appointment and why it is scheduled. Explained that provider wanted to see her for a follow up regarding medications she prescribed, not to address any ear issues. Pt inquired about  and writer advised pt that a message would be sent to his nurse to follow up in regards to possibly scheduling her to see him for her ear as he has seen her for this in the past.  Pt verbalized understanding of information given.

## 2022-01-07 NOTE — TELEPHONE ENCOUNTER
Spoke to the patient. She has is having symptoms. Will have pt come in on Wednesday to see Dr. Lowe as he will want to see her first. Set up an appt with the pt.    Mayi Olivares LPN

## 2022-01-15 NOTE — TELEPHONE ENCOUNTER
[facility] Call Center    Phone Message    May a detailed message be left on voicemail: yes     Reason for Call: Symptoms or Concerns     If patient has red-flag symptoms, warm transfer to triage line    Current symptom or concern: Face swelling, Laryngitis    Symptoms have been present for:  ? week(s)    Has patient previously been seen for this? Yes    By : Dr Lagunas    Date: 2/20/2019    Are there any new or worsening symptoms? Yes      Action Taken: Message routed to:  [facility] ENT    Travel Screening: Not Applicable                                                                       [facility]

## 2022-01-18 NOTE — PATIENT INSTRUCTIONS
1.  You were seen in the ENT Clinic today by Dr. Porter.     2. Your next steps:   - Schedule procedure with Dr. Porter    - Schedule video swallow study 2-4 weeks following procedure    3.  Plan is to return to clinic post operatively.    If you have any questions or concerns after your appointment, please call the clinic:   - Clinic phone: [phone].   - Esperanza (Dr. Porter's nurse): [phone]    Esperanza Sweeney RN, BSN  [facility]  Department of Otolaryngology  [facility]  [email or web address]

## 2022-01-18 NOTE — PROGRESS NOTES
[facility]    OUTPATIENT SPEECH LANGUAGE PATHOLOGY  PLAN OF TREATMENT FOR OUTPATIENT REHABILITATION AND PROGRESS NOTE                                                          Patient's Last Name, First Name, Giovana Sosa Date of Birth  1980   Provider's Name  [facility] Medical Record No.  [id number]    Onset Date  09/01/2021 Start of Care Date  09/01/2021   Type:     __PT   ___OT   _X_SLP Medical Diagnosis  Velopharyngeal incompetence   SLP Diagnosis  Oropharyngeal dysphagia Plan of Treatment  Frequency/Duration: 2x/month for 6 months  Certification date from 11/30/2021 to 2/28/2022     Goals:  Goal Identifier 1   Goal Description Pt will tolerate soft and bite sized diet and thin liquids with no evidence of pulmonary compromise over 12 weeks' time.     Target Date 02/28/22   Date Met      Progress (detail required for progress note):  Please see progress note filed this date.     Goal Identifier 2   Goal Description Pt will demonstrate understanding and independent implementation of aspiration precautions and safe swallow strategies on 5/5 opp.     Target Date 02/28/22   Date Met      Progress (detail required for progress note):  Please see progress note filed this date     Goal Identifier 3   Goal Description Pt will independently complete trained oropharyngeal strengthening exercises as recommended by the treating clinician.    Target Date 02/28/22   Date Met      Progress (detail required for progress note):  Please see progress note filed this date            I CERTIFY THE NEED FOR THESE SERVICES FURNISHED UNDER        THIS PLAN OF TREATMENT AND WHILE UNDER MY CARE     (Physician attestation of this document indicates review and certification of the therapy plan).                Referring Provider: Dr. Nitza Garcia, SLP

## 2022-01-18 NOTE — PROGRESS NOTES
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 Joaquin is a 42 year old female is being followed for dysphagia. She was initially seen on 9/10/21. Please refer to this note for full history.      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 Has had multiple surgeries. Has had a GJ tube in the past. Has had a trach in the past. She has hardware in her neck placed at the time of her resection.     Today, she presents for follow up. she reports:  - Nystatin helped thrush in mouth and throat  - did not know she was supposed to swallow it  - was only swishing and spitting it   - taste has been off  - still has dark, yellow, runny, water-like drainage from her left ear  - scheduled to see Dr. 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     Gastro-oesophageal reflux disease      Hypertension      IBS (irritable bowel syndrome)      MDD (major depressive disorder)      Migraine      Obesity (BMI 30-39.9)      Velopharyngeal insufficiency,  acquired        PAST SURGICAL HISTORY:   Past Surgical History:   Procedure Laterality Date     BIOPSY       brain tumor removal       DAVINCI ASSISTED UVULOPALATOPHARYNGOPLASTY  5/7/2013    Procedure: DAVINCI ASSISTED UVULOPALATOPHARYNGOPLASTY;  Davinci Pharyngoplasty ;  Surgeon: Hammad Lowe MD;  Location:  OR     ESOPHAGOSCOPY, GASTROSCOPY, DUODENOSCOPY (EGD), COMBINED  12/5/2011    Procedure:COMBINED ESOPHAGOSCOPY, GASTROSCOPY, DUODENOSCOPY (EGD); Surgeon:CEDRICK PABLO; Location:[facility]     neck fusion to c3      to C4      proton particle radiation       soft palette removed, throat x4         CURRENT MEDICATIONS:   Current Outpatient Medications:      AFLURIA QUADRIVALENT 0.5 ML injection, ADM 0.5ML IM UTD, Disp: , Rfl:      atenolol (TENORMIN) 25 MG tablet, Take 1 tablet (25 mg) by mouth daily . Patient needs to see primary provider for further refills., Disp: 14 tablet, Rfl: 0     atomoxetine (STRATTERA) 60 MG capsule, Take 80 mg by mouth daily , Disp: , Rfl:      BACLOFEN PO, Take 10 mg by mouth 3 times daily , Disp: , Rfl:      brexpiprazole (REXULTI) 0.5 MG tablet, Take 1 mg by mouth daily, Disp: , Rfl:      buprenorphine (BUTRANS) 10 MCG/HR WK patch, Place 1 patch onto the skin every 7 days, Disp: , Rfl:      cevimeline (EVOXAC) 30 MG capsule, Take 1 capsule (30 mg) by mouth 3 times daily, Disp: 90 capsule, Rfl: 3     Cholecalciferol (VITAMIN D) 2000 UNITS CAPS, , Disp: , Rfl:      cyclobenzaprine (FLEXERIL) 10 MG tablet, Take 1 tablet (10 mg) by mouth 3 times daily as needed, Disp: 20 tablet, Rfl: 0     esomeprazole (NEXIUM) 40 MG capsule, Take 1 capsule (40 mg) by mouth every morning (before breakfast) One hour before meals, Disp: 90 capsule, Rfl: 3     fluconazole (DIFLUCAN) 100 MG tablet, Take 200mg(2 tablets) the first day, then take 100mg (1 tablet) for the remaining 13 days., Disp: 15 tablet, Rfl: 0     GABAPENTIN 300 MG OR CAPS, Takes 600 mg qid, Disp: , Rfl:      hydrOXYzine (ATARAX)  25 MG tablet, , Disp: , Rfl:      levomilnacipran (FETZIMA) 80 MG 24 HR capsule, Take 80 mg by mouth daily, Disp: , Rfl:      MAGNESIUM OXIDE PO, Take 500 mg by mouth daily, Disp: , Rfl:      meclizine (ANTIVERT) 12.5 MG tablet, TAKE 1 TABLET(12.5 MG) BY MOUTH THREE TIMES DAILY AS NEEDED FOR DIZZINESS, Disp: 15 tablet, Rfl: 0     methylPREDNISolone (MEDROL) 4 MG tablet, Take 1 tablet (4 mg) by mouth daily, Disp: 21 tablet, Rfl: 0     mirabegron (MYRBETRIQ) 50 MG 24 hr tablet, Take 1 tablet (50 mg) by mouth daily, Disp: 90 tablet, Rfl: 3     mirabegron (MYRBETRIQ) 50 MG 24 hr tablet, Take 1 tablet (50 mg) by mouth daily . Patient needs to see primary provider for further refills., Disp: 30 tablet, Rfl: 0     Multiple Vitamins-Minerals (MULTIVITAL PO), Take by mouth daily, Disp: , Rfl:      nortriptyline (PAMELOR) 25 MG capsule, Take 1 capsule (25 mg) by mouth At Bedtime Call clinic to schedule follow up appointment., Disp: 90 capsule, Rfl: 0     oxyCODONE-acetaminophen (PERCOCET) 5-325 MG per tablet, Take 0.05 tablets by mouth 2 times daily Takes 7.5 mg Q 4 hrs PRN, Disp: , Rfl:      pseudoePHEDrine (SUDOGEST 12 HOUR) 120 MG 12 hr tablet, TAKE 1 TABLET BY MOUTH EVERY 12 HOURS AS NEEDED FOR CONGESTION, Disp: 60 tablet, Rfl: 2     SUMAtriptan (IMITREX) 100 MG tablet, Take 1 tablet (100 mg) by mouth at onset of headache for migraine May repeat after one hour, max 200 mg in 24 hours, Disp: 12 tablet, Rfl: 0     tretinoin (RETIN-A) 0.025 % external cream, Apply small pea-sized amount to affected skin at bedtime, use sunscreen during day, Disp: 30 g, Rfl: 1    ALLERGIES: Zolpidem, Ambien [zolpidem tartrate], Amoxicillin, Cephalexin, Cephalosporins, Levaquin [levofloxacin], and Morphine    SOCIAL HISTORY:    Social History     Socioeconomic History     Marital status: Single     Spouse name: Not on file     Number of children: Not on file     Years of education: Not on file     Highest education level: Not on file    Occupational History     Not on file   Tobacco Use     Smoking status: Former Smoker     Packs/day: 0.20     Years: 10.00     Pack years: 2.00     Types: Cigarettes     Quit date: 2013     Years since quittin.0     Smokeless tobacco: Never Used     Tobacco comment: e-cig   Substance and Sexual Activity     Alcohol use: Not Currently     Comment: socially     Drug use: No     Sexual activity: Never   Other Topics Concern     Parent/sibling w/ CABG, MI or angioplasty before 65F 55M? Not Asked   Social History Narrative     Not on file     Social Determinants of Health     Financial Resource Strain: Not on file   Food Insecurity: Not on file   Transportation Needs: Not on file   Physical Activity: Not on file   Stress: Not on file   Social Connections: Not on file   Intimate Partner Violence: Not At Risk     Fear of Current or Ex-Partner: No     Emotionally Abused: No     Physically Abused: No     Sexually Abused: No   Housing Stability: Not on file         FAMILY HISTORY:   Family History   Problem Relation Age of Onset     Uterine Cancer Mother      Arthritis Father         RA     Breast Cancer Maternal Grandmother      Musculoskeletal Disorder Maternal Grandmother         MS     Arthritis Paternal Grandmother       Non-contributory for problems with anesthesia    REVIEW OF SYSTEMS:   The patient was asked a 14 point review of systems regarding constitutional symptoms, eye symptoms, ears, nose, mouth, throat symptoms, cardiovascular symptoms, respiratory symptoms, gastrointestinal symptoms, genitourinary symptoms, musculoskeletal symptoms, integumentary symptoms, neurological symptoms, psychiatric symptoms, endocrine symptoms, hematologic/lymphatic symptoms, and allergic/ immunologic symptoms.   The pertinent factors have been included in the HPI and below.  Patient Supplied Answers to Review of Systems  [facility] ROS 2021   Constitutional Problems with sleep   Neurology Headache   Psychology -   Ears,  Nose, Throat Hearing loss, Ear pain, Ringing/noise in ears, Nasal congestion or drainage, Sore throat, Trouble swallowing, Hoarseness, Coughing up blood   Cardiopulmonary Cough   Gastrointestinal/Genitourinary -   Musculoskeletal Sore or stiff joints, Swollen joints, Back pain, Neck pain, Swollen legs/feet   Allergy/Immunology Allergies or hay fever   Hematologic Bleeding problems, Easy bruising   Endocrine Thirst, Heat or cold intolerance, Frequent urination       Physical Examination   The patient underwent a physical examination as described below. The pertinent positive and negative findings are summarized after the description of the examination.  Constitutional: The patient's developmental and nutritional status was assessed. The patient's voice quality was assessed.  Head and Face: The head and face were inspected for deformities. The sinuses were palpated. The salivary glands were palpated. Facial muscle strength was assessed bilaterally.  Eyes: Extraocular movements and primary gaze alignment were assessed.  Ears, Nose, Mouth and Throat: The ears and nose were examined for deformities. The nasal septum, mucosa, and turbinates were inspected by anterior rhinoscopy. The lips, teeth, and gums were examined for abnormalities. The oral mucosa, tongue, palate, tonsils, lateral and posterior pharynx were inspected for the presence of asymmetry or mucosal lesions.    Neck: The tracheal position was noted, and the neck mass palpated to determine if there were any asymmetries, abnormal neck masses, thyromegally, or thyroid nodules.  Respiratory: The nature of the breathing and chest expansion/symmetry was observed.  Cardiovascular: The patient was examined to determine the presence of any edema or jugular venous distension.  Abdomen: The contour of the abdomen was noted.  Lymphatic: The patient was examined for infraclavicular lymphadenopathy.  Musculoskeletal: The patient was inspected for the presence of skeletal  deformities.  Extremities: The extremities were examined for any clubbing or cyanosis.  Skin: The skin was examined for inflammatory or neoplastic conditions.  Neurologic: The patient's orientation, mood, and affect were noted. The cranial nerve  functions were examined.  Other pertinent positive and negative findings on physical examination:   OC/OP: no lesions, uvula midline, soft palate elevates symmetrically   Neck: no lesions  Ears: right TM appears normal, left TM without evidence of active infection      All other physical examination findings were within normal limits and noncontributory.    Procedures   Flexible laryngoscopy (CPT 80634)      Pre-procedure diagnosis: dysphonia   Post-procedure diagnosis: same as above  Indication for procedure: Ms. Joaquin is a 42 year old female with see above  Procedure(s): Fiberoptic Laryngoscopy    Details of Procedure: After informed consent was obtained, the patient was seated in the examination chair.  The areas of the nasopharynx as well as the hypopharynx were anesthetized with topical 4% lidocaine with 0.25% phenylephrine atomizer.  Examination of the base of tongue was performed first.  Attention was directed to any evidence of masses in the area or evidence of leukoplakia or candidal infection.  Attention was directed to the epiglottis where its size and position was determined and its movement on phonation of the vowel  e .  The piriform sinuses were then inspected for any mass lesions or pooling of secretions.  Attention was then directed to the larynx. The vocal folds were inspected for infection or any areas of leukoplakia, for masses, polypoid degeneration, or hemorrhage.  Having done this, the arytenoids and vocal processes were inspected for erythema or evidence of granuloma formation.  The posterior commissure was then inspected for evidence of inflammatory changes in the mucosa and heaping up of mucosal tissue. The patient was then instructed to say the  vowel  e .  Adduction of vocal folds to the midline was observed for any evidence of paresis or paralysis of the larynx or asymmetry in rotation of the larynx to the left or right. The patient was asked to breathe and the degree of abduction was noted bilaterally.  Subglottic view of the larynx was obtained for any additional mass lesions or mucosal changes.  Finally the post cricoid was examined for evidence of pooling of secretions, as well as the pharyngeal wall mucosa.   Anesthesia type: 0.25% phenylephrine    Findings:  Anatomic/physiological deviations: LNC,  nasopharyngeal scarring, thickened vocal folds, supraglottic hyperfunction, pooling of saliva   Right vocal process: No restriction of mobility   Left vocal process: No restriction of mobility  Glottal gap: Complete glottal closure  Supraglottic structures: Normal  Hypopharynx: Normal     Estimated Blood Loss: minimal  Complications: None  Disposition: Patient tolerated the procedure well          Review of Relevant Clinical Data   I personally reviewed:  Labs:  Lab Results   Component Value Date    TSH 1.03 07/14/2016     Lab Results   Component Value Date     09/02/2021    CO2 31 09/02/2021    BUN 13 09/02/2021    PHOS 5.3 (H) 05/10/2013     Lab Results   Component Value Date    WBC 7.3 09/02/2021    HGB 10.0 (L) 09/02/2021    HCT 34.3 (L) 09/02/2021    MCV 92 09/02/2021     09/02/2021     Lab Results   Component Value Date    PTT 34 08/11/2014    INR 0.99 02/11/2019     No results found for: ANGELICA  No components found for: RHEUMATOIDFACTOR,  RF  Lab Results   Component Value Date    CRP 29.0 (H) 02/11/2019    CRP 7.1 08/11/2014    CRP <3.0 04/05/2008     No components found for: CKTOT, URICACID  No components found for: C3, C4, DSDNAAB, NDNAABIFA  No results found for: MPOAB    Patient reported Quality of Life (QOL) Measures   Patient Supplied Answers To VHI Questionnaire  Voice Handicap Index (VHI-10) 10/14/2021   My voice makes it  "difficult for people to hear me 3   People have difficulty understanding me in a noisy room 3   My voice difficulties restrict my personal and social life.  2   I feel left out of conversations because of my voice 1   My voice problem causes me to lose income 0   I feel as though I have to strain to produce voice 2   The clarity of my voice is unpredictable 2   My voice problem upsets me 2   My voice makes me feel handicapped 3   People ask, \"What's wrong with your voice?\" 0   VHI-10 18         Patient Supplied Answers To EAT Questionnaire  Eating Assessment Tool (EAT-10) 10/14/2021   My swallowing problem has caused me to lose weight 0   My swallowing problem interferes with my ability to go out for meals 0   Swallowing liquids takes extra effort 2   Swallowing solids takes extra effort 3   Swallowing pills takes extra effort 3   Swallowing is painful 2   The pleasure of eating is affected by my swallowing 1   When I swallow food sticks in my throat 3   I cough when I eat 3   Swallowing is stressful 2   EAT-10 19         Patient Supplied Answers To CSI Questionnaire  Cough Severity Index (CSI) 10/14/2021   My cough is worse when I lie down 2   My coughing problem causes me to restrict my personal and social life 0   I tend to avoid places because of my cough problem 0   I feel embarrassed because of my coughing problem 0   People ask, ''What's wrong?'' because I cough a lot 0   I run out of air when I cough 2   My coughing problem affects my voice 4   My coughing problem limits my physical activity 0   My coughing problem upsets me 2   People ask me if I am sick because I cough a lot 0   CSI Score 10         Patient Supplied Answers to Dyspnea Index Questionnaire:  Dyspnea Index 10/14/2021   1. I have trouble getting air in. 0   2. I feel tightness in my throat when I am having patricia breathing problem. 0   3. It takes more effort to breathe than it used to. 0   4. Change in weather affect my breathing problem. 0   5. " My breathing gets worse with stress. 0   6. I make sound/noise breathing in 2   7. I have to strain to breathe. 0   8. My shortness of breath gets worse with exercise or physical activity 0   9. My breathing problem makes me feel stressed. 1   10. My breathing problem casuses me to restrict my personal and social life. 0   Dyspnea Index Total Score 3       Impression & Plan     IMPRESSION: Ms. Joaquin is a 42 year old female who is being seen for the followin. Dysphagia  - for a long time  - in the setting of clival chordoma with soft palate resection , followed by radiation   - had prior Xray Video Swallow Exam in 2017  Worsening globus and inability to initiate swallow   PNA in 2018  - Xray Video Swallow Exam 21- unsafe swallow, with pharyngeal weakness, nasopharyngeal regurgitation, does not cough with penetration and aspiration, appears to have narrowing at the UES - worse now than , unclear if it is UES dysfunction vs poor pharyngeal weakness  - PNA 2 years ago  - feels like she eats everything she wants - has limitation due to only having front teeth  - FEES shows  vallecular residue, pyriform sinus residue, pharyngeal wall residue, postcricoid residue and premature spillage to vocal folds and penetration, no aspiration  - TNE shows that it passes with difficulty through the UES, did not tolerate the scope, had severe pain when the scope was through the UES  - discussed she likely does have some tightness of the UES that if dilated could improve her swallow though her main issue with her swallow is the pharyngeal weakness  - the soft palate and nasal regurgitation is likely not possible to be fixed given the abnormal anatomy  - discussed options for dilation with MAC - transnasal and transoral or with general anesthesia with MDL  - discussed that improving UES patency can also make reflux worse and she has significant reflux changes   - symptoms a lot better after her TNE - thinks she was  passively dilated  - FEES 10/14/2021 shows penetration, pharyngeal, nasopharyngeal residue with solids   - discussed proceeding with another dilation   - does plan to have spine surgery - discussed we will need to help with this given her high risk of worsening dysphagia after  - was scheduled to have TNE but was postponed due to UTI  - had fungal infection after taking antibiotics for UTI requiring antifungals   - symptoms today 01/18/2022 have worsened over the past couple of weeks  - no evidence of fungal infection today  - would like to proceed with TNE with dilation   - discussed her oropharyngeal dysphagia is due to pharyngeal weakness primarily and UES dysfunction secondarily, both due to radiation  - discussed importance of swallow therapy for maintenance of swallow function   - risks, benefits and alternatives were discussed  Plan  - TNE with dilation  - schedule surgery   - Xray Video Swallow Exam after in 2-4 weeks followed by office visit       2. Dysphonia  - voice bothers   - people cant understand her  - tried multiple things for her soft palate  - scope shows bilateral vocal fold thickening and erythema, and severe postcricoid reflux changes  - symptoms due to dysphonia from vocal folds as well as disarthria  - will treat for fungal and bacterial laryngitis   - received abx (bactrim) and antifungal (fluconazole and nystatin)  - voice improved but thinks its due to decreased in cough  - now has started coughing again for the past week and her voice is back   - scope shows nasopharyngeal scarring, thickened vocal folds, supraglottic hyperfunction, paradoxical vocal fold motion   - symptoms today 01/18/2022 are continued   Plan  - voice therapy after dilation            RETURN VISIT: after dilation    Scribe Disclosure:  I, Anuradha Pan am serving as a scribe to document services personally performed by Nitza Porter MD at this visit, based upon the provider's statements to me. All documentation has  been reviewed by the aforementioned provider prior to being entered into the official medical record.     Nitza Porter MD    Laryngology    [facility]  Department of  Otolaryngology - Head and Neck Surgery  [facility] [place] [address]  Appointment line: [phone]  Fax: [phone]  [email or web address]

## 2022-01-18 NOTE — NURSING NOTE
Chief Complaint   Patient presents with     RECHECK     Follow up       Blood pressure 115/54, pulse 91, height 1.524 m (5'), weight 111.1 kg (245 lb), SpO2 99 %, not currently breastfeeding.    Ino Lemons, EMT

## 2022-01-18 NOTE — LETTER
2022       RE: Giovana Joaquin  [address]  [facility] [address]     Dear Colleague,    Thank you for referring your patient, Giovana Joaquin, to the [facility] [place] at [facility]. Please see a copy of my visit note below.        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 Joaquin is a 42 year old female is being followed for dysphagia. She was initially seen on 9/10/21. Please refer to this note for full history.      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 Has had multiple surgeries. Has had a GJ tube in the past. Has had a trach in the past. She has hardware in her neck placed at the time of her resection.     Today, she presents for follow up. she reports:  - Nystatin helped thrush in mouth and throat  - did not know she was supposed to swallow it  - was only swishing and spitting it   - taste has been off  - still has dark, yellow, runny, water-like drainage from her left ear  - scheduled to see Dr. 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     Gastro-oesophageal reflux disease      Hypertension      IBS (irritable bowel syndrome)      MDD (major depressive disorder)      Migraine      Obesity (BMI 30-39.9)      Velopharyngeal insufficiency, acquired        PAST SURGICAL HISTORY:   Past Surgical History:   Procedure Laterality Date     BIOPSY       brain tumor removal       DAVINCI ASSISTED UVULOPALATOPHARYNGOPLASTY  5/7/2013    Procedure: DAVINCI ASSISTED UVULOPALATOPHARYNGOPLASTY;  Davinci Pharyngoplasty ;  Surgeon: Hammad Lowe MD;  Location: [facility] OR     ESOPHAGOSCOPY, GASTROSCOPY, DUODENOSCOPY (EGD), COMBINED  12/5/2011    Procedure:COMBINED ESOPHAGOSCOPY, GASTROSCOPY, DUODENOSCOPY (EGD); Surgeon:CEDRICK PABLO; Location:[facility]     neck fusion to c3      to C4      proton particle radiation       soft palette removed, throat x4         CURRENT MEDICATIONS:   Current Outpatient Medications:      AFLURIA QUADRIVALENT 0.5 ML injection, ADM 0.5ML IM UTD, Disp: , Rfl:      atenolol (TENORMIN) 25 MG tablet, Take 1 tablet (25 mg) by mouth daily . Patient needs to see primary provider for further refills., Disp: 14 tablet, Rfl: 0     atomoxetine (STRATTERA) 60 MG capsule, Take 80 mg by mouth daily , Disp: , Rfl:      BACLOFEN PO, Take 10 mg by mouth 3 times daily , Disp: , Rfl:      brexpiprazole (REXULTI) 0.5 MG tablet, Take 1 mg by mouth daily, Disp: , Rfl:      buprenorphine (BUTRANS) 10 MCG/HR WK patch, Place 1 patch onto the skin every 7 days, Disp: , Rfl:      cevimeline (EVOXAC) 30 MG capsule, Take 1 capsule (30 mg) by mouth 3 times daily, Disp: 90 capsule, Rfl: 3     Cholecalciferol (VITAMIN D) 2000 UNITS CAPS, , Disp: , Rfl:      cyclobenzaprine (FLEXERIL) 10 MG tablet, Take 1 tablet (10 mg) by mouth 3 times daily as needed, Disp: 20 tablet, Rfl: 0     esomeprazole (NEXIUM) 40 MG capsule, Take 1 capsule (40 mg) by mouth  every morning (before breakfast) One hour before meals, Disp: 90 capsule, Rfl: 3     fluconazole (DIFLUCAN) 100 MG tablet, Take 200mg(2 tablets) the first day, then take 100mg (1 tablet) for the remaining 13 days., Disp: 15 tablet, Rfl: 0     GABAPENTIN 300 MG OR CAPS, Takes 600 mg qid, Disp: , Rfl:      hydrOXYzine (ATARAX) 25 MG tablet, , Disp: , Rfl:      levomilnacipran (FETZIMA) 80 MG 24 HR capsule, Take 80 mg by mouth daily, Disp: , Rfl:      MAGNESIUM OXIDE PO, Take 500 mg by mouth daily, Disp: , Rfl:      meclizine (ANTIVERT) 12.5 MG tablet, TAKE 1 TABLET(12.5 MG) BY MOUTH THREE TIMES DAILY AS NEEDED FOR DIZZINESS, Disp: 15 tablet, Rfl: 0     methylPREDNISolone (MEDROL) 4 MG tablet, Take 1 tablet (4 mg) by mouth daily, Disp: 21 tablet, Rfl: 0     mirabegron (MYRBETRIQ) 50 MG 24 hr tablet, Take 1 tablet (50 mg) by mouth daily, Disp: 90 tablet, Rfl: 3     mirabegron (MYRBETRIQ) 50 MG 24 hr tablet, Take 1 tablet (50 mg) by mouth daily . Patient needs to see primary provider for further refills., Disp: 30 tablet, Rfl: 0     Multiple Vitamins-Minerals (MULTIVITAL PO), Take by mouth daily, Disp: , Rfl:      nortriptyline (PAMELOR) 25 MG capsule, Take 1 capsule (25 mg) by mouth At Bedtime Call clinic to schedule follow up appointment., Disp: 90 capsule, Rfl: 0     oxyCODONE-acetaminophen (PERCOCET) 5-325 MG per tablet, Take 0.05 tablets by mouth 2 times daily Takes 7.5 mg Q 4 hrs PRN, Disp: , Rfl:      pseudoePHEDrine (SUDOGEST 12 HOUR) 120 MG 12 hr tablet, TAKE 1 TABLET BY MOUTH EVERY 12 HOURS AS NEEDED FOR CONGESTION, Disp: 60 tablet, Rfl: 2     SUMAtriptan (IMITREX) 100 MG tablet, Take 1 tablet (100 mg) by mouth at onset of headache for migraine May repeat after one hour, max 200 mg in 24 hours, Disp: 12 tablet, Rfl: 0     tretinoin (RETIN-A) 0.025 % external cream, Apply small pea-sized amount to affected skin at bedtime, use sunscreen during day, Disp: 30 g, Rfl: 1    ALLERGIES: Zolpidem, Ambien [zolpidem  tartrate], Amoxicillin, Cephalexin, Cephalosporins, Levaquin [levofloxacin], and Morphine    SOCIAL HISTORY:    Social History     Socioeconomic History     Marital status: Single     Spouse name: Not on file     Number of children: Not on file     Years of education: Not on file     Highest education level: Not on file   Occupational History     Not on file   Tobacco Use     Smoking status: Former Smoker     Packs/day: 0.20     Years: 10.00     Pack years: 2.00     Types: Cigarettes     Quit date: 2013     Years since quittin.0     Smokeless tobacco: Never Used     Tobacco comment: e-cig   Substance and Sexual Activity     Alcohol use: Not Currently     Comment: socially     Drug use: No     Sexual activity: Never   Other Topics Concern     Parent/sibling w/ CABG, MI or angioplasty before 65F 55M? Not Asked   Social History Narrative     Not on file     Social Determinants of Health     Financial Resource Strain: Not on file   Food Insecurity: Not on file   Transportation Needs: Not on file   Physical Activity: Not on file   Stress: Not on file   Social Connections: Not on file   Intimate Partner Violence: Not At Risk     Fear of Current or Ex-Partner: No     Emotionally Abused: No     Physically Abused: No     Sexually Abused: No   Housing Stability: Not on file         FAMILY HISTORY:   Family History   Problem Relation Age of Onset     Uterine Cancer Mother      Arthritis Father         RA     Breast Cancer Maternal Grandmother      Musculoskeletal Disorder Maternal Grandmother         MS     Arthritis Paternal Grandmother       Non-contributory for problems with anesthesia    REVIEW OF SYSTEMS:   The patient was asked a 14 point review of systems regarding constitutional symptoms, eye symptoms, ears, nose, mouth, throat symptoms, cardiovascular symptoms, respiratory symptoms, gastrointestinal symptoms, genitourinary symptoms, musculoskeletal symptoms, integumentary symptoms, neurological symptoms,  psychiatric symptoms, endocrine symptoms, hematologic/lymphatic symptoms, and allergic/ immunologic symptoms.   The pertinent factors have been included in the HPI and below.  Patient Supplied Answers to Review of Systems  [facility] ENT ROS 8/11/2021   Constitutional Problems with sleep   Neurology Headache   Psychology -   Ears, Nose, Throat Hearing loss, Ear pain, Ringing/noise in ears, Nasal congestion or drainage, Sore throat, Trouble swallowing, Hoarseness, Coughing up blood   Cardiopulmonary Cough   Gastrointestinal/Genitourinary -   Musculoskeletal Sore or stiff joints, Swollen joints, Back pain, Neck pain, Swollen legs/feet   Allergy/Immunology Allergies or hay fever   Hematologic Bleeding problems, Easy bruising   Endocrine Thirst, Heat or cold intolerance, Frequent urination       Physical Examination   The patient underwent a physical examination as described below. The pertinent positive and negative findings are summarized after the description of the examination.  Constitutional: The patient's developmental and nutritional status was assessed. The patient's voice quality was assessed.  Head and Face: The head and face were inspected for deformities. The sinuses were palpated. The salivary glands were palpated. Facial muscle strength was assessed bilaterally.  Eyes: Extraocular movements and primary gaze alignment were assessed.  Ears, Nose, Mouth and Throat: The ears and nose were examined for deformities. The nasal septum, mucosa, and turbinates were inspected by anterior rhinoscopy. The lips, teeth, and gums were examined for abnormalities. The oral mucosa, tongue, palate, tonsils, lateral and posterior pharynx were inspected for the presence of asymmetry or mucosal lesions.    Neck: The tracheal position was noted, and the neck mass palpated to determine if there were any asymmetries, abnormal neck masses, thyromegally, or thyroid nodules.  Respiratory: The nature of the breathing and chest  expansion/symmetry was observed.  Cardiovascular: The patient was examined to determine the presence of any edema or jugular venous distension.  Abdomen: The contour of the abdomen was noted.  Lymphatic: The patient was examined for infraclavicular lymphadenopathy.  Musculoskeletal: The patient was inspected for the presence of skeletal deformities.  Extremities: The extremities were examined for any clubbing or cyanosis.  Skin: The skin was examined for inflammatory or neoplastic conditions.  Neurologic: The patient's orientation, mood, and affect were noted. The cranial nerve  functions were examined.  Other pertinent positive and negative findings on physical examination:   OC/OP: no lesions, uvula midline, soft palate elevates symmetrically   Neck: no lesions  Ears: right TM appears normal, left TM without evidence of active infection      All other physical examination findings were within normal limits and noncontributory.    Procedures   Flexible laryngoscopy (CPT 69247)      Pre-procedure diagnosis: dysphonia   Post-procedure diagnosis: same as above  Indication for procedure: Ms. Joaquin is a 42 year old female with see above  Procedure(s): Fiberoptic Laryngoscopy    Details of Procedure: After informed consent was obtained, the patient was seated in the examination chair.  The areas of the nasopharynx as well as the hypopharynx were anesthetized with topical 4% lidocaine with 0.25% phenylephrine atomizer.  Examination of the base of tongue was performed first.  Attention was directed to any evidence of masses in the area or evidence of leukoplakia or candidal infection.  Attention was directed to the epiglottis where its size and position was determined and its movement on phonation of the vowel  e .  The piriform sinuses were then inspected for any mass lesions or pooling of secretions.  Attention was then directed to the larynx. The vocal folds were inspected for infection or any areas of leukoplakia, for  masses, polypoid degeneration, or hemorrhage.  Having done this, the arytenoids and vocal processes were inspected for erythema or evidence of granuloma formation.  The posterior commissure was then inspected for evidence of inflammatory changes in the mucosa and heaping up of mucosal tissue. The patient was then instructed to say the vowel  e .  Adduction of vocal folds to the midline was observed for any evidence of paresis or paralysis of the larynx or asymmetry in rotation of the larynx to the left or right. The patient was asked to breathe and the degree of abduction was noted bilaterally.  Subglottic view of the larynx was obtained for any additional mass lesions or mucosal changes.  Finally the post cricoid was examined for evidence of pooling of secretions, as well as the pharyngeal wall mucosa.   Anesthesia type: 0.25% phenylephrine    Findings:  Anatomic/physiological deviations: LNC,  nasopharyngeal scarring, thickened vocal folds, supraglottic hyperfunction, pooling of saliva   Right vocal process: No restriction of mobility   Left vocal process: No restriction of mobility  Glottal gap: Complete glottal closure  Supraglottic structures: Normal  Hypopharynx: Normal     Estimated Blood Loss: minimal  Complications: None  Disposition: Patient tolerated the procedure well          Review of Relevant Clinical Data   I personally reviewed:  Labs:  Lab Results   Component Value Date    TSH 1.03 07/14/2016     Lab Results   Component Value Date     09/02/2021    CO2 31 09/02/2021    BUN 13 09/02/2021    PHOS 5.3 (H) 05/10/2013     Lab Results   Component Value Date    WBC 7.3 09/02/2021    HGB 10.0 (L) 09/02/2021    HCT 34.3 (L) 09/02/2021    MCV 92 09/02/2021     09/02/2021     Lab Results   Component Value Date    PTT 34 08/11/2014    INR 0.99 02/11/2019     No results found for: ANGELICA  No components found for: RHEUMATOIDFACTOR,  RF  Lab Results   Component Value Date    CRP 29.0 (H) 02/11/2019     "CRP 7.1 08/11/2014    CRP <3.0 04/05/2008     No components found for: CKTOT, URICACID  No components found for: C3, C4, DSDNAAB, NDNAABIFA  No results found for: MPOAB    Patient reported Quality of Life (QOL) Measures   Patient Supplied Answers To VHI Questionnaire  Voice Handicap Index (VHI-10) 10/14/2021   My voice makes it difficult for people to hear me 3   People have difficulty understanding me in a noisy room 3   My voice difficulties restrict my personal and social life.  2   I feel left out of conversations because of my voice 1   My voice problem causes me to lose income 0   I feel as though I have to strain to produce voice 2   The clarity of my voice is unpredictable 2   My voice problem upsets me 2   My voice makes me feel handicapped 3   People ask, \"What's wrong with your voice?\" 0   VHI-10 18         Patient Supplied Answers To EAT Questionnaire  Eating Assessment Tool (EAT-10) 10/14/2021   My swallowing problem has caused me to lose weight 0   My swallowing problem interferes with my ability to go out for meals 0   Swallowing liquids takes extra effort 2   Swallowing solids takes extra effort 3   Swallowing pills takes extra effort 3   Swallowing is painful 2   The pleasure of eating is affected by my swallowing 1   When I swallow food sticks in my throat 3   I cough when I eat 3   Swallowing is stressful 2   EAT-10 19         Patient Supplied Answers To CSI Questionnaire  Cough Severity Index (CSI) 10/14/2021   My cough is worse when I lie down 2   My coughing problem causes me to restrict my personal and social life 0   I tend to avoid places because of my cough problem 0   I feel embarrassed because of my coughing problem 0   People ask, ''What's wrong?'' because I cough a lot 0   I run out of air when I cough 2   My coughing problem affects my voice 4   My coughing problem limits my physical activity 0   My coughing problem upsets me 2   People ask me if I am sick because I cough a lot 0   CSI " Score 10         Patient Supplied Answers to Dyspnea Index Questionnaire:  Dyspnea Index 10/14/2021   1. I have trouble getting air in. 0   2. I feel tightness in my throat when I am having particia breathing problem. 0   3. It takes more effort to breathe than it used to. 0   4. Change in weather affect my breathing problem. 0   5. My breathing gets worse with stress. 0   6. I make sound/noise breathing in 2   7. I have to strain to breathe. 0   8. My shortness of breath gets worse with exercise or physical activity 0   9. My breathing problem makes me feel stressed. 1   10. My breathing problem casuses me to restrict my personal and social life. 0   Dyspnea Index Total Score 3       Impression & Plan     IMPRESSION: Ms. Joaquin is a 42 year old female who is being seen for the followin. Dysphagia  - for a long time  - in the setting of clival chordoma with soft palate resection , followed by radiation   - had prior Xray Video Swallow Exam in 2017  Worsening globus and inability to initiate swallow   PNA in 2018  - Xray Video Swallow Exam 21- unsafe swallow, with pharyngeal weakness, nasopharyngeal regurgitation, does not cough with penetration and aspiration, appears to have narrowing at the UES - worse now than , unclear if it is UES dysfunction vs poor pharyngeal weakness  - PNA 2 years ago  - feels like she eats everything she wants - has limitation due to only having front teeth  - FEES shows  vallecular residue, pyriform sinus residue, pharyngeal wall residue, postcricoid residue and premature spillage to vocal folds and penetration, no aspiration  - TNE shows that it passes with difficulty through the UES, did not tolerate the scope, had severe pain when the scope was through the UES  - discussed she likely does have some tightness of the UES that if dilated could improve her swallow though her main issue with her swallow is the pharyngeal weakness  - the soft palate and nasal regurgitation is  likely not possible to be fixed given the abnormal anatomy  - discussed options for dilation with MAC - transnasal and transoral or with general anesthesia with MDL  - discussed that improving UES patency can also make reflux worse and she has significant reflux changes   - symptoms a lot better after her TNE - thinks she was passively dilated  - FEES 10/14/2021 shows penetration, pharyngeal, nasopharyngeal residue with solids   - discussed proceeding with another dilation   - does plan to have spine surgery - discussed we will need to help with this given her high risk of worsening dysphagia after  - was scheduled to have TNE but was postponed due to UTI  - had fungal infection after taking antibiotics for UTI requiring antifungals   - symptoms today 01/18/2022 have worsened over the past couple of weeks  - no evidence of fungal infection today  - would like to proceed with TNE with dilation   - discussed her oropharyngeal dysphagia is due to pharyngeal weakness primarily and UES dysfunction secondarily, both due to radiation  - discussed importance of swallow therapy for maintenance of swallow function   - risks, benefits and alternatives were discussed  Plan  - TNE with dilation  - schedule surgery   - Xray Video Swallow Exam after in 2-4 weeks followed by office visit       2. Dysphonia  - voice bothers   - people cant understand her  - tried multiple things for her soft palate  - scope shows bilateral vocal fold thickening and erythema, and severe postcricoid reflux changes  - symptoms due to dysphonia from vocal folds as well as disarthria  - will treat for fungal and bacterial laryngitis   - received abx (bactrim) and antifungal (fluconazole and nystatin)  - voice improved but thinks its due to decreased in cough  - now has started coughing again for the past week and her voice is back   - scope shows nasopharyngeal scarring, thickened vocal folds, supraglottic hyperfunction, paradoxical vocal fold motion   -  symptoms today 01/18/2022 are continued   Plan  - voice therapy after dilation            RETURN VISIT: after dilation    Scribe Disclosure:  I, Anuradha Pan am serving as a scribe to document services personally performed by Nitza Porter MD at this visit, based upon the provider's statements to me. All documentation has been reviewed by the aforementioned provider prior to being entered into the official medical record.     Nitza Porter MD    Laryngology    [facility]  Department of  Otolaryngology - Head and Neck Surgery  [facility] [place] [address]  Appointment line: [phone]  Fax: [phone]  [email or web address]

## 2022-01-18 NOTE — PROGRESS NOTES
[facility]    Outpatient Speech Language Pathology Progress Note  Patient: Giovana Joaquin  : 1980    Beginning/End Dates of Reporting Period:  2021 to 2022    Referring Provider: Dr. Nitza Porter    Therapy Diagnosis: Oropharyngeal dysphagia    Client Self Report: Pt returns today and is seen in conjunction with ENT clinic visit. Her dilation was cancelled because she had an infection. Reports her swallow is worsening.           Goals:  Goal Identifier 1   Goal Description Pt will tolerate soft and bite sized diet and thin liquids with no evidence of pulmonary compromise over 12 weeks' time.     Target Date 22   Date Met      Progress (detail required for progress note):  Goal not met. Please see daily documentation for details. Pt will benefit from ongoing SLP services to improve pharyngeal strength/ROM and swallow safety, given risk of radiation induced fibrosis as this can lead to progressive dysphagia and need for enteral support.      Goal Identifier 2   Goal Description Pt will demonstrate understanding and independent implementation of aspiration precautions and safe swallow strategies on / opp.     Target Date 22   Date Met      Progress (detail required for progress note): Goal not met. Please see daily documentation for details. Pt will benefit from ongoing SLP services to improve pharyngeal strength/ROM and swallow safety, given risk of radiation induced fibrosis as this can lead to progressive dysphagia and need for enteral support.      Goal Identifier 3   Goal Description Pt will independently complete trained oropharyngeal strengthening exercises as recommended by the treating clinician.    Target Date 22   Date Met      Progress (detail required for progress note):  Goal not met. Please see daily documentation for details. Pt will benefit from ongoing SLP  services to improve pharyngeal strength/ROM and swallow safety, given risk of radiation induced fibrosis as this can lead to progressive dysphagia and need for enteral support.          Plan:  Continue therapy per current plan of care.    Discharge:  No

## 2022-01-19 NOTE — TELEPHONE ENCOUNTER
LVM for patient regarding added Hearing test prior to current scheduled ENT Appointment with . Scheduled Hearing test for same day at 10am on 1/26/22. Sent appointment letter and provided Clinic and direct number for scheduling conflicts.

## 2022-01-19 NOTE — PROGRESS NOTES
Reached out to patient to discuss procedure date. Offered patient 2/2/22 and 2/9/22.    Patient unavailable and unable to leave a voicemail due to mailbox being full.    Esperanza Sweeney RN on 1/19/2022 at 3:01 PM

## 2022-01-20 NOTE — PROGRESS NOTES
Left voicemail for patient to discuss procedure date - if she got a ride for either 2/2 or 2/9. Provided direct call back number to discuss.    Esperanza Sweeney RN on 1/20/2022 at 11:41 AM

## 2022-01-20 NOTE — TELEPHONE ENCOUNTER
Spoke with patient regarding added Hearing test prior to current scheduled ENT Appointment with . Scheduled Hearing test for same day at 10am on 1/26/22. Patient stated that she is not wiling to make that work and will try to get a hearing test completed in [place] before 1/26/2022. Patient will call back regarding status of [place] hearing test completed.-Per Patient

## 2022-01-24 NOTE — PROGRESS NOTES
Left additional voicemail regarding procedure date. Provided direct call back number.    Esperanza Sweeney RN on 1/24/2022 at 2:39 PM

## 2022-01-26 NOTE — Clinical Note
See Audio completed this morning. The right air conduction thresholds have improved across all frequencies, and the left ear is largely stable. She has been having frequent left ear drainage.     Thanks,  Baljit

## 2022-01-26 NOTE — NURSING NOTE
Chief Complaint   Patient presents with     RECHECK     yellow discharge from left ear      Blood pressure 102/53, pulse 85, temperature 97.5  F (36.4  C), resp. rate 19, height 1.524 m (5'), weight 109.3 kg (241 lb), SpO2 100 %, not currently breastfeeding.    Fuentes Krishnan LPN

## 2022-01-26 NOTE — LETTER
1/26/2022       RE: Giovana Joaquin  [id number]  [place] [address]     Dear Colleague,    Thank you for referring your patient, Giovana Joaquin, to the [facility] [place] at [facility]. Please see a copy of my visit note below.    HISTORY OF PRESENT ILLNESS:  Giovana Joaquin returns to clinic for routine evaluation of her bilateral eustachian tube dysfunction.  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  She describes a mild hearing loss bilaterally.      AUDIOGRAM:  An audiogram was ordered previously and it shows that she has stable hearing with a conductive loss bilaterally.    PHYSICAL EXAMINATION:  Examination today shows both tympanic membranes with the previous ear tubes extruded.  There was no air fluid on either side and she describes her hearing as adequate.    ASSESSMENT:  Bilateral eustachian tube dysfunction, status post ear tube placement, now with posterior closed tympanic membranes.    PLAN:  Continue to monitor as needed.          Again, thank you for allowing me to participate in the care of your patient.      Sincerely,    Hammad Lowe MD

## 2022-01-26 NOTE — PROGRESS NOTES
AUDIOLOGY REPORT    SUBJECTIVE:  Giovana Joaquin is a 42 year old female who was seen in the Audiology Clinic at the [facility] for audiologic evaluation, referred by Hammad Lowe M.D. The patient has been seen previously in this clinic on 9/1/2021 for assessment and results indicated mild sloping to severe mixed hearing loss in the right ear, and mild sloping to moderately-severe mixed hearing loss in the left ear. The patient has a history of bilateral PE tubes in August 2021, which have since fallen out. Patient reports bilateral eardrum perforations. Feels hearing in the left ear has worsened since last test 9/1/21. Reports frequent yellow drainage from the left ear. Longstanding bilateral constant tinnitus, and occasional left aural pressure. Denies aural pain, dizziness. The patient notes difficulty with communication in a variety of listening situations.     OBJECTIVE:  Otoscopic exam indicates clear/white moisture  in the left ear canal.     Pure Tone Thresholds assessed using conventional audiometry with good  reliability from 250-8000 Hz bilaterally using insert earphones and circumaural headphones     RIGHT:  normal sloping to moderate sensorineural hearing loss    LEFT:    mild sloping to moderate-severe, primarily conductive hearing loss    Tympanogram:    RIGHT: normal eardrum mobility    LEFT:   Abnormal- Flat tracing with normal ear canal volume    Reflexes (reported by stimulus ear):  RIGHT: Ipsilateral is present at normal levels  RIGHT: Contralateral: Did not test due to abnormal tymp  LEFT:   Ipsilateral: Did not test due to abnormal tymp  LEFT:   Contralateral is absent at frequencies tested      Speech Reception Threshold:    RIGHT: 25 dB HL    LEFT:   35 dB HL  Word Recognition Score:     RIGHT: 100% at 60 dB HL using NU-6 recorded word list.    LEFT:   100% at 75 dB HL using NU-6 recorded word list.      ASSESSMENT:   Normal sloping to  moderate sensorineural hearing loss in the right ear, and mild sloping to moderately severe, primarily conductive hearing loss in the left ear. Compared to patient's previous audiogram dated 9/1/2021, air conduction thresholds have improved in the right ear across all frequencies. Thresholds in the left ear remain are largely stable, except for 10 dB worsening of air conduction at 500 & 6000 Hz. Today s results were discussed with the patient in detail.       PLAN:  Patient was counseled regarding hearing loss and impact on communication. It is recommended that the patient follow up with Dr. Lowe in ENT as scheduled later today. Return to monitor hearing status per physician recommendation.  Please call this clinic with questions regarding these results or recommendations.        Claudia Quinn.  Licensed Audiologist  [place] # [id number]

## 2022-01-26 NOTE — PATIENT INSTRUCTIONS
1. You were seen in the ENT Clinic today by Dr. Lowe.  If you have any questions or concerns after your appointment, please call   - Option 1: ENT Clinic: [phone]   - Option 2: Mayi (Dr. Lowe's Nurse): [phone]          Yecenia(Dr. Lowe's Nurse): [phone]     2.   Plan to return to clinic as needed    Mayi Olivares LPN  [facility] - Otolaryngology

## 2022-01-28 NOTE — TELEPHONE ENCOUNTER
Spoke to Giovana regarding our conversation about 2/2/2022,. Patient states that she can't do 2/2 because she doesn't have a ride. Informed her that I'm going to call for 2/2. She states shes trying to set up a ride for 2/9. Informed her that on 2/9, Dr. Porter is no longer here. Informed her we can do 2/4 or 2/16, but these appts go quick and we will need to know ASAP.   Patient verbalized understanding. Surgery is cancelled, until we hear back from patient.     Amber Arnold on 1/28/2022 at 1:17 PM

## 2022-02-17 PROBLEM — K13.79 OTHER LESIONS OF ORAL MUCOSA: Status: ACTIVE | Noted: 2021-01-01

## 2022-02-28 NOTE — PROGRESS NOTES
Reached out to patient to check in on how she is doing and if she would like to proceed with surgery with Dr. Porter. Left vm and provided direct call back.    Esperanza Sweeney RN on 2/28/2022 at 1:49 PM

## 2022-03-04 NOTE — PROGRESS NOTES
Left additional voicemail for patient to discuss if she would like to  her procedure with Dr. Porter.    Esperanza Sweeney RN on 3/4/2022 at 2:36 PM

## 2022-06-25 NOTE — ED TRIAGE NOTES
"Pt states having insomnia, nausea, diarrhea, \"liquid ear wax and ears always plugged\", lightheadedness, headaches, recent weight loss, and headaches. ABCs intact GCS 15     Triage Assessment     Row Name 06/24/22 2001       Triage Assessment (Adult)    Airway WDL WDL       Respiratory WDL    Respiratory WDL WDL       Skin Circulation/Temperature WDL    Skin Circulation/Temperature WDL WDL       Cardiac WDL    Cardiac WDL WDL       Peripheral/Neurovascular WDL    Peripheral Neurovascular WDL WDL       Cognitive/Neuro/Behavioral WDL    Cognitive/Neuro/Behavioral WDL WDL              "

## 2022-07-06 NOTE — TELEPHONE ENCOUNTER
[facility] Call Center    Phone Message    May a detailed message be left on voicemail: yes     Reason for Call: Other: Patient's mother called regarding getting a sooner appointment. Patient is not eating or drinkin really anything, constant vertigo and headaches, extremely weak and has lost over 30 lbs. Please call patient's mother to discuss.      Action Taken: Message routed to:  [facility]    Travel Screening: Not Applicable

## 2022-07-06 NOTE — TELEPHONE ENCOUNTER
RN called patient's mother, Evangelina, and offered an appt with a resident for tomorrow 7/7/22, and she was agreeable.  Appt was made with Dr. Dietz for 1:00 PM tomorrow.    Inés Malagon RN on 7/6/2022 at 9:39 AM

## 2022-07-07 PROBLEM — J86.9 EMPYEMA LUNG (H): Status: ACTIVE | Noted: 2022-01-01

## 2022-07-07 PROBLEM — R63.4 WEIGHT LOSS: Status: ACTIVE | Noted: 2022-01-01

## 2022-07-07 PROBLEM — N39.0 URINARY TRACT INFECTION WITHOUT HEMATURIA, SITE UNSPECIFIED: Status: ACTIVE | Noted: 2022-01-01

## 2022-07-07 NOTE — PROGRESS NOTES
PRIMARY CARE CENTER       SUBJECTIVE:  Giovana Joaquin is a 42 year old female with a PMHx as noted below who comes in for 3 weeks of diarrhea, N/V, lack of appetite, oral Trush, excessive sweating, lightheaded/dizzines, blurry vision, headaches, burning and pain with urination. Patient and patient's mother also state she has experienced a 45 lbs weight loss in the past 3 weeks as well. Patient visited 2 urgent cares in the past 2 weeks, left before any workup could be done. She was advised to go to the ED at that time. Patient is accompanied by 2 family members including her mother. Currently, patient's vitals are borderline, /73, HR 93, seems weak, lightheaded, in moderate distress. We instructed the patient to go to the the [facility] ED immediately. I called the ED and staffed the patient with the attending, family is taking the patient there now.       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     Gastro-oesophageal reflux disease      Hypertension      IBS (irritable bowel syndrome)      MDD (major depressive disorder)      Migraine      Obesity (BMI 30-39.9)      Velopharyngeal insufficiency, acquired      Past Surgical History:   Procedure Laterality Date     BIOPSY       brain tumor removal       DAVINCI ASSISTED UVULOPALATOPHARYNGOPLASTY  5/7/2013    Procedure: DAVINCI ASSISTED UVULOPALATOPHARYNGOPLASTY;  Davinci Pharyngoplasty ;  Surgeon: Hammad Lowe MD;  Location:  OR     ESOPHAGOSCOPY, GASTROSCOPY, DUODENOSCOPY (EGD), COMBINED  12/5/2011    Procedure:COMBINED ESOPHAGOSCOPY, GASTROSCOPY, DUODENOSCOPY (EGD); Surgeon:CEDRICK PABLO; Location:[facility]     neck fusion to c3       to C4      proton particle radiation       soft palette removed, throat x4       Social history: smokes 1-2 marijuana cigarettes/day for chronic pain, vapes daily. Drinks alcohol occasionally. Denies any other recreational drug use.     Allergies:  Cephalexin, Zolpidem, Ambien [zolpidem tartrate], Amoxicillin, Amoxicillin-pot clavulanate, Levaquin [levofloxacin], Cephalosporins, and Morphine       Medications and allergies reviewed by me today.     ROS:     Constitutional: +chills, fatigue, 45 lbs weight loss in the past 3 weeks  Skin: + groin rashes, no moles or lesions  Eyes: blurry vision, no irritation noted  ENT/MOUTH: oral thrush, sore throat  RESP: negative for significant cough or SOB  CV: NEGATIVE for chest pain, palpitations; + peripheral edema  GI: + nausea, abdominal pain, heartburn, diarrhea, no blood in stool  MUSCULOSKELETAL: NEGATIVE for significant arthralgias or myalgia  NEURO: + weakness, dizziness; negative for paresthesias  HEME: NEGATIVE for bleeding problems      OBJECTIVE:    /73 (BP Location: Left arm, Patient Position: Sitting, Cuff Size: Adult Regular)   Pulse 93   Temp 99.1  F (37.3  C) (Oral)   Resp 20   Ht 1.524 m (5')   Wt 92 kg (202 lb 14.4 oz)   SpO2 98%   BMI 39.63 kg/m     Wt Readings from Last 1 Encounters:   07/07/22 91.6 kg (202 lb)       GENERAL APPEARANCE: sick, weak, in moderate distress, lightheaded.     EYES: EOMI, PERRL     HENT: ear canals and TM's normal and nose and mouth without ulcers or lesions; Oral thrush noted     NECK: no adenopathy, no asymmetry, masses, or scars and thyroid normal to palpation     RESP: no rales, rhonchi; wheezes appreciated b/l in the lower lungs     CV: tachycardic, normal S1 S2, + systolic murmur, no click or rub. 2+ pitting edema b/l LE     ABDOMEN: epigastric tenderness, no HSM or masses and bowel sounds normal     Extremities: extremities normal- no gross deformities noted, no evidence of inflammation in joints, FROM in all  extremities.     SKIN: no suspicious lesions or rashes     NEURO: A&Ox3, sensation intact b/l      LYMPHATICS: No cervical adenopathy         ASSESSMENT/PLAN:    #45 lbs weight loss in the past 3 weeks  #Diarrhea, N/V, Lightheadedness, oral thrush x 3 weeks    - Given patients borderline vitals and recent 45 lbs weight loss in the past 3 weeks, I recommend for patient to go immediately to the ED. Patient has visited 2 Urgent cares in the past 2 weeks as well, where she was also instructed to go to the ED.   - I staffed the patient with [facility] ED physician and notified them patient should arrive soon; family will take patient to the ED immediately.    Pt should return to clinic for f/u as instructed by ED physician at hospital discharge.     Sharee Dietz DO  PGY1  Jul 7, 2022    Pt was seen and plan of care discussed with Dr. Peraza.

## 2022-07-07 NOTE — NURSING NOTE
Chief Complaint   Patient presents with     Weight Loss     Cant eat     Dizziness     Fatigue     Weakness         ESTHELA Valdovinos at 1:06 PM on 7/7/2022.   
Enhanced supervision

## 2022-07-07 NOTE — ED PROVIDER NOTES
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  The patient states that for the last few weeks she has had a loss of 45 pounds, accompanied by loss of appetite.  She states that she is unable to eat because she is nauseated and quickly vomits it up, including any pills she takes which has been an issue.  She has not eaten today nor has she vomited today.  Endorses vomiting maybe once yesterday.  She also states that she had a 5-day course of diarrhea that has since gone away.  Endorses dysuria for over a week.  Patient denies hematuria.  She endorses dizziness and headaches for the last 2 weeks and does note that she has a history of a brain tumor.  However she denies history of headaches from the tumor, noting that it has been 20 years since it was removed.  Endorses intermittent blurry vision.  The patient has a throat stricture that she says needs to be stretched out, 5x hx throat surgery to repair soft palate all failed.    The patient states that she does not have dysphagia or difficulty eating and drinking.  However this is most vomiting she has \"abdominal\".  Denies recent antibiotic use or upper back pain.  She has been taking Zofranfor the pain, and has also been taking a probiotic.  She endorses a significant amount of thrush in her throat.      Per chart review the patient has a recent history of presenting to the ED (6/24/2022, 7/5/2022) but leaving after " she has waited several hours and has not been seen yet.    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     Gastro-oesophageal reflux disease      Hypertension      IBS (irritable bowel syndrome)      MDD (major depressive disorder)      Migraine      Obesity (BMI 30-39.9)      Velopharyngeal insufficiency, acquired      Past Surgical History:   Procedure Laterality Date     BIOPSY       brain tumor removal       DAVINCI ASSISTED UVULOPALATOPHARYNGOPLASTY  5/7/2013    Procedure: DAVINCI ASSISTED UVULOPALATOPHARYNGOPLASTY;  Davinci Pharyngoplasty ;  Surgeon: Hammad Lowe MD;  Location: [facility] OR     ESOPHAGOSCOPY, GASTROSCOPY, DUODENOSCOPY (EGD), COMBINED  12/5/2011    Procedure:COMBINED ESOPHAGOSCOPY, GASTROSCOPY, DUODENOSCOPY (EGD); Surgeon:CEDRICK PABLO; Location:[facility]     neck fusion to c3      to C4      proton particle radiation       soft palette removed, throat x4       AFLURIA QUADRIVALENT 0.5 ML injection  atenolol (TENORMIN) 25 MG tablet  atomoxetine (STRATTERA) 60 MG capsule  BACLOFEN PO  brexpiprazole (REXULTI) 0.5 MG tablet  buprenorphine (BUTRANS) 10 MCG/HR WK patch  cevimeline (EVOXAC) 30 MG capsule  Cholecalciferol (VITAMIN D) 2000 UNITS CAPS  cyclobenzaprine (FLEXERIL) 10 MG tablet  esomeprazole (NEXIUM) 40 MG capsule  fluconazole (DIFLUCAN) 100 MG tablet  GABAPENTIN 300 MG OR CAPS  hydrOXYzine (ATARAX) 25 MG tablet  levomilnacipran (FETZIMA ER) 80 MG 24 hr capsule  MAGNESIUM OXIDE PO  meclizine (ANTIVERT) 12.5 MG tablet  medical cannabis (Patient's own supply)  methylPREDNISolone (MEDROL) 4 MG tablet  mirabegron (MYRBETRIQ) 50 MG 24 hr tablet  mirabegron (MYRBETRIQ) 50 MG 24  hr tablet  Multiple Vitamins-Minerals (MULTIVITAL PO)  nortriptyline (PAMELOR) 25 MG capsule  oxyCODONE-acetaminophen (PERCOCET) 5-325 MG per tablet  pseudoePHEDrine (SUDOGEST 12 HOUR) 120 MG 12 hr tablet  SUMAtriptan (IMITREX) 100 MG tablet  tretinoin (RETIN-A) 0.025 % external cream      Allergies   Allergen Reactions     Cephalexin Other (See Comments), Hives and Rash     Vertigo   Vertigo        Zolpidem      Other reaction(s): Other (See Comments), Sleep Disturbances  Sleep walking  dizzy    Other reaction(s): Sleep walk     Ambien [Zolpidem Tartrate] Other (See Comments)     Sleep walking     Amoxicillin Nausea     Stomach pain     Amoxicillin-Pot Clavulanate Other (See Comments)     Stomach pains     Levaquin [Levofloxacin]      Has vertigo     Cephalosporins Dizziness     Other reaction(s): Vestibular Toxicity  Per patient statement not true allergy.  Does not recall this being an issue.        Morphine Itching and Rash     Not effective, and itching (side effect), per patient statement.        Past medical history, past surgical history, medications, and allergies were reviewed with the patient. Additional pertinent items: Acute respiratory failure with hypoxia retrieved from Care Everywhere.    Family History  Family History   Problem Relation Age of Onset     Uterine Cancer Mother      Arthritis Father         RA     Breast Cancer Maternal Grandmother      Musculoskeletal Disorder Maternal Grandmother         MS     Arthritis Paternal Grandmother      Family history was reviewed with the patient. Additional pertinent items: None    Social History  Social History     Tobacco Use     Smoking status: Former Smoker     Packs/day: 0.20     Years: 10.00     Pack years: 2.00     Types: Cigarettes     Quit date: 2013     Years since quittin.4     Smokeless tobacco: Never Used     Tobacco comment: e-cig   Substance Use Topics     Alcohol use: Not Currently     Comment: socially     Drug use: No       Social history was reviewed with the patient. Additional pertinent items: None      Review of Systems   Constitutional: Positive for appetite change and unexpected weight change.   Eyes: Positive for visual disturbance.   Gastrointestinal: Positive for diarrhea, nausea and vomiting.   Genitourinary: Positive for dysuria. Negative for hematuria.   Musculoskeletal: Negative for back pain.   Neurological: Positive for dizziness and headaches.   All other systems reviewed and are negative.    A complete review of systems was performed with pertinent positives and negatives noted in the HPI, and all other systems negative.    Physical Exam   BP: 112/74  Pulse: 90  Temp: 99  F (37.2  C)  Resp: 16  Height: 152.4 cm (5')  Weight: 91.6 kg (202 lb)  SpO2: 95 %  Physical Exam  Constitutional:       General: She is not in acute distress.     Appearance: She is well-developed. She is not ill-appearing, toxic-appearing or diaphoretic.   HENT:      Head: Normocephalic and atraumatic.      Mouth/Throat:      Comments: Significant white thrush visible in the posterior pharynx.;  Harsh voice which is baseline from prior surgeries  Cardiovascular:      Rate and Rhythm: Normal rate and regular rhythm.      Heart sounds: Normal heart sounds.   Pulmonary:      Effort: Pulmonary effort is normal. No respiratory distress.      Breath sounds: Normal breath sounds.   Abdominal:      General: There is no distension.      Palpations: Abdomen is soft.      Tenderness: There is no abdominal tenderness. There is no rebound.   Musculoskeletal:         General: No tenderness.      Cervical back: Normal range of motion.   Skin:     General: Skin is warm and dry.   Neurological:      General: No focal deficit present.      Mental Status: She is alert and oriented to person, place, and time.   Psychiatric:         Behavior: Behavior normal.         Thought Content: Thought content normal.         ED Course     5:40 PM  The patient was seen and  examined by Daylin Florez MD in Room ED06.    Procedures       The medical record was reviewed and interpreted.  Current labs reviewed and interpreted.  Previous labs reviewed and interpreted.     Results for orders placed or performed during the hospital encounter of 07/07/22   XR Chest 2 Views     Status: None    Narrative    EXAM: XR CHEST 2 VW  LOCATION: [facility]  DATE/TIME: 7/7/2022 6:32 PM    INDICATION: difficulty swallowing  COMPARISON: 6/13/2018      Impression    IMPRESSION: Heart size and pulmonary vascularity normal. Extensive airspace consolidation within the left lower lobe. This could represent a left diaphragmatic hernia versus pneumonia, pleural fluid, atelectasis or mass. CT chest exam recommended for   further evaluation. The right lung is clear.   CT Chest w Contrast     Status: None    Narrative    EXAM: CT CHEST W CONTRAST  LOCATION: [facility]  DATE/TIME: 7/7/2022 7:12 PM    INDICATION: weight loss, vomiting nausea; LLL opacity  COMPARISON: None.  TECHNIQUE: CT chest with IV contrast. Multiplanar reformats were obtained. Dose reduction techniques were used.    CONTRAST: 99 mL Isovue 370    FINDINGS:   LUNGS AND PLEURA: There is an approximate 11 x 7 x 12 cm thick-walled enhancing fluid collection within the left hemithorax posteriorly. There is mass effect upon the left lower lobe which is nearly completely collapsed. Aerated portions of the left   upper lobe are clear. The right lung is clear. No pneumothorax.    MEDIASTINUM/AXILLAE: There are mildly enlarged AP window, lower paratracheal, and subcarinal lymph nodes. Pulmonary arteries are mildly dilated. In thoracic aorta within normal limits. Right atrium and ventricle appear mildly enlarged. No pericardial   effusion.     CORONARY ARTERY CALCIFICATION: None.    UPPER ABDOMEN: There is an incidental 1.5 cm right adrenal adenoma or possibly a benign  myelolipoma.    MUSCULOSKELETAL: Unremarkable.      Impression    IMPRESSION:   1.  Moderate left empyema.  2.  Mild pulmonary arterial hypertension suspected.   UA with Microscopic reflex to Culture     Status: Abnormal    Specimen: Urine, Clean Catch   Result Value Ref Range    Color Urine Yellow Colorless, Straw, Light Yellow, Yellow    Appearance Urine Slightly Cloudy (A) Clear    Glucose Urine Negative Negative mg/dL    Bilirubin Urine Negative Negative    Ketones Urine Negative Negative mg/dL    Specific Gravity Urine 1.029 1.003 - 1.035    Blood Urine Negative Negative    pH Urine 6.0 5.0 - 7.0    Protein Albumin Urine 100  (A) Negative mg/dL    Urobilinogen Urine Normal Normal, 2.0 mg/dL    Nitrite Urine Negative Negative    Leukocyte Esterase Urine Negative Negative    Mucus Urine Present (A) None Seen /LPF    RBC Urine 2 <=2 /HPF    WBC Urine 20 (H) <=5 /HPF    Squamous Epithelials Urine 1 <=1 /HPF    Hyaline Casts Urine 139 (H) <=2 /LPF    Narrative    Urine Culture ordered based on laboratory criteria   HCG qualitative urine     Status: Normal   Result Value Ref Range    hCG Urine Qualitative Negative Negative   Comprehensive metabolic panel     Status: Abnormal   Result Value Ref Range    Sodium 135 133 - 144 mmol/L    Potassium 3.2 (L) 3.4 - 5.3 mmol/L    Chloride 95 94 - 109 mmol/L    Carbon Dioxide (CO2) 34 (H) 20 - 32 mmol/L    Anion Gap 6 3 - 14 mmol/L    Urea Nitrogen 7 7 - 30 mg/dL    Creatinine 0.60 0.52 - 1.04 mg/dL    Calcium 9.2 8.5 - 10.1 mg/dL    Glucose 101 (H) 70 - 99 mg/dL    Alkaline Phosphatase 162 (H) 40 - 150 U/L    AST 11 0 - 45 U/L    ALT 21 0 - 50 U/L    Protein Total 7.6 6.8 - 8.8 g/dL    Albumin 1.8 (L) 3.4 - 5.0 g/dL    Bilirubin Total 0.4 0.2 - 1.3 mg/dL    GFR Estimate >90 >60 mL/min/1.73m2   Lactic acid whole blood     Status: Normal   Result Value Ref Range    Lactic Acid 1.3 0.7 - 2.0 mmol/L   CRP inflammation     Status: Abnormal   Result Value Ref Range    CRP  Inflammation 250.0 (H) 0.0 - 8.0 mg/L   CBC with platelets and differential     Status: Abnormal   Result Value Ref Range    WBC Count 15.8 (H) 4.0 - 11.0 10e3/uL    RBC Count 4.90 3.80 - 5.20 10e6/uL    Hemoglobin 10.3 (L) 11.7 - 15.7 g/dL    Hematocrit 36.6 35.0 - 47.0 %    MCV 75 (L) 78 - 100 fL    MCH 21.0 (L) 26.5 - 33.0 pg    MCHC 28.1 (L) 31.5 - 36.5 g/dL    RDW 20.5 (H) 10.0 - 15.0 %    Platelet Count 523 (H) 150 - 450 10e3/uL    % Neutrophils 81 %    % Lymphocytes 11 %    % Monocytes 7 %    % Eosinophils 0 %    % Basophils 0 %    % Immature Granulocytes 1 %    NRBCs per 100 WBC 0 <1 /100    Absolute Neutrophils 12.8 (H) 1.6 - 8.3 10e3/uL    Absolute Lymphocytes 1.7 0.8 - 5.3 10e3/uL    Absolute Monocytes 1.1 0.0 - 1.3 10e3/uL    Absolute Eosinophils 0.0 0.0 - 0.7 10e3/uL    Absolute Basophils 0.1 0.0 - 0.2 10e3/uL    Absolute Immature Granulocytes 0.1 <=0.4 10e3/uL    Absolute NRBCs 0.0 10e3/uL   [place] Draw     Status: None    Narrative    The following orders were created for panel order [place] Draw.  Procedure                               Abnormality         Status                     ---------                               -----------         ------                     Extra Blue Top Tube[[id number]]                              Final result               Extra Red Top Tube[[id number]]                               Final result                 Please view results for these tests on the individual orders.   Extra Blue Top Tube     Status: None   Result Value Ref Range    Hold Specimen JIC    Extra Red Top Tube     Status: None   Result Value Ref Range    Hold Specimen JIC    Asymptomatic COVID-19 Virus (Coronavirus) by PCR Nasopharyngeal     Status: Normal    Specimen: Nasopharyngeal; Swab   Result Value Ref Range    SARS CoV2 PCR Negative Negative    Narrative    Testing was performed using the [employer]  SARS-CoV-2 & Influenza A/B Assay on the [employer]mine  Veronika  System.  This test should be ordered for the  detection of SARS-COV-2 in individuals who meet SARS-CoV-2 clinical and/or epidemiological criteria. Test performance is unknown in asymptomatic patients.  This test is for in vitro diagnostic use under the FDA EUA for laboratories certified under CLIA to perform moderate and/or high complexity testing. This test has not been FDA cleared or approved.  A negative test does not rule out the presence of PCR inhibitors in the specimen or target RNA in concentration below the limit of detection for the assay. The possibility of a false negative should be considered if the patient's recent exposure or clinical presentation suggests COVID-19.  [facility] are certified under the Clinical Laboratory Improvement Amendments of 1988 (CLIA-88) as qualified to perform moderate and/or high complexity laboratory testing.   CBC with platelets differential     Status: Abnormal    Narrative    The following orders were created for panel order CBC with platelets differential.  Procedure                               Abnormality         Status                     ---------                               -----------         ------                     CBC with platelets and d...[[id number]]  Abnormal            Final result                 Please view results for these tests on the individual orders.     Medications   0.9% sodium chloride BOLUS (0 mLs Intravenous Stopped 7/8/22 0028)     Followed by   0.9% sodium chloride BOLUS (has no administration in time range)     Followed by   sodium chloride 0.9% infusion ( Intravenous New Bag 7/7/22 2131)   atenolol (TENORMIN) tablet 25 mg (has no administration in time range)   atomoxetine (STRATTERA) capsule 80 mg (has no administration in time range)   buprenorphine (BUTRANS) 10 MCG/HR WK patch 1 patch (1 patch Transdermal Not Given 7/8/22 0058)   cevimeline (EVOXAC) capsule 30 mg (has no administration in time range)   cyclobenzaprine (FLEXERIL) tablet 10 mg (has no  administration in time range)   pantoprazole (PROTONIX) EC tablet 40 mg (has no administration in time range)   gabapentin (NEURONTIN) capsule 600 mg (has no administration in time range)   meclizine (ANTIVERT) tablet 12.5 mg (has no administration in time range)   mirabegron (MYRBETRIQ) 24 hr tablet 50 mg (has no administration in time range)   nortriptyline (PAMELOR) capsule 25 mg (25 mg Oral Given 7/8/22 0059)   oxyCODONE-acetaminophen (PERCOCET) 5-325 MG per tablet 1 tablet (1 tablet Oral Given 7/8/22 0029)   acetaminophen (TYLENOL) tablet 650 mg (has no administration in time range)   lidocaine 1 % 0.1-1 mL (has no administration in time range)   lidocaine (LMX4) cream (has no administration in time range)   sodium chloride (PF) 0.9% PF flush 3 mL (3 mLs Intracatheter Not Given 7/8/22 0011)   sodium chloride (PF) 0.9% PF flush 3 mL (has no administration in time range)   melatonin tablet 1 mg (has no administration in time range)   ondansetron (ZOFRAN ODT) ODT tab 4 mg (has no administration in time range)     Or   ondansetron (ZOFRAN) injection 4 mg (has no administration in time range)   prochlorperazine (COMPAZINE) injection 10 mg (has no administration in time range)     Or   prochlorperazine (COMPAZINE) tablet 10 mg (has no administration in time range)     Or   prochlorperazine (COMPAZINE) suppository 25 mg (has no administration in time range)   piperacillin-tazobactam (ZOSYN) 3.375 g vial to attach to  mL bag (has no administration in time range)   fluconazole (DIFLUCAN) tablet 200 mg (200 mg Oral Given 7/8/22 0029)   enoxaparin ANTICOAGULANT (LOVENOX) injection 40 mg (has no administration in time range)   iopamidol (ISOVUE-370) solution 100 mL (99 mLs Intravenous Given 7/7/22 1918)   sodium chloride 0.9 % bag 100mL for CT scan flush use (73 mLs Intravenous Given 7/7/22 1918)   metoclopramide (REGLAN) injection 10 mg (10 mg Intravenous Given 7/7/22 1950)   HYDROmorphone (DILAUDID) injection 0.2  mg (0.2 mg Intravenous Given 7/7/22 1950)   piperacillin-tazobactam (ZOSYN) 3.375 g vial to attach to  mL bag (0 g Intravenous Stopped 7/7/22 2250)          Results for orders placed or performed during the hospital encounter of 07/07/22   UA with Microscopic reflex to Culture     Status: Abnormal    Specimen: Urine, Clean Catch   Result Value Ref Range    Color Urine Yellow Colorless, Straw, Light Yellow, Yellow    Appearance Urine Slightly Cloudy (A) Clear    Glucose Urine Negative Negative mg/dL    Bilirubin Urine Negative Negative    Ketones Urine Negative Negative mg/dL    Specific Gravity Urine 1.029 1.003 - 1.035    Blood Urine Negative Negative    pH Urine 6.0 5.0 - 7.0    Protein Albumin Urine 100  (A) Negative mg/dL    Urobilinogen Urine Normal Normal, 2.0 mg/dL    Nitrite Urine Negative Negative    Leukocyte Esterase Urine Negative Negative    Mucus Urine Present (A) None Seen /LPF    RBC Urine 2 <=2 /HPF    WBC Urine 20 (H) <=5 /HPF    Squamous Epithelials Urine 1 <=1 /HPF    Hyaline Casts Urine 139 (H) <=2 /LPF    Narrative    Urine Culture ordered based on laboratory criteria   HCG qualitative urine     Status: Normal   Result Value Ref Range    hCG Urine Qualitative Negative Negative     Medications - No data to display     Assessments & Plan (with Medical Decision Making)   Patient is a 42-year-old female with a known history of a clival chordoma and also a soft palate resection about 20 years prior presents to the ER due to increased difficulty with swallowing nausea vomiting and decreased appetite.  Patient's been having a significant weight loss in the past 3 weeks.  Patient also has been feeling generalized unwell.  Patient's been having difficulty keeping her medications down because she is constantly feeling nauseous.  Due to her prior surgeries the soft palate she has difficulty vomiting but is been feeling very nauseous.  Patient was seen earlier today by her PCP and was sent to the ER  for evaluation.  Patient tried to come to the ER multiple times but has been unable to get in and she has significant weights.  Patient here did have significant thrush in the posterior aspect of her mouth.  Case was initially discussed with ENT who recommended a scope and said that they could get it done as an outpatient patient can be discharged.  Patient however was found to have a significant opacity in the left lower lobe.  We obtain a CT chest which shows concern for a large left empyema.  This is likely was causing the leukocytosis and significantly elevated CRP.  This is also was making her baseline dysphagia worse.  Plan will be to start the patient on IV antibiotics admit to the hospital.  Case was discussed with internal medicine who excepted the patient for admission.  Patient was initially very retired elected to stay in the hospital because she does not have her close from home.  Patient eventually was convinced to come in by myself and the mother.  Patient be [place] [facility] [place] in [place] bed becomes available.    I have reviewed the nursing notes. I have reviewed the findings, diagnosis, plan and need for follow up with the patient.    New Prescriptions    No medications on file       Final diagnoses:   Empyema lung (H)   Weight loss   Urinary tract infection without hematuria, site unspecified     IAmber, am serving as a trained medical scribe to document services personally performed by Daylin Florez MD, based on the provider's statements to me.   Daylin RIVERA MD, was physically present and have reviewed and verified the accuracy of this note documented by Amber Jarrett.    --  Daylin Florez  [facility] EMERGENCY DEPARTMENT  7/7/2022     Daylin Florez MD  07/08/22 0104

## 2022-07-07 NOTE — ED TRIAGE NOTES
Triage Assessment     Row Name 07/07/22 1507       Triage Assessment (Adult)    Airway WDL WDL       Respiratory WDL    Respiratory WDL WDL       Skin Circulation/Temperature WDL    Skin Circulation/Temperature WDL X  pale       Cognitive/Neuro/Behavioral WDL    Cognitive/Neuro/Behavioral WDL X

## 2022-07-07 NOTE — ED TRIAGE NOTES
"Came here with mother. Mother states \"we already did all this\" just look at what they faxed over.       Per Mirela note today:    Giovana Joaquin is a 42 year old female with a PMHx as noted below who comes in for 3 weeks of diarrhea, N/V, lack of appetite, oral Trush, excessive sweating, lightheaded/dizzines, blurry vision, headaches, burning and pain with urination x 3 weeks. Patient and patient's mother also state she has experienced a 45 lbs weight loss in the past 3 weeks as well. Patient visited 2 urgent cares in the past 2 weeks, left before any workup could be done. She was advised to go to the ED at that time. Currently, patient's vitals are borderline, /73, HR 93, seems weak, lightheaded, in moderate distress. We recommended she visits the [facility] ED. I called the ED and staffed the patient with the attending, family is taking the patient to the ER immediately.       "

## 2022-07-08 NOTE — CONSULTS
[facility] Pulmonology Consultation    Giovana Joaquin  MRN: [id number]  : 1980    Date of Admission: 2022  Date of Service: 22    Reason for consult:   Pleural effusion  Requesting physician:  Dr. Huerta    Assessment:  Pleural effusion, concern for empyema  Hx of aspiration  Tobacco use-vaping, smoking medical marijuana    Ms. 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  Her imaging has a moderate sized pleural effusion with a thick rind which is concerning for long standing effusion and empyema. DDx is broad until pleural fluid is obtained. She has constitutional symptoms including fevers, night sweats, and cough concerning for infection. She has a history of aspiration, and reports recent difficulties with this.   We would start with placement of chest tube, and maintaining to suction (fine to ambulate/go to procedures with chest tube on water seal). If chest tube output >1L, take off suction and leave to water seal or clamp if symptomatic. If chest tube output dwindles, will consider lytic therapy in coming days.    Recommendations:  -- IR for chest tube placement   -- CXR  post chest tube placement  -- broad spectrum antibiotics including vancomycin, zosyn pending culture results  -- obtain sputum culture, blood cultures    Patient discussed and seen with Dr. Adams.    Rosa Elena Cheng DO  Pulmonary fellow  Pager: [phone]    Chief complaint:  Malaise, weight loss, fever    HPI:    Ms Joaquin presented to the ED on  with weight loss, anorexia, nausea, vomiting, diarrhea. She had a CT scan which revealed effusion concerning for empyema. She was admitted for further workup and management.    She reports 45 lb weight loss over the last 3.5 weeks. Some component of  loss of appetite, some component of nausea with eating, and dysphagia. She also has problems with aspiration, and has had multiple pneumonias in the past. She is due for a release of throat stricture soon she says. She has a chronic cough, this hasn't changed much recently. She has noticed some fevers, night sweats that are different than her usual night sweats. She has poor dentition, denies any acute pains now.    Review of systems: complete 10 point review of systems completed and negative except as noted above in HPI.    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 Rare etoh use, no recent black outs/passing out/aspiration events in the setting of EtOH.    Surgical history:    Past Surgical History:   Procedure Laterality Date     BIOPSY       brain tumor removal       DAVINCI ASSISTED UVULOPALATOPHARYNGOPLASTY  5/7/2013    Procedure: DAVINCI ASSISTED UVULOPALATOPHARYNGOPLASTY;  Davinci Pharyngoplasty ;  Surgeon: Hammad Lowe MD;  Location:  OR     ESOPHAGOSCOPY, GASTROSCOPY, DUODENOSCOPY (EGD), COMBINED  12/5/2011    Procedure:COMBINED ESOPHAGOSCOPY, GASTROSCOPY, DUODENOSCOPY (EGD); Surgeon:CEDRICK PABLO; [facility]     neck fusion to c3      to C4      proton particle radiation       soft palette removed, throat x4       Family history: I have reviewed family history in EMR.  Family History    Problem Relation Age of Onset     Uterine Cancer Mother      Arthritis Father         RA     Breast Cancer Maternal Grandmother      Musculoskeletal Disorder Maternal Grandmother         MS     Arthritis Paternal Grandmother       Allergies:    Allergies   Allergen Reactions     Cephalexin Other (See Comments), Hives and Rash     Vertigo   Vertigo        Zolpidem      Other reaction(s): Other (See Comments), Sleep Disturbances  Sleep walking  dizzy    Other reaction(s): Sleep walk     Ambien [Zolpidem Tartrate] Other (See Comments)     Sleep walking     Amoxicillin Nausea     Stomach pain     Amoxicillin-Pot Clavulanate Other (See Comments)     Stomach pains     Levaquin [Levofloxacin]      Has vertigo     Cephalosporins Dizziness     Other reaction(s): Vestibular Toxicity  Per patient statement not true allergy.  Does not recall this being an issue.        Morphine Itching and Rash     Not effective, and itching (side effect), per patient statement.        Medications:    Current Facility-Administered Medications   Medication     acetaminophen (TYLENOL) tablet 650 mg     atenolol (TENORMIN) tablet 25 mg     atomoxetine (STRATTERA) capsule 80 mg     cevimeline (EVOXAC) capsule 30 mg     cyclobenzaprine (FLEXERIL) tablet 10 mg     enoxaparin ANTICOAGULANT (LOVENOX) injection 40 mg     fluconazole (DIFLUCAN) tablet 200 mg     gabapentin (NEURONTIN) capsule 600 mg     levomilnacipran (FETZIMA ER) 24 hr capsule 80 mg     lidocaine (LMX4) cream     lidocaine 1 % 0.1-1 mL     meclizine (ANTIVERT) tablet 12.5 mg     melatonin tablet 1 mg     mirabegron (MYRBETRIQ) 24 hr tablet 50 mg     naloxone (NARCAN) injection 0.2 mg    Or     naloxone (NARCAN) injection 0.4 mg    Or     naloxone (NARCAN) injection 0.2 mg    Or     naloxone (NARCAN) injection 0.4 mg     nortriptyline (PAMELOR) capsule 25 mg     ondansetron (ZOFRAN ODT) ODT tab 4 mg    Or     ondansetron (ZOFRAN) injection 4 mg     oxyCODONE IR (ROXICODONE) half-tab  7.5 mg     pantoprazole (PROTONIX) EC tablet 40 mg     piperacillin-tazobactam (ZOSYN) 3.375 g vial to attach to  mL bag     prochlorperazine (COMPAZINE) injection 10 mg    Or     prochlorperazine (COMPAZINE) tablet 10 mg    Or     prochlorperazine (COMPAZINE) suppository 25 mg     sodium chloride (PF) 0.9% PF flush 3 mL     sodium chloride (PF) 0.9% PF flush 3 mL     Objective:  /55 (BP Location: Right arm)   Pulse 82   Temp 98.2  F (36.8  C) (Oral)   Resp 17   Ht 1.524 m (5')   Wt 95.6 kg (210 lb 13.3 oz)   SpO2 98%   BMI 41.17 kg/m      Gen: in no acute distress, sitting upright in bed  HEENT: MMM, no oral lesions appreciated  CV: RRR, no murmurs appreciated, extremities warm, no peripheral edema  Pulm: no increased work of breathing, CTAB  GI: soft, not distended  MSK: no gross deformities, no joint swelling  Integ: no rashes or lesions appreciated  Neuro: speech clear, alert and oriented  Psych: calm, appropriate    Data:    I have personally reviewed laboratory data. Notably: , WBC 16, plts 523, Hgb 10.3.     I have personally reviewed imaging available. Notably:   CT chest 7/7/22 IMPRESSION:   1.  Moderate left empyema.  2.  Mild pulmonary arterial hypertension suspected.    I have personally reviewed cardiac studies. Notably: EKG sept 2021, TTE 2014 unremarkable.  Most recent PFTs:  None available.

## 2022-07-08 NOTE — H&P
[facility]    History and Physical - Hospitalist Service, GOLD TEAM        Date of Admission:  7/7/2022    Assessment & Plan   A: Patient is a 41 y/o woman who has multiple medical problems including past posterior fossa surgery for clival chordoma, velopharyngeal insufficiency, past soft palate removal and GERD. Patient presented with a 3 week history of nausea, vomiting and weight loss. Patient has been found to have a left-sided empyema. Patient is awaiting admission to [facility].    P:  1.) Left-sided empyema: Patient has been started on zosyn and this is to be continued.  2.) Oropharyngeal thrush: Given reported symptoms, will start fluconazole.  3.) Hypertension: Continue atenolol.  4.) Depression, anxiety: Continue stratterra.  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  6.) Chronic pain: Patient on buprenorphine as outpatient and this will be continued.  7.) GERD: Patient to be on protonix for now.  8.) History of esophageal stricture: To f/u as outpatient.  9.) History of chordoma with past resection: Further surveillance as outpatient.  10.) Velopharyngeal insufficiency, past soft palate removal: Monitoring for aspiration.  11.) Obesity: To f/u as outpatient.  12.) Patient has been found to have empyema. Patient will require IV antibiotics and further evaluation by Pulmonology. I anticipate that patient will require at least 2 midnights of inpatient hospital services.      Lm Huerta MD  Hospitalist Service, GOLD TEAM   [facility]  Securely message with the Vocera Web Console (learn more here)  Text page via [facility] Paging/Directory   Please see signed in provider for up to date coverage information    ______________________________________________________________________    Chief Complaint     Nausea, vomiting, weight loss    History of Present Illness     Patient is a 41 y/o woman who has multiple medical  problems including past posterior fossa surgery for clival chordoma, velopharyngeal insufficiency, past soft palate removal and GERD. Patient presented with a 3 week history of nausea, vomiting and weight loss. Patient reported losing 45 lbs over the past 3 weeks. Patient also noted onset of left-sided mid back pain about 4 weeks ago. Patient noted reduced appetite.     Patient noted having difficulty swallowing pills recently and attributed this to esophageal stricture. Patient reportedly has had this stretched in the past. Patient reported also having oral thrush and noted that thrush was also affecting her nose.     Patient noted no cough, no wheezing and no pleurisy. Patient noted subjective fever. Patient noted having a urinary tract infection. Patient noted no other problems at this point in time.     Review of Systems    - 10 point review of systems unremarkable aside from what was mentioned in HPI.    Past Medical History    I have reviewed this patient's medical history and updated it with pertinent information if needed.   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  Past Surgical History:   Procedure Laterality Date     BIOPSY       brain tumor removal       DAVINCI ASSISTED UVULOPALATOPHARYNGOPLASTY  5/7/2013    Procedure: DAVINCI ASSISTED UVULOPALATOPHARYNGOPLASTY;   Davinci Pharyngoplasty ;  Surgeon: Hammad Lowe MD;  Location: [facility]     ESOPHAGOSCOPY, GASTROSCOPY, DUODENOSCOPY (EGD), COMBINED  2011    Procedure:COMBINED ESOPHAGOSCOPY, GASTROSCOPY, DUODENOSCOPY (EGD); Surgeon:CEDRICK PABLO; Location:[facility]     neck fusion to c3      to C4      proton particle radiation       soft palette removed, throat x4         Social History   I have reviewed this patient's social history and updated it with pertinent information if needed.  Social History     Tobacco Use     Smoking status: Former Smoker     Packs/day: 0.20     Years: 10.00     Pack years: 2.00     Types: Cigarettes     Quit date: 2013     Years since quittin.4     Smokeless tobacco: Never Used     Tobacco comment: e-cig   Substance Use Topics     Alcohol use: Not Currently     Comment: socially     Drug use: No       Family History   I have reviewed this patient's family history and updated it with pertinent information if needed.  Family History   Problem Relation Age of Onset     Uterine Cancer Mother      Arthritis Father         RA     Breast Cancer Maternal Grandmother      Musculoskeletal Disorder Maternal Grandmother         MS     Arthritis Paternal Grandmother        Prior to Admission Medications   Prior to Admission Medications   Prescriptions Last Dose Informant Patient Reported? Taking?   AFLURIA QUADRIVALENT 0.5 ML injection   Yes No   Sig: ADM 0.5ML IM UTD   BACLOFEN PO   Yes No   Sig: Take 10 mg by mouth 3 times daily    Cholecalciferol (VITAMIN D) 2000 UNITS CAPS   Yes No   GABAPENTIN 300 MG OR CAPS   Yes No   Sig: Takes 600 mg qid   MAGNESIUM OXIDE PO   Yes No   Sig: Take 500 mg by mouth daily   Multiple Vitamins-Minerals (MULTIVITAL PO)   Yes No   Sig: Take by mouth daily   SUMAtriptan (IMITREX) 100 MG tablet   No No   Sig: Take 1 tablet (100 mg) by mouth at onset of headache for migraine May repeat after one hour, max 200 mg in 24 hours   atenolol (TENORMIN) 25 MG tablet   No No    Sig: Take 1 tablet (25 mg) by mouth daily . Patient needs to see primary provider for further refills.   atomoxetine (STRATTERA) 60 MG capsule   Yes No   Sig: Take 80 mg by mouth daily    brexpiprazole (REXULTI) 0.5 MG tablet   Yes No   Sig: Take 1 mg by mouth daily   buprenorphine (BUTRANS) 10 MCG/HR WK patch   Yes No   Sig: Place 1 patch onto the skin every 7 days   cevimeline (EVOXAC) 30 MG capsule   No No   Sig: Take 1 capsule (30 mg) by mouth 3 times daily   cyclobenzaprine (FLEXERIL) 10 MG tablet   No No   Sig: Take 1 tablet (10 mg) by mouth 3 times daily as needed   esomeprazole (NEXIUM) 40 MG capsule   No No   Sig: Take 1 capsule (40 mg) by mouth every morning (before breakfast) One hour before meals   fluconazole (DIFLUCAN) 100 MG tablet   No No   Sig: Take 200mg(2 tablets) the first day, then take 100mg (1 tablet) for the remaining 13 days.   Patient not taking: No sig reported   hydrOXYzine (ATARAX) 25 MG tablet   Yes No   levomilnacipran (FETZIMA ER) 80 MG 24 hr capsule   Yes No   Sig: Take 80 mg by mouth daily   meclizine (ANTIVERT) 12.5 MG tablet   No No   Sig: TAKE 1 TABLET(12.5 MG) BY MOUTH THREE TIMES DAILY AS NEEDED FOR DIZZINESS   medical cannabis (Patient's own supply)   Yes No   Sig: See Admin Instructions (The purpose of this order is to document that the patient reports taking medical cannabis.  This is not a prescription, and is not used to certify that the patient has a qualifying medical condition.)   methylPREDNISolone (MEDROL) 4 MG tablet   No No   Sig: Take 1 tablet (4 mg) by mouth daily   mirabegron (MYRBETRIQ) 50 MG 24 hr tablet   No No   Sig: Take 1 tablet (50 mg) by mouth daily . Patient needs to see primary provider for further refills.   mirabegron (MYRBETRIQ) 50 MG 24 hr tablet   No No   Sig: Take 1 tablet (50 mg) by mouth daily   nortriptyline (PAMELOR) 25 MG capsule   No No   Sig: Take 1 capsule (25 mg) by mouth At Bedtime Call clinic to schedule follow up appointment.    oxyCODONE-acetaminophen (PERCOCET) 5-325 MG per tablet   Yes No   Sig: Take 0.05 tablets by mouth 2 times daily Takes 7.5 mg Q 4 hrs PRN   pseudoePHEDrine (SUDOGEST 12 HOUR) 120 MG 12 hr tablet   No No   Sig: TAKE 1 TABLET BY MOUTH EVERY 12 HOURS AS NEEDED FOR CONGESTION   tretinoin (RETIN-A) 0.025 % external cream   No No   Sig: Apply small pea-sized amount to affected skin at bedtime, use sunscreen during day      Facility-Administered Medications: None     Allergies   Allergies   Allergen Reactions     Cephalexin Other (See Comments), Hives and Rash     Vertigo   Vertigo        Zolpidem      Other reaction(s): Other (See Comments), Sleep Disturbances  Sleep walking  dizzy    Other reaction(s): Sleep walk     Ambien [Zolpidem Tartrate] Other (See Comments)     Sleep walking     Amoxicillin Nausea     Stomach pain     Amoxicillin-Pot Clavulanate Other (See Comments)     Stomach pains     Levaquin [Levofloxacin]      Has vertigo     Cephalosporins Dizziness     Other reaction(s): Vestibular Toxicity  Per patient statement not true allergy.  Does not recall this being an issue.        Morphine Itching and Rash     Not effective, and itching (side effect), per patient statement.          Physical Exam   Vital Signs: Temp: 99.2  F (37.3  C) Temp src: Oral BP: 94/69 Pulse: 79   Resp: 18 SpO2: 99 % O2 Device: None (Room air)    Weight: 202 lbs 0 oz    General: Patient comfortable, NAD. Speech somewhat hard to understand.  Eyes: No scleral icterus or conjunctival injection.  Oropharynx: White plaque seen at back of patient's throat.  Heart: RRR, S1 S2 w/o murmurs.  Lungs: Breath sounds present in right lung fields and upper left lung fields but attenuated in lower left lung hanley. Rub present. Left middle to lower lung fields dull to percussion; other lung fields tympanic to percussion.  Gastrointestinal: Abdomen soft, nontender.  Skin: Warm, dry.  Musculoskeletal: No visible joint swelling or erythema.  Neuro: Moves  all extremities equally.  Psych: Affect pleasant.    Data   Labs noted.   Sodium 135; Potassium 3.2; Creatinine 0.60; .0;   WBC 15.8; Hb 10.3; Platelets 523    Urinalysis negative for nitrite and leucocyte esterase.    CXR: Heart size and pulmonary vascularity normal. Extensive airspace consolidation within the left lower lobe. This could represent a left diaphragmatic hernia versus pneumonia, pleural fluid, atelectasis or mass. CT chest exam recommended for   further evaluation. The right lung is clear.      CT chest:   1.  Moderate left empyema.  2.  Mild pulmonary arterial hypertension suspected.

## 2022-07-08 NOTE — PLAN OF CARE
/55 (BP Location: Right arm)   Pulse 82   Temp 98.2  F (36.8  C) (Oral)   Resp 17   Ht 1.524 m (5')   Wt 95.6 kg (210 lb 13.3 oz)   SpO2 98%   BMI 41.17 kg/m      Care from: 7320-2218      VS & Pain: VSS on room air, prn Tylenol x1 and prn Oxy x1 were given for chronic generalized pain- partially effective    Neuro: A&O x4    Respiratory: dyspnea on exertion    Cardiac: WDL    Peripheral neurovascular: 1+ edema in BLE    GI/: adequate urine output, no BM this shift    Nutrition: NPO    Skin: pt refused skin assessment    Musculoskeletal: generalized weakness    Lines: L PIV is saline locked    Activity: SBA    Events this shift: Tolerated IV abx well. Call light within reach.    Plan: Continue to follow poc. Possible CT placement tomorrow.      Goal Outcome Evaluation:    Plan of Care Reviewed With: patient     Overall Patient Progress: no change    Outcome Evaluation: Prn Tylenol x1 and prn Oxy x1 was given for pain- partially effective, denied nausea and diahhrea, waiting for CT placement

## 2022-07-08 NOTE — PROGRESS NOTES
> Patient transported to [facility] before I could round on patient. Didn't receive any page.   [facility] Hospitalist team to follow.     Ayan Baez MD   Hospitalist (Internal Medicine)

## 2022-07-08 NOTE — ED NOTES
Pt is boarding in [facility] waiting for [facility]. Pt had an uneventful night. Pt used the bathroom independently, received medications, and rested. No overt concerns noted. Pt was concerned with receiving her home medications. All medications should be ordered for the pt now.

## 2022-07-08 NOTE — PROGRESS NOTES
[facility]  Transfer Triage Note    Date of call: 07/07/22  Time of call: 8:56 PM    Current Patient Location: [facility]  Current Level of Care: ED    Vitals: Temp: 99.2  F (37.3  C) Temp src: Oral BP: 94/69 Pulse: 79   Resp: 18 SpO2: 99 % Height: 152.4 cm (5') Weight: 91.6 kg (202 lb)  O2 Device: None (Room air) at    Diagnosis: Empyema  Is COVID-19 a concern? No  Reason for requested transfer: Further diagnostic work up, management, and consultation for specialized care   Isolation Needs: Contact    Outside Records: Available  Additional records may be faxed to [phone].    Transfer accepted: Yes  Stability of Patient: Patient is vitally stable, with no critical labs, and will likely remain stable throughout the transfer process  Level of Care Needed: Med Surg  Telemetry Needed:  None  Expected Time of Arrival for Transfer: 8-24 hours  Arrival Location:  [facility]    Recommendations for Management and Stabilization: Not needed    Additional Comments:     Giovana Joaquin is a 42 year old woman with a history of cordoma at the base of brain s/p fossa surgery, velopharyngeal incompetence s/p soft palate removal x4, s/p uvulopalatopharyngoplasty (2013) who presented with weight loss, nausea/vomiting, and difficulty swallowing.  She has been vomiting up her pills.  Found to have marked thrush.  CT performed and revealed a large left sided empyema.  Has elevated inflammatory markers and leukocytosis.  ED provider initiated broad spectrum antibiotics.      Will transfer from [facility] to [facility] for specialist consultation.  Med surg bed no tele, contact iso for MRSA.    Sakshi Zuleta MD

## 2022-07-08 NOTE — ED NOTES
[facility]   ED Nurse to Floor Handoff     Giovana Joaquin is a 42 year old female who speaks English and lives with a spouse,  in a home  They arrived in the ED by car from home    ED Chief Complaint: Weight Loss and Nausea, Vomiting, & Diarrhea    ED Dx;   Final diagnoses:   Empyema lung (H)   Weight loss   Urinary tract infection without hematuria, site unspecified         Needed?: No    Allergies:   Allergies   Allergen Reactions     Cephalexin Other (See Comments), Hives and Rash     Vertigo   Vertigo        Zolpidem      Other reaction(s): Other (See Comments), Sleep Disturbances  Sleep walking  dizzy    Other reaction(s): Sleep walk     Ambien [Zolpidem Tartrate] Other (See Comments)     Sleep walking     Amoxicillin Nausea     Stomach pain     Amoxicillin-Pot Clavulanate Other (See Comments)     Stomach pains     Levaquin [Levofloxacin]      Has vertigo     Cephalosporins Dizziness     Other reaction(s): Vestibular Toxicity  Per patient statement not true allergy.  Does not recall this being an issue.        Morphine Itching and Rash     Not effective, and itching (side effect), per patient statement.      .  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     Gastro-oesophageal reflux disease      Hypertension      IBS (irritable bowel syndrome)      MDD (major depressive disorder)      Migraine      Obesity (BMI 30-39.9)      Velopharyngeal insufficiency, acquired       Baseline Mental status: WDL  Current Mental Status changes: at basesline    Infection present or suspected this encounter: no  Sepsis suspected: No  Isolation type:  Contact  Patient tested for COVID 19 prior to admission: YES     Activity level - Baseline/Home:  Stand with Assist  Activity Level - Current:   Stand with Assist    Bariatric equipment needed?: No    In the ED these meds were given:   Medications   0.9% sodium chloride BOLUS (0 mLs Intravenous Stopped 7/7/22 2048)     Followed by   0.9% sodium chloride BOLUS (has no administration in time range)     Followed by   sodium chloride 0.9% infusion ( Intravenous New Bag 7/7/22 2131)   iopamidol (ISOVUE-370) solution 100 mL (99 mLs Intravenous Given 7/7/22 1918)   sodium chloride 0.9 % bag 100mL for CT scan flush use (73 mLs Intravenous Given 7/7/22 1918)   metoclopramide (REGLAN) injection 10 mg (10 mg Intravenous Given 7/7/22 1950)   HYDROmorphone (DILAUDID) injection 0.2 mg (0.2 mg Intravenous Given 7/7/22 1950)   piperacillin-tazobactam (ZOSYN) 3.375 g vial to attach to  mL bag (3.375 g Intravenous New Bag 7/7/22 2130)       Drips running?  Yes    Home pump  No    Current LDAs  Peripheral IV 07/07/22 Left Lower forearm (Active)   Site Assessment WDL 07/07/22 1805   Line Status Saline locked 07/07/22 1805   Dressing Intervention New dressing  07/07/22 1805   Phlebitis Scale 0-->no symptoms 07/07/22 1805   Number of days: 0       Incision/Surgical Site 05/07/13 Mouth (Active)   Number of days: 3348       Labs results:   Labs Ordered and Resulted from Time of ED Arrival to Time of ED Departure   ROUTINE UA WITH MICROSCOPIC REFLEX TO CULTURE - Abnormal       Result Value    Color Urine Yellow      Appearance Urine Slightly Cloudy (*)     Glucose Urine Negative      Bilirubin Urine Negative      Ketones Urine Negative      Specific Gravity Urine 1.029      Blood Urine Negative      pH Urine 6.0      Protein Albumin Urine 100  (*)     Urobilinogen Urine Normal      Nitrite Urine Negative      Leukocyte Esterase Urine Negative      Mucus Urine Present (*)     RBC Urine 2      WBC Urine 20 (*)     Squamous Epithelials  Urine 1      Hyaline Casts Urine 139 (*)    COMPREHENSIVE METABOLIC PANEL - Abnormal    Sodium 135      Potassium 3.2 (*)     Chloride 95      Carbon Dioxide (CO2) 34 (*)     Anion Gap 6      Urea Nitrogen 7      Creatinine 0.60      Calcium 9.2      Glucose 101 (*)     Alkaline Phosphatase 162 (*)     AST 11      ALT 21      Protein Total 7.6      Albumin 1.8 (*)     Bilirubin Total 0.4      GFR Estimate >90     CRP INFLAMMATION - Abnormal    CRP Inflammation 250.0 (*)    CBC WITH PLATELETS AND DIFFERENTIAL - Abnormal    WBC Count 15.8 (*)     RBC Count 4.90      Hemoglobin 10.3 (*)     Hematocrit 36.6      MCV 75 (*)     MCH 21.0 (*)     MCHC 28.1 (*)     RDW 20.5 (*)     Platelet Count 523 (*)     % Neutrophils 81      % Lymphocytes 11      % Monocytes 7      % Eosinophils 0      % Basophils 0      % Immature Granulocytes 1      NRBCs per 100 WBC 0      Absolute Neutrophils 12.8 (*)     Absolute Lymphocytes 1.7      Absolute Monocytes 1.1      Absolute Eosinophils 0.0      Absolute Basophils 0.1      Absolute Immature Granulocytes 0.1      Absolute NRBCs 0.0     HCG QUALITATIVE URINE - Normal    hCG Urine Qualitative Negative     LACTIC ACID WHOLE BLOOD - Normal    Lactic Acid 1.3     COVID-19 VIRUS (CORONAVIRUS) BY PCR - Normal    SARS CoV2 PCR Negative     URINE CULTURE       Imaging Studies:   Recent Results (from the past 24 hour(s))   XR Chest 2 Views    Narrative    EXAM: XR CHEST 2 VW  LOCATION: [facility]  DATE/TIME: 7/7/2022 6:32 PM    INDICATION: difficulty swallowing  COMPARISON: 6/13/2018      Impression    IMPRESSION: Heart size and pulmonary vascularity normal. Extensive airspace consolidation within the left lower lobe. This could represent a left diaphragmatic hernia versus pneumonia, pleural fluid, atelectasis or mass. CT chest exam recommended for   further evaluation. The right lung is clear.   CT Chest w Contrast    Narrative    EXAM: CT CHEST W  CONTRAST  LOCATION: [facility]  DATE/TIME: 7/7/2022 7:12 PM    INDICATION: weight loss, vomiting nausea; LLL opacity  COMPARISON: None.  TECHNIQUE: CT chest with IV contrast. Multiplanar reformats were obtained. Dose reduction techniques were used.    CONTRAST: 99 mL Isovue 370    FINDINGS:   LUNGS AND PLEURA: There is an approximate 11 x 7 x 12 cm thick-walled enhancing fluid collection within the left hemithorax posteriorly. There is mass effect upon the left lower lobe which is nearly completely collapsed. Aerated portions of the left   upper lobe are clear. The right lung is clear. No pneumothorax.    MEDIASTINUM/AXILLAE: There are mildly enlarged AP window, lower paratracheal, and subcarinal lymph nodes. Pulmonary arteries are mildly dilated. In thoracic aorta within normal limits. Right atrium and ventricle appear mildly enlarged. No pericardial   effusion.     CORONARY ARTERY CALCIFICATION: None.    UPPER ABDOMEN: There is an incidental 1.5 cm right adrenal adenoma or possibly a benign myelolipoma.    MUSCULOSKELETAL: Unremarkable.      Impression    IMPRESSION:   1.  Moderate left empyema.  2.  Mild pulmonary arterial hypertension suspected.       Recent vital signs:   BP 94/69   Pulse 79   Temp 99.2  F (37.3  C) (Oral)   Resp 18   Ht 1.524 m (5')   Wt 91.6 kg (202 lb)   SpO2 99%   BMI 39.45 kg/m      Kian Coma Scale Score: 15 (07/07/22 1930)       Cardiac Rhythm: Normal Sinus  Pt needs tele? No  Skin/wound Issues: None    Code Status: Full Code    Pain control: fair    Nausea control: good    Abnormal labs/tests/findings requiring intervention: see epic    Family present during ED course? Yes       Tasks needing completion: None    Loyda Pineda, RN  [phone] [facility]  [phone] [facility] ED

## 2022-07-08 NOTE — PROGRESS NOTES
[facility]    Transfer Acceptance Note - Medicine Service, RAZIA TEAM 5       Date of Admission:  7/7/2022    Assessment & Plan            Giovana Joaquin is a 42 year old female with history of multiple medical problems including past posterior fossa surgery for clival chordoma, velopharyngeal insufficiency s/p soft palate removal and ultimately uvulopalatopharyngoplasty (2013), now has chronic aspiration and was admitted on 7/7/2022 due to night seats, weight loss, cough, found to have left-sided empyema. She was transferred to [place] for intervention with IP vs IR. She is currently breathing comfortably on room air.     Left-sided empyema  Poor dentition   Velopharyngeal insufficiency s/p soft palate removal and uvulopalatopharyngoplasty (2013)  CT imaging suggestive of moderate left-sided empyema in setting of several weeks of infectious symptoms. Patient is currently non-toxic appearing on room air. Suspect she is susceptible due to chronic aspiration in setting of multiple ENT procedures (most recent intervention was several years ago)   - appreciate pulmonology input   - IP unable to perform chest tube tonight  - IR consulted  - Continue Vanco, Zosyn, per ID recs   - discontinue IV fluids given pitting edema   - pta cevimeline     Oropharyngeal thrush  Nausea  Has had previously. Mild discomfort currently.   - Continue fluconazole  - zofran, compazine prn     Chronic pain:  Follows with pain clinic. Has not been on Bup in months, per her report, will check .   - oxycodone 7.5 mg q6h prn per home dose   - gabapentin 600 mg TID  - nortriptyline 25 mg at bedtime     --Chronic problems--  Hypertension: Continue atenolol.  Depression, anxiety: Continue stratterra, levomilnacipran.  GERD: Pta PPI.     Diet: NPO per Anesthesia Guidelines for Procedure/Surgery Except for: Meds    DVT Prophylaxis: Enoxaparin (Lovenox) SQ  [place] Catheter: Not present  Fluids:  PO  Central Lines: None  Cardiac Monitoring: None  Code Status: Full Code      Disposition Plan    TBD pending clinical course      The patient's care was discussed with the Attending Physician, Dr. Ness, Bedside Nurse and Patient.    Ciarra Roman MD  Medicine Service, [address]  Securely message with the Vocera Web Console (learn more here)  Text page via [facility] Paging/Directory   Please see signed in provider for up to date coverage information      Clinically Significant Risk Factors Present on Admission             # Hypoalbuminemia: Albumin = 1.8 g/dL (Ref range: 3.4 - 5.0 g/dL) on admission, will monitor as appropriate        # Severe Obesity: Estimated body mass index is 41.17 kg/m  as calculated from the following:    Height as of this encounter: 1.524 m (5').    Weight as of this encounter: 95.6 kg (210 lb 13.3 oz).        ______________________________________________________________________    Interval History   NAEO. Nursing notes reviewed. Patient feels ok currently, feeling nervous about the proposed chest tube. She endorses ongoing pain in her left side, not pleuritic. Does not feel short of breath. Able to find comfortable position in bed.     Data reviewed today: I reviewed all medications, new labs and imaging results over the last 24 hours. I personally reviewed the chest CT image(s) showing large left-sided empyema.    Physical Exam   Vital Signs: Temp: 98.2  F (36.8  C) Temp src: Oral BP: 114/55 Pulse: 82   Resp: 17 SpO2: 98 % O2 Device: None (Room air)    Weight: 210 lbs 13.28 oz  General: AAOx3, NAD,   HEENT: Tongue brightly erythematous, white plaques on palate, poor dentition without gingival abscess, posterior oropharynx appears clear   CV: RRR, normal S1S2, no murmur, clicks, rubs appreciated. Strong peripheral pulses.   Resp: Diminished lung sounds due to body habitus, comfortable work of breathing on room air,  no wheeze  Abd: Soft, non-distended, non-tender, normoactive bs, no HSM, no masses appreciated  Extremities: No obvious deformity or asymmetry  Skin: No obvious rashes or lesions      Data   Recent Labs   Lab 07/07/22  1806   WBC 15.8*   HGB 10.3*   MCV 75*   *      POTASSIUM 3.2*   CHLORIDE 95   CO2 34*   BUN 7   CR 0.60   ANIONGAP 6   MIROSLAVA 9.2   *   ALBUMIN 1.8*   PROTTOTAL 7.6   BILITOTAL 0.4   ALKPHOS 162*   ALT 21   AST 11     Recent Results (from the past 24 hour(s))   XR Chest 2 Views    Narrative    EXAM: XR CHEST 2 VW  LOCATION: [facility]  DATE/TIME: 7/7/2022 6:32 PM    INDICATION: difficulty swallowing  COMPARISON: 6/13/2018      Impression    IMPRESSION: Heart size and pulmonary vascularity normal. Extensive airspace consolidation within the left lower lobe. This could represent a left diaphragmatic hernia versus pneumonia, pleural fluid, atelectasis or mass. CT chest exam recommended for   further evaluation. The right lung is clear.   CT Chest w Contrast    Narrative    EXAM: CT CHEST W CONTRAST  LOCATION: [facility]  DATE/TIME: 7/7/2022 7:12 PM    INDICATION: weight loss, vomiting nausea; LLL opacity  COMPARISON: None.  TECHNIQUE: CT chest with IV contrast. Multiplanar reformats were obtained. Dose reduction techniques were used.    CONTRAST: 99 mL Isovue 370    FINDINGS:   LUNGS AND PLEURA: There is an approximate 11 x 7 x 12 cm thick-walled enhancing fluid collection within the left hemithorax posteriorly. There is mass effect upon the left lower lobe which is nearly completely collapsed. Aerated portions of the left   upper lobe are clear. The right lung is clear. No pneumothorax.    MEDIASTINUM/AXILLAE: There are mildly enlarged AP window, lower paratracheal, and subcarinal lymph nodes. Pulmonary arteries are mildly dilated. In thoracic aorta within normal limits. Right atrium and  ventricle appear mildly enlarged. No pericardial   effusion.     CORONARY ARTERY CALCIFICATION: None.    UPPER ABDOMEN: There is an incidental 1.5 cm right adrenal adenoma or possibly a benign myelolipoma.    MUSCULOSKELETAL: Unremarkable.      Impression    IMPRESSION:   1.  Moderate left empyema.  2.  Mild pulmonary arterial hypertension suspected.

## 2022-07-09 NOTE — PROGRESS NOTES
07/09/22 1605   General Information   Onset of Illness/Injury or Date of Surgery 07/07/22   Referring Physician Ciarra Roman MD   Patient/Family Therapy Goal Statement (SLP) Pt wants to be home by the 24th   Pertinent History of Current Problem Pt is a 42 year old female with history of multiple medical problems including past posterior fossa surgery for clival chordoma, velopharyngeal insufficiency s/p soft palate removal and ultimately uvulopalatopharyngoplasty (2013), now has chronic aspiration and was admitted on 7/7/2022 due to night seats, weight loss, cough, found to have left-sided empyema. She was transferred to [place] for intervention with IP vs IR. She is currently breathing comfortably on room air. There is concern that current empyema is related to aspiration. Clinical swallow eval completed per MD order.   General Observations Alert and pleasant, mom and girlfriend present   Past History of Dysphagia   Pt with extensive hx of dysphagia related to post-radiation changes. Pt had VFSS most recently in September 2021. Silent aspiration of thin and thick liquids observed at that time. In addition to oropharyngeal deficits, pt also observed to have an obstructing CP bar. Pt was scheduled for a dilation in January which historically has improved her swallow function, but recurrent infections and fear of COVID have prevented her from getting this dilation. Additionally, pt feels strongly that she wants to avoid a feeding tube at this time given her negative experience in the past with a G/J tube. Despite this, her significant other and mother are concerned about her limited PO intake and progressive weight loss.     Type of Evaluation   Type of Evaluation Swallow Evaluation   Oral Motor   Oral Musculature anomalies present   Structural Abnormalities present   Mucosal Quality dry   Dentition (Oral Motor)   Dentition (Oral Motor) some missing teeth   Facial Symmetry (Oral Motor)   Facial Symmetry  "(Oral Motor) WNL   Lip Function (Oral Motor)   Lip Range of Motion (Oral Motor) WNL   Tongue Function (Oral Motor)   Tongue ROM (Oral Motor) WNL   Jaw Function (Oral Motor)   Jaw Function (Oral Motor) WNL   Cough/Swallow/Gag Reflex (Oral Motor)   Soft Palate/Velum (Oral Motor)   (hx of soft palate resection in 2001, pharyngoplasty, PP radiation, velopharyngeal insufficiency - upon examination, back of oropharynx is coated in thick yellow/white thrush)   Comment, Cough/Swallow/Gag Reflex (Oral Motor) adequate, chronich cough at baseline   Vocal Quality/Secretion Management (Oral Motor)   Vocal Quality (Oral Motor) dysphonic  (rough)   General Swallowing Observations   Respiratory Support (General Swallowing Observations) none   Current Diet/Method of Nutritional Intake (General Swallowing Observations, NIS) NPO   Swallowing Evaluation Clinical swallow evaluation   Clinical Swallow Evaluation   Feeding Assistance no assistance needed   Clinical Swallow Evaluation Textures Trialed thin liquids;pureed   Clinical Swallow Eval: Thin Liquid Texture Trial   Mode of Presentation, Thin Liquids self-fed   Volume of Liquid or Food Presented 4oz sprite   Oral Phase of Swallow WFL   Pharyngeal Phase of Swallow impaired   Diagnostic Statement Pt with known hx of silent aspiration - during bedside, wet vocal quality appreciated   Clinical Swallow Evaluation: Puree Solid Texture Trial   Mode of Presentation, Puree self-fed   Volume of Puree Presented 2 1/2 tsps applesauce   Oral Phase, Puree WFL   Pharyngeal Phase, Puree impaired;feeling of something stuck in throat;repeated swallows   Diagnostic Statement Pt endorsed sensation that very little was \"going down\" and majority of bolus was stuck in her throat. Additionally, pt demonstrated wet vocal quality after trials.   Esophageal Phase of Swallow   Patient reports or presents with symptoms of esophageal dysphagia Yes   Esophageal comments Known hx of obstructing CP bar   Swallowing " Recommendations   Diet Consistency Recommendations regular diet;thin liquids (level 0)   Supervision Level for Intake close supervision needed   Mode of Delivery Recommendations bolus size, small;food moistened;slow rate of intake   Monitoring/Assistance Required (Eating/Swallowing) stop eating activities when fatigue is present   Recommended Feeding/Eating Techniques (Swallow Eval) maintain upright sitting position for eating;maintain upright posture during/after eating for 30 minutes;provide oral hygiene prior to intake   Medication Administration Recommendations, Swallowing (SLP) as tolerated - ideally crushed or as liquid   Instrumental Assessment Recommendations VFSS (videofluoroscopic swallowing study)  (pt and her family will discuss)   General Therapy Interventions   Planned Therapy Interventions Dysphagia Treatment   Dysphagia treatment Oropharyngeal exercise training;Modified diet education;Instruction of safe swallow strategies;Compensatory strategies for swallowing  (further instrumental evaluation as needed)   Clinical Impression   Criteria for Skilled Therapeutic Interventions Met (SLP Eval) Yes, treatment indicated   SLP Diagnosis Chronic, severe pharyngoesophageal dysphagia with known hx of silent aspiration   Risks & Benefits of therapy have been explained evaluation/treatment results reviewed;care plan/treatment goals reviewed;risks/benefits reviewed;current/potential barriers reviewed;participants voiced agreement with care plan;participants included;patient;spouse/significant other;mother   Clinical Impression Comments   Clinical swallow eval completed per MD order. Pt presents with chronic, severe pharyngoesophageal dysphagia 2/2 hx of XRT. Oral mech exam remarkable for rough, high pitched vocal quality, baseline hypernasality, junky cough. Pt assessed with thin liquids and applesauce. Pt noted to have wet vocal quality with all PO and endorsed globus sensation with solids. Despite lack of overt  "coughing/choking, pt is known to aspirate thin and thick liquids without any s/sx of aspiration (silent aspiration).     At this time, the pt expressed a strong preference to avoid a feeding tube and would like to continue eating/drinking by mouth. While it is true that the pt has been \"tolerating\" aspiration for years at this point, recommend she and her family have further discussions with MD regarding likelihood that current infection is related to repeated aspiration. Aside from the infection, question pt's ability to even take in enough by mouth to maintain adequate nutrition/hydration given the chronic and progressive nature of her dysphagia.     As pt awaits these further discussions, it is reasonable for her to return to a soft diet/thin liquids with use of specific aspiration precautions: oral care with toothbrush/paste 3x/day, upright positioning during meals, slow rate, frequent OOB activity. SLP will follow closely for ongoing education/training, and to recommend a VFSS as necessary. Suspect pt's family would like her to obtain a repeat VFSS to determine new baseline before proceeding with any enteral feeding.     SLP Discharge Planning   SLP Discharge Recommendation home with outpatient speech therapy   SLP Rationale for DC Rec Severe chronic dysphagia   SLP Brief overview of current status  Recommend a soft diet/thin liquids with use of specific aspiration precautions: oral care with toothbrush/paste 3x/day, upright positioning during meals, slow rate, frequent OOB activity. SLP will follow closely for ongoing education/training, and to recommend a VFSS as necessary. Suspect pt's family would like her to obtain a repeat VFSS to determine new baseline before proceeding with any enteral feeding.    Total Evaluation Time   Total Evaluation Time (Minutes) 35     "

## 2022-07-09 NOTE — PROCEDURES
[facility]    Procedure: IR Procedure Note    Date/Time: 7/9/2022 3:56 PM  Performed by: Lynda Curran MD  Authorized by: Lynda Curran MD       UNIVERSAL PROTOCOL   Site Marked: Yes  Prior Images Obtained and Reviewed:  Yes  Required items: Required blood products, implants, devices and special equipment available    Patient identity confirmed:  Verbally with patient, arm band, provided demographic data and hospital-assigned identification number  Patient was reevaluated immediately before administering moderate or deep sedation or anesthesia  Confirmation Checklist:  Patient's identity using two indicators, relevant allergies, procedure was appropriate and matched the consent or emergent situation and correct equipment/implants were available  Time out: Immediately prior to the procedure a time out was called    Universal Protocol: the Joint Commission Universal Protocol was followed    Preparation: Patient was prepped and draped in usual sterile fashion    ESBL (mL):  0     ANESTHESIA    Anesthesia: Local infiltration  Local Anesthetic:  Lidocaine 1% without epinephrine  Anesthetic Total (mL):  15      SEDATION    Patient Sedated: No    See dictated procedure note for full details.  Findings: No sedation    Specimens: none    Complications: None    Condition: Stable    Plan: Drain to pleurvac with 3 way  Consider daily flushes and drain alteplase  Monitor output  Repeat imaging when output decliines to less than 20 mL per day      PROCEDURE  Describe Procedure: 14 Fr nonlocking drain placed into left empyema, selena green pus aspirated  Length of time physician/provider present for 1:1 monitoring during sedation: 0

## 2022-07-09 NOTE — PLAN OF CARE
Reason for Admission: Empyema of L lung, prolonged N/V leading to rapid wt. loss    RN assumed cares at 1900 to 0700    Vitals: Temp: 97.8  F (36.6  C) Temp src: Oral BP: 109/59 Pulse: 93   Resp: 16 SpO2: 94 % O2 Device: None (Room air)    Pain: chronic arthritic pain noted in neck, back, and hips. PRN oxycodone and tylenol given when pt requested them   Neuro: A/O  Cardiac: WDL  Respiratory: infrequent cough, diminished L sided long sounds  GI/: poor apatite, NPO starting at 0000 7/9 for procedure today. Takes pills with sprite the pt brings and has in room   IV/Drains: 1 PIV saline locked between antibiotics. Pt concerned about amount of IV fluid she gets due to edema in ankles   Activity: SBA pt reminded to call for help before getting up. Pt can sit up and reposition self in bed independently   Skin: edema in lower extremities (more so in R ankle as that foot often dangled off the side of the bed during sleep), feet raised with pillows, compression stockings in the room if pt wishes to try them   Labs: no RN managed labs, no BG ordered   NOTE: some pt home medications not ordered for inpatient stay. MD notified and waiting on day team to approve incase they were held for a reason the night MD was not made awair of. Missing home medications include Nabumetone taken at bedtime, baclofen, and Wal-Phed ER tablet taken both AM and PM    Plan of Care:  chest tube placement expected 7/9 (mother would like to be called when procedure time is confirmed so she can be here for it). Possible need for NG due to poor apatite, to be decided at later time. Continue to monitor and contact MD as needed.

## 2022-07-09 NOTE — PLAN OF CARE
RN Decompensation Assessment   (Additional guidance for RRT)    Mentation:  Exam is notable for normal (baseline) mental status    Respiratory mechanics and oxygenation:  Exam is notable for normal/unchanged breathing pattern and stable oxygen requirements    Cardiovascular/perfusion:   Exam is notable for normal/unchanged cardiovascular/perfusion assessment    Overall estimation of the patient s condition/stability (1 = stable patient, 7 = appears very sick)? 2       Increased edema

## 2022-07-09 NOTE — PROCEDURES
[facility]    Procedure: Left chest tube    Date/Time: 7/9/2022 3:42 PM  Performed by: Jake Xiong DO  Authorized by: Lynda Curran MD       UNIVERSAL PROTOCOL   Site Marked: NA  Prior Images Obtained and Reviewed:  Yes  Required items: Required blood products, implants, devices and special equipment available    Patient identity confirmed:  Verbally with patient, arm band, provided demographic data and hospital-assigned identification number  Patient was reevaluated immediately before administering moderate or deep sedation or anesthesia  Confirmation Checklist:  Patient's identity using two indicators, relevant allergies, procedure was appropriate and matched the consent or emergent situation and correct equipment/implants were available  Time out: Immediately prior to the procedure a time out was called    Universal Protocol: the Joint Commission Universal Protocol was followed    Preparation: Patient was prepped and draped in usual sterile fashion       ANESTHESIA    Anesthesia: Local infiltration  Local Anesthetic:  Lidocaine 1% without epinephrine      SEDATION    Patient Sedated: No    See dictated procedure note for full details.  Findings: Large walled off left pleural fluid collection with window for percutanous ultrasound guided drainage with placement of 14 Fr nonlocking pigtail.    Specimens: none    Complications: None    Condition: Stable    Plan: Routine aftercare. Left sided posterior chest tube connected to 3 way stopcock and [id number] waterseal chamber suction. Thrombolytics as needed.      PROCEDURE    Patient Tolerance:  Patient tolerated the procedure well with no immediate complications  Length of time physician/provider present for 1:1 monitoring during sedation: 0

## 2022-07-09 NOTE — CONSULTS
IR consultation for left chest tube for empyema.    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    - Pertinent imaging reviewed and demonstrated left pleural collection consistent with empyema.  - Plan for chest tube placement later this morning. We would place at least a 14 Fr with 3-way stopcock for possible lytics.  - NPO is not mandatory, typically performed with local anesthesia only.  - Consent will be obtained at time of procedure.  - Heparin does not need to be held.      Jake Xiong DO, MS                PGY-6 Interventional Radiology   [facility]   7:35 AM July 9, 2022     I reviewed all pertinent data including imaging and agree with the assessment and plan documented by Dr. Xiong.    Lynda Curran MD  Interventional Radiology   Pager [phone]

## 2022-07-09 NOTE — PLAN OF CARE
/56   Pulse 89   Temp 97.8  F (36.6  C) (Oral)   Resp 16   Ht 1.524 m (5')   Wt 95.6 kg (210 lb 13.3 oz)   SpO2 94%   BMI 41.17 kg/m      Care from: [id number]      VS & Pain: VSS on room air. Prn Tylenol x1 and prn Oxy x1 were given for chronic generalized pain- partially effective. Prn Tylenol x1, prn Lidocaine x1, prn Flexeril x1, prn IV Dilaudid x1 were given for L back pain from CT    Neuro: A&O x4    Respiratory: dyspnea on exertion; CT is on -20 cm H2O, output was 645 mL thick creamy yellow- writer irrigated with 5 mL NS    Cardiac: WDL    Peripheral neurovascular: 1+ edema in BLE- provided compression stockings but refused    GI/: adequate urine output, no BM this shift    Nutrition: poor appetite and oral intake    Skin: pt refused skin assessment    Musculoskeletal: generalized weakness    Lines: L PIV is saline locked    Activity: SBA    Events this shift: Call light within reach. CT was placed this afternoon. CT was not draining well d/t thick output, c/o severe L back pain around the CT site, but VSS on room air, pain management at the time was ineffective (prn Tylenol x1, prn Lidocaine x1, prn Flexeril x1 were already given) - writer notified IR, Pulmonary, Maroon 5, and Maroon crosscover; Maroon 5 provider ordered CT irrigation, Pulmonary provider ordered a chest xray, Maroon crosscover ordered prn IV Dilaudid. Chest xray was done, writer irrigated CT, administered prn IV Dilaudid x1, and put pt on cont pul ox monitor. Lab notified writer of failed pleural fluid lab tests d/t too viscous and clotting- writer notified Maroon crossover at 1720.    Plan: Continue to follow poc.      Goal Outcome Evaluation:    Plan of Care Reviewed With: patient     Overall Patient Progress: declining    Outcome Evaluation: worsened pain at new CT site- IR, pulmonary, and Maroon crosscover were notified, chest xray was ordered, prn IV Dilaudid was added to pain management, writer put pt on cont pul ox  monitor

## 2022-07-09 NOTE — PROGRESS NOTES
Patient Name: Giovana Joaquin  Medical Record Number: [id number]  Today's Date: 7/9/2022    Procedure: Image guided left chest tube placement.  Proceduralist: MD Magdy., MD Tyrese.  Pathology present: n/a    Procedure Start: 1519  Procedure end: 1535  Sedation medications administered: none     Report given to: DANNY, RN  : n/a    Other Notes: Pt arrived to IR room Ct2 from . Consent reviewed. Pt denies any questions or concerns regarding procedure. Pt positioned sitting and monitored per protocol. Pt tolerated procedure without any noted complications.  Specimens collected and sent to lab. Pt transferred back to .    Marti Neely, RN

## 2022-07-09 NOTE — PROVIDER NOTIFICATION
Provider notified (name): Bianca Amin MD  Reason for notification: pt/family wondering if she can get diet changed from NPO so she can get food/water since surgery not until tomorrow    Response from provider: diet order changed to reg diet now and NPO at midnight

## 2022-07-09 NOTE — PROVIDER NOTIFICATION
Provider notified (name): Bianca Amin MD  Reason for notification: pt said missing home meds, Nabumetone taken at bedtime, Wal-Phed ER tablet taken AM and PM, and Baclofen.  pt also concerned about fluid retention and wants PO (nit IV) antibiotics if able  2nd page:  pt has not taken Wal-Phed today, ok waiting for day team if needed    Response from provider:

## 2022-07-09 NOTE — PROGRESS NOTES
[facility] Pulmonology Follow up    Goivana Joaquin  MRN: [id number]  : 1980    Date of Admission: 2022  Date of Service: 2022    Assessment:  L Pleural effusion, concern for empyema  Hx of aspiration  Tobacco use-vaping, smoking medical marijuana    Awaiting chest tube placement with IR, no new symptoms. Would maintain chest tube to suction (fine to ambulate/go to procedures with chest tube on water seal). If chest tube output >1L, take off suction and leave to water seal, or clamp if symptomatic. If chest tube output dwindles, will consider lytic therapy in coming days.    Recommendations:  -- IR for chest tube placement  (studies ordered)  -- am CXR 7/10  -- agree with zosyn, stop vanc with negative MRSA nares  -- obtain sputum culture, blood cultures  -- maintain chest tube to -20 continuous suction  -- pain control as needed    Subjective:  Feeling ok, mouth feels very dry and wants to drink sprite. No new cough, sputum production. Family at bedside, questions answered.     Review of systems: complete 10 point review of systems completed and negative except as noted above in HPI.    Objective:  /58   Pulse 88   Temp 97.8  F (36.6  C) (Oral)   Resp 16   Ht 1.524 m (5')   Wt 94.8 kg (209 lb)   SpO2 96%   BMI 40.82 kg/m      Gen: in no acute distress, sitting upright in bed  CV: RRR, no peripheral edema  Pulm: diminished to mid L lung posteriorly, clear on R, no increased work of breathing.  GI: soft, not distended  MSK: tender to palpation over L back  Neuro: speech quiet and intermittently slurred, alert and oriented  Psych: calm, appropriate    Data:  No new imaging.    Rosa Elena Cheng DO  Pulmonary fellow  Pager: [phone]

## 2022-07-09 NOTE — PROGRESS NOTES
Resident/Fellow Attestation   I, Ciarra Roman MD, was present with the medical/JYOTI student who participated in the service and in the documentation of the note.  I have verified the history and personally performed the physical exam and medical decision making.  I agree with the assessment and plan of care as documented in the note.      43 yo with multiple ENT surgeries, chronic aspiration presenting with likely empyema. Appreciate IR involvement for chest tube today. Remains on room air. Will monitor clinically.     Ciarra Roman MD  PGY4  Date of Service (when I saw the patient): 07/09/22    [facility]    Progress Note - Medicine Service, [facility] TEAM 5       Date of Admission:  7/7/2022    Assessment & Plan          Giovana Joaquin is a 42 year old female with history of multiple medical problems including past posterior fossa surgery for clival chordoma, velopharyngeal insufficiency s/p soft palate removal and ultimately uvulopalatopharyngoplasty (2013), now has chronic aspiration and was admitted on 7/7/2022 due to night seats, weight loss, cough, found to have left-sided empyema. She was transferred to [place] for intervention with IP vs IR. She is currently breathing comfortably on room air.     Today:   - Chest tube today  - Restarted PTA baclofen  - Nystatin oral suspension for thrush  - Dental hygiene consult  - Compression stockings  - MRSA nares     Left-sided pleural effusion, likely empyema  Poor dentition   Velopharyngeal insufficiency s/p soft palate removal and uvulopalatopharyngoplasty (2013)  CT imaging suggestive of moderate left-sided pleural effusion with thick rind, in s/o several weeks of infectious s/s. C/f long-standing effusion and likely empyema, likely infectious though unclear etiology pending pleural fluid analysis. Patient is currently non-toxic appearing on room air. Suspect she is susceptible due to  chronic aspiration in setting of multiple ENT procedures (most recent intervention was several years ago)   - Pulmonology consulted; appreciate recs  - IR consulted; appreciate recs  - Abx: C/w beverly, Zosyn, per ID recs   - MRSA nares   - PTA cevimeline      Oropharyngeal thrush  Nausea  Has had previously. Mild discomfort currently.   - Continue PO fluconazole  - Start nystatin oral suspension for thrush  - Dental Hygiene consult  - zofran, compazine prn      Chronic pain:  Follows with pain clinic. Has not been on Bup in months, per her report, will check .   - PTA oxycodone 7.5 mg q6h prn per home dose   - PTA gabapentin 600 mg TID  - PTA nortriptyline 25 mg at bedtime   - Restarted PTA baclofen 10mg TID  -      --Chronic problems--  Hypertension: Continue atenolol.  Depression, anxiety: Continue stratterra, levomilnacipran.  GERD: Pta PPI.     Diet: NPO per Anesthesia Guidelines for Procedure/Surgery Except for: Meds, Ice Chips    DVT Prophylaxis: Enoxaparin (Lovenox) SQ  [place] Catheter: Not present  Fluids: None  Central Lines: None  Cardiac Monitoring: None  Code Status: Full Code      Disposition Plan      Expected Discharge Date: 07/13/2022              The patient's care was discussed with the Attending Physician, Dr. Ness.    Monique Berger  Medical Student  Medicine Service, [address]  Securely message with the Vocera Web Console (learn more here)  Text page via [facility] Paging/Directory   Please see signed in provider for up to date coverage information      Clinically Significant Risk Factors Present on Admission               # Severe Obesity: Estimated body mass index is 40.82 kg/m  as calculated from the following:    Height as of this encounter: 1.524 m (5').    Weight as of this encounter: 94.8 kg (209 lb).        ______________________________________________________________________    Interval History   NAEON. Nursing notes reviewed.  Reports dry mouth, cracked lips, ongoing cough, and b/l edema. Nausea has improved, and no vomiting. Intermittent l-sided pain with deep breathing. No chills, abdominal pain, diarrhea, CP. Updated patient about plan for chest tube today.     Data reviewed today: I reviewed all medications, new labs and imaging results over the last 24 hours.     Physical Exam   Vital Signs: Temp: 97.8  F (36.6  C) Temp src: Oral BP: 110/56 Pulse: 89   Resp: 16 SpO2: 94 % O2 Device: None (Room air)    Weight: 209 lbs 0 oz  Gen: NAD.  HEENT: NCAT. Anicteric sclera. No conjunctival injection or periorbital edema. Brightly erythematous tongue; white plaques on palate, poor dentition.   Respiratory: No increased WOB. BS + in R lung fields, and ULL. Diminished lung sounds and dullness to percussion in LLL.   Cardiovascular: RRR. Normal S1, S2. No m/r/g.   GI: NBS. Soft. NT/ND.   Ext: B/l 2+ pitting edema extending to mid-calf. Non-erythematous, non-tender.  Neurologic: AOx3. Grossly nonfocal.    Data   Recent Labs   Lab 07/09/22  0617 07/07/22  1806   WBC 12.5* 15.8*   HGB 8.5* 10.3*   MCV 78 75*    523*    135   POTASSIUM 3.7 3.2*   CHLORIDE 101 95   CO2 32* 34*   BUN 5.7* 7   CR 0.56 0.60   ANIONGAP 5* 6   MIROSLAVA 8.8 9.2   GLC 90 101*   ALBUMIN  --  1.8*   PROTTOTAL  --  7.6   BILITOTAL  --  0.4   ALKPHOS  --  162*   ALT  --  21   AST  --  11

## 2022-07-10 NOTE — PROVIDER NOTIFICATION
Critical Test Results/Notification    Critical lab result (name and value):  Pleural fluid drawn 7/9@1532, routine culture isolated in broth showing gram + cocci  What time did lab notify you? 1133  What provider did you notify? Dr Ness @ 1137  Was the provider notified within 30 min? yes  Why was provider not notified? n/a    Response from provider: Awaiting response.

## 2022-07-10 NOTE — PLAN OF CARE
Goal Outcome Evaluation:    Plan of Care Reviewed With: patient      VSS.  When awake O2 sats 95% to 100% on room air.  When sleeping sats drop to low 90s and upper 80s.  Oxygen 1-2 liters on when sleeping to keep sats above 92%.  Patient reports mild dyspnea with walking and talking.   Alert and oriented x 4.  Up to bathroom with minimal assist of one.  Complains of pain  at chest tube site especially with movement and coughing. Oxycodone for pain management with fair relief of pain.  Chest tube site clean ,dry and intact.  Chest tube output thick and creamy /blood tinged in color.  Chest tube flushed once per MD orders.     PO intake poor.  Only drinking sprite.   Plans to try apple sauce this afternoon.

## 2022-07-10 NOTE — PROVIDER NOTIFICATION
Provider notified (name): Evelyne Rae MD #[id number]  Reason for notification:   Positive lab cultures:   3+ Gram positive cocci  3+ Gram positive bacilli  4+ WBC seen    Response from provider:

## 2022-07-10 NOTE — PROGRESS NOTES
[facility]    Progress Note - Medicine Service, [facility] TEAM 5       Date of Admission:  7/7/2022    Assessment & Plan            Giovana Joaquin is a 42 year old female with history of multiple medical problems including past posterior fossa surgery for clival chordoma, velopharyngeal insufficiency s/p soft palate removal and ultimately uvulopalatopharyngoplasty (2013), now has chronic aspiration and was admitted on 7/7/2022 due to night seats, weight loss, cough, found to have left-sided empyema. She was transferred to [place] for intervention with IP vs IR. She is currently breathing comfortably on room air.     Today:   - Chest tube in place  - Pleural fluid GS with GPCs and GPB  - On Zosyn, vanc discontinued with negative MRSA nares  - Will add vanc if worsening or not improving   - Cont Nystatin and fluconazole for severe thrush      Left-sided empyema (GS GPCs and GPB)  Poor dentition   Velopharyngeal insufficiency s/p soft palate removal and uvulopalatopharyngoplasty (2013)  CT imaging suggestive of moderate left-sided pleural effusion with thick rind, in s/o several weeks of infectious s/s. C/f long-standing effusion c/w empyema based on pelural fluid cultures. Patient is currently non-toxic appearing on room air. Suspect she is susceptible due to chronic aspiration in setting of multiple ENT procedures (most recent intervention was several years ago)   - Pulmonology consulted; appreciate recs  - IR consulted: Placed chest tube  - Chest tube management per IP  - MRSA nares negative   - Vancomycin stopped, but may be restarted if growing MRSA or worsening   - Cont Zosyn  - PTA cevimeline      Severe Oropharyngeal thrush  Nausea  Dysphagia   Has had previously. Mild discomfort currently.   - Continue PO fluconazole  - Start nystatin oral suspension for thrush  - Dental Hygiene consult  - zofran, compazine prn      Chronic pain:  Follows with pain clinic. Has  not been on Bup in months, per her report, will check .   - PTA oxycodone 7.5 mg q6h prn per home dose   - PTA gabapentin 600 mg TID  - PTA nortriptyline 25 mg at bedtime   - Restarted PTA baclofen 10mg TID  -      --Chronic problems--  Hypertension: Continue atenolol.  Depression, anxiety: Continue stratterra, levomilnacipran.  GERD: Pta PPI.     Diet: Regular Diet Adult    DVT Prophylaxis: Enoxaparin (Lovenox) SQ  Carolina Catheter: Not present  Fluids: None  Central Lines: None  Cardiac Monitoring: None  Code Status: Full Code      Disposition Plan     Expected Discharge Date: 07/13/2022              The patient's care was discussed with the Bedside Nurse and Patient.    Jc Ness MD  [facility] Hospitalist   Medicine Service, [address]  Securely message with the Vocera Web Console (learn more here)  Text page via [facility] Paging/Directory   Please see signed in provider for up to date coverage information      Clinically Significant Risk Factors Present on Admission               # Severe Obesity: Estimated body mass index is 40.82 kg/m  as calculated from the following:    Height as of this encounter: 1.524 m (5').    Weight as of this encounter: 94.8 kg (209 lb).        ______________________________________________________________________    Interval History   No acute events overnight, tolerated CT placement, pain controlled, denies fever or chills     Data reviewed today: I reviewed all medications, new labs and imaging results over the last 24 hours.     Physical Exam   Vital Signs: Temp: 97.9  F (36.6  C) Temp src: Oral BP: 110/68 Pulse: 75   Resp: 16 SpO2: 100 % O2 Device: Nasal cannula Oxygen Delivery: 1 LPM  Weight: 209 lbs 0 oz  Constitutional: Awake, alert, cooperative, no apparent distress  Respiratory: Minimal lung sounds on L  Cardiovascular: Regular rate and rhythm, normal S1 and S2  GI: Normal bowel sounds, soft, non-distended,  non-tender  Skin/Integumen: No rashes, no cyanosis      Data   Recent Labs   Lab 07/10/22  0622 07/09/22 2004 07/09/22  0617 07/07/22  1807 07/07/22  1806   WBC 7.3  --  12.5*  --  15.8*   HGB 8.5*  --  8.5*  --  10.3*   MCV 78  --  78  --  75*     --  429  --  523*     --  138  --  135   POTASSIUM 3.8  --  3.7  --  3.2*   CHLORIDE 99  --  101  --  95   CO2 33*  --  32*  --  34*   BUN 4.7*  --  5.7*  --  7   CR 0.54  --  0.56 0.55 0.60   ANIONGAP 4*  --  5*  --  6   MIROSLAVA 8.7  --  8.8  --  9.2   GLC 85 92 90  --  101*   ALBUMIN  --   --   --   --  1.8*   PROTTOTAL  --   --  6.4  --  7.6   BILITOTAL  --   --   --   --  0.4   ALKPHOS  --   --   --   --  162*   ALT  --   --   --   --  21   AST  --   --   --   --  11

## 2022-07-10 NOTE — PLAN OF CARE
RN assumed cares at 1900 to 0700    Vitals: Temp: 97.6  F (36.4  C) Temp src: Oral BP: 99/55 Pulse: 75   Resp: 16 SpO2: 97 % O2 Device: Nasal cannula Oxygen Delivery: 1 LPM  Pain: pain reported in CT site as well as back, hip, and neck. PRN and scheduled pain medications given, pt allowed to sleep throughout the night, only woken for meds  Neuro: A/O, sleeps very soundly   Cardiac: WDL  Respiratory: pt put on 1L of O2 due to O2 sat going down into the 80s as she began to get more sleepy. Pt often high 90s if not 100% O2 sat. On 1L via nasal cannula, but during trial without, pt O2 sat dropped to 91% within a few minutes. O2 remain in place to keep O2 above 92% as well as promote comfort. CT in place, secretions thinner this shift then previous shift.  GI/: poor apatite   IV/Drains: 1 PIV, CT   Activity: up to bedside commode   Skin: L sided CT placement, bandage clean, dry, and intact. Slight edema in feet/ankles  Labs: no BG tests orderd, no RN managed labs. Pleural fluid sample from L lung Positive lab cultures: 3+ Gram positive cocci, 3+ Gram positive bacilli, 4+ WBC seen--MD notified  Note: due to issues with PIVs, zosyn started at 1639 (per MAR) finished around 1930. All following doses re timed to more closely follow the q6h dosing instructions    Plan of Care:  Continue to monitor, contact MD as needed. reassess O2 needs when pt awake for the day. Encourage PO food and fluid intake.

## 2022-07-10 NOTE — PROGRESS NOTES
[facility] Pulmonology Follow up    Giovana Joaquin  MRN: [id number]  : 1980    Date of Admission: 2022  Date of Service: 07/10/2022    Assessment:  L empyema  Hx of aspiration  Tobacco use-vaping, smoking medical marijuana    Pleural fluid with selena pus, growing GPC, GPB awaiting speciation. Initially good output, has slowed but become quite sanguinous. Will monitor, and consider lytic therapy in coming days. Agree with ongoing broad spectrum antibiotics.     Recommendations:  -- continue chest tube to continuous -20 suction (ok to ambulate or go to procedures, work with pt, etc on water seal)  -- flush chest tube q6h with 20cc NS  -- agree with zosyn, speciation from pleural fluid pending  -- pain control as needed  -- daily am CXR with chest tube in place    Subjective: Pain at chest tube site, better with pain meds. No new cough. No hemoptysis. No chest pain. Tolerating sips with meds, going to try some applesauce.    Review of systems: complete 10 point review of systems completed and negative except as noted above in HPI.    Objective:  /68 (BP Location: Right arm, Patient Position: Sitting)   Pulse 75   Temp 97.9  F (36.6  C) (Oral)   Resp 16   Ht 1.524 m (5')   Wt 94.8 kg (209 lb)   SpO2 100%   BMI 40.82 kg/m      Gen: in no acute distress, sitting upright in bed  CV: RRR, no peripheral edema  Pulm: diminished to mid L lung posteriorly,   GI: soft, not distended  MSK: tender to palpation over L back  Neuro: speech quiet and a little difficult to understand, alert and oriented  Psych: calm, appropriate    Data:  Repeat CXR noted, decreased but still sizeable effusion remains.    Patient seen and discussed with Dr. Gray.    Rosa Elena Cheng DO  Pulmonary fellow  Pager: [phone]

## 2022-07-10 NOTE — PLAN OF CARE
Problem: Plan of Care - These are the overarching goals to be used throughout the patient stay.    Goal: Absence of Hospital-Acquired Illness or Injury  Intervention: Identify and Manage Fall Risk  Recent Flowsheet Documentation  Taken 7/10/2022 0900 by Lizzy Tillman  Safety Promotion/Fall Prevention:   supervised activity   safety round/check completed   room organization consistent   patient and family education   nonskid shoes/slippers when out of bed   clutter free environment maintained  Intervention: Prevent Skin Injury  Recent Flowsheet Documentation  Taken 7/10/2022 0900 by Lizzy Tillman  Body Position: position changed independently  Intervention: Prevent and Manage VTE (Venous Thromboembolism) Risk  Recent Flowsheet Documentation  Taken 7/10/2022 0900 by Lizzy Tillman  VTE Prevention/Management:   compression stockings off   foot pump device off   SCDs (sequential compression devices) off  Activity Management:   activity adjusted per tolerance   activity encouraged     Problem: Fatigue (Oncology Care)  Goal: Improved Activity Tolerance  Intervention: Promote Improved Energy  Recent Flowsheet Documentation  Taken 7/10/2022 0900 by Lizzy Tillman  Activity Management:   activity adjusted per tolerance   activity encouraged     Problem: Pain Acute (Oncology Care)  Goal: Optimal Pain Control  Intervention: Prevent or Manage Pain  Recent Flowsheet Documentation  Taken 7/10/2022 0900 by Lizzy Tillman  Medication Review/Management: medications reviewed     Problem: Fatigue  Goal: Improved Activity Tolerance  Intervention: Promote Improved Energy  Recent Flowsheet Documentation  Taken 7/10/2022 0900 by Lizzy Tillman  Activity Management:   activity adjusted per tolerance   activity encouraged     Problem: Diarrhea  Goal: Fluid and Electrolyte Balance  Intervention: Manage Diarrhea  Recent Flowsheet Documentation  Taken 7/10/2022 0900 by Lizzy Tillman  Medication Review/Management: medications reviewed    Goal Outcome Evaluation:    Patient was calm and pleasant this shift. Patient is A&Ox4 and complained of pain rated 8-9/10 most of the day. Scheduled pain medications were given in addition to PRN oxycodone for pain. Chest tube site is clean, dry, and intact with 30ml serosanguinous drainage this shift. Patient had great difficulty swallowing morning oral medications. Ambulate with standby-assist.      Vickie Queenkoh  Nursing Student

## 2022-07-11 NOTE — PLAN OF CARE
Goal Outcome Evaluation:    Plan of Care Reviewed With: patient     Overall Patient Progress: no change    Outcome Evaluation: managed pain at CT site, start on lytics in CT, encourage PO intake    Assumed cares [id number]. A&O and able to make needs known. VSS on RA. BP remained WNL after having low BP over night. Pain somewhat controlled with PO Oxycodone. Oxy dose increased by provider for increased pain at CT site. CT should be to -20 wall suction. Alteplase ordered and started today. CT clamped and Alteplase instilled @ 1445. Pt education provided on changing positions Q15min during instillation. Pt expressed verbal understanding and agreeable to treatment. X1 dose of K+ ordered by provider and given with AM recheck. No appetite at meal times. Pt only drinks Sprite she has at bedside. Pt up independently in room. Continue with plan of care.

## 2022-07-11 NOTE — PROVIDER NOTIFICATION
Provider notified (name): Evelyne Rae MD  Reason for notification: pt BP 89/63 (MAP 64), 88/44 (MAP 55), and 89/46 (MAP 59).  Paging per order for systolic under 90. do you want a fluid bolus?    Response from provider: IV bolus ordered

## 2022-07-11 NOTE — PLAN OF CARE
RN assumed cares at 1900 to 0700     Vitals: Temp: (!) 95.5  F (35.3  C) Temp src: Oral BP: 93/60 (MAP 70) Pulse: 74   Resp: 18 SpO2: 100 % O2 Device: Nasal cannula with humidification Oxygen Delivery: 1 LPM   Pt SBP under 90 over night--500mL LR bolus given. Temp dropped from 98.4 F in PM to 95.5 in AM   Pain: pain reported in CT site as well as back, hip, and neck. PRN and scheduled pain medications given, pt allowed to sleep throughout the night, only woken for meds  Neuro: A/O, sleeps very soundly  Cardiac: WDL  Respiratory:  1L of O2 via nasal cannula. CT in place  GI/: poor apatite. Reported eating a few cherries throughout the day  IV/Drains: 1 PIV new this shift, CT   Activity: up to bedside commode   Skin: L sided CT placement, bandage clean, dry, and intact. Slight edema in feet/ankles  Labs: no BG tests orderd, no RN managed labs.      Plan of Care: Monitor temp and BP, contact MD as needed, treat pain as needed, contact MD for more pain meds as needed

## 2022-07-11 NOTE — PROVIDER NOTIFICATION
Provider notified (name): Med student-- Monique Berger  Reason for notification:  Please see anaerobic culture result from 7/9. 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

## 2022-07-11 NOTE — PROVIDER NOTIFICATION
Provider notified (name):Evelyne Rae MD  Reason for notification: FYI: pt temp dropped from 98.4 F last night to 95.5 F this AM. pt skin feels sweaty. AM BP was 85/52, 84/49, and then 93/60 while being poked by lab. pt fell back asleep fast    Response from provider: MD asked to speak to RN in person, RN in another pt's room when MD came onto unit, RN notified MD when out of room, waiting for MD to come back

## 2022-07-11 NOTE — PROGRESS NOTES
[facility]   Pulmonary   Progress Note  Date of Service: 07/11/2022  Giovana Joaquin MRN: [id number]          Assessment and Recommendations:   Assessment:  L empyema  Hx of aspiration  Tobacco use-vaping, smoking medical marijuana     Pleural fluid with selena pus, growing GPC, GPB awaiting speciation. Initially good output, has slowed in the last 24 hours and became quite sanguinous. Will order lytics and continue to monitor. Agree with ongoing broad spectrum antibiotics.      Recommendations:  -- Ordered lytics BID x 3 days.   -- Consider changing from -20 suction on chest tube to water seal for patient comfort and better mobility.  -- agree with zosyn, speciation from pleural fluid pending  -- pain control as needed  -- daily am CXR with chest tube in place  -- Recommend speech consult to evaluate aspiration risk    Thank you for allowing us to care for this patient.  We will continue to follow.  Please don't hesitate to page or call with any questions or concerns.    Patient seen & discussed w/  Dr. Uriarte, who agrees with the above assessment and plan.    Eric Montanez, MS-4  [facility] [employer]  07/11/2022 8:53 AM    Resident/Fellow Attestation   I was present with the medical student who participated in the service and in the documentation of the note.  I have verified the history and personally performed the physical exam and medical decision making.  I agree with the assessment and plan of care as documented in the note.      Ms. Joaquin here with empyema, polymicrobial likely due to aspiration. Although it is chronic aspiration, we recommend SLP evaluation given this severe complication of aspiration. Chest tube is in place, but decreased output over yesterday. Will initiate tpa/dornase 7/11 with plan to continue for 3 days. Daily CXR to evaluate effusion, suspect significant amount remains. Pain control will be important.    Rosa Elena Cheng, DO  PGY5  Date of Service (when I  saw the patient): 07/11/22          Subjective / Interval History:   Nursing notes reviewed.   Patient has been reports no changes to her breathing, has a wet cough which she states has been present since she started feeling ill. She reports chronic lower back pain and some tenderness around her chest tube site.           Objective:     Temp:  [95.5  F (35.3  C)-98.4  F (36.9  C)] 95.5  F (35.3  C)  Pulse:  [65-75] 74  Resp:  [16-18] 18  BP: (81-93)/(40-63) 93/60  SpO2:  [96 %-100 %] 100 %  I/O last 3 completed shifts:  In: 270 [P.O.:200; Other:70]  Out: 20 [Chest Tube:20]  Wt Readings from Last 1 Encounters:   07/10/22 98.5 kg (217 lb 2.5 oz)    Body mass index is 42.41 kg/m . FiO2 (%): 1 %  Resp: 18    Exam:  Gen: in no acute distress, sitting upright in bed  CV: RRR, no peripheral edema  Pulm: diminished to mid L lung posteriorly, no increased work of breathing  GI: soft, not distended  MSK: tender to palpation over L back  Neuro: speech quiet and a little difficult to understand, alert and oriented  Psych: calm, appropriate  Skin: No rashes or erythema around chest tube insertion site. Some pus noted on bandage at chest tube site.          Data:   Labs: reviewed in EMR  Imaging: reviewed in EMR and notable imaging listed below:  CXR today no changes in effusion.          Medications:   Medications     acetaminophen  650 mg Oral Q4H     atenolol  25 mg Oral Daily     atomoxetine  80 mg Oral Daily     Baclofen  10 mg Oral TID     cevimeline  30 mg Oral TID     cyclobenzaprine  10 mg Oral TID     enoxaparin ANTICOAGULANT  40 mg Subcutaneous Q24H     fexofenadine  60 mg Oral Daily     fluconazole  200 mg Oral Daily     gabapentin  600 mg Oral TID     levomilnacipran  80 mg Oral Daily     mirabegron  50 mg Oral Daily     nabumetone  1,000 mg Oral At Bedtime     nortriptyline  25 mg Oral At Bedtime     nystatin  500,000 Units Swish & Swallow 4x Daily     pantoprazole  40 mg Oral QAM AC     piperacillin-tazobactam   3.375 g Intravenous Q6H     potassium chloride  40 mEq Oral Once     pseudoePHEDrine  120 mg Oral BID     sodium chloride (PF)  20 mL Chest Tube Q6H     sodium chloride (PF)  3 mL Intracatheter Q8H

## 2022-07-11 NOTE — PLAN OF CARE
Problem: Oral Intake Inadequate  Goal: Improved Oral Intake  Intervention: Promote and Optimize Oral Intake  Flowsheets (Taken 7/11/2022 1609)  Oral Nutrition Promotion: calorie-dense liquids provided     Problem: Oral Intake Altered (Oncology Care)  Goal: Optimal Oral Intake  Intervention: Minimize and Manage Barriers to Oral Intake  Recent Flowsheet Documentation  Taken 7/11/2022 1609 by Jay Ko RD  Oral Nutrition Promotion: calorie-dense liquids provided     Problem: Diarrhea  Goal: Fluid and Electrolyte Balance  Intervention: Manage Diarrhea  Recent Flowsheet Documentation  Taken 7/11/2022 0700 by Jay Ko RD  Nutrition Interventions: supplemental drinks provided   Goal Outcome Evaluation:

## 2022-07-11 NOTE — PROGRESS NOTES
Resident/Fellow Attestation   I, Bryanna Camacho MD, was present with the medical/JYOTI student who participated in the service and in the documentation of the note.  I have verified the history and personally performed the physical exam and medical decision making.  I agree with the assessment and plan of care as documented in the note.      Bryanna Camacho MD  PGY1  Date of Service (when I saw the patient): 07/11/22    [facility]    Progress Note - Medicine Service, [facility] TEAM 5       Date of Admission:  7/7/2022    Assessment & Plan             Giovana Joaquin is a 42 year old female with history of multiple medical problems including past posterior fossa surgery for clival chordoma, velopharyngeal insufficiency s/p soft palate removal and ultimately uvulopalatopharyngoplasty (2013), now has chronic aspiration and was admitted on 7/7/2022 due to night seats, weight loss, cough, found to have left-sided empyema. She was transferred to [facility] for intervention with IP vs IR. She is currently breathing comfortably on room air.      Today:   - Chest tube in place, lytics BID per pulmonology   - Pleural fluid GS with GPCs, GPB, and Fusobacterium  - On Zosyn  - Will add vanc if worsening or not improving   - Cont Nystatin and fluconazole for severe thrush   - Increased oxycodone to 10 mg q4H,      Left-sided empyema (GS GPCs, GPB, Fusobacterium)  Poor dentition   Velopharyngeal insufficiency s/p soft palate removal and uvulopalatopharyngoplasty (2013)  CT imaging suggestive of moderate left-sided pleural effusion with thick rind, in s/o several weeks of infectious s/s. C/f long-standing effusion c/w empyema based on pelural fluid cultures. Patient is currently non-toxic appearing on room air. Suspect she is susceptible due to chronic aspiration in setting of multiple ENT procedures (most recent intervention was several years ago). S/p chest tube (07/09); pleural fluid with selena  pus, growing GPC, GPB and Fusobacterium awaiting speciation.   - Pulmonology consulted; appreciate recs   > Lytics BID x 3d   > daily CXR with chest tube in place  - IR consulted: Placed chest tube  - Chest tube management per IP  - Vancomycin stopped (MRSA nares negative); may restart if growing MRSA or worsening   - Cont Zosyn  - PTA cevimeline   - Speech consulted for evaluation of aspiration risk     Severe Oropharyngeal thrush  Nausea  Dysphagia   Has had previously. Mild discomfort currently.   - Continue PO fluconazole  - Start nystatin oral suspension for thrush  - Dental Hygiene consult  - Zofran, compazine prn      Chronic pain:  Follows with pain clinic. Has not been on Bup in months, per her report, will check .   - PTA oxycodone 7.5 mg q6h prn per home dose   - PTA gabapentin 600 mg TID  - PTA nortriptyline 25 mg at bedtime   - Restarted PTA baclofen 10mg TID     --Chronic problems--  Hypertension: Continue atenolol.  Depression, anxiety: Continue stratterra, levomilnacipran.  GERD: Pta PPI.     Diet: Mechanical/Dental Soft Diet    DVT Prophylaxis: Enoxaparin (Lovenox) SQ  [place] Catheter: Not present  Fluids: None  Central Lines: None  Cardiac Monitoring: None  Code Status: Full Code      Disposition Plan     Expected Discharge Date: 07/13/2022                The patient's care was discussed with the Attending Physician, Dr. Ness.    Monique Berger  Medical Student  Medicine Service, [address]  Securely message with the Vocera Web Console (learn more here)  Text page via [facility] Paging/Directory   Please see signed in provider for up to date coverage information      Clinically Significant Risk Factors Present on Admission               # Severe Obesity: Estimated body mass index is 42.41 kg/m  as calculated from the following:    Height as of this encounter: 1.524 m (5').    Weight as of this encounter: 98.5 kg (217 lb 2.5 oz).         ______________________________________________________________________    Interval History   CASSIE. Nursing notes reviewed. States she feels about the same; some tenderness on L-side around chest tube site, but otherwise pain is well controlled this am. Cough persists, unchanged from previous days. No SOB, CP, abdominal pain, diarrhea, or dysuria.     Data reviewed today: I reviewed all medications, new labs and imaging results over the last 24 hours.    Physical Exam   Vital Signs: Temp: (!) 95.5  F (35.3  C) Temp src: Oral BP: 93/60 (MAP 70) Pulse: 74   Resp: 18 SpO2: 100 % O2 Device: Nasal cannula with humidification Oxygen Delivery: 1 LPM  Weight: 217 lbs 2.45 oz  General Appearance: NAD.   HEENT: NCAT. EOMI. Anicteric sclera.   Respiratory: No increased WOB. R-side -- clear to auscultation, no crackles, wheezes. L-side -- chest tube in place, draining sanguinous fluid. Diminished basilar breath sounds.   Cardiovascular: RRR. Normal S1, S2. No m/r/g.   GI: +BS. NT/ND. Non-tympanitic.  Skin: No new lesions, erythema on limited skin exam.  Neurologic: AOx3. Grossly nonfocal.    Data   Recent Labs   Lab 07/11/22  0604 07/10/22  0622 07/09/22 2004 07/09/22  0617 07/07/22  1807 07/07/22  1806   WBC 5.5 7.3  --  12.5*  --  15.8*   HGB 7.9* 8.5*  --  8.5*  --  10.3*   MCV 78 78  --  78  --  75*    385  --  429  --  523*    136  --  138  --  135   POTASSIUM 3.3* 3.8  --  3.7  --  3.2*   CHLORIDE 101 99  --  101  --  95   CO2 30* 33*  --  32*  --  34*   BUN 3.7* 4.7*  --  5.7*  --  7   CR 0.54 0.54  --  0.56   < > 0.60   ANIONGAP 6* 4*  --  5*  --  6   MIROSLAVA 8.5* 8.7  --  8.8  --  9.2   GLC 77 85 92 90   < > 101*   ALBUMIN  --   --   --   --   --  1.8*   PROTTOTAL  --   --   --  6.4  --  7.6   BILITOTAL  --   --   --   --   --  0.4   ALKPHOS  --   --   --   --   --  162*   ALT  --   --   --   --   --  21   AST  --   --   --   --   --  11    < > = values in this interval not displayed.

## 2022-07-11 NOTE — CONSULTS
Dental Hygiene Consult Service        Giovana Joaquin MRN# [id number]   YOB: 1980 Age: 42 year old     Date of Admission: 7/7/2022  PCP is Gloria Love  Date of Service: 7/11/2022           Reason for Consult:   Referring MD & Reason for Visit: I was asked by Lynda Curran MD, to see Giovana Joaquin for dysphagia, aspiration, poor dentition.         History of Present Illness:   This patient is a 42 year old female with a history of multiple medical problems including past posterior fossa surgery for clival chordoma, velopharyngeal insufficiency s/p soft palate removal and ultimately uvulopalatopharyngoplasty (2013), now has chronic aspiration and was admitted on 7/7/2022 due to night seats, weight loss, cough, found to have left-sided empyema. She was transferred to [place] for intervention with IP vs IR. She is currently breathing comfortably on room air.    .  Dental and oropharyngeal history is pertinent for posterior fossa surgery and soft palate removal, poor dentition.  The patient reports active thrush, poor dentition w/ broken teeth, no active dental pain.     The patient  does  have a history of aspiration.    The patient  does not have a history of dentures or dental appliances.       Current oral products and cares performed by the patient and/or nursing include: Pt is independent in her oral care, brushes 2x/day.              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     Gastro-oesophageal reflux disease      Hypertension      IBS (irritable bowel syndrome)      MDD (major depressive disorder)      Migraine      Obesity (BMI 30-39.9)       Velopharyngeal insufficiency, acquired                Social History:     Social History     Tobacco Use     Smoking status: Former Smoker     Packs/day: 0.20     Years: 10.00     Pack years: 2.00     Types: Cigarettes     Quit date: 2013     Years since quittin.4     Smokeless tobacco: Never Used     Tobacco comment: e-cig   Substance Use Topics     Alcohol use: Not Currently     Comment: socially     Drug use: No              Medications:   No current facility-administered medications on file prior to encounter.  atenolol (TENORMIN) 25 MG tablet, Take 1 tablet (25 mg) by mouth daily . Patient needs to see primary provider for further refills.  atomoxetine (STRATTERA) 60 MG capsule, Take 80 mg by mouth daily   BACLOFEN PO, Take 10 mg by mouth 3 times daily   buprenorphine (BUTRANS) 10 MCG/HR WK patch, Place 1 patch onto the skin every 7 days  cevimeline (EVOXAC) 30 MG capsule, Take 1 capsule (30 mg) by mouth 3 times daily  Cholecalciferol (VITAMIN D) 2000 UNITS CAPS,   cyclobenzaprine (FLEXERIL) 10 MG tablet, Take 1 tablet (10 mg) by mouth 3 times daily as needed  esomeprazole (NEXIUM) 40 MG capsule, Take 1 capsule (40 mg) by mouth every morning (before breakfast) One hour before meals  GABAPENTIN 300 MG OR CAPS, Takes 600 mg qid  levomilnacipran (FETZIMA ER) 80 MG 24 hr capsule, Take 80 mg by mouth daily  MAGNESIUM OXIDE PO, Take 500 mg by mouth daily  mirabegron (MYRBETRIQ) 50 MG 24 hr tablet, Take 1 tablet (50 mg) by mouth daily . Patient needs to see primary provider for further refills.  nortriptyline (PAMELOR) 25 MG capsule, Take 1 capsule (25 mg) by mouth At Bedtime Call clinic to schedule follow up appointment.  AFLURIA QUADRIVALENT 0.5 ML injection, ADM 0.5ML IM UTD  brexpiprazole (REXULTI) 0.5 MG tablet, Take 1 mg by mouth daily  fluconazole (DIFLUCAN) 100 MG tablet, Take 200mg(2 tablets) the first day, then take 100mg (1 tablet) for the remaining 13 days. (Patient not taking: No sig  reported)  hydrOXYzine (ATARAX) 25 MG tablet,   meclizine (ANTIVERT) 12.5 MG tablet, TAKE 1 TABLET(12.5 MG) BY MOUTH THREE TIMES DAILY AS NEEDED FOR DIZZINESS  medical cannabis (Patient's own supply), See Admin Instructions (The purpose of this order is to document that the patient reports taking medical cannabis.  This is not a prescription, and is not used to certify that the patient has a qualifying medical condition.)  methylPREDNISolone (MEDROL) 4 MG tablet, Take 1 tablet (4 mg) by mouth daily  mirabegron (MYRBETRIQ) 50 MG 24 hr tablet, Take 1 tablet (50 mg) by mouth daily  Multiple Vitamins-Minerals (MULTIVITAL PO), Take by mouth daily  oxyCODONE-acetaminophen (PERCOCET) 5-325 MG per tablet, Take 0.05 tablets by mouth 2 times daily Takes 7.5 mg Q 4 hrs PRN  pseudoePHEDrine (SUDOGEST 12 HOUR) 120 MG 12 hr tablet, TAKE 1 TABLET BY MOUTH EVERY 12 HOURS AS NEEDED FOR CONGESTION  SUMAtriptan (IMITREX) 100 MG tablet, Take 1 tablet (100 mg) by mouth at onset of headache for migraine May repeat after one hour, max 200 mg in 24 hours  tretinoin (RETIN-A) 0.025 % external cream, Apply small pea-sized amount to affected skin at bedtime, use sunscreen during day              Physical Exam:   Head and neck exam:  WNL, no asymmetry or swelling, no pain on palpation.     Soft Tissue exam:  Tissues generally shiny, slightly dry. Thick secretions in back of throat. Fissured tongue. Slight angular cheilitis     Hard Tissue exam:  Several missing and/or broken teeth throughout dentition. Suspicious for possible infection upper right broken molar, fenestration in gingiva, on palate, lingual to Upper right molar.            Assessment and Plan:   Assessment:  This patient is a 42 year old female with a history of  multiple medical problems including past posterior fossa surgery for clival chordoma, velopharyngeal insufficiency s/p soft palate removal and ultimately uvulopalatopharyngoplasty (2013), now has chronic aspiration and  was admitted on 7/7/2022 due to night seats, weight loss, cough, found to have left-sided empyema. She was transferred to [place] for intervention with IP vs IR. She is currently breathing comfortably on room air.   , oral exam concerning for multiple broken/missing teeth, dysphagia, aspiration risk, fenestration in gingiva lingual to UR molar.      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   -  General Practice Dentistry referral recommended? Yes, after CT is taken.   -  Oral and Maxillofacial Surgery referral recommended?  No    Oral Care Plan:    - Continue daily oral care, prior to eating.   - Brushing 2-3x/day with either water or mild fluoridated toothpaste. Consider zero alcohol antiseptic rinse 1-2x/day to reduce bacterial challenge.   - Continue care for thrush as indicated.   -Pt has OTC ointment and chap stick that she uses to manage angular cheilitis.       Anastasia [facility]  [phone]

## 2022-07-11 NOTE — PROGRESS NOTES
"CLINICAL NUTRITION SERVICES - ASSESSMENT NOTE     Nutrition Prescription    RECOMMENDATIONS FOR MDs/PROVIDERS TO ORDER:  Continue with diet as tolerated     Malnutrition Status:    Severe malnutrition in the context of acute presentation    Recommendations already ordered by Registered Dietitian (RD):  Ordered ensure max protein BID to optimize intake     Future/Additional Recommendations:  PO improvement        REASON FOR ASSESSMENT  Giovana Joaquin is a/an 42 year old female assessed by the dietitian for Admission Nutrition Risk Screen for positive    Chart reviewed:  PMH: Posterior fossa surgery for clival chordoma, velopharyngeal insufficiency s/p soft palate removal and ultimately uvulopalatopharyngoplasty (2013), now has chronic aspiration.  - Admitted on 7/7/2022 due to night seats, weight loss, cough, found to have left-sided empyema. She was transferred to [place] for intervention with IP vs IR. She is currently breathing comfortably on room air.   - Chest tube in place     NUTRITION HISTORY  - Severe oropharyngeal thrush, nausea, dysphagia, chronic aspiration history     CURRENT NUTRITION ORDERS  Diet: Mechanical/Dental Soft   Intake/Tolerance: No appetite at meal times. Pt only had Sprite today    LABS  BUN:3.7 ( acute low)  Cr: 0.54   CRP: 157 (H), WBC: 5.5 (normal)  Glucose:77  Urine ketones: negative       MEDICATIONS  Medications reviewed    ANTHROPOMETRICS  Height: 152.4 cm (5' 0\")  Most Recent Weight: 95.6 kg (210 lb 13.3 oz) standing wt on 7/8/22  IBW: 45.5 kg ( 210% IbW)  BMI: 42.41 kg /m2 Obesity Grade III BMI >40  Weight History: 12.5% wt loss in 6 month   Wt Readings from Last 10 Encounters:   07/10/22 98.5 kg (217 lb 2.5 oz)   07/07/22 92 kg (202 lb 14.4 oz)   01/26/22 109.3 kg (241 lb)   01/18/22 111.1 kg (245 lb)   10/14/21 111.1 kg (245 lb)   09/10/21 107 kg (236 lb)   09/08/21 106.6 kg (235 lb)   09/02/21 106.6 kg (235 lb 0.2 oz)   09/01/21 109.9 kg (242 lb 4.6 oz)   08/11/21 112 kg " (246 lb 14.6 oz)       Dosing Weight: 58 kg adjusted wt from 95.6 kg on 7/8 standing wt and IBW of 45.5 kg     ASSESSED NUTRITION NEEDS  Estimated Energy Needs: 1160- 1450 +  kcals/day (20- 25 +  kcals/kg)  Justification: Maintenance, obesity   Estimated Protein Needs: 70- 87 grams protein/day (1.2 - 1.5 grams of pro/kg)  Justification: Hypercatabolism with acute illness  Estimated Fluid Needs:  (1 mL/kcal)   Justification: Maintenance    PHYSICAL FINDINGS  See malnutrition section below.    MALNUTRITION  % Intake: </= 50% for >/= 5 days (severe)  % Weight Loss: > 10% in 6 months (severe)  Subcutaneous Fat Loss: Unable to assess  Muscle Loss: Unable to assess  Fluid Accumulation/Edema: +1 trace   Malnutrition Diagnosis: Severe malnutrition in the context of acute presentation    NUTRITION DIAGNOSIS  Inadequate oral intake related to decrease appetite as evidenced by 0-25% meal intake since admit     INTERVENTIONS  Implementation  Nutrition Education: Unable to complete due to patient busy with other services    Medical food supplement therapy     Goals  Patient to consume % of nutritionally adequate meal trays TID, or the equivalent with supplements/snacks.     Monitoring/Evaluation  Progress toward goals will be monitored and evaluated per protocol.    Jay Ko RD/MARIO  Pager [phone]

## 2022-07-12 NOTE — PROVIDER NOTIFICATION
Provider notified (name): MD Kyra  Reason for notification: Pt getting another round of narcan. Is unresponsive/ intermittently responsive to pain. Please come assess.   Response from provider: Came to assess

## 2022-07-12 NOTE — PROGRESS NOTES
[facility]   Pulmonary   Progress Note  Date of Service: 07/12/2022  Giovana Joaquin MRN: [id number]          Assessment and Recommendations:   Assessment:  L empyema- fusobacterium nucleatum, Strep constellatus  Hx of aspiration  Tobacco use-vaping, smoking medical marijuana     Pleural fluid with selena pus, growing fusobacterium and strep constellatus, consistent with aspiration and perhaps dental infection. Will continue to monitor susceptibilities. Initially good output over the first day of having chest tube in place, but drainage stopped afterwards. Lytics x1 given evening of 7/11 with some output, but quickly slowed again. Agree with ongoing broad spectrum antibiotics.      Recommendations:  -- Ordered lytics BID x 3 days. If empyema has not improved after lytics, would recommend a repeat CT and thoracic surgery consult.   -- chest tube to water seal for patient comfort and better mobility.  -- agree with zosyn, susceptibilities from pleural fluid pending  -- would consider Toradol for pain control.   -- daily am CXR with chest tube in place   -- Recommend speech consult to evaluate aspiration risk    Thank you for allowing us to care for this patient.  We will continue to follow.  Please don't hesitate to page or call with any questions or concerns.    Patient seen & discussed w/  Dr. Uriarte, who agrees with the above assessment and plan.    Eric Montanez, MS-4  [facility] [employer]  07/12/2022 1:11 PM          Subjective / Interval History:   Nursing notes reviewed.   Overnight patient was difficult to rouse, rapid response was called and patient improved with Narcan. Required 3 doses of narcan. Pill bottles found near bed, suspicious that patient took extra pain medication. On interview with patient she is alert and sitting on edge of bed. She reports back pain, worse when lytics were given. No change to breathing. Received one dose of lytics last night with ~200 mL drained.  Discussed with patient importance of working with speech to avoid future aspirations.           Objective:   Temp:  [96.9  F (36.1  C)-98  F (36.7  C)] 98  F (36.7  C)  Pulse:  [63-92] 92  Resp:  [13-20] 13  BP: ()/(38-92) 106/63  SpO2:  [87 %-99 %] 99 %  I/O last 3 completed shifts:  In: 210 [P.O.:150; Other:60]  Out: 200 [Chest Tube:200]  Wt Readings from Last 1 Encounters:   07/12/22 102 kg (224 lb 13.9 oz)    Body mass index is 43.92 kg/m . Resp: 13    Exam:  Gen: in no acute distress, sitting on edge of ed  CV: RRR, no peripheral edema  Pulm: diminished to mid L lung posteriorly, no increased work of breathing  GI: soft, not distended  MSK: tender to palpation over L back  Neuro: speech quiet and a little difficult to understand, alert and oriented  Psych: calm, appropriate  Skin: No rashes or erythema around chest tube insertion site. Some pus noted on bandage at chest tube site.          Data:   Labs: reviewed in EMR  Imaging: reviewed in EMR and notable imaging listed below:  CXR today concerning for slight increase in effusion.           Medications:   Medications     acetaminophen  650 mg Oral Q4H     alteplase (ACTIVASE) 10 mg and dornase 5 mg in NS syringe for chest tube instillation  50 mL Chest Tube BID     atenolol  25 mg Oral Daily     atomoxetine  80 mg Oral Daily     Baclofen  10 mg Oral TID     cevimeline  30 mg Oral TID     cyclobenzaprine  10 mg Oral TID     enoxaparin ANTICOAGULANT  40 mg Subcutaneous Q24H     fexofenadine  60 mg Oral Daily     fluconazole  200 mg Oral Daily     gabapentin  600 mg Oral TID     levomilnacipran  80 mg Oral Daily     mirabegron  50 mg Oral Daily     nabumetone  1,000 mg Oral At Bedtime     nortriptyline  25 mg Oral At Bedtime     nystatin  500,000 Units Swish & Swallow 4x Daily     pantoprazole  40 mg Oral QAM AC     piperacillin-tazobactam  3.375 g Intravenous Q6H     potassium chloride  40 mEq Oral Once     pseudoePHEDrine  120 mg Oral BID     sodium  chloride (PF)  20 mL Chest Tube Q6H     sodium chloride (PF)  3 mL Intracatheter Q8H     Resident/Fellow Attestation   I was present with the medical student who participated in the service and in the documentation of the note.  I have verified the history and personally performed the physical exam and medical decision making.  I agree with the assessment and plan of care as documented in the note.      Admit with large empyema, cultures thus far with fusobacterium and strep constellatus. Known aspiration, reinforced importance of working with SLP and taking recommendations seriously as this is a grave complication of aspiration. Appreciate dental evaluation as fusobacterium and periodontal disease linked.  CXR reviewed- no significant improvement in effusion, ? Some evidence of trapped lung. Continue lytics, based on imaging/poor response thus far may require surgical intervention for definitive management of space. Would favor ongoing trial of lytics, continued IV antibiotics, repeat CT post lytic course, and then consult to CT surgery if unsuccessful evacuation.    Rosa Elena Cheng, DO  PGY 5  Date of Service (when I saw the patient): 07/12/22

## 2022-07-12 NOTE — PLAN OF CARE
Goal Outcome Evaluation:    Plan of Care Reviewed With: patient     Overall Patient Progress: improving  Pt is doing well with left sided chest tube to -20 cm of suction post TPA. Tube unclamped at 1645. Pt was able to do repositioning in bed independently with only slight reminders. Writer confirmed with Pulmonary on call to wait at least 12 hours for second TPA instillation. Will reschedule evening dose for the am. Pt receiving IV antibiotics every 6 hours.She is maintaining sats on room air. Needs to be reminded to keep probe in place after getting up and moving around in room. O2 sats consistently 91-95% on room air. Good air movement throughout. PT has trouble with taking larger pills. She takes pills slowly. Had emesis after 2 potassium pills around dinner time. Was able to take the first 2/4 earlier. Did not eat supper but drank 16 oz of pop this shift. Up independently in room and to the rest room. Very pleasant. On the phone with family constantly.

## 2022-07-12 NOTE — PROGRESS NOTES
No change in pt responsiveness. ALFRED HIGH wdl. Attempted to place call to pts mom to ask about baseline level of responsiveness. No answer- mail box full. unable to  Leave message.

## 2022-07-12 NOTE — PROVIDER NOTIFICATION
"   07/12/22 0300   Call Information   Date of Call 07/12/22   Time of Call 0318   Name of person requesting the team Gila BERG   Title of person requesting team RN   RRT Arrival time 0322   Time RRT ended 0450   Reason for call   Type of RRT Adult   Primary reason for call Sudden or unanticipated change in patient condition;Nurse is concerned/worried;Additional help needed   Was patient transferred from the ED, ICU, or PACU within last 24 hours prior to RRT call? No   SBAR   Situation Patient not responding to agressive attempts to wake her up. Responded to 0.2 of narcan   Background Per provider note \"History of multiple medical problems including past posterior fossa surgery for clival chordoma, velopharyngeal insufficiency s/p soft palate removal and ultimately uvulopalatopharyngoplasty (2013), now has chronic aspiration and was admitted on 7/7/2022 due to night seats, weight loss, cough, found to have left-sided empyema. She was transferred to [place] for intervention with IP vs IR.\"   Notable History/Conditions Cancer;Hypertension   Assessment Upon arrival, patient alert and responding appropriately after 0.2 of narcan. Coarse cough, lungs diminished in bases. Sats in the mid 90's on room air while awake. 2L while asleep with good sats. Patient needing second dose of narcan less than an hour later because she was once again not responding to attempts to wake her.   Interventions Labs;Meds;Other (describe)  (Blood sugars checked to be mid 70's. Sugary drinks given. Bedside nurse initated Capnography)   Adjustments to Recommend Higher level of care if continuing to need narcan.   Patient Outcome   Patient Outcome Stabilized on unit   RRT Team   Date Attending Physician notified 07/12/22   Physician(s) ROXIE Villalobos CNP   Lead RN Alejandro BERG   Post RRT Intervention Assessment   Post RRT Assessment Worsened   Date Follow Up Done 07/12/22   Time Follow Up Done 0740   Comments pt continues " to be somnolent, difficult to rouse and immediately goes back to sleep. RR 11-17. shallow. bp 80's-90's/40's-50's

## 2022-07-12 NOTE — PROGRESS NOTES
"NURSING PROGRESS NOTE  Patient Transfer In    Report received from DEACON Barraza , bedside RN on 5B (unit) at 1000 (time).     Patient deemed stable for transfer.    Patient and their support system mother are aware of plans.     Patient transported to 6511 (room) on tele,RA, capnography with RN .    Patient settled and oriented to room, telemetry placed on patient and initial vital signs completed.  Patient educated on \"call don't fall\", use of call light, etc.  Call light was placed within reach.    Skin assessment completed by two RNs as part of initial assessment.    Upon arrival to unit patient awake, alert and oriented x 4, ambulated to bathroom with RN, sitting upright in chair. On room air, vitals stable. MD at bedside.   Belongings:  Clothing, medications from patient's rooms sent from 5B, which were sent down to pharmacy.       Rhianna Castañeda RN .................................................... July 12, 2022   11:27 AM  [facility] ([facility] [place]  Stepdown ICU (Unit 6D)  "

## 2022-07-12 NOTE — PROGRESS NOTES
Resident/Fellow Attestation   I, Bryanna Camacho MD, was present with the medical/JYOTI student who participated in the service and in the documentation of the note.  I have verified the history and personally performed the physical exam and medical decision making.  I agree with the assessment and plan of care as documented in the note.    Bryanna Camacho MD  PGY1  Date of Service (when I saw the patient): 07/12/22    [facility]    Progress Note - Medicine Service, MAROON TEAM 5       Date of Admission:  7/7/2022    Assessment & Plan          Giovana Joaquin is a 42 year old female with history of multiple medical problems including past posterior fossa surgery for clival chordoma, velopharyngeal insufficiency s/p soft palate removal and ultimately uvulopalatopharyngoplasty (2013), now has chronic aspiration and was admitted on 7/7/2022 due to night seats, weight loss, cough, found to have left-sided empyema. She was transferred to [facility] for intervention with IP vs IR. She is currently breathing comfortably on room air.      Today:   - RRT overnight for AMS, negative workup except for new RBBB on EKG, Bcx pending.   - Totally regained mentaion in the PM, cleared for food by speech, and ongoing reduced dose range of narcotics.  - Chest tube in place, lytics BID per pulmonology   - On Zosyn; add vanc if worsening or not improving   - Cont Nystatin and fluconazole for severe thrush      Encephalopathy, likely 2/2 toxic vs. metabolic  Overnight 07/11, pt was difficult to rouse, minimally responsive to painful stimuli, triggered RRT. Received narcan x2, with rapid improvement in AMS both times, and minimal improvement with third narcan dose; VBG showed pCO2 elevated at 60. ~4 hours after third Narcan dose, pt remained difficult to arouse, opening eyes briefly to painful stimuli. PERRL, no miosis or mydriasis. Unclear etiology for encephalopathy at this time, likely combination of  toxic (in s/o possible ingestion of unknown pills found in room), and metabolic (hypercapnia on VBG.) Known infectious source (L-sided empyema s/p chest tube), but low c/f worsening or new infection (she is on Zosyn, afebrile and HDS, and WBC wnl). If toxic 2/2 ingestion of known pills, unclear what they may be; physical exam incongruent with opioid use/withdrawal. Hypercapnia is improving on recheck. Will transfer to intermediate care and pursue full w/up. Currently protecting airway, but low threshold to transfer to ICU if this changes.  - Transfer to intermediate care  - Capnography   - UDS (pending)  - CT head  - EKG  - CXR  - BCx x2   - APAP, salicylate level check  - Recheck VBG  - Talked to pharm re: identifying pills found in room  - Holding oxy PRN pending work-up/improvement in ACS    Left-sided empyema (GS GPCs, GPB, Fusobacterium)  Poor dentition   Velopharyngeal insufficiency s/p soft palate removal and uvulopalatopharyngoplasty (2013)  CT imaging suggestive of moderate left-sided pleural effusion with thick rind, in s/o several weeks of infectious s/s. C/f long-standing effusion c/w empyema based on pelural fluid cultures. Patient is currently non-toxic appearing on room air. Suspect she is susceptible due to chronic aspiration in setting of multiple ENT procedures (most recent intervention was several years ago). S/p chest tube (07/09); pleural fluid with selena pus, growing GPC, GPB and Fusobacterium awaiting speciation.   - Pulmonology consulted; appreciate recs              > Lytics BID x 3d              > Daily CXR with chest tube in place  - IR consulted: Placed chest tube  - Chest tube management per IP  - Vancomycin stopped (MRSA nares negative); may restart if growing MRSA or worsening   - Cont Zosyn  - PTA cevimeline   - Speech consulted for evaluation of aspiration risk     Severe Oropharyngeal thrush  Nausea  Dysphagia   Has had previously. Mild discomfort currently.   - Continue PO  fluconazole  - Start nystatin oral suspension for thrush  - Dental Hygiene consult  - Zonatalie compazine prn      Chronic pain:  Follows with pain clinic. Has not been on Bup in months, per her report, will check .   - PTA oxycodone 7.5 mg q6h prn per home dose   - PTA gabapentin 600 mg TID  - PTA nortriptyline 25 mg at bedtime   - Restarted PTA baclofen 10mg TID     --Chronic problems--  Hypertension: Continue atenolol.  Depression, anxiety: Continue stratterra, levomilnacipran.  GERD: Pta PPI.     Diet: Mechanical/Dental Soft Diet  Snacks/Supplements Adult: Other; Please send ensure max protein with every lunch and dinner trays; With Meals    DVT Prophylaxis: Enoxaparin (Lovenox) SQ  Carolina Catheter: Not present  Fluids: None  Central Lines: None  Cardiac Monitoring: None  Code Status: Full Code      Disposition Plan     Expected Discharge Date: 07/13/2022                The patient's care was discussed with the Attending Physician, Dr. Yeboah.    Monique Berger  Medical Student  Medicine Service, [address]  Securely message with the Vocera Web Console (learn more here)  Text page via [facility] Paging/Directory   Please see signed in provider for up to date coverage information      Clinically Significant Risk Factors Present on Admission                      ______________________________________________________________________    Interval History   Overnight: Became difficult to rouse. Per overnight RN, pt was ambulating/mentating appropriately, sat on bed. When RN returned to room, pt was lying on back and very difficult to rouse; RN found bag next to her which contained two unmarked pill bottles. RRT called. Pt received narcan x2, with immediate response and improvement in mentation both times. She became somnolent again, requiring third narcan dose, but no improvement in mentation. No change in mentation in ~4h since last narcan dose.     This am,  remains difficult to rouse, minimally responsive to painful stimuli.     Data reviewed today: I reviewed all medications, new labs and imaging results over the last 24 hours.     Physical Exam   Vital Signs: Temp: 97  F (36.1  C) Temp src: Axillary BP: 93/48 Pulse: 77   Resp: 20 SpO2: 98 % O2 Device: Nasal cannula Oxygen Delivery: 1 LPM  Weight: 224 lbs 13.91 oz  Gen: Somnolent, briefly opens eyes to sternal rub and immediately goes back to sleep.   Respiratory: Shallow breaths, RR ~15. Exam limited by snoring, but good air movement b/l auscultating anteriorly. Diminished lung sounds in L lower lobes auscultating at midaxillary line. Chest tube in place and draining sanguinous output.   Cardiovascular: RRR. Normal S1, S2. No m/r/g.   GI: Soft, non-distended, non-tympanitic.   Skin: No new rashes on limited skin exam.      Data   Recent Labs   Lab 07/12/22  0526 07/12/22  0443 07/12/22  0412 07/11/22  0604 07/10/22  0622 07/09/22 2004 07/09/22  0617 07/07/22  1807 07/07/22  1806   WBC 5.9  --   --  5.5 7.3  --  12.5*  --  15.8*   HGB 9.5*  --   --  7.9* 8.5*  --  8.5*  --  10.3*   MCV 78  --   --  78 78  --  78  --  75*     --   --  353 385  --  429  --  523*     --   --  137 136  --  138  --  135   POTASSIUM 3.2*  --   --  3.3* 3.8  --  3.7   < > 3.2*   CHLORIDE 101  --   --  101 99  --  101   < > 95   CO2 29  --   --  30* 33*  --  32*   < > 34*   BUN 4.3*  --   --  3.7* 4.7*  --  5.7*   < > 7   CR 0.52  --   --  0.54 0.54  --  0.56   < > 0.60   ANIONGAP 9  --   --  6* 4*  --  5*   < > 6   MIROSLAVA 9.0  --   --  8.5* 8.7  --  8.8  --  9.2   GLC 81 76 70 77 85   < > 90  --  101*   ALBUMIN  --   --   --   --   --   --   --   --  1.8*   PROTTOTAL  --   --   --   --   --   --  6.4  --  7.6   BILITOTAL  --   --   --   --   --   --   --   --  0.4   ALKPHOS  --   --   --   --   --   --   --   --  162*   ALT  --   --   --   --   --   --   --   --  21   AST  --   --   --   --   --   --   --   --  11    < > = values  in this interval not displayed.

## 2022-07-12 NOTE — PLAN OF CARE
Problem: Plan of Care - These are the overarching goals to be used throughout the patient stay.    Goal: Plan of Care Review/Shift Note  Description: The Plan of Care Review/Shift note should be completed every shift.  The Outcome Evaluation is a brief statement about your assessment that the patient is improving, declining, or no change.  This information will be displayed automatically on your shift note.  Outcome: Ongoing, Not Progressing  Flowsheets (Taken 7/12/2022 0833)  Plan of Care Reviewed With: patient  Overall Patient Progress: declining     Pt A+O x4 at start of shift. Ambulating independently in room. 10 mg oxycodone, scheduled tylenol and 25 mg nortriptyline at 0100. Pt neuro intact and appropriately conversational. Tolerating diet. Able to swallow pills.  CT dressing CDI. CT to -20 suction with small amt serosang output. Flushed per order. PIV replaced: sl'd btwn used.    Approx 330 Authoring RN found pt sleeping perpendicular in bed. Somnolent- unable to arouse with sternal rub. Narcan admin and RRT called. Pt responded to narcan admin. Loose pills found in 2 pill bottles in room. Pt adamant did not take anything that was not given by nursing staff.  VSS: HR 80's- BPs soft /40-50's- RR 17-20. O2 sats 87-97 on ra. Placed on 2 lpm nc with resulting sa rates of . Vitals monitored closely q 15 min.   BG monitored.  Pt remained somnolent remainder of shift- arousing only to vigorous/painful stimuli after 2 additional doses of Narcan admin.     Capno ordered- Etco2 38-41  Labs ordered- lactic acid 1.7- VBG: pCO2-63; pH- 7.43;  Bicarb - 35- K+ 3.2. ( MD notified)    Continue to monitor. Awaiting on decision/orders to transfer to higher level of care.

## 2022-07-12 NOTE — PROGRESS NOTES
1445: Pt c/o 9/10 pain and frustrated that oxy d/c'd. Is there anything else we can give her? Possibly try lido patches near CT site? Toradol?    1526: Would someone be able to come to room and speak with pt and family re- pain management? They are very concerned and frustrated.    1703: Paged SLP about current diet orders. Family and patient extremely frustrated about limited options with soft and bite sized diet. Per SLP, ok for pt to remain on current diet. Paged [facility] resident about re-adjusting diet order.    Goal Outcome Evaluation:    Plan of Care Reviewed With: patient     Overall Patient Progress: improving    Outcome Evaluation: Working to manage pain at CT site + chronic pain. CT to suction. PO intake increased this afternoon- SLP to do swallow study tomorrow AM with patient.    NURSING PROGRESS NOTE  Shift Summary  Date: July 12, 2022     Neuro/Musculoskeletal:  A&Ox4. Neuros intact.   Cardiac:  SR.  VSS.     Respiratory:  Sating in the 90s on RA. Capnography monitoring.  GI/:  Adequate urine output. LBM: 7/12- loose/liquid  Diet/Appetite:  Tolerating mechanical/soft diet.  Activity: SBA.  Pain: Chronic pain, CT site pain. Oxy restarted this afternoon.  Skin:  No new deficits noted.   Protocols/Labs: K replaced this afternoon- awaiting recheck.    Pertinent Shift Updates: Pt transferred to 6D. VSS.    Plan: Video Swallow study tomorrow @ 0930.       Jasmin Pressley RN  .................................................... July 12, 2022   6:59 PM  [facility] ([facility]): [facility] ICU (Unit 6D)

## 2022-07-12 NOTE — PLAN OF CARE
Assumed cares: [id number]  Vitals: VSS except for respirations intermittently dropped to 11 but would increase to baseline of 16-17, highest respiration noted was 24. Pt on 1 L of O2 via NC.   Pain: Unable to assess pain due to pt unresponsive  Neuro: Unable to assess due to pt unresponsive  Cardiac: WDL  Respiratory: Coarse and diminshed left lung sounds  GI/: Incontinent, need to collect urine sample  Skin: WDL  IV/Drains: L PIV- SL; L chest tube at -20 suction, tube clamped at 0930 for alteplase treatment, to be unclamped at 1130, pt repositioned every 15 minutes  Activity: Independent  Behavior: Pt is unresponsive to cares due to pt becoming unresponsive on overnight shift- see previous shift notes for events. Pt opened eyes with repositioning for a couple of seconds but would return to sleep. Vitals were monitored every 15 minutes. Providers aware of patient situation.     Plan of Care: Pt to be transferred to .      Goal Outcome Evaluation:    Plan of Care Reviewed With: patient     Overall Patient Progress: no change

## 2022-07-12 NOTE — PHARMACY-CONSULT NOTE
Pharmacy Consult Note    Pharmacy was consulted to identify miscellaneous pills found beside the patient in pill bottles after an unresponsive episode overnight. These pills were stored in central pharmacy after the event using standard pt med storage processes.     Writer obtained the patient's medication bag from central pharmacy and the following table indicates medications where able to be identified.              Gema Browning, MalindaD

## 2022-07-12 NOTE — CODE/RAPID RESPONSE
Rapid Response Team Note    Assessment   A rapid response was called on Giovana Joaquin due to somnolence. Unable to be awaken by nursing staff. She received flexeril and oxycodone PRN tonight. 2 unmarked bottles of pills found on patient's possession. Narcan given and she immediately woke up. She denies taking any additional meds. VSS. Pill bottles sent to pharmacy. Alert and oriented, moves all extremities equally. No neuro deficits    Plan   -  Keep on continuous pulse ox    Addendum: Narcan redosed. Labs: CBC, BMP, VGB.   Discussed with primary team, recommend transferring to higher level of care for closer monitoring    -  The Internal Medicine primary team was able to be reached and they are in agreement with the above plan.  -  Disposition: The patient will remain on the current unit. We will continue to monitor this patient closely.  -  Reassessment and plan follow-up will be performed by the primary team      ROXIE Suarez CNP  [facility] RRT [facility] Job Code Contact #[phone]  [facility] Paging/Directory    Hospital Course   Brief Summary of events leading to rapid response:   Concern for difficulty in awakening patient    Admission Diagnosis:   Empyema lung (H) [J86.9]  Weight loss [R63.4]  Urinary tract infection without hematuria, site unspecified [N39.0]    Physical Exam   Temp: 97.7  F (36.5  C) Temp  Min: 95.5  F (35.3  C)  Max: 97.7  F (36.5  C)  Resp: 18 Resp  Min: 16  Max: 18  SpO2: 96 % SpO2  Min: 91 %  Max: 100 %  Pulse: 87 Pulse  Min: 65  Max: 90    No data recorded  BP: 112/59 Systolic (24hrs), Av , Min:84 , Max:126   Diastolic (24hrs), Av, Min:49, Max:92     I/Os: I/O last 3 completed shifts:  In: 190 [P.O.:150; Other:40]  Out: 100 [Chest Tube:100]     Exam:   General: in no acute distress  Mental Status: AAOx4.      Significant Results and Procedures   Lactic Acid:   Recent Labs   Lab Test 22  0652 22  1806   LACT 0.7 1.3     CBC:   Recent Labs   Lab Test  07/11/22  0604 07/10/22  0622 07/09/22  0617   WBC 5.5 7.3 12.5*   HGB 7.9* 8.5* 8.5*   HCT 29.4* 31.0* 31.8*    385 429

## 2022-07-12 NOTE — PROGRESS NOTES
"At approximately 0315 bedside nurse entered patient room to find patient sleeping sitting on the edge of her bed, leaned back. Patient was found to be unresponsive to all stimulus including pain. Patient was maintaining her own airway and was maintaining oxygen above 92% on room air, Vitally stable. Bedside nurse observed a blue toiletry pouch on patient pillow which had not been there previously and found a bottle of mixed pills in the pouch. There was also a bottle of mixed pills in her purse on the windowsill by her bed. Patient was given prescribed 10mg oxycodone and 25mg nortriptyline at bedtime, at approximately 0100. Patient has routinely been getting these dosages and getting these medications together throughout this admission. Nurse called in charge nurse as well as called a rapid response and for narcan. IV narcan given at 0320. Patient became alert and oriented and continued to maintain airway as well as continued to be vitally stable. MD in room.    Once awake, patient denied taking any pills other than those given by nurse. Patient was able to identify some of the pills in the bottles which include: percocet, a \"pain pill\", flexeril and others. Charge nurse confiscated all pills and bottles she could find and brought to pharmacy, per protocol. Charge nurse also noted that during admission to unit, patient was asked if she brought home medications to the hospital and patient declined. Patient belongings at time of admission also did not include any medications as observed by nursing staff.     Patient required additional dose of IV narcan at 0440 AM for unresponsiveness with appropriate and immediate response to medication.  MD in room.     At approximately 0455 pt received third dose of IV narcan without response. MD in room.    Pt mother attempted to be reached around 6am to check baseline status, no answer and VM full.     "

## 2022-07-13 NOTE — PROGRESS NOTES
Antimicrobial Stewardship Team Note    Antimicrobial Stewardship Program - A joint venture between [facility] and  Physicians to optimize antibiotic management.  NOT a formal consult - Restricted Antimicrobial Review     Patient: Giovana Joaquin  MRN: [id number]  Allergies: Cephalexin, Zolpidem, Ambien [zolpidem tartrate], Amoxicillin, Amoxicillin-pot clavulanate, Aspartame, Levaquin [levofloxacin], Cephalosporins, and Morphine    Brief Summary: Giovana Joaquin  is a 42 year old female with a PMH of posterior fossa surgery for clival chordoma, velopharyngeal insufficiency, past soft palate removal, and GERD, who was admitted to [facility] on 7/7 for 3 weeks of nausea, vomiting, and weight loss.      History of Present Illness: She presented to the ED with complaints on unexpected weight loss along with nausea, vomiting, and diarrhea. She had been unable to eat without nausea. She also reported a significant amount of oral thrush. On admission, she was afebrile and hemodynamically stable but with leukocytosis (15.8) and elevated CRP (250). A Chest CT on 7/7 showed an 11 x 7 x 12 cm thick-walled enhancing fluid collection within the left hemithorax posteriorly with mass effect upon the LLL which is nearly completely collapsed. She was started on piperacillin-tazobactam on 7/7 and was started on fluconazole on 7/8 for oral thrush (previously on nystatin). She underwent chest tube placement with IR on 7/9 which drained cloudy white fluid. Pleural fluid cultures grew Streptococcus constellatus and Fusobacterium nucleatum. MRSA PCR nasal swab was negative. She remained hemodynamically stable with decreasing WBC count (now WNL x 4 days) and decreasing CRP (157 on 7/10).     On 7/12, a rapid response was called due to somnolence, and the patient was responsive with naloxone. She was found to have a bottle of miscellaneous pills which was confiscated. She was transferred to intermediate care for monitoring but  remained on room air. Repeat CXR on 7/12 showed improved aeration to the left lung base with slightly decreased size of the left basilar empyema and increased perihilar interstitial opacities concerning for increasing pulmonary edema versus atelectasis. Blood cultures were obtained on 7/12 and are no growth to date. Chest tube output has been steadily declining but has remained hemodynamically stable. She remains on piperacillin-tazobactam and fluconazole.           Active Anti-infective Medications   (From admission, onward)                 Start     Stop    07/08/22 0330  piperacillin-tazobactam  3.375 g,   Intravenous,   EVERY 6 HOURS        Empyema        --    07/08/22 0000  fluconazole  200 mg,   Oral,   DAILY        Candidiasis        07/15/22 0759                  Assessment: Left sided empyema s/p chest tube placement  Patient presented with left sided empyema that steadily improved following chest tube placement. Pleural fluid cultures are growing Streptococcus constellatus and Fusobacterium nucleatum which are both organisms consistent with infections causing lung abscesses. Streptococcus constellatus is reliably penicillin and cephalosporin susceptible, so therapy can be narrowed from piperacillin-tazobactam. Fusobacterium nucleatum is also generally susceptible to beta-lactamase inhibitor combos and metronidazole. Options include ampicillin-sulbactam, ceftriaxone + metronidazole, cefidinir + metronidazole, or amoxicillin-clavulanate. Patient does have listed PCN and cephalosporin allergies in the chart, but reactions are documented as adverse affects and not true allergies; she also has tolerated piperacillin-tazobactam and cefazolin in the past. Patient appears to be tolerating oral medications, so patient can be transitioned to oral medications in preparation for discharge. Duration of therapy is typically anywhere from 2-4+ weeks for empyema, so will defer to the primary teams and pulmonology for  duration of therapy pending clinical improvement.     Recommendations:  1) Stop piperacillin-tazobactam  2) Can consider the following options         A) Ampicillin-sulbactam 3 g every 6 hours        B) Ceftriaxone 2 g every 24 hours + metronidazole 500 mg IV/PO every 8 hours         C) Oral options: Augmentin 875 mg BID or cefdinir 300 mg BID + metronidazole 500 mg every 8 hours  3) Will defer to primary team and pulmonology for duration of therapy    Discussed with ID Staff: MD Miladys Ho, PharmD  Pager: [phone]     Vital Signs/Clinical Features:  Vitals  Report        07/11 0700 07/12 0659 07/12 0700 07/13 0659 07/13 0700 07/13 1228   Most Recent      Temp ( F)   97.7    96.9 -  98      97.4     97.4 (36.3) 07/13 0753    Pulse 78 -  91    63 -  98      88     88 07/13 0753    Resp 16 -  20    13 -  24      20     20 07/13 0753    BP 93/44 -  126/92    88/46 -  116/79      116/70     116/70 07/13 0753    SpO2 (%) 87 -  97    91 -  100      100     100 07/13 0753            Labs  Estimated Creatinine Clearance: 146.4 mL/min (based on SCr of 0.51 mg/dL).  Recent Labs   Lab Test 07/07/22  1807 07/09/22  0617 07/10/22  0622 07/11/22  0604 07/12/22  0526 07/13/22  0528   CR 0.55 0.56 0.54 0.54 0.52 0.51       Recent Labs   Lab Test 02/17/15  0100 02/22/15  0110 07/20/15  1150 07/14/16  1046 07/27/16  1055 08/25/16  1231 09/26/16  1359 06/08/17  1124 02/11/19  0754 09/02/21  0245 07/07/22  1806 07/09/22  0617 07/10/22  0622 07/11/22  0604 07/12/22  0526 07/13/22  0528   WBC 12.5*   < > 10.8   < > 8.7   < > 9.7 8.4 11.8*   < > 15.8* 12.5* 7.3 5.5 5.9 4.6   ANEU 9.5*  --  8.1  --  5.7  --  6.6 6.0 8.9*  --   --   --   --   --   --   --    ALYM 2.3  --  1.8  --  2.1  --  2.2 1.4 1.8  --   --   --   --   --   --   --    JOSELIN 0.6  --  0.6  --  0.7  --  0.6 0.8 0.8  --   --   --   --   --   --   --    AEOS 0.1  --  0.2  --  0.2  --  0.2 0.2 0.2  --   --   --   --   --   --   --    HGB 11.5*   < >  11.5*   < > 10.1*   < > 12.1 11.0* 11.4*   < > 10.3* 8.5* 8.5* 7.9* 9.5* 8.4*   HCT 35.3   < > 36.0   < > 32.5*   < > 38.0 35.0 38.1   < > 36.6 31.8* 31.0* 29.4* 34.8* 30.9*   MCV 86   < > 85   < > 84   < > 88 90 93   < > 75* 78 78 78 78 78      < > 327   < > 293   < > 265 278 357   < > 523* 429 385 353 396 366    < > = values in this interval not displayed.       Recent Labs   Lab Test 07/20/15  1150 07/14/16  1046 07/27/16  1055 08/25/16  1231 02/11/19  0754 07/07/22  1806   BILITOTAL 0.4 0.5 0.4 0.4 0.3 0.4   ALKPHOS 151* 142 136 134 118 162*   ALBUMIN 3.6 3.7 3.4 3.3* 3.2* 1.8*   AST 20 18 14 16 13 11   ALT 26 23 17 20 18 21       Recent Labs   Lab Test 08/11/14  1533 02/11/19  0754 07/07/22  1806 07/10/22  0622 07/11/22  0652 07/12/22  0526   LACT  --   --  1.3  --  0.7 1.7   CRP 7.1 29.0* 250.0* 157.00*  --   --    SED  --  55*  --   --   --   --              Culture Results:  7-Day Micro Results       Procedure Component Value Units Date/Time    Blood Culture Hand, Left [[id number]]  (Normal) Collected: 07/12/22 0830    Order Status: Completed Lab Status: Preliminary result Updated: 07/13/22 1033    Specimen: Blood from Hand, Left      Culture No growth after 1 day    Blood Culture Hand, Right [[id number]]  (Normal) Collected: 07/12/22 0830    Order Status: Completed Lab Status: Preliminary result Updated: 07/13/22 1033    Specimen: Blood from Hand, Right      Culture No growth after 1 day    Anaerobic bacterial culture [[id number]]  (Abnormal) Collected: 07/09/22 1640    Order Status: Completed Lab Status: Final result Updated: 07/13/22 1116    Specimen: Pleural fluid from Lung, Left      Culture 4+ Fusobacterium nucleatum      4+ Mixed Aerobic and Anaerobic randy     Comment: No predominant organism       Pleural fluid Aerobic Bacterial Culture Routine [[id number]]  (Abnormal)  (Susceptibility) Collected: 07/09/22 1532    Order Status: Completed Lab Status: Preliminary result Updated:  07/13/22 1046    Specimen: Pleural fluid from Lung, Left      Culture 2+ Streptococcus constellatus    Susceptibility       Streptococcus constellatus (1)       Antibiotic Interpretation Sensitivity   Method Status      Ampicillin Susceptible <=0.06 ug/mL MARCUS Final     Penicillin Susceptible <=0.03 ug/mL MARCUS Final     Clindamycin Susceptible <=0.06 ug/mL MARCUS Final     Erythromycin Susceptible <=0.06 ug/mL MARCUS Final     Cefotaxime Susceptible <=0.25 ug/mL MARCUS Final     Ceftriaxone Susceptible <=0.25 ug/mL MARCUS Final     Vancomycin Susceptible 1.0 ug/mL MARCUS Final                      Gram stain [[id number]]  (Abnormal) Collected: 07/09/22 1532    Order Status: Completed Lab Status: Final result Updated: 07/09/22 2057    Specimen: Pleural fluid from Lung, Left      Gram Stain Result 3+ Gram positive cocci      3+ Gram positive bacilli      4+ WBC seen     Comment: Predominantly PMNs       MRSA MSSA PCR, Nasal Swab [[id number]] Collected: 07/09/22 1055    Order Status: Completed Lab Status: Final result Updated: 07/09/22 1236    Specimen: Swab from Nares, Bilateral      MRSA Target DNA Negative     SA Target DNA Positive    Narrative:      The [employer]  Xpert SA Nasal Complete assay performed in the [employer]  Dx System is a qualitative in vitro diagnostic test designed for rapid detection of Staphylococcus aureus (SA) and methicillin-resistant Staphylococcus aureus (MRSA) from nasal swabs in patients at risk for nasal colonization. The test utilizes automated real-time polymerase chain reaction (PCR) to detect MRSA/SA DNA. The Xpert SA Nasal Complete assay is intended to aid in the prevention and control of MRSA/SA infections in healthcare settings. The assay is not intended to diagnose, guide or monitor treatment for MRSA/SA infections, or provide results of susceptibility to methicillin. A negative result does not preclude MRSA/SA nasal colonization.     Urine Culture [[id number]] Collected: 07/07/22 4103     Order Status: Completed Lab Status: Final result Updated: 07/09/22 1012    Specimen: Urine, Clean Catch      Culture No Growth            Recent Labs   Lab Test 05/09/14  1443 06/09/14  1655 09/02/21  0123 07/07/22  1541   URINEPH 7.0 6.0 6.5 6.0   NITRITE Negative Negative Negative Negative   LEUKEST Negative Negative Negative Negative   WBCU 1 <1 <1 20*                         Imaging: XR Chest 2 Views    Result Date: 7/7/2022  EXAM: XR CHEST 2 VW LOCATION: [facility] DATE/TIME: 7/7/2022 6:32 PM INDICATION: difficulty swallowing COMPARISON: 6/13/2018     IMPRESSION: Heart size and pulmonary vascularity normal. Extensive airspace consolidation within the left lower lobe. This could represent a left diaphragmatic hernia versus pneumonia, pleural fluid, atelectasis or mass. CT chest exam recommended for further evaluation. The right lung is clear.    XR Chest Port 1 View    Result Date: 7/12/2022  EXAM: XR CHEST PORT 1 VIEW  7/12/2022 9:12 AM HISTORY:  somnolence, lethargy, patient with aspiration risk   COMPARISON:  Chest x-ray 7/11/2022 FINDINGS: AP radiograph of the chest. Stable positioning and appearance of the left basilar chest tube. Slightly decreased left basilar empyema and slightly improved aeration to the left lower lung field. Stable cardiomediastinal silhouette. Increased perihilar interstitial opacities. Trace left pneumothorax. Visualized upper abdomen is unremarkable. No acute osseous abnormality.     IMPRESSION: 1. Increased perihilar interstitial opacities concerning for increasing pulmonary edema versus atelectasis. 2. Improved aeration to the left lung base with slightly decreased size of the left basilar empyema. 3. Stable positioning of the left basilar chest tube. I have personally reviewed the examination and initial interpretation and I agree with the findings. JORDY CHANDLER MD   SYSTEM ID:  [id number]    XR Chest Port 1 View    Result Date:  7/11/2022  EXAM: XR CHEST PORT 1 VIEW  7/11/2022 8:01 AM HISTORY: 42 years Female empyema, eval chest tube and effusion. COMPARISON: 7/10/2022. TECHNIQUE: Portable AP view of the chest. FINDINGS: Stable left basilar chest tube position. Midline trachea. Stable cardiomediastinal silhouette. Distinct pulmonary vasculature. Small right pleural effusion. Stable left moderate pleural effusion. No discernible pneumothorax. Unchanged lung volumes. Left basilar streaky opacities, unchanged. Unremarkable upper abdomen. No acute or suspicious osseous abnormalities. Unremarkable soft tissues.     IMPRESSION: 1.  Unchanged moderate left-sided pleural effusion with basilar atelectasis/consolidation and chest tube in stable positioning. 2.  Small right-sided pleural effusion. I have personally reviewed the examination and initial interpretation and I agree with the findings. NINO WHITTAKER MD   SYSTEM ID:  [id number]    XR Chest Port 1 View    Result Date: 7/10/2022  Chest one view portable HISTORY: Empyema chest tube effusion COMPARISON STUDY: 7/9/2022 FINDINGS: Cardiac silhouette is borderline enlarged. Left moderate pleural effusion with basilar atelectasis and consolidation. Trace hydropneumothorax component. Right lung is clear.     IMPRESSION: Left moderate pleural effusion with component of trace hydropneumothorax with chest tube in place. JORDY CHANDLER MD   SYSTEM ID:  [id number]    XR Chest Port 1 View    Result Date: 7/9/2022  EXAM: XR Chest 1 view 7/9/2022 7:49 PM  HISTORY: s/p left chest tube for empyema, having severe pain post-procedure, r/o pneumo or other complication. COMPARISON: CT of the chest dated 7/7/2022. TECHNIQUE: Frontal view of the chest. FINDINGS: Left basilar chest tube in place. Trachea is midline. Cardiac and mediastinal silhouette is partially obscured. Dense left basilar pulmonary opacity. Stable appearance of the left basilar empyema. No pneumothorax.     IMPRESSION: 1. Left basilar empyema  with left chest tube in place. 2. No pneumothorax. I have personally reviewed the examination and initial interpretation and I agree with the findings. BOBBY BOWSER MD   SYSTEM ID:  [id number]    IR Chest Tube Place Non Tunneled Left    Result Date: 7/9/2022  PROCEDURES 7/9/2022: 1. Ultrasound guided left chest tube placement. Clinical History: Left empyema. Chest tube requested. Comparisons: Chest CT 7/7/2022 Staff Radiologist: Anum Curran M.D., interventional radiology staff. I, Lynda Curran, performed the entire procedure. Medications: 1% lidocaine for local anesthesia, 15 mL Nursing: The patient was placed on continuous monitoring. No intravenous sedation administered. Vital signs monitored by nursing staff under Interventional Radiologists supervision. The patient remained stable throughout the procedure. Contrast: No intravenous contrast administered. PROCEDURE: The patient understood the limitations, alternatives, and risks of the procedure and requested the procedure be performed. Both written and oral consent were obtained. The patient was placed in the upright sitting position. Targeted pre-procedural ultrasound loculated left pleural effusion with thick rind, accessible via an inferior intercostal approach. The left back was prepped and draped in the usual sterile fashion. Ten to one volume mixture of 1% lidocaine without epinephrine was used for local anesthesia. Under ultrasound guidance, a 5 Filipino [employer] DynamiX centesis catheter needle was advanced over the top of an inferior lateral left rib into the inferior left pleural space. Needle removed. Permanent copy of the image documenting catheter placement was entered into the patients record. Thick, going purulent fluid was aspirated. Guidewire advanced and coiled in pleural space, confirmed with ultrasound. Tract serially dilated, and over the wire a 14 [place] [facility] nonlocking pigtail catheter was advanced. It was advanced into the basilar  aspect of the empyema. Good tube position confirmed with ultrasound, and image was saved. Wire removed. Tube noted to be well functioning. 80 mL of green, purulent fluid was removed, and a sample was sent for the requested laboratory testing. Tube secured with Ethilon suture, attached to Pleur-evac chamber with intervening three-way stopcock, and sterile dressing applied. No immediate complication.     IMPRESSION: Ultrasound-guided placement of the 14 Kiswahili nonlocking pigtail chest tube into a left empyema as detailed above. Tube is attached to three-way stopcock and Pleur-evac chamber for flushes and alteplase administration. PLAN: Tube management per interventional pulmonology. Drain alteplase and flushes will be needed given fluid consistency. JANETH COVARRUBIAS MD   SYSTEM ID:  [id number]    CT Chest w Contrast    Result Date: 7/7/2022  EXAM: CT CHEST W CONTRAST LOCATION: [facility] DATE/TIME: 7/7/2022 7:12 PM INDICATION: weight loss, vomiting nausea; LLL opacity COMPARISON: None. TECHNIQUE: CT chest with IV contrast. Multiplanar reformats were obtained. Dose reduction techniques were used. CONTRAST: 99 mL Isovue 370 FINDINGS: LUNGS AND PLEURA: There is an approximate 11 x 7 x 12 cm thick-walled enhancing fluid collection within the left hemithorax posteriorly. There is mass effect upon the left lower lobe which is nearly completely collapsed. Aerated portions of the left upper lobe are clear. The right lung is clear. No pneumothorax. MEDIASTINUM/AXILLAE: There are mildly enlarged AP window, lower paratracheal, and subcarinal lymph nodes. Pulmonary arteries are mildly dilated. In thoracic aorta within normal limits. Right atrium and ventricle appear mildly enlarged. No pericardial effusion. CORONARY ARTERY CALCIFICATION: None. UPPER ABDOMEN: There is an incidental 1.5 cm right adrenal adenoma or possibly a benign myelolipoma. MUSCULOSKELETAL: Unremarkable.      IMPRESSION: 1.  Moderate left empyema. 2.  Mild pulmonary arterial hypertension suspected.    CT Head w/o Contrast    Result Date: 7/12/2022  CT HEAD W/O CONTRAST 7/12/2022 1:51 PM Provided History: lethargy, encephalopathy ICD-10: Comparison: Head CT 6/13/2018 and neck CT 2/11/2019 Technique: Using multidetector thin collimation helical acquisition technique, axial, coronal and sagittal CT images from the skull base to the vertex were obtained without intravenous contrast. Findings:  No intracranial hemorrhage, mass effect, or midline shift. The ventricles are proportionate to the cerebral sulci. The gray to white matter differentiation of the cerebral hemispheres is preserved. The basal cisterns are patent. The visualized paranasal sinuses are clear. The mastoid air cells are clear. Redemonstration of posterior cervical and occipital surgical fusion. Grossly unchanged hypodense diffuse infiltrative appearance of the clivus and odontoid process with retropulsion of the odontoid process and severe stenosis of the craniocervical junction when correlated with CT from 2019. No new calvarial fracture or lytic bone lesion      Impression: No acute intracranial pathology. Grossly stable postsurgical changes of posterior fusion of occipital bone and cervical spine, chronic hypodense appearance of the clivus/odontoid process and retropulsion of the odontoid process causing significant narrowing of the cervicomedullary junction. KIM HALL MD   SYSTEM ID:  [id number]

## 2022-07-13 NOTE — PROGRESS NOTES
"   07/13/22 0900   General Information   Onset of Illness/Injury or Date of Surgery 07/07/22   Referring Physician Bryanna Camacho MD   Patient/Family Therapy Goal Statement (SLP) None stated   Pertinent History of Current Problem Pt is a 42 year old female with history of multiple medical problems including past posterior fossa surgery for clival chordoma, velopharyngeal insufficiency s/p soft palate removal and ultimately uvulopalatopharyngoplasty (2013), now has chronic aspiration and was admitted on 7/7/2022 due to night seats, weight loss, cough, found to have left-sided empyema. Per pulm note, \"Pleural fluid with selena pus, growing fusobacterium and strep constellatus, consistent with aspiration and perhaps dental infection.\" VFSS completed today to objectively assess swallow mechanism.   General Observations Alert and agreeable   Past History of Dysphagia   Pt with extensive hx of dysphagia related to post-radiation changes. Pt had VFSS most recently in September 2021. Silent aspiration of thin and thick liquids observed at that time. In addition to oropharyngeal deficits, pt also observed to have an obstructing CP bar. Pt was scheduled for a dilation in January which historically has improved her swallow function, but recurrent infections and fear of COVID have prevented her from getting this dilation. The pt's significant other and mother are concerned about the pt's recent limited PO intake and progressive weight loss.     Type of Evaluation   Type of Evaluation Swallow Evaluation   General Swallowing Observations   Respiratory Support (General Swallowing Observations) none   Current Diet/Method of Nutritional Intake (General Swallowing Observations, NIS) regular diet;thin liquids (level 0)   Swallowing Evaluation Videofluoroscopic swallow study (VFSS)   VFSS Evaluation   Radiologist Resident   Views Taken left lateral   Physical Location of Procedure [facility]   VFSS Textures Trialed thin " "liquids;mildly thick liquids;pureed   VFSS Eval: Thin Liquid Texture Trial   Mode of Presentation, Thin Liquid cup;self-fed   Order of Presentation 1, 2, 3, 7, 10, 11(AP)   Preparatory Phase WFL   Oral Phase, Thin Liquid WFL   Pharyngeal Phase, Thin Liquid   (impaired)   Rosenbek's Penetration Aspiration Scale: Thin Liquid Trial Results 8 - contrast passes glottis, visible subglottic residue remains, absent patient response (aspiration)   Response to Aspiration absent response, silent aspiration   Diagnostic Statement Aspiration after the swallow with laryngeal vestibule residue and spillover from pyriforms. Cued cough helps reduce amount of deep penetration/aspiration.   VFSS Eval: Mildly Thick Liquids   Mode of Presentation cup;self-fed   Order of Presentation 4   Preparatory Phase WFL   Oral Phase WFL   Pharyngeal Phase   (impaired)   Rosenbek's Penetration Aspiration Scale 8 - contrast passes glottis, visible subglottic residue remains, absent patient response (aspiration)  (observed on next fluoro run)   Diagnostic Statement Deep penetration with remaining laryngeal vestibule residue. On next fluoro image, suspected aspiration of thick liquids present on anterior wall of trachea.   VFSS Evaluation: Puree Solid Texture Trial   Mode of Presentation, Puree spoon;self-fed   Order of Presentation 5, 6 (still), 8, 9 (same pudding bolus from 8), 12 (AP)   Preparatory Phase WFL   Oral Phase, Puree WFL   Pharyngeal Phase, Puree   (impaired)   Rosenbek's Penetration Aspiration Scale: Puree Food Trial Results 1 - no aspiration, contrast does not enter airway   Diagnostic Statement Impaired, with diffculty passing pudding bolus by end of study. Pt declined trials of any additional solids as she did not have any Sprite present to \"rinse it down\"   Esophageal Phase of Swallow   Patient reports or presents with symptoms of esophageal dysphagia Yes   Esophageal comments Limited distension of UES, hx of dilations but none " "recent   Swallowing Recommendations   Diet Consistency Recommendations thin liquids (level 0);soft & bite-sized (level 6)   Supervision Level for Intake patient independent   Mode of Delivery Recommendations bolus size, small;food moistened;slow rate of intake   Swallowing Maneuver Recommendations effortful (hard) swallow;supraglottic swallow   Monitoring/Assistance Required (Eating/Swallowing) stop eating activities when fatigue is present   Recommended Feeding/Eating Techniques (Swallow Eval) maintain upright sitting position for eating;provide oral hygiene prior to intake;maintain upright posture during/after eating for 30 minutes   Medication Administration Recommendations, Swallowing (SLP) as tolerated   General Therapy Interventions   Planned Therapy Interventions Dysphagia Treatment   Dysphagia treatment Instruction of safe swallow strategies;Compensatory strategies for swallowing   Clinical Impression   Criteria for Skilled Therapeutic Interventions Met (SLP Eval) Yes, treatment indicated   SLP Diagnosis Chronic, moderate-severe dysphagia   Risks & Benefits of therapy have been explained evaluation/treatment results reviewed;care plan/treatment goals reviewed;risks/benefits reviewed;current/potential barriers reviewed;participants voiced agreement with care plan;participants included;patient   Clinical Impression Comments   Videoswallow study completed per MD order to objectively assess oropharyngeal swallow mechanism. Under fluoroscopy, pt presents with persisting moderate-severe oropharyngeal dysphagia 2/2 hx of XRT, soft palate resection, and pharyngoplasty. Swallow function is worse than last VFSS in Sept 2021. Pt assessed with thin liquids, mildly-thick liquids, and pudding. Pt declined trials of solids as she did not have any Sprite to \"rinse it down.\" Oral phase functional. Swallow trigger delayed with bolus in pyriforms before the swallow was triggered. Pt does not have a velum so absent  closure. " Once swallow is triggered, BOT retraction and pharyngeal stripping wave are reduced, with wide column of contrast between BOT and PPW. Epiglottic inversion incomplete, reduced laryngeal elevation. Limited distention of UES appreciated, resulting in large amounts of bolus remaining in pharynx/laryngeal vestibule after initial swallow attempt. Pt clearly has good glottic closure during the swallow, but aspiration occurs after the swallow with thin and thick liquids. Aspiration is silent. Cued cough helps reduce amount of laryngeal vestibule residue after the swallow. Pharyngeal residue appreciated after all boluses, greatest with puree.     Discussed results of VFSS with pulm fellow. Suspect as pt continues to aspirate, she will continue to experience aspiration-related complications, however even an NPO recommendation would still result in likely aspiration of secretions or gastric contents. Additionally, there is some question if pt can maintain adequate nutritional status with how tight her UES appears and how little she has eaten leading up to this admission per her partner and mother.     From purely a swallowing standpoint, it is reasonable for the pt to continue a soft and bite-sized diet and thin liquids with use of specific swallowing strategies and aspiration precautions. Pt should complete oral care prior to PO intake and sit upright for all PO. Pt should alternate solids and liquids, and complete the supraglottic swallow strategy when drinking liquids. Also encourage pt to continue frequent OOB activity. Defer discussion of whether pt can maintain adequate hydration/nutritional status by mouth to RD. SLP will follow education and reintroduction of dysphagia exercises. Pt will need to f/u in ENT clinic re: dilation.     SLP Discharge Planning   SLP Discharge Recommendation home with outpatient speech therapy

## 2022-07-13 NOTE — PLAN OF CARE
NURSING PROGRESS NOTE  Shift Summary      Date: July 13, 2022     Neuro/Musculoskeletal:  A&Ox4. Difficult to rouse from sleep, but remains oriented.   Cardiac:  SR, R side BBB.  VSS.     Respiratory:  Sating in the 90s on 1L NC while sleeping. Capnography monitoring on. L side chest tube to -20 suction. Output recorded.   GI/:  Adequate urine output.  LBM: 7/12  Diet/Appetite:  Tolerating mechanical soft diet.  Activity:  Standby assist.    Pain:  Rates pain 7/10, medicated with scheduled tylenol and PRN oxycodone with good effect. Lidocaine patch in place.   Skin:  No new deficits noted. Incision to L flank for chest tube.   LDAs + Drips/IVF:  L PIV.   Protocols/Labs:  AM labs ordered.     Pertinent Shift Updates:  Patient slept between cares, no acute events overnight.       Plan:  Continue to monitor patient. Plan for video swallow study in AM.      Page Funk RN  .................................................... July 13, 2022   5:47 AM  [facility] ([employer]): [facility] ICU (Unit 6D)

## 2022-07-13 NOTE — PROGRESS NOTES
[facility]   Pulmonary   Progress Note  Date of Service: 07/13/2022  Giovana Joaquin MRN: [id number]          Assessment and Recommendations:   Assessment:  L empyema- fusobacterium nucleatum, Strep constellatus  Hx of chronic aspiration  Tobacco use-vaping, smoking medical marijuana  Periodontal disease     Pleural fluid with selena pus, growing fusobacterium and strep constellatus, consistent with aspiration and perhaps dental infection. Will continue to monitor susceptibilities. Initially good output over the first day of having chest tube in place, but drainage minimal afterwards. Lytics x1 given evening of 7/11 with increased output, slower and steady output since. Video swallow study concerning for chronic aspiration, unsurprising given patient's history and oral randy growing on effusion culture.      Recommendations:  -- tpa/dornase BID x 3 days (last dose morning of 7/14). Would recommend repeat chest CT today and if no improvement in effusion consult thoracic surgery tomorrow.  -- chest tube to water seal for patient comfort and better mobility.  -- Consider narrowing antibiotics to Unasyn  -- daily am CXR with chest tube in place     Thank you for allowing us to care for this patient.  We will continue to follow.  Please don't hesitate to page or call with any questions or concerns.    Patient seen & discussed w/  Dr. Uriarte, who agrees with the above assessment and plan.    Eric Montanez, MS-4  [facility] [employer]  07/13/2022 2:18 PM          Subjective / Interval History:   Nursing notes reviewed.   Patient feels like she is breathing more comfortably today. Increased pain with lytics but overall tolerating them well.          Objective:   Temp:  [97.3  F (36.3  C)-97.9  F (36.6  C)] 97.9  F (36.6  C)  Pulse:  [82-98] 85  Resp:  [15-24] 15  BP: (104-117)/(60-79) 117/72  SpO2:  [91 %-100 %] 92 %  I/O last 3 completed shifts:  In: 290 [I.V.:200; Other:90]  Out: 230 [Chest  Tube:230]  Wt Readings from Last 1 Encounters:   07/13/22 96.5 kg (212 lb 11.2 oz)    Body mass index is 41.54 kg/m . Resp: 15    Exam:  Gen: in no acute distress, laying in bed  CV: RRR, no peripheral edema  Pulm: diminished to mid L lung posteriorly, no increased work of breathing  GI: soft, not distended  MSK: tender to palpation over L back  Neuro: speech quiet and a little difficult to understand, alert and oriented  Psych: calm, appropriate  Skin: No rashes or erythema around chest tube insertion site. Some pus noted on bandage at chest tube site.          Data:   Labs: reviewed in EMR  Imaging: reviewed in EMR and notable imaging listed below:        Medications:   Medications     acetaminophen  650 mg Oral Q4H     alteplase (ACTIVASE) 10 mg and dornase 5 mg in NS syringe for chest tube instillation  50 mL Chest Tube BID     atenolol  25 mg Oral Daily     atomoxetine  80 mg Oral Daily     Baclofen  10 mg Oral TID     cevimeline  30 mg Oral TID     cyclobenzaprine  10 mg Oral TID     enoxaparin ANTICOAGULANT  40 mg Subcutaneous Q24H     fexofenadine  60 mg Oral Daily     fluconazole  200 mg Oral Daily     gabapentin  600 mg Oral TID     levomilnacipran  80 mg Oral Daily     lidocaine  1 patch Transdermal Q24h    And     lidocaine   Transdermal Q8H TOMER     mirabegron  50 mg Oral Daily     nabumetone  1,000 mg Oral At Bedtime     nortriptyline  25 mg Oral At Bedtime     nystatin  500,000 Units Swish & Swallow 4x Daily     pantoprazole  40 mg Oral QAM AC     piperacillin-tazobactam  3.375 g Intravenous Q6H     pseudoePHEDrine  120 mg Oral BID     sodium chloride (PF)  20 mL Chest Tube Q6H     sodium chloride (PF)  3 mL Intracatheter Q8H     Resident/Fellow Attestation   I was present with the medical student who participated in the service and in the documentation of the note.  I have verified the history and personally performed the physical exam and medical decision making.  I agree with the assessment and plan  of care as documented in the note.      Some drainage from empyema with lytic therapy, but XR reveals significant effusion remains. Concern this will not be evacuated without surgical intervention. Plan to repeat CT and consult thoracic surgery for definitive intervention.    Spoke with speech regarding ongoing chronic aspiration. Swallow today was similar to prior studies. We discussed no immediate repercussions of ongoing aspiration, but with chronic aspiration she will continue to be at high risk for complications such as pneumonias. Of note, even if she pursued feeding tube, would still be at risk of aspiration of normal saliva, which can be a source of infection. Higher risk with uncontrolled peridontal disease.     Rosa Elena Cheng, DO  PGY- 5  Date of Service (when I saw the patient): 07/13/22

## 2022-07-13 NOTE — PROGRESS NOTES
Resident/Fellow Attestation   I, Bryanna Camacho MD, was present with the medical/JYOTI student who participated in the service and in the documentation of the note.  I have verified the history and personally performed the physical exam and medical decision making.  I agree with the assessment and plan of care as documented in the note.      Bryanna Camacho MD  PGY2  Date of Service (when I saw the patient): 07/13/22    [facility]    Progress Note - Medicine Service, [facility] TEAM 5       Date of Admission:  7/7/2022    Assessment & Plan        Giovana Joaquin is a 42 year old female with history of multiple medical problems including past posterior fossa surgery for clival chordoma, velopharyngeal insufficiency s/p soft palate removal and ultimately uvulopalatopharyngoplasty (2013), now has chronic aspiration and was admitted on 7/7/2022 due to night seats, weight loss, cough, found to have left-sided empyema. She was transferred to [place] for intervention with IP vs IR, now s/p chest tube (07/09).      Today:   - Video Swallow Study today; plan to discuss results with patient and mom tomorrow prior to any diet changes, as the last changes met with some resistance.   - Chest tube in place, lytics BID  - Per pulm: repeat CT and thoracic surgery consult if no improvement with lytics   - Discontinued Zosyn today per antimicrobial stewardship input appreciate  - Started Amp-Sulbactam 3g q6h    - Nystatin and fluconazole for severe thrush   - continue Oxycodone 5-10 mg q4h       Left-sided empyema (Fusobacterium, Strep constellatus)  H/o chronic aspiration   Poor dentition   Velopharyngeal insufficiency s/p soft palate removal and uvulopalatopharyngoplasty (2013)  CT showing moderate left-sided pleural effusion with thick rind, in s/o several weeks of infectious s/s. C/f long-standing effusion c/w empyema based on pelural fluid cultures, in s/o chronic aspiration 2/2 multiple ENT  procedures. S/p chest tube (07/09); pleural fluid with selena pus, growing GPC, GPB and Fusobacterium awaiting speciation.   - Pulmonology consulted; appreciate recs              > Lytics BID x 3d              > Daily CXR with chest tube in place   > Repeat CT, thoracic surgery consult if no improvement with lytics  - IR consulted: Placed chest tube  - Chest tube management per IP  - Vancomycin stopped (MRSA nares negative); may restart if growing MRSA or worsening   - Cont Zosyn  - PTA cevimeline   - Speech consulted for evaluation of aspiration risk     Encephalopathy, likely 2/2 toxic vs. metabolic, resolved  Overnight 07/11, pt was difficult to rouse, minimally responsive to painful stimuli, triggered RRT. Received narcan x2, with rapid improvement in AMS both times, and minimal improvement with third narcan dose; VBG showed pCO2 elevated at 60. ~4 hours after third Narcan dose, pt difficult to arouse, opening eyes briefly to painful stimuli. PERRL, no miosis or mydriasis. Unclear etiology for encephalopathy at this time, likely combination of toxic (in s/o possible ingestion of unknown pills found in room), and metabolic (hypercapnia on VBG.) Known infectious source, but low c/f worsening or new infection (she is on Zosyn, afebrile and HDS, and WBC wnl). Physical exam and clinical picture most c/w benzodiazepine use, less likely opioid use/withdrawal. Hypercapnia improved on recheck. She was briefly transferred to intermediate care; ICU transfer was deferred as she was protecting airway throughout and HDS. Work-up unremarkable-- CT head unrevealing, EKG showed new RBBB, CXR stable, NGTD on BCx, APAP and salicylate levels wnl. Pharmacology evaluation of pills in her bag largely unrevealing. Briefly held her oxycodone PRN, but pending restarted given unrevealing work-up. Will CTM.       Severe Oropharyngeal thrush  Nausea  Dysphagia   Has had previously. Mild discomfort currently.   - Continue PO fluconazole  -  Started nystatin oral suspension for thrush  - Dental Hygiene consult  - Zofran, compazine prn      Chronic pain:  Follows with pain clinic. Has not been on Bup in months, per her report, will check .   - PTA oxycodone 5-10 mg q4h prn   - PTA gabapentin 600 mg TID  - PTA nortriptyline 25 mg at bedtime   - PTA baclofen 10mg TID     --Chronic problems--  Hypertension: Continue atenolol.  Depression, anxiety: Continue stratterra, levomilnacipran.  GERD: PTA PPI.     Diet: Snacks/Supplements Adult: Other; Please send ensure max protein with every lunch and dinner trays; With Meals  Mechanical/Dental Soft Diet    DVT Prophylaxis: Enoxaparin (Lovenox) SQ  [place] Catheter: Not present  Fluids: None  Central Lines: None  Cardiac Monitoring: None  Code Status: Full Code      Disposition Plan      Expected Discharge Date: 07/14/2022                The patient's care was discussed with the Attending Physician, Dr. Alcazar.    Monique Berger  Medical Student  Medicine Service, [address]  Securely message with the Vocera Web Console (learn more here)  Text page via [facility] Paging/Directory   Please see signed in provider for up to date coverage information      Clinically Significant Risk Factors Present on Admission                      ______________________________________________________________________    Interval History   NAEON. Nursing notes reviewed. Just awakened at time of exam, and pt was sleepy. No CP, pain remains unchanged, no abdominal discomfort. No other physical c/o.    Data reviewed today: I reviewed all medications, new labs and imaging results over the last 24 hours.   Physical Exam   Vital Signs: Temp: 97.4  F (36.3  C) Temp src: Axillary BP: 116/70 Pulse: 88   Resp: 20 SpO2: 100 % O2 Device: Nasal cannula Oxygen Delivery: 1 LPM  Weight: 212 lbs 11.2 oz  General Appearance: NAD. Sitting upright in bed, drinking sprite with her am meds.   HEENT:  NCAT. EOMI. Anicteric sclera. No conjunctival injection or periorbital edema.  Respiratory: Chest tube in place, draining serosanguinous fluid. No increased WOB. Clear to auscultation on R-side auscultating anteriorly and at midline. Diminished breath sounds of left lower lung fields.   Cardiovascular: RRR. Normal S1, S2. No m/r/g.   GI: BS+. NT/ND.   Skin: No warmth, erythema around chest tube; moderate tenderness to palpation. No other new lesions on limited skin exam.      Data   Recent Labs   Lab 07/13/22  0528 07/12/22  1856 07/12/22  0526 07/12/22  0443 07/12/22  0412 07/11/22  0604 07/09/22 2004 07/09/22  0617 07/07/22  1807 07/07/22  1806   WBC 4.6  --  5.9  --   --  5.5   < > 12.5*  --  15.8*   HGB 8.4*  --  9.5*  --   --  7.9*   < > 8.5*  --  10.3*   MCV 78  --  78  --   --  78   < > 78  --  75*     --  396  --   --  353   < > 429  --  523*     --  139  --   --  137   < > 138  --  135   POTASSIUM 3.6 3.7 3.2*  --   --  3.3*   < > 3.7  --  3.2*   CHLORIDE 104  --  101  --   --  101   < > 101  --  95   CO2 30*  --  29  --   --  30*   < > 32*  --  34*   BUN 4.2*  --  4.3*  --   --  3.7*   < > 5.7*  --  7   CR 0.51  --  0.52  --   --  0.54   < > 0.56   < > 0.60   ANIONGAP 6*  --  9  --   --  6*   < > 5*  --  6   MIROSLAVA 8.5*  --  9.0  --   --  8.5*   < > 8.8  --  9.2   GLC 74  --  81 76   < > 77   < > 90  --  101*   ALBUMIN  --   --   --   --   --   --   --   --   --  1.8*   PROTTOTAL  --   --   --   --   --   --   --  6.4  --  7.6   BILITOTAL  --   --   --   --   --   --   --   --   --  0.4   ALKPHOS  --   --   --   --   --   --   --   --   --  162*   ALT  --   --   --   --   --   --   --   --   --  21   AST  --   --   --   --   --   --   --   --   --  11    < > = values in this interval not displayed.

## 2022-07-14 NOTE — PLAN OF CARE
NURSING PROGRESS NOTE  Shift Summary        Date: July 14, 2022     Pertinent Updates/Shift Summary:  Patient slept between cares, no acute events overnight. Chest tube to -20 suction, alteplase administered per orders, output recorded.     Please see Nursing Flowsheets for full assessment details, I&Os, and VS.      Most Recent Vitals & Weight:   BP (!) 89/51 (BP Location: Right arm, Cuff Size: Adult Regular)   Pulse 76   Temp 97.3  F (36.3  C) (Axillary)   Resp 15   Ht 1.524 m (5')   Wt 96.5 kg (212 lb 11.2 oz)   SpO2 100%   BMI 41.54 kg/m       Wt Readings from Last 2 Encounters:   07/13/22 96.5 kg (212 lb 11.2 oz)   07/07/22 92 kg (202 lb 14.4 oz)          Plan:  Continue to monitor patient.         Page Funk RN  .................................................... July 14, 2022   5:38 AM  [facility] ([facility]): [place]  Stepdown ICU (Unit 6D)

## 2022-07-14 NOTE — PROGRESS NOTES
NURSING PROGRESS NOTE  Shift Summary      Date: July 13, 2022     Neuro/Musculoskeletal:  A&Ox4. At times slow to respond, per report is pts baseline  Cardiac:  SR.  VSS.     Respiratory:  Sating in the 90s on room air.  GI/:  Adequate urine output.   Diet/Appetite:  Tolerating mechanical soft diet. Pt with nausea today and did not eat. One emesis with zofran given x1. Pt drinking sprite per her request  Activity:  Assist of standby with help for chest tube. Moves slowly but is steady   Pain:  Complains of pain, mostly in area of chest tube with movement. Legs swollen which are uncomfortable. Compression stockings placed. Pt up to chair with legs elevated.  Skin:  No new deficits noted.   LDAs + Drips/IVF:    Protocols/Labs:      Pertinent Shift Updates:  Pt sleepy and more difficult to arouse this morning. As day progressed, more alert and interactive. SLP study this morning completed. Antibiotics given per MAR.     Alteplase instilled into chest tube at 1120 per pt request. Returned to suction at 1330. Irrigated easily with some bloody return.     Pt sitting in recliner for several hours this afternoon. Unclear if chest tube draining adequately, pt aware she may need to be readjusted if this is an issue. Night RN updated.         Jasmin Grijalva RN  .................................................... July 13, 2022   7:41 PM  [facility] ([facility]): [facility] ICU (Unit 6D)

## 2022-07-14 NOTE — PROGRESS NOTES
[facility]   Pulmonary   Progress Note  Date of Service: 07/14/2022  Giovana Joaquin MRN: [id number]          Assessment and Recommendations:   Assessment:  L empyema- fusobacterium nucleatum, Strep constellatus  Hx of chronic aspiration  Tobacco use-vaping, smoking medical marijuana  Periodontal disease     Pleural fluid with selena pus, growing fusobacterium and strep constellatus, consistent with aspiration and periodontal disease. Will continue to monitor susceptibilities. Ongoing output from chest tube over several days. Video swallow study concerning for chronic aspiration, unsurprising given patient's history and oral randy growing on effusion culture.     Recommendations:  -- continue tpa/dornase this evening (ordered for you)  -- Chest CT today and if no improvement in effusion consult thoracic surgery.  -- chest tube to water seal for patient comfort and better mobility.  -- Agree with Unasyn for abx  -- daily am CXR with chest tube in place   -- Defer PTA Nasacort to primary team    Thank you for allowing us to care for this patient.  We will continue to follow.  Please don't hesitate to page or call with any questions or concerns.    Patient seen & discussed w/  Dr. Uriarte, who agrees with the above assessment and plan.    Eric Montanez, MS-4  [facility] [employer]  07/14/2022 1:33 PM          Subjective / Interval History:   Nursing notes reviewed.   Patient reports no changes to overall pain or breathing. Patient does report that she would like her PTA nasal spray and is requesting her GI medications be switched to her PTA medication.           Objective:   Temp:  [96.8  F (36  C)-98.2  F (36.8  C)] 97.9  F (36.6  C)  Pulse:  [75-86] 79  Resp:  [15-20] 15  BP: ()/(51-72) 102/60  SpO2:  [92 %-100 %] 98 %  I/O last 3 completed shifts:  In: 630 [P.O.:400; I.V.:100; Other:130]  Out: 320 [Chest Tube:320]  Wt Readings from Last 1 Encounters:   07/13/22 96.5 kg (212 lb 11.2  oz)    Body mass index is 41.54 kg/m . Resp: 15    Exam:  Gen: in no acute distress, sitting in recliner  CV: RRR, no peripheral edema  Pulm: diminished to mid L lung posteriorly, no increased work of breathing  GI: soft, not distended  MSK: tender to palpation over L back  Neuro: speech quiet and a little difficult to understand, alert and oriented  Psych: calm, appropriate  Skin: No rashes or erythema around chest tube insertion site. Some pus noted on bandage at chest tube site.          Data:   Labs: reviewed in EMR  Imaging: reviewed in EMR and notable imaging listed below:        Medications:   Medications     acetaminophen  650 mg Oral Q4H     ampicillin-sulbactam (UNASYN) IV  3 g Intravenous Q6H     atenolol  25 mg Oral Daily     atomoxetine  80 mg Oral Daily     Baclofen  10 mg Oral TID     cevimeline  30 mg Oral TID     cyclobenzaprine  10 mg Oral TID     enoxaparin ANTICOAGULANT  40 mg Subcutaneous Q24H     fexofenadine  60 mg Oral Daily     fluconazole  200 mg Oral Daily     gabapentin  600 mg Oral TID     levomilnacipran  80 mg Oral Daily     lidocaine  1 patch Transdermal Q24h    And     lidocaine   Transdermal Q8H TOMER     mirabegron  50 mg Oral Daily     nabumetone  1,000 mg Oral At Bedtime     nortriptyline  25 mg Oral At Bedtime     nystatin  500,000 Units Swish & Swallow 4x Daily     pantoprazole  40 mg Oral QAM AC     pseudoePHEDrine  120 mg Oral BID     sodium chloride (PF)  20 mL Chest Tube Q6H     sodium chloride (PF)  3 mL Intracatheter Q8H     Resident/Fellow Attestation   I was present with the medical student who participated in the service and in the documentation of the note.  I have verified the history and personally performed the physical exam and medical decision making.  I agree with the assessment and plan of care as documented in the note.      CT obtained with significant improvement in empyema. Small rind left, with evidence of trapped lung/ex vacuo pneumothorax. Will continue  lytics for another dose, and monitor chest tube output. Hold off on CT surgery consult given dramatic improvement.    Rosa Elena Cheng MD  PGY 5  Date of Service (when I saw the patient): 07/14/22

## 2022-07-14 NOTE — PROGRESS NOTES
Resident/Fellow Attestation   I, Sarbjit Elena MD, was present with the medical/JYOTI student who participated in the service and in the documentation of the note.  I have verified the history and personally performed the physical exam and medical decision making.  I agree with the assessment and plan of care as documented in the note.      Sarbjit Elena MD  PGY2  Date of Service (when I saw the patient): 07/14/22    [facility]    Progress Note - Medicine Service, MAROON TEAM 5       Date of Admission:  7/7/2022    Giovana Joaquin is a 42 year old female with history of multiple medical problems including past posterior fossa surgery for clival chordoma, velopharyngeal insufficiency s/p soft palate removal and ultimately uvulopalatopharyngoplasty (2013), now has chronic aspiration and was admitted on 7/7/2022 due to night seats, weight loss, cough, found to have left-sided empyema. She was transferred to [place] for intervention with IP vs IR, now s/p chest tube (07/09).       Today:   - CT today - looks better  - Chest tube, lytics BID x3d (s/p 5 doses)  - Per pulm: another dose of lytics, no thoracic surgery consult   - Abx: unasyn 3g q6h    - Nystatin and fluconazole for severe thrush   - Pain: scheduled APAP, PRN ibuprofen + oxycodone 5-10 mg q4h      Left-sided empyema (Fusobacterium, Strep constellatus)  H/o chronic aspiration   Poor dentition   Velopharyngeal insufficiency s/p soft palate removal and uvulopalatopharyngoplasty (2013)  CT showing moderate left-sided pleural effusion with thick rind, in s/o several weeks of infectious s/s. C/f long-standing effusion c/w empyema based on pelural fluid cultures, in s/o chronic aspiration 2/2 multiple ENT procedures. S/p chest tube (07/09); pleural fluid with selena pus, growing Strep constellatus and Fusobacterium. Started on Zosyn, but de-escalated with speciation and sensitivities to unasyn.  Remains HDS, with decreasing WBC (wnl x4) and CRP. Diminishing chest tube output and minimal change on CXR. Will re-eval with CT and if no improvement, thoracic surgery consult for management.   - Pulmonology consulted; appreciate recs              > Lytics BID x 3d              > Daily CXR with chest tube in place              > Repeat CT, thoracic surgery consult  - IR consulted: Placed chest tube  - Chest tube management per IP  - Vancomycin stopped (MRSA nares negative); may restart if growing MRSA or worsening   - Abx: Unasyn 3g q6H (~2-4+ wks?)   - PTA cevimeline   - Speech consulted for evaluation of aspiration risk    Encephalopathy, likely 2/2 toxic vs. metabolic, resolved  Overnight 07/11, pt was difficult to rouse, minimally responsive to painful stimuli, triggered RRT. Received narcan x2, with rapid improvement in AMS both times, and minimal improvement with third narcan dose; VBG showed pCO2 elevated at 60. ~4 hours after third Narcan dose, pt difficult to arouse, opening eyes briefly to painful stimuli. PERRL, no miosis or mydriasis. Unclear etiology for encephalopathy at this time, likely combination of toxic (in s/o possible ingestion of unknown pills found in room), and metabolic (hypercapnia on VBG.) Known infectious source, but low c/f worsening or new infection (she is on Zosyn, afebrile and HDS, and WBC wnl). Physical exam and clinical picture most c/w benzodiazepine use, less likely opioid use/withdrawal. Hypercapnia improved on recheck. She was briefly transferred to intermediate care; ICU transfer was deferred as she was protecting airway throughout and HDS. Work-up unremarkable-- CT head unrevealing, EKG showed new RBBB, CXR stable, NGTD on BCx, APAP and salicylate levels wnl. Pharmacology evaluation of pills in her bag largely unrevealing. Briefly held her oxycodone PRN, but pending restarted given unrevealing work-up. Will CTM.      Severe Oropharyngeal thrush  Nausea  Dysphagia   Has had  previously. Mild discomfort currently.   - Continue PO fluconazole  - Started nystatin oral suspension for thrush  - Dental Hygiene consult  - Zonatalie compazine prn      Chronic pain  Follows with pain clinic. Has not been on Bup in months, per her report, will check .   - PTA oxycodone 5-10 mg q4h prn   - PTA gabapentin 600 mg TID  - PTA nortriptyline 25 mg at bedtime   - PTA baclofen 10mg TID     --Chronic problems--  Hypertension: Continue atenolol.  Depression, anxiety: Continue stratterra, levomilnacipran.  GERD: PTA PPI.     Diet: Snacks/Supplements Adult: Other; Please send ensure max protein with every lunch and dinner trays; With Meals  Mechanical/Dental Soft Diet    DVT Prophylaxis: Enoxaparin (Lovenox) SQ  [place] Catheter: Not present  Fluids: None  Central Lines: None  Cardiac Monitoring: None  Code Status: Full Code      Disposition Plan     Expected Discharge Date: 07/14/2022                The patient's care was discussed with the Attending Physician, Dr. Alcazar.    Monique Berger  Medical Student  Medicine Service, [address]  Securely message with the Vocera Web Console (learn more here)  Text page via [facility] Paging/Directory   Please see signed in provider for up to date coverage information      ______________________________________________________________________    Interval History   NAEON. Nursing notes reviewed. Has pain around chest tube site, worse with movement. Mouth continues to be very dry. No CP, SOB, abdominal pain, other physical c/o. Briefly discussed plans for CT today. Still pending discussion about alterations to her diet.     Data reviewed today: I reviewed all medications, new labs and imaging results over the last 24 hours. I personally reviewed the chest x-ray image(s) showing L basilar chest tube stable, minimal change in L-sided empyema, R-sided perihilar opacities unchanged from previous. .    Physical Exam    Vital Signs: Temp: 96.8  F (36  C) Temp src: Axillary BP: 99/63 Pulse: 80   Resp: 16 SpO2: 100 % O2 Device: None (Room air)    Weight: 212 lbs 11.2 oz  Gen:  NAD. Sleeping in bed.  HEENT: NCAT. EOMI. Anicteric sclera. No conjunctival injection or periorbital edema. Dry mucosal membranes, thrush in posterior pharynx appears improved.  Respiratory: Chest tube in place, draining serosanguinous fluid. No increased WOB. Clear to auscultation on R-side anteriorly and at midline. Diminished breath sounds at left lower lung fields.   Cardiovascular: RRR. Normal S1, S2. No m/r/g.   GI: BS+. NT/ND.   Skin: No warmth, erythema around chest tube; tender to palpation this am. No other new lesions on limited skin exam.    Data   Recent Labs   Lab 07/14/22  0750 07/13/22  0528 07/12/22  1856 07/12/22  0526 07/09/22 2004 07/09/22  0617 07/07/22  1807 07/07/22  1806   WBC 4.5 4.6  --  5.9   < > 12.5*  --  15.8*   HGB 8.5* 8.4*  --  9.5*   < > 8.5*  --  10.3*   MCV 76* 78  --  78   < > 78  --  75*    366  --  396   < > 429  --  523*    140  --  139   < > 138  --  135   POTASSIUM 3.5 3.6 3.7 3.2*   < > 3.7  --  3.2*   CHLORIDE 103 104  --  101   < > 101  --  95   CO2 29 30*  --  29   < > 32*  --  34*   BUN 4.3* 4.2*  --  4.3*   < > 5.7*  --  7   CR 0.55 0.51  --  0.52   < > 0.56   < > 0.60   ANIONGAP 8 6*  --  9   < > 5*  --  6   MIROSLAVA 8.6 8.5*  --  9.0   < > 8.8  --  9.2   GLC 79 74  --  81   < > 90  --  101*   ALBUMIN  --   --   --   --   --   --   --  1.8*   PROTTOTAL  --   --   --   --   --  6.4  --  7.6   BILITOTAL  --   --   --   --   --   --   --  0.4   ALKPHOS  --   --   --   --   --   --   --  162*   ALT  --   --   --   --   --   --   --  21   AST  --   --   --   --   --   --   --  11    < > = values in this interval not displayed.

## 2022-07-14 NOTE — PROGRESS NOTES
Addendum to Progress Notes dated 7/13/2022 , 7/12/2022   Additional problems       # Hypokalemia         Replenished with K+ as needed.       # Malnutrition, severe  # Hypoalbuminemia     - patient seen by Registered dietician during this hospitalization      - Ensure and other dietary supplementation as per RD     case discussed with attending Dr. Solomon.   Bryanna Camacho MD

## 2022-07-14 NOTE — CONSULTS
Care Management Initial Consult    General Information  Assessment completed with: Spouse or significant other, Desiree (girlfriend/s.o.)  Type of CM/SW Visit: Initial Assessment    Primary Care Provider verified and updated as needed: Yes   Readmission within the last 30 days: no previous admission in last 30 days      Reason for Consult: discharge planning, other (see comments) (elevated risk unplanned readmission)  Advance Care Planning: Advance Care Planning Reviewed: no concerns identified          Communication Assessment  Patient's communication style: spoken language (English or Bilingual)    Hearing Difficulty or Deaf: no   Wear Glasses or Blind: no    Cognitive  Cognitive/Neuro/Behavioral: WDL  Level of Consciousness: alert  Arousal Level: opens eyes spontaneously  Orientation: oriented x 4  Mood/Behavior: calm, cooperative  Best Language: 0 - No aphasia  Speech: slow, word-finding difficulty    Living Environment:   People in home: parent(s), significant other     Current living Arrangements:        Able to return to prior arrangements:         Family/Social Support:  Care provided by:    Provides care for:    Marital Status: Single             Description of Support System:           Current Resources:   Patient receiving home care services: No     Community Resources: None  Equipment currently used at home: none  Supplies currently used at home: None    Employment/Financial:  Employment Status: unemployed, disabled        Financial Concerns:             Lifestyle & Psychosocial Needs:  Social Determinants of Health     Tobacco Use: Medium Risk     Smoking Tobacco Use: Former Smoker     Smokeless Tobacco Use: Never Used   Alcohol Use: Not on file   Financial Resource Strain: Not on file   Food Insecurity: Not on file   Transportation Needs: Not on file   Physical Activity: Not on file   Stress: Not on file   Social Connections: Not on file   Intimate Partner Violence: Not At Risk     Fear of Current or  Ex-Partner: No     Emotionally Abused: No     Physically Abused: No     Sexually Abused: No   Depression: Not at risk     PHQ-2 Score: 1   Housing Stability: Not on file       Functional Status:  Prior to admission patient needed assistance:   Dependent ADLs:: Independent  Dependent IADLs:: Independent       Mental Health Status:          Chemical Dependency Status:                Values/Beliefs:  Spiritual, Cultural Beliefs, Mosque Practices, Values that affect care:                 Additional Information:  RNCC called patient to do case management assessment as patient has elevated risk unplanned readmission. RNCC reached patient's hilary magaña Terri by phone. Patient's mother also was in the background answering questions. RNCC verified patient insurance, address and PCP. Patient lives with her flavio Ramírez and mother Evangelina in a house in [place], [place]. Patient is independent at baseline but is not employed, is disabled. Patient is admitted with empyema of the lung, weight loss, UTI, and has had a chest tube placed. Patient is up with SBA, does not endorse discharge planning needs at this point. RNCC will continue to follow.    FLOR Victoria, BA, RN, CMSRN, RNCC  Covering Units 6D/OBS  Pager: [phone]  Phone: [phone]  6th floor Weekend/Holiday Pager: [phone]  Observation weekend/after hours: [phone]

## 2022-07-15 NOTE — DISCHARGE SUMMARY
[facility]  Discharge Summary - Medicine & Pediatrics       Date of Admission:  7/7/2022  Date of Discharge:  7/15/2022  Discharging Provider: Dr. 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 Love) on 8/1/22    Follow up with pulmonology in 6 weeks, including chest CT at that time.   Please call the pulmonology clinic to ensure these appointments have been   scheduled.    Appointments on [place] and/or [facility] (with [facility] or [facility]   provider or service). Call [phone] if you haven't heard regarding   these appointments within 7 days of discharge.             Unresulted Labs Ordered in the Past 30 Days of this Admission     Date and Time Order Name Status Description    7/12/2022  8:17 AM Blood Culture Hand, Right Preliminary     7/12/2022  8:17 AM Blood Culture Hand, Left Preliminary     7/9/2022 10:16 AM Anaerobic bacterial culture Preliminary       These results will be followed up by PCP/Pulmonology    Discharge Disposition   Discharged to home  Condition at discharge: Stable    Hospital Course   Giovana Joaquin is a 42 year old female with PMH of velopharyngeal insufficiency s/p soft palate removal and ultimately uvulopalatopharyngoplasty (2013), posterior fossa surgery for chordoma, chronic aspiration and was admitted on 7/7/2022 due to night sweats, weight loss, cough, and found to have left-sided empyema. Patient hemodynamically stable, feeling well prior to  discharge. Return precautions discussed prior to discharge. 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 CT showed moderate left-sided pleural effusion with thick rind, in s/o several weeks of infectious s/s. Pulmonology was consulted, and recommended a chest tube and lytics to help with chest tube output. She received a chest tube (07/09), which drained pleural fluid with selena pus growing Fusobacterium and Strep constellatus c/w aspiration and periodontal disease. She was initially placed on broad-spectrum antibiotics, which were de-escalated with speciation and sensitivities. Given her h/o velopharyngeal insufficiency and chronic aspiration, SLP was also consulted. A videoswallow study showed chronic moderate-to-severe dysphagia. Her cough and WBC improved significantly with antibiotics. Repeat CT showed significant improvement of empyema, and chest tube was removed. She was discharged with PO augmentin to complete a 4wk course, with close follow-up with PCP and repeat imaging and pulmonary follow-up in 6 weeks.   - Start PO Augmentin daily for 26 days (end 08/10/22).  - Repeat CT (08/26/2022) and pulmonology follow-up at that time  - Follow-up with speech therapy (07/17/2022)  - PTA cevimeline      Severe oropharyngeal thrush  Nausea  Dysphagia   Has had oropharyngeal thrush previously, in s/o chronic nausea and dysphagia. She completed a course of PO fluconazole while inpatient. Additionally started on oral suspension nystatin, with improvement in thrush.  - Continue nystatin oral suspension for thrush for 1 additional week     Encephalopathy, likely toxic metabolic, resolved  Overnight 07/11, pt was difficult to rouse, minimally  responsive to painful stimuli, triggered RRT. Received narcan x2, with rapid improvement in AMS both times, and minimal improvement with third narcan dose; VBG showed pCO2 elevated at 60. ~4 hours after third Narcan dose, pt difficult to arouse, opening eyes briefly to painful stimuli. Unclear etiology for encephalopathy, likely of toxic metabolic (in s/o possible ingestion of unknown pills found in room and resultant hypercapnia). Known infectious source, but low c/f worsening or new infection (she is on Zosyn, afebrile and HDS, and WBC wnl). Physical exam and clinical picture most c/w benzodiazepine use, less likely opioid use/withdrawal. Hypercapnia improved on recheck. She was briefly transferred to intermediate care; ICU transfer was deferred as she was protecting airway throughout and HDS. Work-up unremarkable-- CT head unrevealing, EKG showed new RBBB, CXR stable, NGTD on BCx, APAP and salicylate levels wnl. She did not have any repeat episodes of encephalopathy for the remainder of her hospital admission.      Chronic pain  Follows with pain clinic. Pt reports she has not been on Bup in months.   - PTA oxycodone 5-10 mg q4h prn   - PTA gabapentin 600 mg TID  - PTA nortriptyline 25 mg at bedtime   - PTA baclofen 10mg TID    Hypertension  - PTA atenolol.    Depression, anxiety  - PTA stratterra  - PTA Levomilnacipra  - Pt reports she is no longer taking brexpiprazole    GERD  - PTA PPI    Consultations This Hospital Stay   PULMONARY GENERAL ADULT IP CONSULT  INTERVENTIONAL RADIOLOGY ADULT/PEDS IP CONSULT  DENTAL HYGIENE IP ADULT CONSULT - UU  SPEECH LANGUAGE PATH ADULT IP CONSULT  NURSING TO CONSULT FOR VASCULAR ACCESS CARE IP CONSULT  NURSING TO CONSULT FOR VASCULAR ACCESS CARE IP CONSULT  NURSING TO CONSULT FOR VASCULAR ACCESS CARE IP CONSULT  CARE MANAGEMENT / SOCIAL WORK IP CONSULT  INTERVENTIONAL RADIOLOGY ADULT/PEDS IP CONSULT    Code Status   Full Code     The patient was discussed with Dr. Yeboah  [facility]  [address] [address]  Phone: [phone]  Fax: [phone]    Resident/Fellow Attestation   I, Tariq Garnica MD, was present with the medical/JYOTI student who participated in the service and in the documentation of the note.  I have verified the history and personally performed the physical exam and medical decision making.  I agree with the assessment and plan of care as documented in the note.      Patient staffed with attending, Dr. Alcazar.    Tariq Garnica MD  Medicine-Pediatrics, PGY4  Date of Service (when I saw the patient): 07/15/22  ______________________________________________________________________    Physical Exam   Vital Signs: Temp: 97.7  F (36.5  C) Temp src: Axillary BP: 114/62 Pulse: 89   Resp: 16 SpO2: 99 % O2 Device: None (Room air) Oxygen Delivery: 1 LPM  Weight: 214 lbs 11.2 oz  Gen:  Sitting up in chair, awake, alert, conversational, NAD  HEENT: NCAT. EOMI. Anicteric sclera. No conjunctival injection or periorbital edema. Dry mucosal membranes, thrush in posterior pharynx appears improved.  Respiratory:  No increased WOB in room air. Clear to auscultation on R-side anteriorly and at midline. Diminished breath sounds at left lower lung fields. Chest tube has been removed.  Cardiovascular: RRR. Normal S1, S2. No m/r/g.   GI: BS+. NT/ND.  Skin: No warmth, erythema around former chest tube site. No other new lesions on limited skin exam.       Primary Care Physician   Gloria Love    Discharge Orders      CT Chest w/o Contrast     Pulmonary Medicine Referral      Reason for your hospital stay    You were admitted for an infection in your left lung. You received a chest tube to help drain the infection, and were placed on antibiotics.     Please finish your antibiotic course, and follow-up with your primary care doctor to make sure you are continuing to improve. You will have a  chest CT in 6 weeks and a pulmonology follow-up at that time.     Activity    Your activity upon discharge: activity as tolerated     Adult [facility] Follow-up and recommended labs and tests    Follow up with primary care (Dr. Love) on 8/1/22    Follow up with pulmonology in 6 weeks, including chest CT at that time. Please call the pulmonology clinic to ensure these appointments have been scheduled.    Appointments on [place] and/or [facility] (with [facility] or [facility] provider or service). Call [phone] if you haven't heard regarding these appointments within 7 days of discharge.     Diet    Follow this diet upon discharge: Mechanical/Dental Soft Diet, small bites       Significant Results and Procedures   Most Recent 3 CBC's:Recent Labs   Lab Test 07/15/22  1120 07/14/22  0750 07/13/22  0528   WBC 5.7 4.5 4.6   HGB 9.4* 8.5* 8.4*   MCV 77* 76* 78    353 366     Most Recent 3 BMP's:Recent Labs   Lab Test 07/15/22  0651 07/14/22  0750 07/13/22  0528    140 140   POTASSIUM 3.6 3.5 3.6   CHLORIDE 102 103 104   CO2 31* 29 30*   BUN 4.2* 4.3* 4.2*   CR 0.58 0.55 0.51   ANIONGAP 6* 8 6*   MIROSLAVA 8.6 8.6 8.5*   GLC 81 79 74   ,   Results for orders placed or performed during the hospital encounter of 07/07/22   XR Chest 2 Views    Narrative    EXAM: XR CHEST 2 VW  LOCATION: [facility]  DATE/TIME: 7/7/2022 6:32 PM    INDICATION: difficulty swallowing  COMPARISON: 6/13/2018      Impression    IMPRESSION: Heart size and pulmonary vascularity normal. Extensive airspace consolidation within the left lower lobe. This could represent a left diaphragmatic hernia versus pneumonia, pleural fluid, atelectasis or mass. CT chest exam recommended for   further evaluation. The right lung is clear.   CT Chest w Contrast    Narrative    EXAM: CT CHEST W CONTRAST  LOCATION: [facility]  DATE/TIME: 7/7/2022 7:12 PM    INDICATION:  weight loss, vomiting nausea; LLL opacity  COMPARISON: None.  TECHNIQUE: CT chest with IV contrast. Multiplanar reformats were obtained. Dose reduction techniques were used.    CONTRAST: 99 mL Isovue 370    FINDINGS:   LUNGS AND PLEURA: There is an approximate 11 x 7 x 12 cm thick-walled enhancing fluid collection within the left hemithorax posteriorly. There is mass effect upon the left lower lobe which is nearly completely collapsed. Aerated portions of the left   upper lobe are clear. The right lung is clear. No pneumothorax.    MEDIASTINUM/AXILLAE: There are mildly enlarged AP window, lower paratracheal, and subcarinal lymph nodes. Pulmonary arteries are mildly dilated. In thoracic aorta within normal limits. Right atrium and ventricle appear mildly enlarged. No pericardial   effusion.     CORONARY ARTERY CALCIFICATION: None.    UPPER ABDOMEN: There is an incidental 1.5 cm right adrenal adenoma or possibly a benign myelolipoma.    MUSCULOSKELETAL: Unremarkable.      Impression    IMPRESSION:   1.  Moderate left empyema.  2.  Mild pulmonary arterial hypertension suspected.   IR Chest Tube Place Non Tunneled Left    Narrative    PROCEDURES 7/9/2022:  1. Ultrasound guided left chest tube placement.    Clinical History: Left empyema. Chest tube requested.    Comparisons: Chest CT 7/7/2022    Staff Radiologist: Anum Curran M.D., interventional radiology staff.  I, Lynda Curran, performed the entire procedure.    Medications:   1% lidocaine for local anesthesia, 15 mL    Nursing: The patient was placed on continuous monitoring. No  intravenous sedation administered. Vital signs monitored by nursing  staff under Interventional Radiologists supervision. The patient  remained stable throughout the procedure.    Contrast: No intravenous contrast administered.    PROCEDURE: The patient understood the limitations, alternatives, and  risks of the procedure and requested the procedure be performed. Both  written and oral  consent were obtained.    The patient was placed in the upright sitting position. Targeted  pre-procedural ultrasound loculated left pleural effusion with thick  rind, accessible via an inferior intercostal approach. The left back  was prepped and draped in the usual sterile fashion. Ten to one volume  mixture of 1% lidocaine without epinephrine was used for local  anesthesia.     Under ultrasound guidance, a 5 Burmese Cook ReInnervate centesis catheter  needle was advanced over the top of an inferior lateral left rib into  the inferior left pleural space. Needle removed. Permanent copy of the  image documenting catheter placement was entered into the patients  record. Thick, going purulent fluid was aspirated. Guidewire advanced  and coiled in pleural space, confirmed with ultrasound. Tract serially  dilated, and over the wire a 14 Bur[place]ese [facility] nonlocking pigtail  catheter was advanced. It was advanced into the basilar aspect of the  empyema. Good tube position confirmed with ultrasound, and image was  saved. Wire removed. Tube noted to be well functioning. 80 mL of  green, purulent fluid was removed, and a sample was sent for the  requested laboratory testing.    Tube secured with Ethilon suture, attached to Pleur-evac chamber with  intervening three-way stopcock, and sterile dressing applied.    No immediate complication.      Impression    IMPRESSION:   Ultrasound-guided placement of the 14 Burmese nonlocking pigtail chest  tube into a left empyema as detailed above. Tube is attached to  three-way stopcock and Pleur-evac chamber for flushes and alteplase  administration.    PLAN:  Tube management per interventional pulmonology. Drain alteplase and  flushes will be needed given fluid consistency.    JANETH COVARRUBIAS MD         SYSTEM ID:  [id number]   XR Chest Port 1 View    Narrative    EXAM: XR Chest 1 view 7/9/2022 7:49 PM      HISTORY: s/p left chest tube for empyema, having severe pain  post-procedure, r/o  pneumo or other complication.    COMPARISON: CT of the chest dated 7/7/2022.     TECHNIQUE: Frontal view of the chest.    FINDINGS: Left basilar chest tube in place.  Trachea is midline. Cardiac and mediastinal silhouette is partially  obscured. Dense left basilar pulmonary opacity. Stable appearance of  the left basilar empyema. No pneumothorax.      Impression    IMPRESSION:   1. Left basilar empyema with left chest tube in place.  2. No pneumothorax.    I have personally reviewed the examination and initial interpretation  and I agree with the findings.    BOBBY BOWSER MD         SYSTEM ID:  [id number]   XR Chest Port 1 View    Narrative    Chest one view portable    HISTORY: Empyema chest tube effusion    COMPARISON STUDY: 7/9/2022    FINDINGS: Cardiac silhouette is borderline enlarged. Left moderate  pleural effusion with basilar atelectasis and consolidation. Trace  hydropneumothorax component. Right lung is clear.      Impression    IMPRESSION: Left moderate pleural effusion with component of trace  hydropneumothorax with chest tube in place.    JORDY CHANDLER MD         SYSTEM ID:  [id number]   XR Chest Port 1 View    Narrative    EXAM: XR CHEST PORT 1 VIEW  7/11/2022 8:01 AM    HISTORY: 42 years Female empyema, eval chest tube and effusion.     COMPARISON: 7/10/2022.    TECHNIQUE: Portable AP view of the chest.    FINDINGS:   Stable left basilar chest tube position. Midline trachea. Stable  cardiomediastinal silhouette. Distinct pulmonary vasculature. Small  right pleural effusion. Stable left moderate pleural effusion. No  discernible pneumothorax. Unchanged lung volumes. Left basilar streaky  opacities, unchanged. Unremarkable upper abdomen. No acute or  suspicious osseous abnormalities. Unremarkable soft tissues.      Impression    IMPRESSION:   1.  Unchanged moderate left-sided pleural effusion with basilar  atelectasis/consolidation and chest tube in stable positioning.  2.  Small right-sided pleural  effusion.    I have personally reviewed the examination and initial interpretation  and I agree with the findings.    NINO WHITTAKER MD         SYSTEM ID:  [id number]   XR Chest Port 1 View    Narrative    EXAM: XR CHEST PORT 1 VIEW  7/12/2022 9:12 AM     HISTORY:  somnolence, lethargy, patient with aspiration risk       COMPARISON:  Chest x-ray 7/11/2022    FINDINGS: AP radiograph of the chest. Stable positioning and  appearance of the left basilar chest tube. Slightly decreased left  basilar empyema and slightly improved aeration to the left lower lung  field.    Stable cardiomediastinal silhouette. Increased perihilar interstitial  opacities. Trace left pneumothorax. Visualized upper abdomen is  unremarkable. No acute osseous abnormality.      Impression    IMPRESSION:  1. Increased perihilar interstitial opacities concerning for  increasing pulmonary edema versus atelectasis.  2. Improved aeration to the left lung base with slightly decreased  size of the left basilar empyema.  3. Stable positioning of the left basilar chest tube.    I have personally reviewed the examination and initial interpretation  and I agree with the findings.    JORDY CHANDLER MD         SYSTEM ID:  [id number]   CT Head w/o Contrast    Narrative    CT HEAD W/O CONTRAST 7/12/2022 1:51 PM    Provided History: lethargy, encephalopathy  ICD-10:    Comparison: Head CT 6/13/2018 and neck CT 2/11/2019    Technique: Using multidetector thin collimation helical acquisition  technique, axial, coronal and sagittal CT images from the skull base  to the vertex were obtained without intravenous contrast.     Findings:    No intracranial hemorrhage, mass effect, or midline shift. The  ventricles are proportionate to the cerebral sulci. The gray to white  matter differentiation of the cerebral hemispheres is preserved. The  basal cisterns are patent.    The visualized paranasal sinuses are clear. The mastoid air cells are  clear.    Redemonstration of  posterior cervical and occipital surgical fusion.  Grossly unchanged hypodense diffuse infiltrative appearance of the  clivus and odontoid process with retropulsion of the odontoid process  and severe stenosis of the craniocervical junction when correlated  with CT from 2019. No new calvarial fracture or lytic bone lesion       Impression    Impression: No acute intracranial pathology. Grossly stable  postsurgical changes of posterior fusion of occipital bone and  cervical spine, chronic hypodense appearance of the clivus/odontoid  process and retropulsion of the odontoid process causing significant  narrowing of the cervicomedullary junction.    KIM HALL MD         SYSTEM ID:  [id number]   XR Video Swallow with SLP or OT    Narrative    EXAMINATION: XR VIDEO SWALLOW WITH SLP OR OT  7/13/2022 9:56 AM      CLINICAL HISTORY: H/o aspiration    COMPARISON: Swallow study9/1/2021    PROCEDURE COMMENTS:   Fluoroscopy time: 3.4 low-dose pulsed  Contrast: The patient was fed barium in the following manner and  consistencies: Thin, mildly thickened, pudding and barium coated  cracker.  Patient position: Lateral view slightly recumbent from the upright  sitting position. AP views were obtained.    FINDINGS:  The oral preparatory and oral phase of swallowing were normal. There  was delayed initiation of swallowing. There was normal palatal  elevation and epiglottic deflection. Vestibular penetration did not  occur. There is consistent small volume silent tracheal aspiration  with thin and mildly thickened barium. No penetration or aspiration  with pudding and barium coated cracker.     Pharyngeal bar is present. The visualized esophagus showed no  obstruction or other obvious abnormality, although complete evaluation  of the esophagus was not performed.      There was moderate residual contrast in the oral cavity/pharynx with  each consistency barium.    On AP view, there is symmetric transit of the bolus through  the  pharynx. Mild dysmotility in the upper thoracic esophagus on limited  views.      Impression    IMPRESSION:    1. Consistent small volume silent aspiration with thin and mildly  thickened barium.  2. No penetration or aspiration with solid consistency barium.  3. Stasis of contrast to the upper thoracic esophagus, which can be  seen esophageal dysmotility. May consider further evaluation with  esophagram.  4. Please see the speech pathologist report for further details.    I, LIZ GUSTAFSON MD, attest that I was immediately available to  provide guidance and assistance during the entirety of the procedure.     I have personally reviewed the examination and initial interpretation  and I agree with the findings.    LIZ GUSTAFSON MD         SYSTEM ID:  [id number]   XR Chest Port 1 View    Narrative    EXAM: XR CHEST PORT 1 VIEW  7/13/2022 8:37 AM    HISTORY: 42 years Female empyema, eval chest tube and effusion.     COMPARISON: Chest radiograph 7/12/2022.    TECHNIQUE: Portable AP view of the chest.     FINDINGS:   Left basilar pigtail catheter appears stable in alignment.    Midline trachea. Stable cardiomediastinal silhouette with loss of the  left heart border and left hemidiaphragm. Right hemidiaphragm and  costophrenic angle are clear. Large left-sided pleural effusion, not  significantly changed compared to prior image, with associated streaky  linear opacities at the base of the visualized portion of the left  lung. Slightly decreased right sided perihilar opacities. No  discernible pneumothorax. Unremarkable upper abdomen. No acute or  suspicious osseous abnormalities. Unremarkable soft tissues.      Impression    IMPRESSION:   1.  Unchanged appearance of large left sided pleural effusion with  associated atelectasis. Stable alignment of left basilar chest tube.    I have personally reviewed the examination and initial interpretation  and I agree with the findings.    NINO WHITTAKER MD          SYSTEM ID:  [id number]   XR Chest Port 1 View    Narrative    EXAM: XR CHEST PORT 1 VIEW  7/14/2022 8:53 AM    HISTORY: 42 years Female with a hx of chronic aspiration found to have  empyema, eval chest tube and effusion.     COMPARISON: Chest radiograph 7/13/2022.    TECHNIQUE: Portable AP view of the chest.     FINDINGS:   Stable alignment left basilar pigtail catheter.    Midline trachea. Stable cardiomediastinal silhouette. Loss of the left  hemidiaphragm silhouette and costophrenic angle. Unchanged appearance  of large left-sided pleural effusion with associated streaky linear  opacities in the base of the visualized portion of the left lung.  Trace pneumothorax along the left lateral lung. Mild unchanged  interstitial perihilar opacities. Unremarkable upper abdomen. No acute  or suspicious osseous abnormalities. Unremarkable soft tissues.      Impression    IMPRESSION:   1.  Unchanged appearance of large left-sided pleural effusion with  associated atelectasis. Stable alignment of left basilar chest tube.    I have personally reviewed the examination and initial interpretation  and I agree with the findings.    JORDY CHANDLER MD         SYSTEM ID:  [id number]   CT Chest w/o Contrast    Narrative    EXAMINATION: Chest CT  7/14/2022 3:14 PM    CLINICAL HISTORY: L-sided empyema, s/p chest tube, minimal output  w/lytics    COMPARISON: Chest CT 7/17/2022.    TECHNIQUE: CT imaging obtained through the chest without contrast.  Axial, coronal, and sagittal reconstructions and axial MIP reformatted  images are reviewed.     FINDINGS:  Lungs: Resolved left sided empyema with left sided pigtail catheter in  place. Small residual air within the empyema cavity without drainable  fluid. Left lower lobe consolidation. Increased patchy ground glass  opacities in the right upper lobe. Mucus plugging with post  obstructive atelectasis in the right lower lobe. The trachea and  central airways are patent. No pneumothorax or pleural  effusion. No  focal airspace opacity. No suspicious pulmonary nodule.    Mediastinum: The visualized thyroid gland is unremarkable. The heart  size is within normal limits. Small pericardial effusion. The  ascending aorta and main pulmonary artery diameters are within normal  limits. Normal appearance and configuration of the great vessels off  of the aortic arch. Scattered prominent mediastinal lymph nodes,  likely reactive. No suspicious hilar or axillary nodes.    Bones and soft tissues: No suspicious bone findings. Scattered  degenerative change to the spine.    Upper Abdomen: Macroscopic fat [id number] mm right adrenal  myelolipoma versus adenoma, unchanged. No focal left adrenal nodule.  Remainder of the partly visualized upper abdomen is unremarkable.      Impression    IMPRESSION:   1. Resolved left sided empyema with left basilar pigtail catheter in  place. No drainable fluid component remains, only a small amount of  air within the prior empyema cavity.  2. Left lower lobe consolidation concerning for infection vs  atelectasis.  3. Increased patchy ground glass opacities in the right upper lobe  concerning for infection.  4. Borderline pericardial effusion.  5. Right adrenal myelolipoma versus adenoma.    I have personally reviewed the examination and initial interpretation  and I agree with the findings.    NINO WHITTAKER MD         SYSTEM ID:  [id number]   XR Chest Port 1 View    Narrative    EXAM: XR CHEST PORT 1 VIEW  7/15/2022 8:20 AM    HISTORY: 42 years Female empyema, eval chest tube and effusion.     COMPARISON: Chest radiograph 7/14/2022, chest CT 7/14/2022.    TECHNIQUE: Portable AP view of the chest.     FINDINGS:   Stable alignment of left basilar pigtail catheter.    Midline trachea. Stable cardiomediastinal silhouette. Normal right  hemidiaphragmatic silhouette and clear costophrenic angle. Loss of the  left hemidiaphragmatic silhouette and costophrenic angle with  opacification of the  left lower lung field, stable compared to prior  image. Similar streaky linear atelectasis in the base of the  visualized left lung.  Distinct pulmonary vasculature. No definite  pneumothorax.  Unremarkable upper abdomen. No acute or suspicious  osseous abnormalities. Unremarkable soft tissues.      Impression    IMPRESSION:   1.  Unchanged appearance of left lower lung field opacification, which  may represent consolidation, atelectasis, or pleural effusion.   2.  Stable alignment of left basilar chest tube.    I have personally reviewed the examination and initial interpretation  and I agree with the findings.    JORDY CHANDLER MD         SYSTEM ID:  [id number]       Discharge Medications   Current Discharge Medication List      START taking these medications    Details   amoxicillin-clavulanate (AUGMENTIN) 875-125 MG tablet Take 1 tablet by mouth 2 times daily for 26 days  Qty: 52 tablet, Refills: 0    Associated Diagnoses: Empyema lung (H)      nystatin (MYCOSTATIN) [id number] UNIT/ML suspension Swish and swallow 5 mLs (500,000 Units) in mouth 4 times daily for 7 days  Qty: 140 mL, Refills: 0    Associated Diagnoses: Thrush         CONTINUE these medications which have CHANGED    Details   gabapentin (NEURONTIN) 300 MG capsule Take 2 capsules (600 mg) by mouth 3 times daily  Qty: 1 capsule, Refills: 0         CONTINUE these medications which have NOT CHANGED    Details   atenolol (TENORMIN) 25 MG tablet Take 1 tablet (25 mg) by mouth daily . Patient needs to see primary provider for further refills.  Qty: 14 tablet, Refills: 0    Associated Diagnoses: Migraine      atomoxetine (STRATTERA) 60 MG capsule Take 80 mg by mouth daily       BACLOFEN PO Take 10 mg by mouth 3 times daily       cevimeline (EVOXAC) 30 MG capsule Take 1 capsule (30 mg) by mouth 3 times daily  Qty: 90 capsule, Refills: 3    Associated Diagnoses: Dry mouth; Chordoma (H)      Cholecalciferol (VITAMIN D) 2000 UNITS CAPS       cyclobenzaprine  (FLEXERIL) 10 MG tablet Take 1 tablet (10 mg) by mouth 3 times daily as needed  Qty: 20 tablet, Refills: 0      esomeprazole (NEXIUM) 40 MG capsule Take 1 capsule (40 mg) by mouth every morning (before breakfast) One hour before meals  Qty: 90 capsule, Refills: 3    Comments: Please fax prior auth  Associated Diagnoses: Gastroesophageal reflux disease without esophagitis      levomilnacipran (FETZIMA ER) 80 MG 24 hr capsule Take 80 mg by mouth daily    Associated Diagnoses: Tension headache; EDS (Castro-Danlos syndrome); Bleeds easily (H); Excessive or frequent menstruation; Screening for hyperlipidemia      MAGNESIUM OXIDE PO Take 500 mg by mouth daily    Associated Diagnoses: Normocytic anemia      mirabegron (MYRBETRIQ) 50 MG 24 hr tablet Take 1 tablet (50 mg) by mouth daily . Patient needs to see primary provider for further refills.  Qty: 30 tablet, Refills: 0    Comments: Due for appointment, please call [phone] to schedule.  Associated Diagnoses: Mixed stress and urge urinary incontinence; Dry mouth      nortriptyline (PAMELOR) 25 MG capsule Take 1 capsule (25 mg) by mouth At Bedtime Call clinic to schedule follow up appointment.  Qty: 90 capsule, Refills: 0    Associated Diagnoses: Insomnia, unspecified type      hydrOXYzine (ATARAX) 25 MG tablet       meclizine (ANTIVERT) 12.5 MG tablet TAKE 1 TABLET(12.5 MG) BY MOUTH THREE TIMES DAILY AS NEEDED FOR DIZZINESS  Qty: 15 tablet, Refills: 0    Comments: Please keep follow up appointment on 6/13/19.  Associated Diagnoses: Vertigo      medical cannabis (Patient's own supply) See Admin Instructions (The purpose of this order is to document that the patient reports taking medical cannabis.  This is not a prescription, and is not used to certify that the patient has a qualifying medical condition.)      Multiple Vitamins-Minerals (MULTIVITAL PO) Take by mouth daily      oxyCODONE-acetaminophen (PERCOCET) 5-325 MG per tablet Take 0.05 tablets by mouth 2 times  daily Takes 7.5 mg Q 4 hrs PRN      pseudoePHEDrine (SUDOGEST 12 HOUR) 120 MG 12 hr tablet TAKE 1 TABLET BY MOUTH EVERY 12 HOURS AS NEEDED FOR CONGESTION  Qty: 60 tablet, Refills: 2    Associated Diagnoses: Allergic state, sequela      SUMAtriptan (IMITREX) 100 MG tablet Take 1 tablet (100 mg) by mouth at onset of headache for migraine May repeat after one hour, max 200 mg in 24 hours  Qty: 12 tablet, Refills: 0    Comments: Patient needs to see primary provider for further refills.  Associated Diagnoses: Migraine without aura and without status migrainosus, not intractable      tretinoin (RETIN-A) 0.025 % external cream Apply small pea-sized amount to affected skin at bedtime, use sunscreen during day  Qty: 30 g, Refills: 1    Associated Diagnoses: Acne, unspecified acne type         STOP taking these medications       AFLURIA QUADRIVALENT 0.5 ML injection Comments:   Reason for Stopping:         brexpiprazole (REXULTI) 0.5 MG tablet Comments:   Reason for Stopping:         buprenorphine (BUTRANS) 10 MCG/HR WK patch Comments:   Reason for Stopping:         fluconazole (DIFLUCAN) 100 MG tablet Comments:   Reason for Stopping:         methylPREDNISolone (MEDROL) 4 MG tablet Comments:   Reason for Stopping:             Allergies   Allergies   Allergen Reactions     Cephalexin Other (See Comments), Hives and Rash     Vertigo   Vertigo        Zolpidem      Other reaction(s): Other (See Comments), Sleep Disturbances  Sleep walking  dizzy    Other reaction(s): Sleep walk     Ambien [Zolpidem Tartrate] Other (See Comments)     Sleep walking     Amoxicillin Nausea     Stomach pain     Amoxicillin-Pot Clavulanate Other (See Comments)     Stomach pains     Aspartame      Levaquin [Levofloxacin]      Has vertigo     Cephalosporins Dizziness     Other reaction(s): Vestibular Toxicity  Per patient statement not true allergy.  Does not recall this being an issue.        Morphine Itching and Rash     Not effective, and itching  (side effect), per patient statement.

## 2022-07-15 NOTE — PROGRESS NOTES
[facility]   Pulmonary   Progress Note  Date of Service: 07/15/2022  Giovana Joaquin MRN: [id number]          Assessment and Recommendations:   Assessment:  L empyema- fusobacterium nucleatum, Strep constellatus  Hx of chronic aspiration  Tobacco use-vaping, smoking medical marijuana  Periodontal disease     Clinically feeling well. CT with improved empyema, now clear LLL PNA visible, scattered GGOs. Chest tube removed at bedside, recommend prolonged antibiotic course for empyema, and repeat CT chest in 6 weeks. She is at high risk of further pneumonias due to aspiration, and minimizing aspiration risk with avoidance of sedating medications, working with SLP, and good oral hygiene will be important.     Recommendations:  -- augmentin to complete 4 week course  -- repeat CT scan in 6 weeks  -- good oral hygiene, steps to avoid aspiration will be important moving forward- working with speech, avoiding opioids    Thank you for allowing us to care for this patient.  We will sign off. Please don't hesitate to page or call with any questions or concerns.    Patient seen & discussed w/  Dr. Uriarte, who agrees with the above assessment and plan.    Rosa Elena Cheng DO  Pulmonary/critical care fellow  07/15/22 10:32 AM          Subjective / Interval History:   No new respiratory concerns. Excited to get chest tube removed.          Objective:   Temp:  [97.3  F (36.3  C)-97.9  F (36.6  C)] 97.7  F (36.5  C)  Pulse:  [79-89] 89  Resp:  [14-16] 16  BP: ()/(46-62) 114/62  SpO2:  [95 %-99 %] 99 %  I/O last 3 completed shifts:  In: 710 [P.O.:400; I.V.:200; Other:110]  Out: 170 [Chest Tube:170]  Wt Readings from Last 1 Encounters:   07/14/22 97.4 kg (214 lb 11.2 oz)    Body mass index is 41.93 kg/m . Resp: 16    Exam:  Gen: in no acute distress, sitting upright.  CV: RRR, no peripheral edema  Pulm: no increased WOB  MSK: minimally tender to palpation over L back  Neuro: speech quiet and a little difficult to  understand, alert and oriented  Psych: calm, appropriate  Skin: No rashes or erythema around chest tube insertion site.          Data:   Labs: reviewed in EMR  Imaging: reviewed in EMR and notable imaging listed below:  IMPRESSION:   1. Resolved left sided empyema with left basilar pigtail catheter in  place. No drainable fluid component remains, only a small amount of  air within the prior empyema cavity.  2. Left lower lobe consolidation concerning for infection vs  atelectasis.  3. Increased patchy ground glass opacities in the right upper lobe  concerning for infection.  4. Borderline pericardial effusion.  5. Right adrenal myelolipoma versus adenoma.          Medications:   Medications     acetaminophen  650 mg Oral Q4H     alteplase (ACTIVASE) 10 mg and dornase 5 mg in NS syringe for chest tube instillation  50 mL Chest Tube BID     ampicillin-sulbactam (UNASYN) IV  3 g Intravenous Q6H     atenolol  25 mg Oral Daily     atomoxetine  80 mg Oral Daily     Baclofen  10 mg Oral TID     cevimeline  30 mg Oral TID     cyclobenzaprine  10 mg Oral TID     enoxaparin ANTICOAGULANT  40 mg Subcutaneous Q24H     fexofenadine  60 mg Oral Daily     gabapentin  600 mg Oral TID     levomilnacipran  80 mg Oral Daily     lidocaine  1 patch Transdermal Q24h    And     lidocaine   Transdermal Q8H TOMER     mirabegron  50 mg Oral Daily     nabumetone  1,000 mg Oral At Bedtime     nortriptyline  25 mg Oral At Bedtime     nystatin  500,000 Units Swish & Swallow 4x Daily     pantoprazole  40 mg Oral QAM AC     pseudoePHEDrine  120 mg Oral BID     sodium chloride (PF)  20 mL Chest Tube Q6H     sodium chloride (PF)  3 mL Intracatheter Q8H

## 2022-07-15 NOTE — PROGRESS NOTES
CLINICAL NUTRITION SERVICES - REASSESSMENT NOTE     Nutrition Prescription    RECOMMENDATIONS FOR MDs/PROVIDERS TO ORDER:  - Continue diet per primary team and SLP  - Consider ordering multivitamin given poor PO intake     Malnutrition Status:    Severe malnutrition in the context of chronic illness    Recommendations already ordered by Registered Dietitian (RD):  Change supplement to Vital Cuisine 500 Shake (chocolate) BID. Each shake provides 520 kcal and 22 g PRO.     Future/Additional Recommendations:  Monitor PO intake      EVALUATION OF THE PROGRESS TOWARD GOALS   Diet: Mechanical/Dental Soft   Ensure Max Protein BID  Intake: Minimal intake (0-25% of meals) since admit.      NEW FINDINGS   Weight: 97.4 kg on 7/14, wt stable since admit. History of 12.5% weight loss in 6 months.     Labs: Reviewed     Meds: Reviewed     Respiratory: High risk of aspiration per pulmonary note    Enteral Nutrition: Pt declines FT placement at this time     MALNUTRITION  % Intake: </= 50% for >/= 5 days (severe)  % Weight Loss: > 10% in 6 months (severe)  Subcutaneous Fat Loss: None observed  Muscle Loss: None observed, difficult to assess with weight status   Fluid Accumulation/Edema: Mild  Malnutrition Diagnosis: Severe malnutrition in the context of chronic illness    Previous Goals   Patient to consume % of nutritionally adequate meal trays TID, or the equivalent with supplements/snacks.  Evaluation: Not met    Previous Nutrition Diagnosis  Inadequate oral intake related to decrease appetite as evidenced by 0-25% meal intake since admit   Evaluation: No change    CURRENT NUTRITION DIAGNOSIS  Inadequate oral intake related to decrease appetite as evidenced by 0-25% meal intake since admit     INTERVENTIONS  Implementation  Nutrition Education: Discussed current PO intake and using nutrition supplements to meet nutrition needs. Encouraged pt to drink at least 2 Vital Cuisine shakes per day + additional snack as able.    Medical food supplement therapy: See above     Goals  Patient to consume % of nutritionally adequate meal trays TID, or the equivalent with supplements/snacks.    Monitoring/Evaluation  Progress toward goals will be monitored and evaluated per protocol.    Aundrea Villagomez RD, LD  6D RD pager [phone]  Weekend/ED RD pager [phone]

## 2022-07-15 NOTE — PROGRESS NOTES
NURSING PROGRESS NOTE  Shift Summary      Date: July 14, 2022     Neuro/Musculoskeletal:  A&Ox4  Cardiac:  SR.  VSS.     Respiratory:  Sating in the 90s on room air during day.  GI/:  Adequate urine output.  LBM: 7/14 several loose stools  Diet/Appetite:  Tolerating soft diet but not ordering food. Drinking sprite only  Activity:  Assist of standby with chest tube   Pain:  10mg oxy PRN plus scheduled meds given per MAR  Skin:  No new deficits noted.   LDAs + Drips/IVF:  Antibiotics given  Protocols/Labs:      Pertinent Shift Updates:  Pt to Chest CT today to monitor improvement. Lytics reordered for 2000, pt aware. Pt up to chair for much of day. Compression sock applied/removed 2x by pt. Improved swelling but still +2 to knees. Pt up walking in room more today. Reports feeling better. Family at bedside throughout day.      Jasmin Grijalva RN  .................................................... July 14, 2022   7:49 PM  [facility] ([facility]): [place]  Stepdown ICU (Unit 6D)

## 2022-07-15 NOTE — PLAN OF CARE
NURSING PROGRESS NOTE  Shift Summary        Date: July 15, 2022     Pertinent Updates/Shift Summary:  Patient slept between cares, no acute events overnight. Scheduled tylenol given for pain. Chest tube to water seal, output recorded.     Please see Nursing Flowsheets for full assessment details, I&Os, and VS.      Most Recent Vitals & Weight:   /60 (BP Location: Left arm, Cuff Size: Adult Regular)   Pulse 79   Temp 97.3  F (36.3  C) (Axillary)   Resp 14   Ht 1.524 m (5')   Wt 97.4 kg (214 lb 11.2 oz)   SpO2 99%   BMI 41.93 kg/m       Wt Readings from Last 2 Encounters:   07/14/22 97.4 kg (214 lb 11.2 oz)   07/07/22 92 kg (202 lb 14.4 oz)          Plan:  Continue to monitor patient.         Page Funk RN  .................................................... July 15, 2022   6:11 AM  [facility] [place]  [facility]

## 2022-07-15 NOTE — PLAN OF CARE
NURSING PROGRESS NOTE   Discharge Note      DISCHARGE DATE: 7/15/2022      Discharged to:  Home with mother and girlfriend  Via:  Private car   Accompanied by:  Hospital staff  Discharge Instructions:  AVS was reviewed with and given to patient, her mother and girlfriend.  See AVS for specific instructions.  Prescriptions:  Filled at [facility] pharmacy in [place]. Home medication pink sheets returned to pt with instructions on how to  home medications from discharge pharmacy.  Medication list and changes reviewed and sent with patient.  Follow Up Appointments: Outlined in AVS, pt to schedule. Pt needs to call pulmonary clinic to arrange an appointment and CT in 6 weeks. Family aware and will make own appointment.   Belongings: Sent with patient.  IV:  Removed prior to discharge.  Telemetry:  Removed prior to discharge.    Pt exhibits understanding of above discharge instructions; questions answered.    Discharge Paperwork: AVS form signed, and AVS packet sent home with the patient.      Jasmin Grijalva RN .................................................... July 15, 2022   3:21 PM  [facility] ([facility]): [facility] ICU (Unit 6D)       Goal Outcome Evaluation:    Problem: Fatigue (Oncology Care)  Goal: Improved Activity Tolerance  Intervention: Promote Improved Energy  Recent Flowsheet Documentation  Taken 7/15/2022 1200 by Jasmin Grijalva RN  Activity Management: activity adjusted per tolerance  Taken 7/15/2022 0800 by Jasmin Griajlva RN  Activity Management: activity adjusted per tolerance     Problem: Pain Acute (Oncology Care)  Goal: Optimal Pain Control  Intervention: Prevent or Manage Pain  Recent Flowsheet Documentation  Taken 7/15/2022 1200 by Jasmin Grijalva RN  Medication Review/Management: medications reviewed  Taken 7/15/2022 0800 by Jasmin Grijalva RN  Medication Review/Management: medications reviewed     Problem: Fatigue  Goal:  Improved Activity Tolerance  Intervention: Promote Improved Energy  Recent Flowsheet Documentation  Taken 7/15/2022 1200 by Jasmin Grijalva, RN  Activity Management: activity adjusted per tolerance  Taken 7/15/2022 0800 by Jasmin Grijalva, RN  Activity Management: activity adjusted per tolerance     Pt medically ready for discharge.

## 2022-07-15 NOTE — PLAN OF CARE
Goal Outcome Evaluation:    Plan of Care Reviewed With: patient     Overall Patient Progress: no change    Problem: Oral Intake Altered (Oncology Care)  Goal: Optimal Oral Intake  Outcome: Ongoing, Not Progressing  Intervention: Minimize and Manage Barriers to Oral Intake  Recent Flowsheet Documentation  Taken 7/15/2022 1105 by Aundrea Villagomez RD  Oral Nutrition Promotion: calorie-dense liquids provided     Problem: Oral Intake Inadequate  Goal: Improved Oral Intake  Outcome: Ongoing, Not Progressing  Intervention: Promote and Optimize Oral Intake  Recent Flowsheet Documentation  Taken 7/15/2022 1105 by Aundrea Villagomez RD  Oral Nutrition Promotion: calorie-dense liquids provided

## 2022-07-16 NOTE — CARE PLAN
Speech Language Therapy Discharge Summary    Reason for therapy discharge:    Discharged to home with outpatient therapy.    Progress towards therapy goal(s). See goals on Care Plan in [facility] electronic health record for goal details.  Goals partially met.  Barriers to achieving goals:   discharge from facility.    Therapy recommendation(s):    Continued therapy is recommended.  Rationale/Recommendations:  for ongoing dysphagia follow up and treatment.     Pt discharged on soft and bite-sized diet with thin liquids and use of specific swallowing strategies and aspiration precautions. Pt will need to f/u in ENT clinic re: dilation.

## 2022-07-17 NOTE — PROGRESS NOTES
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     Poor dentition    Chronic pain    Hypertension    Depression    Anxiety  Discharge:   Discharge Date: 07/15/22  Discharge Diagnosis:   L-sided empyema 2/2 Strep constellatus and Fusobacterium    Toxic metabolic encephalopathy    Severe oropharyngeal thrush    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 Patient hemodynamically stable, feeling well prior to discharge. Return precautions discussed prior to discharge    ASSESSMENT      Enrollment  Primary Care Care Coordination Status: Not a Candidate    Discharge Assessment  How are you doing now that you are home?: doing well  How are your symptoms? (Red Flag symptoms escalate to triage hotline per guidelines): Unchanged  Do you feel your condition is stable enough to be safe at home until your provider visit?: Yes  Does the patient have their discharge instructions? : Yes  Does the patient have questions regarding their discharge instructions? : No  Were you started on any new medications or were there  changes to any of your previous medications? : Yes  Does the patient have all of their medications?: Yes  Do you have questions regarding any of your medications? : No  Do you have all of your needed medical supplies or equipment (DME)?  (i.e. oxygen tank, CPAP, cane, etc.): Yes  Discharge follow-up appointment scheduled within 14 calendar days? : Yes  Discharge Follow Up Appointment Date: 08/01/22  Discharge Follow Up Appointment Scheduled with?: Specialty Care Provider    Post-op (W CTA Only)  If the patient had a surgery or procedure, do they have any questions for a nurse?: No         PLAN                                                      Outpatient Plan:      Follow-ups Needed After Discharge     Follow-up Appointments     Adult [facility] Follow-up and recommended labs and tests      Follow up with primary care (Dr. Love) on 8/1/22     Follow up with pulmonology in 6 weeks, including chest CT at that time.   Please call the pulmonology clinic to ensure these appointments have been   scheduled.     Appointments on [place] and/or [facility] (with [facility] or [facility]   provider or service). Call [phone] if you haven't heard regarding   these appointments within 7 days of discharge.       Future Appointments   Date Time Provider Department Center   8/1/2022 11:00 AM Gloria Love MD [facility]         For any urgent concerns, please contact our 24 hour nurse triage line: [phone] ([phone])       JOSELIN Guy  [phone]  [facility]

## 2022-07-23 NOTE — PROGRESS NOTES
Addendum to Discharge Summary dated 7/15/22    Additional discharge diagnosis for discharge summary:    #Acute Respiratory Failure with Hypoxia and Hypercapnia, resolved  Improved with treatment of empyema, including antibiotics (continued at discharge) and chest tube (removed prior to discharge). Patient stable in room air prior to discharge.    Patient and plan of care discussed with attending, Dr. Alcazar, on day of discharge.    Tariq Garnica MD  Medicine-Pediatrics, PGY4  [facility]

## 2022-07-29 ENCOUNTER — TELEPHONE (OUTPATIENT)
Dept: PULMONOLOGY | Facility: CLINIC | Age: 42
End: 2022-07-29

## 2022-07-29 ENCOUNTER — LAB REQUISITION (OUTPATIENT)
Dept: LAB | Facility: CLINIC | Age: 42
End: 2022-07-29

## 2022-07-29 NOTE — TELEPHONE ENCOUNTER
Called patient per in-basket message from call center to schedule 6 week hospital follow up in the Pulmonology clinic. Patient did not answer and patient's voicemail box is full and therefore I could not leave a voicemail message.

## 2022-07-31 LAB
BACTERIA BLD CULT: ABNORMAL
BACTERIA BLD CULT: ABNORMAL
BACTERIA TISS BX CULT: ABNORMAL

## 2022-08-01 ENCOUNTER — TELEPHONE (OUTPATIENT)
Dept: INTERNAL MEDICINE | Facility: CLINIC | Age: 42
End: 2022-08-01

## 2022-08-01 NOTE — TELEPHONE ENCOUNTER
[facility] Call Center    Phone Message    May a detailed message be left on voicemail: yes     Reason for Call: Anne calling about the death certificate that needs to be  signed in [facility]. They need this done today. This was sent to her over 3-4 days ago    Action Taken: Message routed to:  [facility] ([facility] P[facility]    Travel Screening: Not Applicable

## 2022-08-05 LAB
BACTERIA TISS BX CULT: ABNORMAL
BACTERIA TISS BX CULT: ABNORMAL

## 2022-08-26 LAB — BACTERIA TISS BX CULT: ABNORMAL

## 2022-09-09 LAB
PATH FINDINGS: NORMAL
PATH REPORT.COMMENTS IMP SPEC: NORMAL
PATH REPORT.FINAL DX SPEC: NORMAL
PATH REPORT.FINAL DX SPEC: NORMAL
PATH REPORT.GROSS SPEC: NORMAL
PATH REPORT.MICROSCOPIC SPEC OTHER STN: NORMAL
PATH REPORT.RELEVANT HX SPEC: NORMAL
PATH REPORT.SITE OF ORIGIN SPEC: NORMAL
PATH REPORT.SITE OF ORIGIN SPEC: NORMAL

## 2022-09-12 LAB — SCANNED LAB RESULT: NORMAL

## 2022-09-27 LAB
Lab: 1299
PERFORMING LABORATORY: ABNORMAL
SPECIMEN STATUS: ABNORMAL
TEST NAME: ABNORMAL

## 2022-10-03 ENCOUNTER — TELEPHONE (OUTPATIENT)
Dept: OTOLARYNGOLOGY | Facility: CLINIC | Age: 42
End: 2022-10-03

## 2022-10-03 NOTE — TELEPHONE ENCOUNTER
Is looking for autopsy report. It has been over 30 days. Will ask Dr. Lowe if there any connections.     Mayi Olivares LPN

## 2022-10-05 NOTE — TELEPHONE ENCOUNTER
"\"She should contact the Department of Pathology but they will direct her [facility]. [email or web address] per Dr. Lowe    Left detailed msg with above info.    Mayi Olivares LPN    "

## 2023-10-15 NOTE — PROGRESS NOTES
HISTORY OF PRESENT ILLNESS:  Giovana Joaquin returns to clinic for routine evaluation of her bilateral eustachian tube dysfunction.  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  She describes a mild hearing loss bilaterally.      AUDIOGRAM:  An audiogram was ordered previously and it shows that she has stable hearing with a conductive loss bilaterally.    PHYSICAL EXAMINATION:  Examination today shows both tympanic membranes with the previous ear tubes extruded.  There was no air fluid on either side and she describes her hearing as adequate.    ASSESSMENT:  Bilateral eustachian tube dysfunction, status post ear tube placement, now with posterior closed tympanic membranes.    PLAN:  Continue to monitor as needed.      
(E4) spontaneous

## 2025-01-21 NOTE — TELEPHONE ENCOUNTER
GABRIELLA with message from Audiology       Patient is inquiring if they need a hearing test. Please call to confirm with patient that Dr Lowe did request the test and that the hearing test can help to determine any issues with the new tubes. The patient can reach out to Dr Lowe and his team with any other questions.       Gave call center number   [phone]

## (undated) RX ORDER — LIDOCAINE HYDROCHLORIDE 10 MG/ML
INJECTION, SOLUTION EPIDURAL; INFILTRATION; INTRACAUDAL; PERINEURAL
Status: DISPENSED
Start: 2022-01-01

## (undated) RX ORDER — LIDOCAINE HYDROCHLORIDE 20 MG/ML
SOLUTION OROPHARYNGEAL
Status: DISPENSED
Start: 2021-01-01